# Patient Record
Sex: FEMALE | Race: WHITE | NOT HISPANIC OR LATINO | Employment: OTHER | URBAN - METROPOLITAN AREA
[De-identification: names, ages, dates, MRNs, and addresses within clinical notes are randomized per-mention and may not be internally consistent; named-entity substitution may affect disease eponyms.]

---

## 2021-04-21 NOTE — PATIENT INSTRUCTIONS
Preventive Health Recommendations    See your health care provider every year to    Review health changes.     Discuss preventive care.      Review your medicines if your doctor has prescribed any.      You no longer need a yearly Pap test unless you've had an abnormal Pap test in the past 10 years. If you have vaginal symptoms, such as bleeding or discharge, be sure to talk with your provider about a Pap test.      Every 1 to 2 years, have a mammogram.  If you are over 69, talk with your health care provider about whether or not you want to continue having screening mammograms.      Every 10 years, have a colonoscopy. Or, have a yearly FIT test (stool test). These exams will check for colon cancer.       Have a cholesterol test every 5 years, or more often if your doctor advises it.       Have a diabetes test (fasting glucose) every three years. If you are at risk for diabetes, you should have this test more often.       At age 65, have a bone density scan (DEXA) to check for osteoporosis (brittle bone disease).    Shots:    Get a flu shot each year.    Get a tetanus shot every 10 years.    Talk to your doctor about your pneumonia vaccines. There are now two you should receive - Pneumovax (PPSV 23) and Prevnar (PCV 13).    Talk to your pharmacist about the shingles vaccine.    Talk to your doctor about the hepatitis B vaccine.    Nutrition:     Eat at least 5 servings of fruits and vegetables each day.      Eat whole-grain bread, whole-wheat pasta and brown rice instead of white grains and rice.      Get adequate about Calcium and Vitamin D.     Lifestyle    Exercise at least 150 minutes a week (30 minutes a day, 5 days a week). This will help you control your weight and prevent disease.      Limit alcohol to one drink per day.      No smoking.       Wear sunscreen to prevent skin cancer.       See your dentist twice a year for an exam and cleaning.      See your eye doctor every 1 to 2 years to screen for  conditions such as glaucoma, macular degeneration, cataracts, etc.    Personalized Prevention Plan  You are due for the preventive services outlined below.  Your care team is available to assist you in scheduling these services.  If you have already completed any of these items, please share that information with your care team to update in your medical record.    There are no preventive care reminders to display for this patient.   Assessment & Plan   Problem List Items Addressed This Visit        Endocrine    Other hyperlipidemia    Relevant Medications    simvastatin (ZOCOR) 40 MG tablet    Other Relevant Orders    Lipid Panel (BFP)    VENOUS COLLECTION (Completed)       Circulatory    Essential hypertension - Primary    Relevant Medications    amLODIPine (NORVASC) 5 MG tablet    doxazosin (CARDURA) 2 MG tablet    hydrochlorothiazide (HYDRODIURIL) 25 MG tablet    Other Relevant Orders    Comprehensive Metobolic Panel (BFP)       Behavioral    Personal history of tobacco use, presenting hazards to health       Other    Memory changes    Relevant Medications    donepezil (ARICEPT) 10 MG tablet    Other Relevant Orders    NEUROLOGY ADULT REFERRAL         1. Essential hypertension  Controlled, check labs, continue medications.  - amLODIPine (NORVASC) 5 MG tablet; Take 1 tablet (5 mg) by mouth daily  Dispense: 90 tablet; Refill: 1  - doxazosin (CARDURA) 2 MG tablet; Take 1 tablet (2 mg) by mouth At Bedtime  Dispense: 90 tablet; Refill: 1  - hydrochlorothiazide (HYDRODIURIL) 25 MG tablet; Take 1 tablet (25 mg) by mouth daily  Dispense: 90 tablet; Refill: 1  - Comprehensive Metobolic Panel (BFP)    2. Other hyperlipidemia  Check labs, continue medications.  - Lipid Panel (BFP)  - simvastatin (ZOCOR) 40 MG tablet; Take 1 tablet (40 mg) by mouth At Bedtime  Dispense: 90 tablet; Refill: 1  - VENOUS COLLECTION    3. Memory changes  Refilled aricept, referral to follow with neurology.  - donepezil (ARICEPT) 10 MG tablet;  Take 1 tablet (10 mg) by mouth At Bedtime  Dispense: 90 tablet; Refill: 0  - NEUROLOGY ADULT REFERRAL    4. Personal history of tobacco use, presenting hazards to health  Quit 6-7 years ago             FUTURE APPOINTMENTS:       - Follow-up visit in 3-6 months    No follow-ups on file.    Tatianna Swain MD  [facility]

## 2021-04-21 NOTE — PROGRESS NOTES
Assessment & Plan   Problem List Items Addressed This Visit        Endocrine    Other hyperlipidemia    Relevant Medications    simvastatin (ZOCOR) 40 MG tablet    Other Relevant Orders    Lipid Panel (BFP)    VENOUS COLLECTION (Completed)       Circulatory    Essential hypertension - Primary    Relevant Medications    amLODIPine (NORVASC) 5 MG tablet    doxazosin (CARDURA) 2 MG tablet    hydrochlorothiazide (HYDRODIURIL) 25 MG tablet    Other Relevant Orders    Comprehensive Metobolic Panel (BFP)       Behavioral    Personal history of tobacco use, presenting hazards to health       Other    Memory changes    Relevant Medications    donepezil (ARICEPT) 10 MG tablet    Other Relevant Orders    NEUROLOGY ADULT REFERRAL         1. Essential hypertension  Controlled, check labs, continue medications.  - amLODIPine (NORVASC) 5 MG tablet; Take 1 tablet (5 mg) by mouth daily  Dispense: 90 tablet; Refill: 1  - doxazosin (CARDURA) 2 MG tablet; Take 1 tablet (2 mg) by mouth At Bedtime  Dispense: 90 tablet; Refill: 1  - hydrochlorothiazide (HYDRODIURIL) 25 MG tablet; Take 1 tablet (25 mg) by mouth daily  Dispense: 90 tablet; Refill: 1  - Comprehensive Metobolic Panel (BFP)    2. Other hyperlipidemia  Check labs, continue medications.  - Lipid Panel (BFP)  - simvastatin (ZOCOR) 40 MG tablet; Take 1 tablet (40 mg) by mouth At Bedtime  Dispense: 90 tablet; Refill: 1  - VENOUS COLLECTION    3. Memory changes  Refilled aricept, referral to follow with neurology.  - donepezil (ARICEPT) 10 MG tablet; Take 1 tablet (10 mg) by mouth At Bedtime  Dispense: 90 tablet; Refill: 0  - NEUROLOGY ADULT REFERRAL    4. Personal history of tobacco use, presenting hazards to health  Quit 6-7 years ago             FUTURE APPOINTMENTS:       - Follow-up visit in 3-6 months    No follow-ups on file.    Tatianna Swain MD  [facility]    Subjective     Nursing Notes:   Caren Adames CMA  4/23/2021  9:17 AM  Signed  Pat is here to  "establish care and go over meds    Pre-visit Screening:  Immunizations:  up to date  Colonoscopy:  NA d/t age  Mammogram: NA d/t age  Asthma Action Test/Plan:  NA  PHQ9:  Done today  GAD7:  Done today  Questioned patient about current smoking habits Pt. quit smoking some time ago.  Ok to leave detailed message on voice mail for today's visit only Yes, phone # [phone]           Ilda Nassar is a 88 year old female who presents to clinic today for the following health issues   HPI         Review of Systems   Constitutional, HEENT, cardiovascular, pulmonary, gi and gu systems are negative, except as otherwise noted.      Objective    /68 (BP Location: Left arm, Patient Position: Sitting, Cuff Size: Adult Regular)   Pulse 57   Temp 98.2  F (36.8  C) (Oral)   Ht 1.626 m (5' 4\")   Wt 47.7 kg (105 lb 3.2 oz)   LMP  (LMP Unknown)   SpO2 96%   BMI 18.06 kg/m    Body mass index is 18.06 kg/m .  Physical Exam   GENERAL: healthy, alert and no distress  NECK: no adenopathy, no asymmetry, masses, or scars and thyroid normal to palpation  RESP: lungs clear to auscultation - no rales, rhonchi or wheezes  CV: regular rate and rhythm, normal S1 S2, no S3 or S4, no murmur, click or rub, no peripheral edema and peripheral pulses strong  ABDOMEN: soft, nontender, no hepatosplenomegaly, no masses and bowel sounds normal  MS: no gross musculoskeletal defects noted, no edema  NEURO: Normal strength and tone, mentation intact and speech normal  PSYCH: mentation appears normal, affect normal/bright          "

## 2021-04-23 ENCOUNTER — OFFICE VISIT (OUTPATIENT)
Dept: FAMILY MEDICINE | Facility: CLINIC | Age: 86
End: 2021-04-23

## 2021-04-23 ENCOUNTER — TRANSFERRED RECORDS (OUTPATIENT)
Dept: FAMILY MEDICINE | Facility: CLINIC | Age: 86
End: 2021-04-23

## 2021-04-23 VITALS
HEART RATE: 57 BPM | HEIGHT: 64 IN | WEIGHT: 105.2 LBS | TEMPERATURE: 98.2 F | OXYGEN SATURATION: 96 % | DIASTOLIC BLOOD PRESSURE: 68 MMHG | BODY MASS INDEX: 17.96 KG/M2 | SYSTOLIC BLOOD PRESSURE: 110 MMHG

## 2021-04-23 DIAGNOSIS — I10 ESSENTIAL HYPERTENSION: Primary | ICD-10-CM

## 2021-04-23 DIAGNOSIS — Z87.891 PERSONAL HISTORY OF TOBACCO USE, PRESENTING HAZARDS TO HEALTH: ICD-10-CM

## 2021-04-23 DIAGNOSIS — R41.3 MEMORY CHANGES: ICD-10-CM

## 2021-04-23 DIAGNOSIS — E78.49 OTHER HYPERLIPIDEMIA: ICD-10-CM

## 2021-04-23 PROBLEM — Z71.89 ACP (ADVANCE CARE PLANNING): Status: ACTIVE | Noted: 2021-04-23

## 2021-04-23 PROBLEM — Z76.89 HEALTH CARE HOME: Status: ACTIVE | Noted: 2021-04-23

## 2021-04-23 LAB
ALBUMIN SERPL-MCNC: 4.2 G/DL (ref 3.6–5.1)
ALBUMIN/GLOB SERPL: 1.7 {RATIO} (ref 1–2.5)
ALP SERPL-CCNC: 74 U/L (ref 33–130)
ALT 1742-6: 16 U/L (ref 0–32)
AST 1920-8: 19 U/L (ref 0–35)
BILIRUB SERPL-MCNC: 1.7 MG/DL (ref 0.2–1.2)
BUN SERPL-MCNC: 16 MG/DL (ref 7–25)
BUN/CREATININE RATIO: 17 (ref 6–22)
CALCIUM SERPL-MCNC: 10 MG/DL (ref 8.6–10.3)
CHLORIDE SERPLBLD-SCNC: 96.5 MMOL/L (ref 98–110)
CHOLEST SERPL-MCNC: 190 MG/DL (ref 0–199)
CHOLEST/HDLC SERPL: 2 {RATIO} (ref 0–5)
CO2 SERPL-SCNC: 32.5 MMOL/L (ref 20–32)
CREAT SERPL-MCNC: 0.94 MG/DL (ref 0.6–1.3)
GLOBULIN, CALCULATED - QUEST: 2.5 (ref 1.9–3.7)
GLUCOSE SERPL-MCNC: 107 MG/DL (ref 60–99)
HDLC SERPL-MCNC: 79 MG/DL (ref 40–150)
LDLC SERPL CALC-MCNC: 92 MG/DL (ref 0–130)
POTASSIUM SERPL-SCNC: 3.85 MMOL/L (ref 3.5–5.3)
PROT SERPL-MCNC: 6.7 G/DL (ref 6.1–8.1)
SODIUM SERPL-SCNC: 138.9 MMOL/L (ref 135–146)
TRIGL SERPL-MCNC: 96 MG/DL (ref 0–149)

## 2021-04-23 PROCEDURE — 36415 COLL VENOUS BLD VENIPUNCTURE: CPT | Performed by: FAMILY MEDICINE

## 2021-04-23 PROCEDURE — 80061 LIPID PANEL: CPT | Performed by: FAMILY MEDICINE

## 2021-04-23 PROCEDURE — 80053 COMPREHEN METABOLIC PANEL: CPT | Performed by: FAMILY MEDICINE

## 2021-04-23 PROCEDURE — 99203 OFFICE O/P NEW LOW 30 MIN: CPT | Performed by: FAMILY MEDICINE

## 2021-04-23 RX ORDER — HYDROCHLOROTHIAZIDE 25 MG/1
25 TABLET ORAL DAILY
COMMUNITY
End: 2021-04-23

## 2021-04-23 RX ORDER — AMLODIPINE BESYLATE 5 MG/1
5 TABLET ORAL DAILY
Qty: 90 TABLET | Refills: 1 | Status: SHIPPED | OUTPATIENT
Start: 2021-04-23 | End: 2021-11-10

## 2021-04-23 RX ORDER — DONEPEZIL HYDROCHLORIDE 10 MG/1
10 TABLET, FILM COATED ORAL AT BEDTIME
Qty: 90 TABLET | Refills: 0 | Status: SHIPPED | OUTPATIENT
Start: 2021-04-23 | End: 2021-09-21

## 2021-04-23 RX ORDER — SIMVASTATIN 40 MG
40 TABLET ORAL AT BEDTIME
Qty: 90 TABLET | Refills: 1 | Status: SHIPPED | OUTPATIENT
Start: 2021-04-23 | End: 2021-11-10

## 2021-04-23 RX ORDER — AMLODIPINE BESYLATE 5 MG/1
5 TABLET ORAL DAILY
COMMUNITY
End: 2021-04-23

## 2021-04-23 RX ORDER — DONEPEZIL HYDROCHLORIDE 10 MG/1
10 TABLET, FILM COATED ORAL AT BEDTIME
COMMUNITY
End: 2021-04-23

## 2021-04-23 RX ORDER — DOXAZOSIN 2 MG/1
2 TABLET ORAL AT BEDTIME
COMMUNITY
End: 2021-04-23

## 2021-04-23 RX ORDER — HYDROCHLOROTHIAZIDE 25 MG/1
25 TABLET ORAL DAILY
Qty: 90 TABLET | Refills: 1 | Status: SHIPPED | OUTPATIENT
Start: 2021-04-23 | End: 2021-11-10

## 2021-04-23 RX ORDER — SIMVASTATIN 40 MG
40 TABLET ORAL AT BEDTIME
COMMUNITY
End: 2021-04-23

## 2021-04-23 RX ORDER — DOXAZOSIN 2 MG/1
2 TABLET ORAL AT BEDTIME
Qty: 90 TABLET | Refills: 1 | Status: SHIPPED | OUTPATIENT
Start: 2021-04-23 | End: 2021-11-10

## 2021-04-23 SDOH — HEALTH STABILITY: MENTAL HEALTH: HOW OFTEN DO YOU HAVE 6 OR MORE DRINKS ON ONE OCCASION?: NOT ASKED

## 2021-04-23 SDOH — HEALTH STABILITY: MENTAL HEALTH: HOW MANY STANDARD DRINKS CONTAINING ALCOHOL DO YOU HAVE ON A TYPICAL DAY?: 1 OR 2

## 2021-04-23 SDOH — HEALTH STABILITY: MENTAL HEALTH: HOW OFTEN DO YOU HAVE A DRINK CONTAINING ALCOHOL?: 2-3 TIMES A WEEK

## 2021-04-23 ASSESSMENT — MIFFLIN-ST. JEOR: SCORE: 892.18

## 2021-04-23 ASSESSMENT — ANXIETY QUESTIONNAIRES
5. BEING SO RESTLESS THAT IT IS HARD TO SIT STILL: NOT AT ALL
1. FEELING NERVOUS, ANXIOUS, OR ON EDGE: NOT AT ALL
GAD7 TOTAL SCORE: 0
2. NOT BEING ABLE TO STOP OR CONTROL WORRYING: NOT AT ALL
IF YOU CHECKED OFF ANY PROBLEMS ON THIS QUESTIONNAIRE, HOW DIFFICULT HAVE THESE PROBLEMS MADE IT FOR YOU TO DO YOUR WORK, TAKE CARE OF THINGS AT HOME, OR GET ALONG WITH OTHER PEOPLE: NOT DIFFICULT AT ALL
6. BECOMING EASILY ANNOYED OR IRRITABLE: NOT AT ALL
3. WORRYING TOO MUCH ABOUT DIFFERENT THINGS: NOT AT ALL
7. FEELING AFRAID AS IF SOMETHING AWFUL MIGHT HAPPEN: NOT AT ALL

## 2021-04-23 ASSESSMENT — PATIENT HEALTH QUESTIONNAIRE - PHQ9
SUM OF ALL RESPONSES TO PHQ QUESTIONS 1-9: 0
5. POOR APPETITE OR OVEREATING: NOT AT ALL

## 2021-04-23 NOTE — NURSING NOTE
Pat is here to establish care and go over meds    Pre-visit Screening:  Immunizations:  up to date  Colonoscopy:  NA d/t age  Mammogram: NA d/t age  Asthma Action Test/Plan:  NA  PHQ9:  Done today  GAD7:  Done today  Questioned patient about current smoking habits Pt. quit smoking some time ago.  Ok to leave detailed message on voice mail for today's visit only Yes, phone # [phone]

## 2021-04-24 ASSESSMENT — ANXIETY QUESTIONNAIRES: GAD7 TOTAL SCORE: 0

## 2021-05-01 ENCOUNTER — HEALTH MAINTENANCE LETTER (OUTPATIENT)
Age: 86
End: 2021-05-01

## 2021-05-11 ENCOUNTER — HOSPITAL ENCOUNTER (OUTPATIENT)
Dept: CT IMAGING | Facility: CLINIC | Age: 86
Discharge: HOME | End: 2021-05-11
Attending: PSYCHIATRY & NEUROLOGY | Admitting: PSYCHIATRY & NEUROLOGY
Payer: MEDICARE

## 2021-05-11 DIAGNOSIS — R41.3 MEMORY LOSS: ICD-10-CM

## 2021-05-11 DIAGNOSIS — E53.8 VITAMIN B12 DEFICIENCY (NON ANEMIC): ICD-10-CM

## 2021-05-11 DIAGNOSIS — R53.83 FATIGUE: Primary | ICD-10-CM

## 2021-05-11 LAB
% GRANULOCYTES: 60.3 %
HCT VFR BLD AUTO: 45.4 % (ref 35–47)
HEMOGLOBIN: 14.4 G/DL (ref 11.7–15.7)
LYMPHOCYTES NFR BLD AUTO: 26 %
MCH RBC QN AUTO: 29.5 PG (ref 26–33)
MCHC RBC AUTO-ENTMCNC: 31.7 G/DL (ref 31–36)
MCV RBC AUTO: 93.1 FL (ref 78–100)
MONOCYTES NFR BLD AUTO: 13.7 %
PLATELET COUNT - QUEST: 217 10^9/L (ref 150–375)
RBC # BLD AUTO: 4.88 10*12/L (ref 3.8–5.2)
WBC # BLD AUTO: 5 10*9/L (ref 4–11)

## 2021-05-11 PROCEDURE — 70450 CT HEAD/BRAIN W/O DYE: CPT

## 2021-05-11 PROCEDURE — 36415 COLL VENOUS BLD VENIPUNCTURE: CPT | Performed by: FAMILY MEDICINE

## 2021-05-11 PROCEDURE — 85025 COMPLETE CBC W/AUTO DIFF WBC: CPT | Performed by: FAMILY MEDICINE

## 2021-05-11 NOTE — NURSING NOTE
Lab only non fasting     Orders from     [facility]  Dr. Akosua Casas  Fax [phone]

## 2021-05-12 LAB
TSH SERPL-ACNC: 1.36 MIU/L (ref 0.4–4.5)
VIT B12 SERPL-MCNC: 399 PG/ML (ref 200–1100)

## 2021-05-26 ENCOUNTER — DOCUMENTATION ONLY (OUTPATIENT)
Dept: OTHER | Facility: CLINIC | Age: 86
End: 2021-05-26

## 2021-05-26 PROBLEM — Z71.89 ACP (ADVANCE CARE PLANNING): Status: RESOLVED | Noted: 2021-04-23 | Resolved: 2021-05-26

## 2021-07-09 ENCOUNTER — APPOINTMENT (OUTPATIENT)
Dept: GENERAL RADIOLOGY | Facility: CLINIC | Age: 86
End: 2021-07-09
Attending: EMERGENCY MEDICINE
Payer: MEDICARE

## 2021-07-09 ENCOUNTER — HOSPITAL ENCOUNTER (EMERGENCY)
Facility: CLINIC | Age: 86
Discharge: HOME | End: 2021-07-09
Attending: EMERGENCY MEDICINE | Admitting: EMERGENCY MEDICINE
Payer: MEDICARE

## 2021-07-09 VITALS
TEMPERATURE: 98 F | OXYGEN SATURATION: 85 % | RESPIRATION RATE: 18 BRPM | SYSTOLIC BLOOD PRESSURE: 119 MMHG | HEART RATE: 80 BPM | DIASTOLIC BLOOD PRESSURE: 91 MMHG

## 2021-07-09 DIAGNOSIS — M25.512 ACUTE PAIN OF LEFT SHOULDER: ICD-10-CM

## 2021-07-09 DIAGNOSIS — I48.91 ATRIAL FIBRILLATION, UNSPECIFIED TYPE (H): ICD-10-CM

## 2021-07-09 LAB
ANION GAP SERPL CALCULATED.3IONS-SCNC: 8 MMOL/L (ref 3–14)
BASOPHILS # BLD AUTO: 0 10E9/L (ref 0–0.2)
BASOPHILS NFR BLD AUTO: 0.6 %
BUN SERPL-MCNC: 17 MG/DL (ref 7–30)
CALCIUM SERPL-MCNC: 9.5 MG/DL (ref 8.5–10.1)
CHLORIDE SERPL-SCNC: 96 MMOL/L (ref 94–109)
CO2 SERPL-SCNC: 31 MMOL/L (ref 20–32)
CREAT SERPL-MCNC: 0.84 MG/DL (ref 0.52–1.04)
DIFFERENTIAL METHOD BLD: NORMAL
EOSINOPHIL # BLD AUTO: 0.1 10E9/L (ref 0–0.7)
EOSINOPHIL NFR BLD AUTO: 2.7 %
ERYTHROCYTE [DISTWIDTH] IN BLOOD BY AUTOMATED COUNT: 12.8 % (ref 10–15)
GFR SERPL CREATININE-BSD FRML MDRD: 61 ML/MIN/{1.73_M2}
GLUCOSE SERPL-MCNC: 112 MG/DL (ref 70–99)
HCT VFR BLD AUTO: 40.7 % (ref 35–47)
HGB BLD-MCNC: 13.5 G/DL (ref 11.7–15.7)
IMM GRANULOCYTES # BLD: 0 10E9/L (ref 0–0.4)
IMM GRANULOCYTES NFR BLD: 0.4 %
INTERPRETATION ECG - MUSE: NORMAL
INTERPRETATION ECG - MUSE: NORMAL
LYMPHOCYTES # BLD AUTO: 0.9 10E9/L (ref 0.8–5.3)
LYMPHOCYTES NFR BLD AUTO: 17.9 %
MCH RBC QN AUTO: 30.4 PG (ref 26.5–33)
MCHC RBC AUTO-ENTMCNC: 33.2 G/DL (ref 31.5–36.5)
MCV RBC AUTO: 92 FL (ref 78–100)
MONOCYTES # BLD AUTO: 0.8 10E9/L (ref 0–1.3)
MONOCYTES NFR BLD AUTO: 17.3 %
NEUTROPHILS # BLD AUTO: 2.9 10E9/L (ref 1.6–8.3)
NEUTROPHILS NFR BLD AUTO: 61.1 %
NRBC # BLD AUTO: 0 10*3/UL
NRBC BLD AUTO-RTO: 0 /100
PLATELET # BLD AUTO: 176 10E9/L (ref 150–450)
POTASSIUM SERPL-SCNC: 3.1 MMOL/L (ref 3.4–5.3)
RBC # BLD AUTO: 4.44 10E12/L (ref 3.8–5.2)
SODIUM SERPL-SCNC: 135 MMOL/L (ref 133–144)
TROPONIN I SERPL-MCNC: <0.015 UG/L (ref 0–0.04)
TROPONIN I SERPL-MCNC: <0.015 UG/L (ref 0–0.04)
WBC # BLD AUTO: 4.7 10E9/L (ref 4–11)

## 2021-07-09 PROCEDURE — 93005 ELECTROCARDIOGRAM TRACING: CPT | Mod: 76

## 2021-07-09 PROCEDURE — 99285 EMERGENCY DEPT VISIT HI MDM: CPT | Mod: 25

## 2021-07-09 PROCEDURE — 73030 X-RAY EXAM OF SHOULDER: CPT | Mod: LT

## 2021-07-09 PROCEDURE — 93005 ELECTROCARDIOGRAM TRACING: CPT

## 2021-07-09 PROCEDURE — 85025 COMPLETE CBC W/AUTO DIFF WBC: CPT | Performed by: EMERGENCY MEDICINE

## 2021-07-09 PROCEDURE — 73060 X-RAY EXAM OF HUMERUS: CPT | Mod: LT

## 2021-07-09 PROCEDURE — 36415 COLL VENOUS BLD VENIPUNCTURE: CPT | Performed by: EMERGENCY MEDICINE

## 2021-07-09 PROCEDURE — 84484 ASSAY OF TROPONIN QUANT: CPT | Performed by: EMERGENCY MEDICINE

## 2021-07-09 PROCEDURE — 80048 BASIC METABOLIC PNL TOTAL CA: CPT | Performed by: EMERGENCY MEDICINE

## 2021-07-09 PROCEDURE — 250N000013 HC RX MED GY IP 250 OP 250 PS 637: Performed by: EMERGENCY MEDICINE

## 2021-07-09 PROCEDURE — 71046 X-RAY EXAM CHEST 2 VIEWS: CPT

## 2021-07-09 RX ORDER — LIDOCAINE 40 MG/G
CREAM TOPICAL
Status: DISCONTINUED | OUTPATIENT
Start: 2021-07-09 | End: 2021-07-09 | Stop reason: HOSPADM

## 2021-07-09 RX ORDER — ACETAMINOPHEN 500 MG
500 TABLET ORAL EVERY 4 HOURS PRN
Status: DISCONTINUED | OUTPATIENT
Start: 2021-07-09 | End: 2021-07-09 | Stop reason: HOSPADM

## 2021-07-09 RX ADMIN — ACETAMINOPHEN 500 MG: 500 TABLET, FILM COATED ORAL at 09:12

## 2021-07-09 ASSESSMENT — ENCOUNTER SYMPTOMS
SHORTNESS OF BREATH: 0
ARTHRALGIAS: 1

## 2021-07-09 NOTE — ED TRIAGE NOTES
Pt brought in by EMS for left arm pain starting at elbow when she woke up this morning. Denies falling.

## 2021-07-09 NOTE — ED NOTES
Pt reports she lives at a hotel and doesn't have a ride home. Looked up address [facility].  Spoke with staff who are able to help her to her apartment.

## 2021-07-09 NOTE — DISCHARGE INSTRUCTIONS
Discharge Instructions  Palpitations    Palpitations are an unusual awareness of your heartbeat. People often describe this as the heart skipping, fluttering, racing, irregular, or pounding. At this time, your doctor has found no signs that your palpitations are due to a serious or life-threatening condition. However, sometimes there is a serious problem that does not show up right away. It is important that you follow up with your doctor within 1 week, or as directed by your doctor today, to check for other serious problems. You may need more blood tests, a stress test, heart monitoring, or other tests.    Palpitations can be caused by caffeine, cigarettes, diet pills, energy drinks or supplements, other stimulants, and medications and street drugs. They can also be caused by anxiety, hormone conditions such as high thyroid, and other medical conditions. Sometimes they are a sign of abnormal rhythm in the heart, so you may need your heart checked.    Return to the Emergency Department if:  You get chest pain or tightness.  You are short of breath.  You get very weak or tired.  You pass out or faint.  Your heart rate is over 120 beats per minute for more than 10 minutes while you are resting.  You have any new symptoms, like fever, cough, numb legs, or you cough up blood.  You have anything else that worries you.    What can I do to help myself?  Fill any prescriptions the doctor gave you and take them right away.   Follow your doctor s instructions about the prescription medicines you are on. Sometimes the doctor may tell you to stop taking a medicine or change the dose.  If you smoke, this may be a good time to quit! The less you can smoke, the better.  Do not use energy drinks, diet pills, or stimulants. Limit your use of caffeine.    Follow up with your doctor:  Within 1 week, or sooner if instructed.  If you keep having palpitations.  If you need help to quit smoking.  If you were given a prescription for  medicine here today, be sure to read all of the information (including the package insert) that comes with your prescription.  This will include important information about the medicine, its side effects, and any warnings that you need to know about.  The pharmacist who fills the prescription can provide more information and answer questions you may have about the medicine.  If you have questions or concerns that the pharmacist cannot address, please call or return to the Emergency Department.         Opioid Medication Information    Pain medications are among the most commonly prescribed medicines, so we are including this information for all our patients. If you did not receive pain medication or get a prescription for pain medicine, you can ignore it.     You may have been given a prescription for an opioid (narcotic) pain medicine and/or have received a pain medicine while here in the Emergency Department. These medicines can make you drowsy or impaired. You must not drive, operate dangerous equipment, or engage in any other dangerous activities while taking these medications. If you drive while taking these medications, you could be arrested for DUI, or driving under the influence. Do not drink any alcohol while you are taking these medications.     Opioid pain medications can cause addiction. If you have a history of chemical dependency of any type, you are at a higher risk of becoming addicted to pain medications.  Only take these prescribed medications to treat your pain when all other options have been tried. Take it for as short a time and as few doses as possible. Store your pain pills in a secure place, as they are frequently stolen and provide a dangerous opportunity for children or visitors in your house to start abusing these powerful medications. We will not replace any lost or stolen medicine.  As soon as your pain is better, you should flush all your remaining medication.     Many prescription pain  medications contain Tylenol  (acetaminophen), including Vicodin , Tylenol #3 , Norco , Lortab , and Percocet .  You should not take any extra pills of Tylenol  if you are using these prescription medications or you can get very sick.  Do not ever take more than 3000 mg of acetaminophen in any 24 hour period.    All opioids tend to cause constipation. Drink plenty of water and eat foods that have a lot of fiber, such as fruits, vegetables, prune juice, apple juice and high fiber cereal.  Take a laxative if you don t move your bowels at least every other day. Miralax , Milk of Magnesia, Colace , or Senna  can be used to keep you regular.      Remember that you can always come back to the Emergency Department if you are not able to see your regular doctor in the amount of time listed above, if you get any new symptoms, or if there is anything that worries you.    Discharge Instructions  Splint Care    You had a splint put on today to help protect your injury and help it heal.  Splints are used to treat things like strains, sprains, cuts and fractures (broken bones).    Be sure your splint is not too tight!  If you splint is too tight, it may cause loss of blood supply.  Signs of your splint being too tight include:  your arm or leg hurting a lot more; your fingers or toes getting numb, cold, pale or blue; or your child is crying, fussing or seeming restless.    Return to the Emergency Department right away if:  You have increased pain or pressure around the injury.  You have numbness, tingling, or cool, pale, or blue toes or fingers past the injury.  Your child is more fussy than normal, crying a lot, or restless.  Your splint becomes soft, breaks, or is wet.  Your splint begins to smell bad.  Your splint is cutting into your skin.    Home care:  Keep the injured area above the level of your heart while laying or sitting down.  This will help decrease the swelling and the pain.  Keep the splint dry.  Do not put objects  down or inside the splint.  If there is an elastic bandage (Ace  wrap) holding the splint on this may be loosened slightly to relieve pressure or pain.  If pain continues return to the Emergency Department right away.  Do not remove your splint by yourself unless told to by your doctor.    Follow-up:  Sometimes the splint put on in the Emergency Department needs to be changed once the swelling has gone down and a more permanent cast needs to be placed.  This is usually done by a bone specialist doctor (Orthopedist).  Follow the instructions given to you by your doctor today.    X-rays:  X-rays done today were read by your doctor but will also be read by a radiologist.  We will contact you if the radiologist sees anything different on the x-ray.  Your regular doctor may also want to review your x-rays on follow-up.    You could have a fracture (break), even if we told you your x-rays were normal. X-rays are not always certain, and some fractures are hard to see and may not show up right away.  Also, your x-ray may look like you have a fracture, even though you do not.  It is important to follow-up with your regular doctor.     If you were given a prescription for medicine here today, be sure to read all of the information (including the package insert) that comes with your prescription.  This will include important information about the medicine, its side effects, and any warnings that you need to know about.  The pharmacist who fills the prescription can provide more information and answer questions you may have about the medicine.  If you have questions or concerns that the pharmacist cannot address, please call or return to the Emergency Department.   Opioid Medication Information    Pain medications are among the most commonly prescribed medicines, so we are including this information for all our patients. If you did not receive pain medication or get a prescription for pain medicine, you can ignore it.     You  may have been given a prescription for an opioid (narcotic) pain medicine and/or have received a pain medicine while here in the Emergency Department. These medicines can make you drowsy or impaired. You must not drive, operate dangerous equipment, or engage in any other dangerous activities while taking these medications. If you drive while taking these medications, you could be arrested for DUI, or driving under the influence. Do not drink any alcohol while you are taking these medications.     Opioid pain medications can cause addiction. If you have a history of chemical dependency of any type, you are at a higher risk of becoming addicted to pain medications.  Only take these prescribed medications to treat your pain when all other options have been tried. Take it for as short a time and as few doses as possible. Store your pain pills in a secure place, as they are frequently stolen and provide a dangerous opportunity for children or visitors in your house to start abusing these powerful medications. We will not replace any lost or stolen medicine.  As soon as your pain is better, you should flush all your remaining medication.     Many prescription pain medications contain Tylenol  (acetaminophen), including Vicodin , Tylenol #3 , Norco , Lortab , and Percocet .  You should not take any extra pills of Tylenol  if you are using these prescription medications or you can get very sick.  Do not ever take more than 3000 mg of acetaminophen in any 24 hour period.    All opioids tend to cause constipation. Drink plenty of water and eat foods that have a lot of fiber, such as fruits, vegetables, prune juice, apple juice and high fiber cereal.  Take a laxative if you don t move your bowels at least every other day. Miralax , Milk of Magnesia, Colace , or Senna  can be used to keep you regular.      Remember that you can always come back to the Emergency Department if you are not able to see your regular doctor in the  amount of time listed above, if you get any new symptoms, or if there is anything that worries you.

## 2021-07-09 NOTE — ED PROVIDER NOTES
"  History   Chief Complaint:  Arm Pain    The history is provided by the patient.      Ilda Nassar is a 88 year old female with history of hypertension, hyperlipidemia, and tobacco use who presents with left arm pain. History limited due to the patient's memory status.The patient reports waking up this morning with constant left arm pain, starting at the elbow and moving up into the shoulder. She describes the pain as \"very sore,\" and almost like an electric shock. Her pain is exacerbated by movement of the arm. Pat reports feeling fine before falling asleep last night. This has never happened before. She denies any shortness of breath, chest pain, or any recent falls.    Review of Systems   Respiratory: Negative for shortness of breath.    Cardiovascular: Negative for chest pain.   Musculoskeletal: Positive for arthralgias.   All other systems reviewed and are negative.        Allergies:  The patient has no known allergies.     Medications:  Norvasc  Aricept  Cardura  Hydrodiuril  Zocor    Past Medical History:    Hypertension  Hyperlipidemia  Memory changes  Tobacco use     Social History:  The patient presents to the ED alone.     Physical Exam     Physical Exam  General: The patient is alert, in no respiratory distress.    HENT: Mucous membranes moist.    Cardiovascular: Regular rate and rhythm. Good pulses in all four extremities. Normal capillary refill and skin turgor.     Respiratory: Lungs are clear. No nasal flaring. No retractions. No wheezing, no crackles.    Gastrointestinal: Abdomen soft. No guarding, no rebound. No palpable hernias.     Musculoskeletal: No gross deformity. Tender over her left shoulder. Slow movement of left upper extremity secondary to pain.     Skin: No rashes or petechiae. There is swelling of a superficial ulcer over the mid-clavical    Neurologic: The patient is alert and oriented to person. GCS 15. No testable cranial nerve deficit. Follows commands with clear and " appropriate speech. Gives appropriate answers, but slow to answer. Good strength in all extremities. No gross neurologic deficit. Gross sensation intact. Pupils are round and reactive. No meningismus.     Lymphatic: No cervical adenopathy. No lower extremity swelling. No swelling of the left upper extremity    Psychiatric: The patient is non-tearful.    Emergency Department Course   ECG  ECG taken at 0900, ECG read at 0907  A fib & flutter waves. ST & T wave abnormality, consider anterior ischemia. Abnormal ECG.    Rate 80 bpm. OR interval 172 ms. QRS duration 92 ms. QT/QTc 380/438 ms. P-R-T axes ** 55 148.     ECG  ECG taken at 1111, ECG read at 1114  Atrial flutter with variable AV block with premature ventricular or aberrantly conducted complexes. T wave abnormality.  Rate 80 bpm. OR interval * ms. QRS duration 84 ms. QT/QTc 384/442 ms. P-R-T axes 96 59 105.     Imaging:  XR shoulder left G/E 3 views  Glenohumeral osteoarthritis, which appears prominent. No  acute fracture identified.  As per radiology.    XR humerus G/E/2 views, left  Glenohumeral osteoarthritis, which appears prominent. No  acute fracture identified.  As per radiology.    XR chest PA & LAT  There are no acute infiltrates. The cardiac silhouette is  not enlarged. Pulmonary vasculature is unremarkable.   As per radiology.     Laboratory:  CBC: WBC 4.7, HGB 13.5,     BMP: potassium: 3.1(L), Glucose: 112(H) o/w WNL (Creatinine 0.84)     Troponin (Collected 0908): <0.015    Troponin (Collected 1125): <0.015    Emergency Department Course:    Reviewed:  I reviewed nursing notes, vitals and past medical history    Assessments:  0854 I obtained history and examined the patient as noted above.   1055 I rechecked the patient and explained findings.   1227 I rechecked the patient and explained findings.     Interventions:  0912 Tylenol 500 mg PO  1230 Sling placement     Disposition:  The patient was discharged to home.       Impression & Plan      Medical Decision Making:  The patient presents complaining of left arm pain starting this morning.  In someone of this age with high blood pressure hyperlipidemia tobacco use I was concerned about ACS however the patient has worse pain with flexion extension of her left arm and is focally tender in the region of the shoulder.  This is not what I would expect from referred pain and she also lacks associated symptoms.  The symptoms are not exertional she has no shortness of breath cough or chest related symptoms.  I did however consider dissection ACS PE, tissue infection dislocation amongst many issues.  The patient's description sounds like it may be due to a neuropathic type process.  Her x-rays show signs of significant arthritis but no fracture or dislocation.  The patient was placed in a sling on her follow-up with Ortho as an outpatient for likely shoulder impingement syndrome.  I did check serial enzymes which were normal.  Her EKG here showed A. fib with some a flutter.  There were no old to compare to and the patient does not recall this but with her memory issues I cannot verify that has not been there deviously.  The patient here is otherwise stable there is no signs of ischemia in her serial enzymes.  Her EKGs are reassuring there is no signs of rapid ventricular response.  I counted her CHADS2 score and started her on aspirin.  She will follow-up with her primary care doctor as well as orthopedics.  I do not think she has a joint infection or life-threatening process.  Symptoms to return for discussed.    Covid-19  Ilda Nassar was evaluated during a global COVID-19 pandemic, which necessitated consideration that the patient might be at risk for infection with the SARS-CoV-2 virus that causes COVID-19.   Applicable protocols for evaluation were followed during the patient's care.     Diagnosis:    ICD-10-CM    1. Acute pain of left shoulder  M25.512    2. Atrial fibrillation, unspecified type (H)   I48.91        Discharge Medications:  None    Scribe Disclosure:  I, Rajendra Cordobanahid, am serving as a scribe at 8:53 AM on 7/9/2021 to document services personally performed by Reyes Blancas MD based on my observations and the provider's statements to me.            Reyes Blancas MD  07/09/21 7344

## 2021-09-20 ENCOUNTER — APPOINTMENT (OUTPATIENT)
Dept: GENERAL RADIOLOGY | Facility: CLINIC | Age: 86
End: 2021-09-20
Attending: EMERGENCY MEDICINE
Payer: MEDICARE

## 2021-09-20 ENCOUNTER — HOSPITAL ENCOUNTER (EMERGENCY)
Facility: CLINIC | Age: 86
Discharge: HOME | End: 2021-09-20
Attending: EMERGENCY MEDICINE | Admitting: EMERGENCY MEDICINE
Payer: MEDICARE

## 2021-09-20 ENCOUNTER — APPOINTMENT (OUTPATIENT)
Dept: CT IMAGING | Facility: CLINIC | Age: 86
End: 2021-09-20
Attending: EMERGENCY MEDICINE
Payer: MEDICARE

## 2021-09-20 VITALS
OXYGEN SATURATION: 96 % | RESPIRATION RATE: 18 BRPM | TEMPERATURE: 97.9 F | BODY MASS INDEX: 16.27 KG/M2 | DIASTOLIC BLOOD PRESSURE: 83 MMHG | WEIGHT: 94.8 LBS | HEART RATE: 93 BPM | SYSTOLIC BLOOD PRESSURE: 140 MMHG
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
## 2021-09-20 DIAGNOSIS — I48.91 ATRIAL FIBRILLATION, UNSPECIFIED TYPE (H): ICD-10-CM

## 2021-09-20 LAB
ALBUMIN UR-MCNC: 20 MG/DL
ANION GAP SERPL CALCULATED.3IONS-SCNC: 6 MMOL/L (ref 3–14)
APPEARANCE UR: CLEAR
ATRIAL RATE - MUSE: 227 BPM
BASOPHILS # BLD AUTO: 0 10E3/UL (ref 0–0.2)
BASOPHILS NFR BLD AUTO: 0 %
BILIRUB UR QL STRIP: NEGATIVE
BUN SERPL-MCNC: 29 MG/DL (ref 7–30)
CALCIUM SERPL-MCNC: 9.1 MG/DL (ref 8.5–10.1)
CHLORIDE BLD-SCNC: 97 MMOL/L (ref 94–109)
CK SERPL-CCNC: 148 U/L (ref 30–225)
CO2 SERPL-SCNC: 32 MMOL/L (ref 20–32)
COLOR UR AUTO: YELLOW
CREAT SERPL-MCNC: 0.7 MG/DL (ref 0.52–1.04)
DIASTOLIC BLOOD PRESSURE - MUSE: NORMAL MMHG
EOSINOPHIL # BLD AUTO: 0 10E3/UL (ref 0–0.7)
EOSINOPHIL NFR BLD AUTO: 0 %
ERYTHROCYTE [DISTWIDTH] IN BLOOD BY AUTOMATED COUNT: 12.4 % (ref 10–15)
GFR SERPL CREATININE-BSD FRML MDRD: 77 ML/MIN/1.73M2
GLUCOSE BLD-MCNC: 113 MG/DL (ref 70–99)
GLUCOSE UR STRIP-MCNC: NEGATIVE MG/DL
HCT VFR BLD AUTO: 44 % (ref 35–47)
HGB BLD-MCNC: 14.7 G/DL (ref 11.7–15.7)
HGB UR QL STRIP: NEGATIVE
HOLD SPECIMEN: NORMAL
HOLD SPECIMEN: NORMAL
IMM GRANULOCYTES # BLD: 0 10E3/UL
IMM GRANULOCYTES NFR BLD: 0 %
INTERPRETATION ECG - MUSE: NORMAL
KETONES UR STRIP-MCNC: 10 MG/DL
LEUKOCYTE ESTERASE UR QL STRIP: NEGATIVE
LYMPHOCYTES # BLD AUTO: 0.5 10E3/UL (ref 0.8–5.3)
LYMPHOCYTES NFR BLD AUTO: 6 %
MCH RBC QN AUTO: 30.7 PG (ref 26.5–33)
MCHC RBC AUTO-ENTMCNC: 33.4 G/DL (ref 31.5–36.5)
MCV RBC AUTO: 92 FL (ref 78–100)
MONOCYTES # BLD AUTO: 1.1 10E3/UL (ref 0–1.3)
MONOCYTES NFR BLD AUTO: 12 %
MUCOUS THREADS #/AREA URNS LPF: PRESENT /LPF
NEUTROPHILS # BLD AUTO: 7.3 10E3/UL (ref 1.6–8.3)
NEUTROPHILS NFR BLD AUTO: 82 %
NITRATE UR QL: NEGATIVE
NRBC # BLD AUTO: 0 10E3/UL
NRBC BLD AUTO-RTO: 0 /100
P AXIS - MUSE: NORMAL DEGREES
PH UR STRIP: 5.5 [PH] (ref 5–7)
PLATELET # BLD AUTO: 233 10E3/UL (ref 150–450)
POTASSIUM BLD-SCNC: 3.3 MMOL/L (ref 3.4–5.3)
PR INTERVAL - MUSE: NORMAL MS
QRS DURATION - MUSE: 96 MS
QT - MUSE: 378 MS
QTC - MUSE: 462 MS
R AXIS - MUSE: 66 DEGREES
RBC # BLD AUTO: 4.79 10E6/UL (ref 3.8–5.2)
RBC URINE: 1 /HPF
SODIUM SERPL-SCNC: 135 MMOL/L (ref 133–144)
SP GR UR STRIP: 1.03 (ref 1–1.03)
SYSTOLIC BLOOD PRESSURE - MUSE: NORMAL MMHG
T AXIS - MUSE: 27 DEGREES
TROPONIN I SERPL-MCNC: <0.015 UG/L (ref 0–0.04)
UROBILINOGEN UR STRIP-MCNC: 2 MG/DL
VENTRICULAR RATE- MUSE: 90 BPM
WBC # BLD AUTO: 9 10E3/UL (ref 4–11)
WBC URINE: 1 /HPF

## 2021-09-20 PROCEDURE — 85025 COMPLETE CBC W/AUTO DIFF WBC: CPT | Performed by: EMERGENCY MEDICINE

## 2021-09-20 PROCEDURE — 84484 ASSAY OF TROPONIN QUANT: CPT | Performed by: EMERGENCY MEDICINE

## 2021-09-20 PROCEDURE — 82550 ASSAY OF CK (CPK): CPT | Performed by: EMERGENCY MEDICINE

## 2021-09-20 PROCEDURE — 80048 BASIC METABOLIC PNL TOTAL CA: CPT | Performed by: EMERGENCY MEDICINE

## 2021-09-20 PROCEDURE — 36415 COLL VENOUS BLD VENIPUNCTURE: CPT | Performed by: EMERGENCY MEDICINE

## 2021-09-20 PROCEDURE — 73502 X-RAY EXAM HIP UNI 2-3 VIEWS: CPT

## 2021-09-20 PROCEDURE — 93005 ELECTROCARDIOGRAM TRACING: CPT

## 2021-09-20 PROCEDURE — 70450 CT HEAD/BRAIN W/O DYE: CPT | Mod: ME

## 2021-09-20 PROCEDURE — 71046 X-RAY EXAM CHEST 2 VIEWS: CPT

## 2021-09-20 PROCEDURE — 81001 URINALYSIS AUTO W/SCOPE: CPT | Performed by: EMERGENCY MEDICINE

## 2021-09-20 PROCEDURE — 99285 EMERGENCY DEPT VISIT HI MDM: CPT | Mod: 25

## 2021-09-20 PROCEDURE — 73030 X-RAY EXAM OF SHOULDER: CPT | Mod: LT

## 2021-09-20 ASSESSMENT — ENCOUNTER SYMPTOMS
CHEST TIGHTNESS: 0
CONSTIPATION: 0
FEVER: 0
NAUSEA: 0
NECK PAIN: 0
HEADACHES: 0
DIARRHEA: 0
ABDOMINAL PAIN: 0
SHORTNESS OF BREATH: 0

## 2021-09-20 NOTE — ED PROVIDER NOTES
"  History     Chief Complaint:  Fall      HPI   Ilda Nassar is a 89 year old female who presents for evaluation after fall yesterday.  Patient does not recall circumstances of fall but can recall that she did not trip over anything.  Reports she was on the ground until this morning when a friend who she normally drinks coffee with every morning came to her door and she was able to call out to the friend to help her up.  Patient thinks that she was making her way to her couch and may have missed the couch causing her to fall.  She does not think she hit her head.  She denies any headache, neck pain, dizziness, chest pain, shortness of breath, abdominal pain, fevers, cough, urinary symptoms, diarrhea/constipation, nausea/vomiting.  Patient does report having a poor appetite over the last 2 to 3 days.  Per sister at bedside, patient has not taken her hyperlipidemia and hypertension medications for the past 2 days.  Patient lives independently in an apartment and does not use any assistive devices for ambulation.  Patient reports feeling \"stiff\" to the left shoulder.    Review of Systems   Constitutional: Negative for fever.   Respiratory: Negative for chest tightness and shortness of breath.    Cardiovascular: Negative for chest pain.   Gastrointestinal: Negative for abdominal pain, constipation, diarrhea and nausea.   Genitourinary: Negative.    Musculoskeletal: Negative for neck pain.   Neurological: Negative for headaches.   All other systems reviewed and are negative.        Allergies:  The patient has no known allergies.    Medications:    Norvasc  Aricept  Cardura  Hydrodiuril  Zocor    Past Medical History:    Memory changes  Other hyperlipidemia  Essential hypertension  Tobacco use   Atrial fibrillation    Social History:  Presents to ED alone via EMS    Physical Exam     Patient Vitals for the past 24 hrs:   BP Temp Temp src Pulse Resp SpO2 Weight   09/20/21 1203 -- -- -- -- -- -- 43 kg (94 lb 12.8 oz) "   09/20/21 1200 (!) 140/83 -- -- 93 -- -- --   09/20/21 1100 126/81 -- -- 98 -- 97 % --   09/20/21 1030 129/80 -- -- 90 -- 97 % --   09/20/21 1008 136/76 97.9  F (36.6  C) Oral 93 18 100 % --       Physical Exam  General: Alert, no acute distress; well appearing  Neuro:  PERRL.  EOMI.  Gait stable, no focal deficits; GCS 15  HEENT:  Atraumatic.  Moist mucous membranes.  Conjunctiva normal.   CV:  Irregular, no m/r/g, skin warm and well perfused  Pulm:  CTAB, no wheezes/ronchi/rales.  No acute distress, breathing comfortably  GI:  Soft, nontender, nondistended.  No rebound or guarding.  Normal bowel sounds  MSK:  No focal areas of edema, erythema; no cervical midline tenderness.  Mild left hip and left shoulder tenderness, no obvious extremity deformity.  Moving all extremities without pain.  Skin:  WWP, no rashes, no lower extremity edema, skin color normal, no diaphoresis  Psych:  Well-appearing, normal affect, regular speech    Emergency Department Course   ECG:  ECG taken at 1037, ECG read at 1041  Atrial fibrillation  T wave abnormality, consider lateral ischemia  Abnormal ECG   No significant change as compared to prior, dated 7/9/21.  Rate 90 bpm. IL interval * ms. QRS duration 96 ms. QT/QTc 378/462 ms. P-R-T axes * 66 27.     Imaging:  XR Shoulder Left G/E 3 Views       1.  Left shoulder negative for fracture or dislocation.          2.  Generalized demineralization.          3.  End-stage degenerative arthritis in the glenohumeral joint, with   bone-on-bone articulation and moderate sized marginal osteophytes.   As per radiology.     XR Pelvis and Hip Left 1 View        1.  Left hip and pelvis negative for acute fracture or joint   malalignment.           2.  Moderate degenerative disc and facet changes in the lower lumbar   spine. Mild partial joint space narrowing in both hips. Arterial   calcifications in the bilateral femoral arteries.   As per radiology.     Chest XR, PA & LAT       No radiographic  evidence of acute chest abnormality.   As per radiology.     Head CT w/o contrast       Age-related changes, as above, with no acute intracranial abnormality.   As per radiology.     Laboratory:  UA with microscopic: Ketones 10(A), Protein Albumin 20(A), Mucus: present(A) o/w WNL     Troponin (Collected 1028): <0.015    BMP: Potassium 3.3(L), Glucose 113(H), o/w WNL (Creatinine 0.70)     CK total: 148    CBC: WBC 9.0, HGB 14.7,        Procedures:  None    Emergency Department Course:    Reviewed:  I reviewed nursing notes, vitals, past history and care everywhere    Assessments:   I obtained history and examined the patient as noted above.   1242 I rechecked the patient and explained findings.     Consults:   None         Interventions:    Medications - No data to display    Disposition:  The patient was discharged to home.    Impression & Plan      Medical Decision Making:  Ilda Nassar is a 89 year old female with history of atrial fibrillation presents to the ER for evaluation of fall.  Please see above for details of HPI and exam.  Patient does not know the exact circumstances of her fall but denies any prodromal symptoms prior.  Here, she complains of left shoulder stiffness.  She is afebrile vitally stable.  GCS is 15.  Neurologic exam is nonfocal and not suggestive of stroke.  EKG shows known atrial fibrillation without any acute ST change concerning for ischemia or infarct.  Troponin is within normal limits.  No signs of severe electrolyte disturbance, anemia, UTI.  CT head negative for acute pathology.  Cervical spine is cleared clinically.  X-rays of the shoulder and left hip were obtained fortunately are negative for fracture dislocation.  The rest of her trauma head to toe exam was unremarkable.  Unclear cause for patient's fall at home but she was able to ambulate in the ER without any difficulty.  Patient reports that she may have just missed the couch and was unable to get up thereafter.   Given her overall well appearance and reassuring work-up thus far, I had a discussion with the patient and sister at bedside and with shared decision-making felt that she was safe to discharge home.  Discussed [id number] score with her and with shared decision making recommend that she continue with aspirin daily and she will follow up with her PCP regarding further discussion of her persistent atrial fibrillation.  Discussed the signs symptoms that should prompt her urgent return to the ER. Patient and sister comfortable with the plan.  All questions were answered and patient was discharged in stable condition.          Diagnosis:    ICD-10-CM    1. Fall in home, initial encounter  W19.XXXA     Y92.009    2. Atrial fibrillation, unspecified type (H)  I48.91        Discharge Medications:  New Prescriptions    No medications on file         Scribe Disclosure:  Kentrell EARL am serving as a scribe at 10:04 AM on 9/20/2021 to document services personally performed by Domingo Horton MD based on my observations and the provider's statements to me.          Domingo Horton MD  09/20/21 0987

## 2021-09-20 NOTE — ED TRIAGE NOTES
Patient presents to the ED following a fall last night. Patient is unsure what caused her to fall. States has not eaten in the past 1.5 days due to loss of appetite. Feels weak.

## 2021-09-20 NOTE — ED NOTES
Ambulated pt down ED hallway approximately 60 feet. Pt stated they did not feel dizzy or SOB during ambulation. Pt had a steady gait that is normal for them.

## 2021-09-21 ENCOUNTER — OFFICE VISIT (OUTPATIENT)
Dept: FAMILY MEDICINE | Facility: CLINIC | Age: 86
End: 2021-09-21

## 2021-09-21 ENCOUNTER — TELEPHONE (OUTPATIENT)
Dept: FAMILY MEDICINE | Facility: CLINIC | Age: 86
End: 2021-09-21

## 2021-09-21 ENCOUNTER — CARE COORDINATION (OUTPATIENT)
Dept: FAMILY MEDICINE | Facility: CLINIC | Age: 86
End: 2021-09-21

## 2021-09-21 VITALS
HEART RATE: 102 BPM | WEIGHT: 100 LBS | DIASTOLIC BLOOD PRESSURE: 78 MMHG | SYSTOLIC BLOOD PRESSURE: 136 MMHG | HEIGHT: 64 IN | OXYGEN SATURATION: 97 % | TEMPERATURE: 98.1 F | BODY MASS INDEX: 17.07 KG/M2

## 2021-09-21 DIAGNOSIS — R41.3 MEMORY CHANGES: ICD-10-CM

## 2021-09-21 DIAGNOSIS — I48.20 CHRONIC ATRIAL FIBRILLATION (H): ICD-10-CM

## 2021-09-21 DIAGNOSIS — I10 ESSENTIAL HYPERTENSION: ICD-10-CM

## 2021-09-21 DIAGNOSIS — Z91.81 AT HIGH RISK FOR FALLS: Primary | ICD-10-CM

## 2021-09-21 DIAGNOSIS — R94.5 ABNORMAL RESULTS OF LIVER FUNCTION STUDIES: ICD-10-CM

## 2021-09-21 DIAGNOSIS — E78.49 OTHER HYPERLIPIDEMIA: ICD-10-CM

## 2021-09-21 PROCEDURE — 36415 COLL VENOUS BLD VENIPUNCTURE: CPT | Performed by: FAMILY MEDICINE

## 2021-09-21 PROCEDURE — 99214 OFFICE O/P EST MOD 30 MIN: CPT | Performed by: FAMILY MEDICINE

## 2021-09-21 PROCEDURE — 80076 HEPATIC FUNCTION PANEL: CPT | Performed by: FAMILY MEDICINE

## 2021-09-21 ASSESSMENT — MIFFLIN-ST. JEOR: SCORE: 863.6

## 2021-09-21 NOTE — PROGRESS NOTES
Care Coordination Initial Assessment    The patient was seen at [facility] on 9/20/2021 for a fall at home. She was discharged with instructions to follow up closely with PCP.    PCP: Tatianna Swain    Referral Source:  ED/IP List    Utilization:   ED visits in last year: 2  Hospital Admits in last year: 0  Last PCP Appt.: 4/23/21  Specialist: Seeing orthopedics   Upcoming Appt.: Has appt with Dr. Swain today     Health Maintenance Reviewed: Yes    Current Medical Health Concerns:   Please see patients current problem list.     Patient/Caregiver Understanding: Yes     Medication Management:   Patient has understanding of regimen and is adherent:  Yes    Functional Status:   Independent with all ADL/IADL's    Current Behavioral Health Concerns:   No behavioral concerns    Patient/Caregiver Understanding:  Yes    Psychosocial:  No concerns    Gaps:    N/A    Resources Given:    N/A    Plan:   Patient is coming in for a follow up appt with Dr. Swain today.

## 2021-09-21 NOTE — TELEPHONE ENCOUNTER
I talked with patients sister, Jodi Trinidad ( [phone] ) regarding Cardiology Referral. Patient would like to see Dr. Claude Sheriff with     [facility] [address]  [address]  [place] [address]  [phone]    Jodi will call [facility] to make patients appointment with Asai Sheriff. Will call me back with appointment information.

## 2021-09-21 NOTE — PROGRESS NOTES
Assessment & Plan   Problem List Items Addressed This Visit        Endocrine    Other hyperlipidemia    Relevant Orders    VENOUS COLLECTION (Completed)    Hepatic Panel ([facility])       Circulatory    Essential hypertension       Other    Memory changes      Other Visit Diagnoses     At high risk for falls    -  Primary    Relevant Orders    Adult Cardiology Eval Referral    Chronic atrial fibrillation (H)        Relevant Orders    Adult Cardiology Eval Referral         1. At high risk for falls  Discussed risks.  - Adult Cardiology Eval Referral; Future    2. Essential hypertension  Controlled, continue medications. Sister will call the pharmacy and get the medications straightened out.    3. Other hyperlipidemia  Continue medications, check liver function.  - VENOUS COLLECTION  - Hepatic Panel ([facility])    4. Memory changes  She said she went to neurology. We will call to get notes. Reportedly they told her to go off the aricept. followup with them. Family has  A plan to check in on her more often.    5. Chronic atrial fibrillation (H)  Referral to cardiology, sister will take her to her family cardiologist. Discussed risks/benefits of blood thinner. Start now full aspirin and discuss further with cardiology. She is a fall risk.  - Adult Cardiology Eval Referral; Future         FUTURE APPOINTMENTS:       - Follow-up visit in 3 months.    No follow-ups on file.    Tatianna Swain MD  [facility]    Subjective   Patricia is a 89 year old who presents for the following health issues here with sister    HPI     Here for ER followup, fell and ended up on the floor. This was early Monday morning. Lives alone. Didn't hit head and no loc. Nothing hurts. Didn't injure herself. Neighbor found her.   Blood work all ok, slightly low potassium.  xrays and head ct are ok.  Is feeling back to her usual. Overall muscle aching and pain. Thinks it happened because she stool up too quick.  Had a fib on ekg. Also had some a  "fib on a previous ER visit in July. They recommended a cardiologist.  He suggested an aspirin    Sister is not sure about the medications. She isn't taking all of them. Sister isn't sure why.  She saw neurology. Sister said that she could stop the aricept. So hasn't been taking this one.  Sister will check with pharmacy to find out about refills.      ED/UC Followup:    Facility:  [facility]  Date of visit: 09/20/21  Reason for visit: fall at home, had a dizzy spell  Current Status: feeling \"foggy\"            Review of Systems         Objective    /78 (BP Location: Left arm, Patient Position: Sitting, Cuff Size: Adult Regular)   Pulse 102   Temp 98.1  F (36.7  C) (Temporal)   Ht 1.626 m (5' 4\")   Wt 45.4 kg (100 lb)   LMP  (LMP Unknown)   SpO2 97%   BMI 17.16 kg/m    Body mass index is 17.16 kg/m .  Physical Exam                   "

## 2021-09-21 NOTE — PATIENT INSTRUCTIONS
Assessment & Plan   Problem List Items Addressed This Visit        Endocrine    Other hyperlipidemia    Relevant Orders    VENOUS COLLECTION (Completed)    Hepatic Panel ([facility])       Circulatory    Essential hypertension       Other    Memory changes      Other Visit Diagnoses     At high risk for falls    -  Primary    Relevant Orders    Adult Cardiology Eval Referral    Chronic atrial fibrillation (H)        Relevant Orders    Adult Cardiology Eval Referral         1. At high risk for falls  Discussed risks.  - Adult Cardiology Eval Referral; Future    2. Essential hypertension  Controlled, continue medications. Sister will call the pharmacy and get the medications straightened out.    3. Other hyperlipidemia  Continue medications, check liver function.  - VENOUS COLLECTION  - Hepatic Panel ([facility])    4. Memory changes  She said she went to neurology. We will call to get notes. Reportedly they told her to go off the aricept. followup with them. Family has  A plan to check in on her more often.    5. Chronic atrial fibrillation (H)  Referral to cardiology, sister will take her to her family cardiologist. Discussed risks/benefits of blood thinner. Start now full aspirin and discuss further with cardiology. She is a fall risk.  - Adult Cardiology Eval Referral; Future         FUTURE APPOINTMENTS:       - Follow-up visit in 3 months.    No follow-ups on file.    Tatianna Swain MD  [facility]

## 2021-09-22 ENCOUNTER — TRANSFERRED RECORDS (OUTPATIENT)
Dept: FAMILY MEDICINE | Facility: CLINIC | Age: 86
End: 2021-09-22

## 2021-09-22 ENCOUNTER — TELEPHONE (OUTPATIENT)
Dept: FAMILY MEDICINE | Facility: CLINIC | Age: 86
End: 2021-09-22

## 2021-09-22 LAB
ALBUMIN SERPL-MCNC: 3.6 G/DL (ref 3.6–5.1)
ALP SERPL-CCNC: 86 U/L (ref 33–130)
ALT 1742-6: 31 U/L (ref 0–32)
AST 1920-8: 42 U/L (ref 0–35)
BILIRUB SERPL-MCNC: 2.3 MG/DL (ref 0.2–1.2)
BILIRUBIN DIRECT: 0.9 MG/DL (ref 0.1–0.4)
PROT SERPL-MCNC: 6.4 G/DL (ref 6.1–8.1)

## 2021-09-22 NOTE — TELEPHONE ENCOUNTER
Call the sister. I read the notes from neurology. It doesn't say to stop the aricept. Also they said to contact their office if they don't get the results of the testing. It is a good idea if they schedule a followup apt with neurology to go over results and recommendations with the doctor.  She should restart the aricept according to what I can see.

## 2021-09-22 NOTE — TELEPHONE ENCOUNTER
Patients sister Jodi called regarding Cardiology Referral. After talk/ thinking about it patient would like to see Cardiology in [place].     Patient is scheduled 10/18/2021 @ 1:15pm with     Dr. Antonino Hernández  [facility]   [facility]  [id number] [place] [address] [address]  [place] [address]  [phone] -- appt line  [phone] -- fax

## 2021-09-24 NOTE — TELEPHONE ENCOUNTER
Daughter Margaret called to say she had a missed call from [facility], she will speak to patients sisters about the information about the medication and Neurology.    According to the will please contact sisters Marion Marr or Jodi Trinidad.

## 2021-10-11 ENCOUNTER — HEALTH MAINTENANCE LETTER (OUTPATIENT)
Age: 86
End: 2021-10-11

## 2021-10-18 ENCOUNTER — OFFICE VISIT (OUTPATIENT)
Dept: CARDIOLOGY | Facility: CLINIC | Age: 86
End: 2021-10-18
Payer: MEDICARE

## 2021-10-18 VITALS
DIASTOLIC BLOOD PRESSURE: 52 MMHG | SYSTOLIC BLOOD PRESSURE: 104 MMHG | OXYGEN SATURATION: 98 % | BODY MASS INDEX: 18.73 KG/M2 | WEIGHT: 112.4 LBS | HEIGHT: 65 IN | HEART RATE: 90 BPM

## 2021-10-18 DIAGNOSIS — I10 ESSENTIAL HYPERTENSION: Primary | ICD-10-CM

## 2021-10-18 DIAGNOSIS — R22.42 LOCALIZED SWELLING OF LEFT LOWER LEG: ICD-10-CM

## 2021-10-18 DIAGNOSIS — Z91.81 AT HIGH RISK FOR FALLS: ICD-10-CM

## 2021-10-18 DIAGNOSIS — E78.49 OTHER HYPERLIPIDEMIA: ICD-10-CM

## 2021-10-18 DIAGNOSIS — I48.20 CHRONIC ATRIAL FIBRILLATION (H): ICD-10-CM

## 2021-10-18 PROCEDURE — 99204 OFFICE O/P NEW MOD 45 MIN: CPT | Performed by: INTERNAL MEDICINE

## 2021-10-18 RX ORDER — ASPIRIN 81 MG/1
81 TABLET ORAL DAILY
COMMUNITY
End: 2021-10-18

## 2021-10-18 ASSESSMENT — MIFFLIN-ST. JEOR: SCORE: 935.72

## 2021-10-18 NOTE — LETTER
10/18/2021    Tatianna Swain MD  [address]  [facility] [address]    RE: Ilda CARLSON Cesario       Dear Colleague,    I had the pleasure of seeing Ilda Nassar in the [facility].    CARDIOLOGY CLINIC CONSULTATION      REASON FOR CONSULT:   Newly recognized atrial fibrillation    PRIMARY CARE PHYSICIAN:  Tatianna Swain        History of Present Illness   Ilda Nassar is an extremely pleasant 89 year old female here as a new patient to discuss her atrial fibrillation.  She has a past medical history significant for atrial fibrillation, hypertension, dyslipidemia, memory loss, and former tobacco abuse.  She presented to the emergency department on 7/9/2021 due to left shoulder pain which she attributed to a musculoskeletal injury.  However, EKG was checked at that time and was read as atrial flutter.  On my review, this is likely coarse atrial fibrillation, with a heart rate of 80 bpm and some nonspecific ST/T wave changes.  She followed up with her primary care physician on 9/20/2021, and ECG was checked at that time and this showed again coarse atrial fibrillation.    From a symptomatic standpoint, she reports that she is actually doing very well.  She does have some lower extremity swelling, more on the left than the right, which she thinks has been worsening over the past couple of months.  It does mostly get better in the morning after sleeping with her legs up, but does not entirely resolved.  Otherwise, she has no chest pain, shortness of breath, palpitations, lightheadedness, or syncope.  She is very active, and lives by herself and does all of her activities of daily living.  She did have a fall on 9/20/2021, and the details of this are not entirely clear from speaking to the patient or her sister.  Apparently she had fallen in her living room, unclear if she tripped or lost consciousness.  However, she reports and her sister confirms that  this is her only fall for quite some time, and neither of them are very concerned about the likelihood of falls in the future.  No other bleeding issues.    Most recent labs are from 9/20/2021, and show a sodium of 135, potassium of 3.3, creatinine of 0.70 with estimated GFR of 77, CBC is normal, and troponin is undetectable x2.  She does not have an echocardiogram in our system.      Assessment & Plan     1. Chronic atrial fibrillation, apparently rate controlled, [id number] = 3  2. Hypertension, well controlled  3. Dyslipidemia  4. Left greater than right lower extremity swelling      It was a pleasure meeting with Patricia and her sister in clinic today.  We discussed the clinical significance of her atrial fibrillation, and the appropriate management.  We also discussed her left greater than right lower extremity swelling.  I recommended the following:      -TTE  -3-day Zio patch for assessment of heart rate control  -Lower extremity duplex  -As fall risk seems to actually be fairly well, we discussed risks and benefits of anticoagulation and I ultimately recommended anticoagulation, which patient and her sister are in favor of.  We will start with Eliquis 2.5 mg twice daily.  We also discussed left atrial appendage occlusion, but they are not in favor of this.  -Continue simvastatin 40 mg daily  -Continue amlodipine 5 mg daily and HCTZ 25 mg daily; may need to decrease 1 of these for toleration of beta-blocker in the future if rate control is suboptimal.      Follow-up: 1 month with MYRIAM to discuss above results        Antonino Hernández MD  Interventional Cardiology  October 18, 2021        Medications   Current Outpatient Medications   Medication     amLODIPine (NORVASC) 5 MG tablet     doxazosin (CARDURA) 2 MG tablet     hydrochlorothiazide (HYDRODIURIL) 25 MG tablet     simvastatin (ZOCOR) 40 MG tablet     No current facility-administered medications for this visit.     Allergies   No Known  Allergies      Physical Exam                      Vital Signs with Ranges     0 lbs 0 oz    Constitutional: Well-appearing, no acute distress  Respiratory: Normal respiratory effort, CTAB  Cardiovascular: Irregularly irregular, 2/6 systolic murmur at the apex.  JVP difficult to assess.  There is 2+ left and 1+ right LE edema.  Normal carotid upstrokes, no carotid bruits.                      Thank you for allowing me to participate in the care of your patient.      Sincerely,     Antonino Hernández MD     [facility]  cc:   Tatianna Swain MD  [address] [place] [address]

## 2021-10-18 NOTE — PROGRESS NOTES
CARDIOLOGY CLINIC CONSULTATION      REASON FOR CONSULT:   Newly recognized atrial fibrillation    PRIMARY CARE PHYSICIAN:  Tatianna Swain        History of Present Illness   Ilda Nassar is an extremely pleasant 89 year old female here as a new patient to discuss her atrial fibrillation.  She has a past medical history significant for atrial fibrillation, hypertension, dyslipidemia, memory loss, and former tobacco abuse.  She presented to the emergency department on 7/9/2021 due to left shoulder pain which she attributed to a musculoskeletal injury.  However, EKG was checked at that time and was read as atrial flutter.  On my review, this is likely coarse atrial fibrillation, with a heart rate of 80 bpm and some nonspecific ST/T wave changes.  She followed up with her primary care physician on 9/20/2021, and ECG was checked at that time and this showed again coarse atrial fibrillation.    From a symptomatic standpoint, she reports that she is actually doing very well.  She does have some lower extremity swelling, more on the left than the right, which she thinks has been worsening over the past couple of months.  It does mostly get better in the morning after sleeping with her legs up, but does not entirely resolved.  Otherwise, she has no chest pain, shortness of breath, palpitations, lightheadedness, or syncope.  She is very active, and lives by herself and does all of her activities of daily living.  She did have a fall on 9/20/2021, and the details of this are not entirely clear from speaking to the patient or her sister.  Apparently she had fallen in her living room, unclear if she tripped or lost consciousness.  However, she reports and her sister confirms that this is her only fall for quite some time, and neither of them are very concerned about the likelihood of falls in the future.  No other bleeding issues.    Most recent labs are from 9/20/2021, and show a sodium of 135, potassium of 3.3, creatinine  of 0.70 with estimated GFR of 77, CBC is normal, and troponin is undetectable x2.  She does not have an echocardiogram in our system.      Assessment & Plan     1. Chronic atrial fibrillation, apparently rate controlled, [id number] = 3  2. Hypertension, well controlled  3. Dyslipidemia  4. Left greater than right lower extremity swelling      It was a pleasure meeting with Patricia and her sister in clinic today.  We discussed the clinical significance of her atrial fibrillation, and the appropriate management.  We also discussed her left greater than right lower extremity swelling.  I recommended the following:      -TTE  -3-day Zio patch for assessment of heart rate control  -Lower extremity duplex  -As fall risk seems to actually be fairly well, we discussed risks and benefits of anticoagulation and I ultimately recommended anticoagulation, which patient and her sister are in favor of.  We will start with Eliquis 2.5 mg twice daily.  We also discussed left atrial appendage occlusion, but they are not in favor of this.  -Continue simvastatin 40 mg daily  -Continue amlodipine 5 mg daily and HCTZ 25 mg daily; may need to decrease 1 of these for toleration of beta-blocker in the future if rate control is suboptimal.      Follow-up: 1 month with MYRIAM to discuss above results        Antonino Hernández MD  Interventional Cardiology  October 18, 2021        Medications   Current Outpatient Medications   Medication     amLODIPine (NORVASC) 5 MG tablet     doxazosin (CARDURA) 2 MG tablet     hydrochlorothiazide (HYDRODIURIL) 25 MG tablet     simvastatin (ZOCOR) 40 MG tablet     No current facility-administered medications for this visit.     Allergies   No Known Allergies      Physical Exam                      Vital Signs with Ranges     0 lbs 0 oz    Constitutional: Well-appearing, no acute distress  Respiratory: Normal respiratory effort, CTAB  Cardiovascular: Irregularly irregular, 2/6 systolic murmur at the apex.  JVP  difficult to assess.  There is 2+ left and 1+ right LE edema.  Normal carotid upstrokes, no carotid bruits.

## 2021-10-19 ENCOUNTER — HOSPITAL ENCOUNTER (OUTPATIENT)
Dept: CARDIOLOGY | Facility: CLINIC | Age: 86
Discharge: HOME | End: 2021-10-19
Attending: INTERNAL MEDICINE | Admitting: INTERNAL MEDICINE
Payer: MEDICARE

## 2021-10-19 DIAGNOSIS — I48.20 CHRONIC ATRIAL FIBRILLATION (H): ICD-10-CM

## 2021-10-19 PROCEDURE — 93244 EXT ECG>48HR<7D REV&INTERPJ: CPT | Performed by: INTERNAL MEDICINE

## 2021-10-19 PROCEDURE — 93242 EXT ECG>48HR<7D RECORDING: CPT

## 2021-11-05 ENCOUNTER — HOSPITAL ENCOUNTER (OUTPATIENT)
Dept: CARDIOLOGY | Facility: CLINIC | Age: 86
Discharge: HOME | End: 2021-11-05
Attending: INTERNAL MEDICINE | Admitting: INTERNAL MEDICINE
Payer: MEDICARE

## 2021-11-05 DIAGNOSIS — R22.42 LOCALIZED SWELLING OF LEFT LOWER LEG: ICD-10-CM

## 2021-11-05 DIAGNOSIS — I48.20 CHRONIC ATRIAL FIBRILLATION (H): ICD-10-CM

## 2021-11-05 LAB — LVEF ECHO: NORMAL

## 2021-11-05 PROCEDURE — 93306 TTE W/DOPPLER COMPLETE: CPT

## 2021-11-05 PROCEDURE — 93970 EXTREMITY STUDY: CPT

## 2021-11-05 PROCEDURE — 93970 EXTREMITY STUDY: CPT | Mod: 26 | Performed by: INTERNAL MEDICINE

## 2021-11-05 PROCEDURE — 93306 TTE W/DOPPLER COMPLETE: CPT | Mod: 26 | Performed by: INTERNAL MEDICINE

## 2021-11-10 DIAGNOSIS — E78.49 OTHER HYPERLIPIDEMIA: ICD-10-CM

## 2021-11-10 DIAGNOSIS — I10 ESSENTIAL HYPERTENSION: ICD-10-CM

## 2021-11-10 RX ORDER — AMLODIPINE BESYLATE 5 MG/1
5 TABLET ORAL DAILY
Qty: 30 TABLET | Refills: 0 | Status: SHIPPED | OUTPATIENT
Start: 2021-11-10 | End: 2021-12-06

## 2021-11-10 RX ORDER — HYDROCHLOROTHIAZIDE 25 MG/1
25 TABLET ORAL DAILY
Qty: 30 TABLET | Refills: 0 | Status: SHIPPED | OUTPATIENT
Start: 2021-11-10 | End: 2021-12-06

## 2021-11-10 RX ORDER — DOXAZOSIN 2 MG/1
2 TABLET ORAL AT BEDTIME
Qty: 30 TABLET | Refills: 0 | Status: SHIPPED | OUTPATIENT
Start: 2021-11-10 | End: 2021-12-06

## 2021-11-10 RX ORDER — SIMVASTATIN 40 MG
40 TABLET ORAL AT BEDTIME
Qty: 30 TABLET | Refills: 0 | Status: SHIPPED | OUTPATIENT
Start: 2021-11-10 | End: 2021-12-06

## 2021-11-10 NOTE — TELEPHONE ENCOUNTER
Pt is moving into memory care facility. She is due for 6 month FASTING medication recheck. Her sister will schedule an appt within the month. They are asking for 30 day supply in the meantime to her new pharmacy. Please advise.    Ilda Nassar is requesting a refill of:    Pending Prescriptions:                       Disp   Refills    amLODIPine (NORVASC) 5 MG tablet          30 tab*0            Sig: Take 1 tablet (5 mg) by mouth daily    doxazosin (CARDURA) 2 MG tablet           30 tab*0            Sig: Take 1 tablet (2 mg) by mouth At Bedtime    hydrochlorothiazide (HYDRODIURIL) 25 MG t*30 tab*0            Sig: Take 1 tablet (25 mg) by mouth daily    simvastatin (ZOCOR) 40 MG tablet          30 tab*0            Sig: Take 1 tablet (40 mg) by mouth At Bedtime

## 2021-12-06 ENCOUNTER — OFFICE VISIT (OUTPATIENT)
Dept: FAMILY MEDICINE | Facility: CLINIC | Age: 86
End: 2021-12-06

## 2021-12-06 VITALS
HEART RATE: 89 BPM | SYSTOLIC BLOOD PRESSURE: 102 MMHG | BODY MASS INDEX: 19.53 KG/M2 | TEMPERATURE: 98.6 F | HEIGHT: 65 IN | DIASTOLIC BLOOD PRESSURE: 62 MMHG | OXYGEN SATURATION: 98 % | WEIGHT: 117.2 LBS

## 2021-12-06 DIAGNOSIS — R73.09 HIGH GLUCOSE: Primary | ICD-10-CM

## 2021-12-06 DIAGNOSIS — E87.6 HYPOKALEMIA: ICD-10-CM

## 2021-12-06 DIAGNOSIS — I10 ESSENTIAL HYPERTENSION: ICD-10-CM

## 2021-12-06 DIAGNOSIS — E78.49 OTHER HYPERLIPIDEMIA: ICD-10-CM

## 2021-12-06 LAB
ALBUMIN SERPL-MCNC: 3.9 G/DL (ref 3.6–5.1)
ALBUMIN/GLOB SERPL: 1.6 {RATIO} (ref 1–2.5)
ALP SERPL-CCNC: 76 U/L (ref 33–130)
ALT 1742-6: 13 U/L (ref 0–32)
AST 1920-8: 16 U/L (ref 0–35)
BILIRUB SERPL-MCNC: 2.6 MG/DL (ref 0.2–1.2)
BUN SERPL-MCNC: 19 MG/DL (ref 7–25)
BUN/CREATININE RATIO: 20.9 (ref 6–22)
CALCIUM SERPL-MCNC: 9.3 MG/DL (ref 8.6–10.3)
CHLORIDE SERPLBLD-SCNC: 97.1 MMOL/L (ref 98–110)
CHOLEST SERPL-MCNC: 163 MG/DL (ref 0–199)
CHOLEST/HDLC SERPL: 2 {RATIO} (ref 0–5)
CO2 SERPL-SCNC: 30.5 MMOL/L (ref 20–32)
CREAT SERPL-MCNC: 0.91 MG/DL (ref 0.6–1.3)
GLOBULIN, CALCULATED - QUEST: 2.5 (ref 1.9–3.7)
GLUCOSE SERPL-MCNC: 121 MG/DL (ref 60–99)
HBA1C MFR BLD: 5.8 % (ref 4–7)
HDLC SERPL-MCNC: 81 MG/DL (ref 40–150)
LDLC SERPL CALC-MCNC: 68 MG/DL (ref 0–130)
POTASSIUM SERPL-SCNC: 3.28 MMOL/L (ref 3.5–5.3)
PROT SERPL-MCNC: 6.4 G/DL (ref 6.1–8.1)
SODIUM SERPL-SCNC: 137.3 MMOL/L (ref 135–146)
TRIGL SERPL-MCNC: 68 MG/DL (ref 0–149)

## 2021-12-06 PROCEDURE — 36415 COLL VENOUS BLD VENIPUNCTURE: CPT | Performed by: FAMILY MEDICINE

## 2021-12-06 PROCEDURE — 83036 HEMOGLOBIN GLYCOSYLATED A1C: CPT | Performed by: FAMILY MEDICINE

## 2021-12-06 PROCEDURE — 80061 LIPID PANEL: CPT | Performed by: FAMILY MEDICINE

## 2021-12-06 PROCEDURE — 80053 COMPREHEN METABOLIC PANEL: CPT | Performed by: FAMILY MEDICINE

## 2021-12-06 PROCEDURE — 99213 OFFICE O/P EST LOW 20 MIN: CPT | Performed by: FAMILY MEDICINE

## 2021-12-06 RX ORDER — AMLODIPINE BESYLATE 5 MG/1
5 TABLET ORAL DAILY
Qty: 90 TABLET | Refills: 1 | Status: SHIPPED | OUTPATIENT
Start: 2021-12-06 | End: 2022-06-16 | Stop reason: DRUGHIGH

## 2021-12-06 RX ORDER — SIMVASTATIN 40 MG
40 TABLET ORAL AT BEDTIME
Qty: 90 TABLET | Refills: 1 | Status: SHIPPED | OUTPATIENT
Start: 2021-12-06 | End: 2022-06-16

## 2021-12-06 RX ORDER — DOXAZOSIN 2 MG/1
2 TABLET ORAL AT BEDTIME
Qty: 90 TABLET | Refills: 1 | Status: SHIPPED | OUTPATIENT
Start: 2021-12-06 | End: 2022-06-16

## 2021-12-06 RX ORDER — HYDROCHLOROTHIAZIDE 25 MG/1
25 TABLET ORAL DAILY
Qty: 90 TABLET | Refills: 1 | Status: SHIPPED | OUTPATIENT
Start: 2021-12-06 | End: 2022-06-16

## 2021-12-06 ASSESSMENT — MIFFLIN-ST. JEOR: SCORE: 957.5

## 2021-12-06 NOTE — PROGRESS NOTES
Assessment & Plan   Problem List Items Addressed This Visit        Endocrine    Other hyperlipidemia    Relevant Medications    simvastatin (ZOCOR) 40 MG tablet    Other Relevant Orders    Comprehensive Metobolic Panel (BFP)    Lipid Panel (BFP)    VENOUS COLLECTION (Completed)       Circulatory    Essential hypertension    Relevant Medications    hydrochlorothiazide (HYDRODIURIL) 25 MG tablet    amLODIPine (NORVASC) 5 MG tablet    doxazosin (CARDURA) 2 MG tablet           1. Other hyperlipidemia  Check labs, refilled medications.  - simvastatin (ZOCOR) 40 MG tablet; Take 1 tablet (40 mg) by mouth At Bedtime  Dispense: 90 tablet; Refill: 1  - Comprehensive Metobolic Panel (BFP)  - Lipid Panel (BFP)  - VENOUS COLLECTION    2. Essential hypertension  Check labs, refilled. Stable.  - hydrochlorothiazide (HYDRODIURIL) 25 MG tablet; Take 1 tablet (25 mg) by mouth daily  Dispense: 90 tablet; Refill: 1  - amLODIPine (NORVASC) 5 MG tablet; Take 1 tablet (5 mg) by mouth daily  Dispense: 90 tablet; Refill: 1  - doxazosin (CARDURA) 2 MG tablet; Take 1 tablet (2 mg) by mouth At Bedtime  Dispense: 90 tablet; Refill: 1           FUTURE APPOINTMENTS:       - Follow-up visit in 6 months    No follow-ups on file.    Tatianna Swain MD  [facility]    Subjective     Nursing Notes:   Jessica Barney CMA  12/6/2021 10:10 AM  Signed  Chief Complaint   Patient presents with     Recheck Medication     fasting today, refill mediations      Pre-visit Screening:  Immunizations:  up to date  Colonoscopy:  is up to date  Mammogram: is up to date  Asthma Action Test/Plan:  NA  PHQ9:  NA  GAD7:  NA  Questioned patient about current smoking habits Pt. quit smoking some time ago.  Ok to leave detailed message on voice mail for today's visit only Yes, phone # [phone]           Ilda Nassar is a 89 year old female who presents to clinic today for the following health issues   HPI     Here with sister, due for refills on  "medications and labs.   Living in an apt. On her own. Things are going well.  Due for lipids and blood pressure review. No problems with the medications.          Review of Systems   Constitutional, HEENT, cardiovascular, pulmonary, gi and gu systems are negative, except as otherwise noted.      Objective    /62 (BP Location: Left arm, Patient Position: Sitting, Cuff Size: Adult Regular)   Pulse 89   Temp 98.6  F (37  C) (Temporal)   Ht 1.651 m (5' 5\")   Wt 53.2 kg (117 lb 3.2 oz)   LMP  (LMP Unknown)   SpO2 98%   BMI 19.50 kg/m    Body mass index is 19.5 kg/m .  Physical Exam   GENERAL: healthy, alert and no distress  RESP: lungs clear to auscultation - no rales, rhonchi or wheezes  CV: regular rate and rhythm, normal S1 S2, no S3 or S4, no murmur, click or rub, no peripheral edema and peripheral pulses strong  ABDOMEN: soft, nontender, no hepatosplenomegaly, no masses and bowel sounds normal  MS: no gross musculoskeletal defects noted, no edema  NEURO: Normal strength and tone, mentation intact and speech normal  PSYCH: mentation appears normal, affect normal/bright    No results found for any visits on 12/06/21.      "

## 2021-12-06 NOTE — NURSING NOTE
Chief Complaint   Patient presents with     Recheck Medication     fasting today, refill mediations      Pre-visit Screening:  Immunizations:  up to date  Colonoscopy:  is up to date  Mammogram: is up to date  Asthma Action Test/Plan:  NA  PHQ9:  NA  GAD7:  NA  Questioned patient about current smoking habits Pt. quit smoking some time ago.  Ok to leave detailed message on voice mail for today's visit only Yes, phone # [phone]

## 2021-12-06 NOTE — PATIENT INSTRUCTIONS
Assessment & Plan   Problem List Items Addressed This Visit        Endocrine    Other hyperlipidemia    Relevant Medications    simvastatin (ZOCOR) 40 MG tablet    Other Relevant Orders    Comprehensive Metobolic Panel (BFP)    Lipid Panel (BFP)    VENOUS COLLECTION (Completed)       Circulatory    Essential hypertension    Relevant Medications    hydrochlorothiazide (HYDRODIURIL) 25 MG tablet    amLODIPine (NORVASC) 5 MG tablet    doxazosin (CARDURA) 2 MG tablet           1. Other hyperlipidemia  Check labs, refilled medications.  - simvastatin (ZOCOR) 40 MG tablet; Take 1 tablet (40 mg) by mouth At Bedtime  Dispense: 90 tablet; Refill: 1  - Comprehensive Metobolic Panel (BFP)  - Lipid Panel (BFP)  - VENOUS COLLECTION    2. Essential hypertension  Check labs, refilled. Stable.  - hydrochlorothiazide (HYDRODIURIL) 25 MG tablet; Take 1 tablet (25 mg) by mouth daily  Dispense: 90 tablet; Refill: 1  - amLODIPine (NORVASC) 5 MG tablet; Take 1 tablet (5 mg) by mouth daily  Dispense: 90 tablet; Refill: 1  - doxazosin (CARDURA) 2 MG tablet; Take 1 tablet (2 mg) by mouth At Bedtime  Dispense: 90 tablet; Refill: 1           FUTURE APPOINTMENTS:       - Follow-up visit in 6 months    No follow-ups on file.    Tatianna Swain MD  [facility]

## 2021-12-06 NOTE — LETTER
[facility]  [address]  [place]  [address]    December 28, 2021        Ilda Nassar  [address] [facility] [address]              Dear Ilda Nassar    Your potassium was in the normal range this time. But your blood sugar was very high. It was only slightly high in the past.  We recently checked a hemoglobin a1c for diabetes and this was in the normal range. I should see you back in 3 months for a clinic visit and we should recheck the kidney test and the diabetes test. Please also come in fasting at that appointment for 12 hours prior.         If you have any further questions or problems, please contact our office at [phone].          Tatianna Swain MD

## 2021-12-07 DIAGNOSIS — I10 ESSENTIAL HYPERTENSION: ICD-10-CM

## 2021-12-07 DIAGNOSIS — E78.49 OTHER HYPERLIPIDEMIA: ICD-10-CM

## 2021-12-07 RX ORDER — HYDROCHLOROTHIAZIDE 25 MG/1
TABLET ORAL
Qty: 90 TABLET | Refills: 1 | COMMUNITY
Start: 2021-12-07

## 2021-12-07 RX ORDER — AMLODIPINE BESYLATE 5 MG/1
TABLET ORAL
Qty: 90 TABLET | Refills: 1 | COMMUNITY
Start: 2021-12-07

## 2021-12-07 RX ORDER — DOXAZOSIN 2 MG/1
TABLET ORAL
Qty: 90 TABLET | Refills: 1 | COMMUNITY
Start: 2021-12-07

## 2021-12-07 RX ORDER — POTASSIUM CHLORIDE 750 MG/1
10 TABLET, EXTENDED RELEASE ORAL DAILY
Qty: 90 TABLET | Refills: 0 | Status: SHIPPED | OUTPATIENT
Start: 2021-12-07 | End: 2022-01-25

## 2021-12-07 RX ORDER — SIMVASTATIN 40 MG
TABLET ORAL
Qty: 90 TABLET | Refills: 1 | COMMUNITY
Start: 2021-12-07

## 2021-12-07 NOTE — TELEPHONE ENCOUNTER
Received incoming refill request for  Pending Prescriptions:                       Disp   Refills    amLODIPine (NORVASC) 5 MG tablet [Pharmac*90 tab*1            Sig: TAKE 1 TABLET(5 MG) BY MOUTH DAILY    doxazosin (CARDURA) 2 MG tablet [Pharmacy*90 tab*1            Sig: TAKE 1 TABLET(2 MG) BY MOUTH AT BEDTIME    hydrochlorothiazide (HYDRODIURIL) 25 MG t*90 tab*1            Sig: TAKE 1 TABLET(25 MG) BY MOUTH DAILY    simvastatin (ZOCOR) 40 MG tablet [Pharmac*90 tab*1            Sig: TAKE 1 TABLET(40 MG) BY MOUTH AT BEDTIME    Patient last had a refill of these medications yesterday.

## 2021-12-09 ENCOUNTER — OFFICE VISIT (OUTPATIENT)
Dept: FAMILY MEDICINE | Facility: CLINIC | Age: 86
End: 2021-12-09

## 2021-12-09 VITALS
TEMPERATURE: 97.1 F | HEIGHT: 65 IN | WEIGHT: 117 LBS | BODY MASS INDEX: 19.49 KG/M2 | DIASTOLIC BLOOD PRESSURE: 62 MMHG | RESPIRATION RATE: 20 BRPM | SYSTOLIC BLOOD PRESSURE: 124 MMHG

## 2021-12-09 DIAGNOSIS — R22.32 LOCALIZED SWELLING OF FINGER OF LEFT HAND: ICD-10-CM

## 2021-12-09 DIAGNOSIS — L03.114 CELLULITIS OF LEFT HAND: Primary | ICD-10-CM

## 2021-12-09 PROCEDURE — 99213 OFFICE O/P EST LOW 20 MIN: CPT | Performed by: FAMILY MEDICINE

## 2021-12-09 RX ORDER — CEPHALEXIN 500 MG/1
500 CAPSULE ORAL 3 TIMES DAILY
Qty: 21 CAPSULE | Refills: 0 | Status: SHIPPED | OUTPATIENT
Start: 2021-12-09 | End: 2022-06-16

## 2021-12-09 ASSESSMENT — MIFFLIN-ST. JEOR: SCORE: 956.59

## 2021-12-09 NOTE — PROGRESS NOTES
"Ilda Nassar is a 89 year old female who is here for evaluation of a redness and swelling left hand.  Began 1-2 days ago.  no fevers/chills.  No generalized malaise.  No other signs/symptoms.    Pt concerned as her wedding ring is extremely tight -causing some pain    Past Medical History:   Diagnosis Date     Atrial fibrillation (H)      Atrial flutter (H)      Hyperlipidemia      Hypertension      Current Outpatient Medications   Medication     cephALEXin (KEFLEX) 500 MG capsule     amLODIPine (NORVASC) 5 MG tablet     apixaban ANTICOAGULANT (ELIQUIS ANTICOAGULANT) 2.5 MG tablet     doxazosin (CARDURA) 2 MG tablet     hydrochlorothiazide (HYDRODIURIL) 25 MG tablet     potassium chloride ER (K-TAB/KLOR-CON) 10 MEQ CR tablet     simvastatin (ZOCOR) 40 MG tablet     No current facility-administered medications for this visit.     No Known Allergies                                  All other ros unremarkable    OBJECTIVE:  /62 (BP Location: Left arm, Patient Position: Chair, Cuff Size: Adult Regular)   Temp 97.1  F (36.2  C)   Resp 20   Ht 1.651 m (5' 5\")   Wt 53.1 kg (117 lb)   LMP  (LMP Unknown)   BMI 19.47 kg/m    General appearance: in no apparent distress.  Skin exam shows rash on left hand with patient's observations stated as swelling, redness, mild discomfot, exam of this area shows likely cellulitis, features including warmth and redness, entire hand, moreso dorsally, circulation intact, pulses intact, cap refill intact, ring finger with evidence constriction from ring but no signs ischemia    ASSESSMENT:  Cellulitis-pt with apparent cellulitis, swelling has caused ring to be stuck- I am concerned about critical restriction developing so ring is removed, pt agreeable to plan    PLAN:ring removal attempted with ring device but was only able to cut through 60% of ring-pt referred to [facility] for final cut with sharper instrument  1) see epic orders  2) recommend rest and elevation of " Size Of Lesion After Curettage: 1.5 Additional Information: (Optional): The wound was cleaned, and a pressure dressing was applied.  The patient received detailed post-op instructions. affected area  3) if rash not decreasing in size within 24-36 hours of beginning antibiotics or increasing in size, needs to seek re-evaluation  4) followup in 2-3 days, and prn     Pt understands to go immediately to ED if worsens as weekend approaching    32 minutes spent attempting to remove ring alone as well as other portions of visit   Consent was obtained from the patient. The risks, benefits and alternatives to therapy were discussed in detail. Specifically, the risks of infection, scarring, bleeding, prolonged wound healing, nerve injury, incomplete removal, allergy to anesthesia and recurrence were addressed. Alternatives to ED&C, such as: surgical removal and XRT were also discussed.  Prior to the procedure, the treatment site was clearly identified and confirmed by the patient. All components of Universal Protocol/PAUSE Rule completed. Cautery Type: electrodesiccation Anesthesia Volume In Cc: 2 Render Post-Care Instructions In Note?: no Detail Level: Detailed Post-Care Instructions: I reviewed with the patient in detail post-care instructions. Patient is to keep the area dry for 48 hours, and not to engage in any swimming until the area is healed. Should the patient develop any fevers, chills, bleeding, severe pain patient will contact the office immediately. Anesthesia Type: 1% lidocaine with 1:200,000 epinephrine and a 1:10 solution of 8.4% sodium bicarbonate Bill As A Line Item Or As Units: Line Item Biopsy Type: H and E What Was Performed First?: Curettage Histology Text: Following the procedure a portion of the curetted material was sent for histologic evaluation. Billing Type: Third-Party Bill

## 2021-12-09 NOTE — NURSING NOTE
Ilda Nassar is here for a sore swollen left hand that started yesterday. Has a ring that is stuck on her finger now.    Questioned patient about current smoking habits.  Pt. quit smoking some time ago.  PULSE regular  My Chart: active  CLASSIFICATION OF OVERWEIGHT AND OBESITY BY BMI                        Obesity Class           BMI(kg/m2)  Underweight                                    < 18.5  Normal                                         18.5-24.9  Overweight                                     25.0-29.9  OBESITY                     I                  30.0-34.9                             II                 35.0-39.9  EXTREME OBESITY             III                >40                            Patient's  BMI Body mass index is 19.47 kg/m .  http://hin.nhlbi.nih.gov/menuplanner/menu.cgi  Pre-visit planning  Immunizations - up to date  Colonoscopy -   Mammogram -   Asthma -   PHQ9 -    JOHAN-7 -    Hearing Test -

## 2021-12-13 ENCOUNTER — OFFICE VISIT (OUTPATIENT)
Dept: CARDIOLOGY | Facility: CLINIC | Age: 86
End: 2021-12-13
Attending: INTERNAL MEDICINE
Payer: MEDICARE

## 2021-12-13 VITALS
BODY MASS INDEX: 18.79 KG/M2 | SYSTOLIC BLOOD PRESSURE: 110 MMHG | DIASTOLIC BLOOD PRESSURE: 62 MMHG | HEIGHT: 66 IN | OXYGEN SATURATION: 98 % | HEART RATE: 96 BPM | WEIGHT: 116.9 LBS

## 2021-12-13 DIAGNOSIS — R06.09 DYSPNEA ON EXERTION: ICD-10-CM

## 2021-12-13 DIAGNOSIS — I48.20 CHRONIC ATRIAL FIBRILLATION (H): ICD-10-CM

## 2021-12-13 PROCEDURE — 99214 OFFICE O/P EST MOD 30 MIN: CPT | Performed by: PHYSICIAN ASSISTANT

## 2021-12-13 ASSESSMENT — MIFFLIN-ST. JEOR: SCORE: 964.06

## 2021-12-13 NOTE — LETTER
12/13/2021    Tatianna Swain MD  [address] [facility] [address]    RE: Ilda Nassar       Dear Colleague,     I had the pleasure of seeing Ilda Nassar in the [facility].      Cardiology Progress Note    Date of Service: 12/13/21    Reason for visit: Follow up testing, atrial fibrillation.    Primary cardiologist: Dr. Antonino Hernández    HPI:  Ms. Nassar is a very pleasant 89 year old female with a PMHx including hyperlipidemia and hypertension, who was seen in the ER in July for shoulder pain which she attributed to a musculoskeletal injury.  However, EKG was checked at that time showed atrial flutter vs coarse afib. Heart rate was controlled at 80 at that time. She was sent for evaluation and saw Dr. Hernández in mid October. At that time from a symptom standpoint she was doing well. She noted some mild LE edema L>R. At that time, for evaluation, an echo, Ziopatch, and LE ultrasound were recommended.     Today, I am meeting Pat in clinic, along with her two sisters, to review her testing. Her echo showed preserved EF 55-60%. RV was of normal size and function. She has moderate to sever bi-atrial enlargement and 1-2+ MR. LE duplex showed no DVT. 3 day Ziopatch showed 100% afib with good control of heart rates and average of 72. Today, she tells me she continues to feel well. She denies any new chest pain, or palpitations. She only has occasional dizziness if she stands too fast when she first gets up in the morning. She is not having significant shortness of breath. She continues with LE edema, which she states is overall unchanged. In the morning it seems better but not fully resolved.     Pat did not have any new labs today, but most recent labs done with her PCP were reviewed as below.     ASSESSMENT/PLAN:    1. Atrial fibrillation.   --First noted this summer when she was evaluated for shoulder pain that she attributed to a musculoskeletal injury. Incidentally noted to be in afib  vs flutter (ultimately felt to be coarse afib). She remains asymptomatic in this regard, and Ziopatch showed very good rate control. She is not currently on beta blockers. Recent echo showed preserved EF with no significant valvular issues.   --Continue Eliquis 2.5mg BID (age/weight) for [id number] = 3.    2. Hypertension.   --Appears to be under good control, currently on amlodipine and hydrochlorothiazide. She was recently found to be hypokalemic and PCP recently added potassium supplements. Repeat labs planned in the next few weeks.    --She continues with some moderate ankle edema. I advised her to elevate her feet in the evenings and recommended compression socks during the day. We also discussed the need to reduce sodium in her diet.  If this is unhelpful, may consider a trial off the amlodipine to see if this is a contributor. Will defer to PCP on follow up.       Follow up plan: Return to cardiology clinic in 6 months, but we are happy to see her back sooner with any new concerns.      Orders this Visit:  Orders Placed This Encounter   Procedures     Basic metabolic panel     N terminal pro BNP outpatient     CBC with platelets     Follow-Up with Cardiac Advanced Practice Provider     No orders of the defined types were placed in this encounter.    There are no discontinued medications.        CURRENT MEDICATIONS:  Current Outpatient Medications   Medication Sig Dispense Refill     amLODIPine (NORVASC) 5 MG tablet Take 1 tablet (5 mg) by mouth daily 90 tablet 1     apixaban ANTICOAGULANT (ELIQUIS ANTICOAGULANT) 2.5 MG tablet Take 1 tablet (2.5 mg) by mouth 2 times daily 180 tablet 3     cephALEXin (KEFLEX) 500 MG capsule Take 1 capsule (500 mg) by mouth 3 times daily 21 capsule 0     doxazosin (CARDURA) 2 MG tablet Take 1 tablet (2 mg) by mouth At Bedtime 90 tablet 1     hydrochlorothiazide (HYDRODIURIL) 25 MG tablet Take 1 tablet (25 mg) by mouth daily 90 tablet 1     potassium chloride ER  "(K-TAB/KLOR-CON) 10 MEQ CR tablet Take 1 tablet (10 mEq) by mouth daily 90 tablet 0     simvastatin (ZOCOR) 40 MG tablet Take 1 tablet (40 mg) by mouth At Bedtime 90 tablet 1       ALLERGIES   No Known Allergies    PAST MEDICAL HISTORY:  Past Medical History:   Diagnosis Date     Atrial fibrillation (H)      Atrial flutter (H)      Hyperlipidemia      Hypertension          Review of Systems:  Cardiovascular: negative for chest pain, palpitations, orthopnea, pos LE edema  Constitutional: negative for chills, sweats, fevers   Resp: Negative for dyspnea at rest, dyspnea on exertion, cough, known chronic lung disease  HEENT: Negative for new visual changes, frequent headaches  Gastrointestinal: negative for abdominal pain, diarrhea, blood in stool, nausea, vomiting  Hematologic/lymphatic: pos for current systemic anticoagulation, neg hx of blood clots  Neurological: negative for focal weakness, LOC, seizures, syncope/presyncope. Pos dizziness if stands too fast.      Physical Exam:  Vitals: /62 (BP Location: Right arm, Patient Position: Sitting, Cuff Size: Adult Regular)   Pulse 96   Ht 1.664 m (5' 5.5\")   Wt 53 kg (116 lb 14.4 oz)   LMP  (LMP Unknown)   SpO2 98%   BMI 19.16 kg/m     Wt Readings from Last 4 Encounters:   12/13/21 53 kg (116 lb 14.4 oz)   12/09/21 53.1 kg (117 lb)   12/06/21 53.2 kg (117 lb 3.2 oz)   10/18/21 51 kg (112 lb 6.4 oz)       GEN:  In general, this is a thin  female, in no acute distress on room air.  Patient ambulatory, accompanied by her two sisters today.  HEENT:  Pupils grossly equal, sclerae nonicteric.   NECK: Supple, trachea midline. No JVD while upright.   C/V:  Irregular rhythm, no significant murmur, rub or gallop. No S3 or RV heave.   RESP: Respirations are unlabored. No use of accessory muscles. Clear to auscultation bilaterally without wheezing, rales, or rhonchi.  GI: Abdomen soft, nontender, nondistended.   EXTREM: 1+ bilateral ankle edema today.  No " cyanosis or clubbing.  NEURO: Alert and oriented, cooperative. Gait not formally assessed. No obvious focal deficits.   SKIN: Warm and dry.       Recent Lab Results:  LIPID RESULTS:  Lab Results   Component Value Date    CHOL 163 12/06/2021    HDL 81 12/06/2021    LDL 68 12/06/2021    TRIG 68 12/06/2021    CHOLHDLRATIO 2 12/06/2021       LIVER ENZYME RESULTS:  No results found for: AST, ALT    CBC RESULTS:  Lab Results   Component Value Date    WBC 9.0 09/20/2021    WBC 4.7 07/09/2021    RBC 4.79 09/20/2021    RBC 4.44 07/09/2021    HGB 14.7 09/20/2021    HGB 13.5 07/09/2021    HCT 44.0 09/20/2021    HCT 40.7 07/09/2021    MCV 92 09/20/2021    MCV 92 07/09/2021    MCH 30.7 09/20/2021    MCH 30.4 07/09/2021    MCHC 33.4 09/20/2021    MCHC 33.2 07/09/2021    RDW 12.4 09/20/2021    RDW 12.8 07/09/2021     09/20/2021     07/09/2021       BMP RESULTS:  Lab Results   Component Value Date    .3 12/06/2021    POTASSIUM 3.28 (A) 12/06/2021    CHLORIDE 97.1 (A) 12/06/2021    CO2 30.5 12/06/2021    ANIONGAP 6 09/20/2021    ANIONGAP 8 07/09/2021     (A) 12/06/2021    BUN 19 12/06/2021    BUN 20.9 12/06/2021    CR 0.91 12/06/2021    GFRESTIMATED 77 09/20/2021    GFRESTIMATED 61 07/09/2021    GFRESTBLACK 71 07/09/2021    ESTELA 9.3 12/06/2021          Additional pertinent testing:    Echo 11/5/2021  Interpretation Summary     1. The left ventricle is normal in size. Proximal septal thickening is noted.  Left ventricular systolic function is normal. The visual ejection fraction is  55-60%. Diastolic function not assessed due to atrial fibrillation. No  regional wall motion abnormalities noted.  2. The right ventricle is normal size. The right ventricular systolic function  is normal.  3. There is mod-severe biatrial enlargement. There is no color Doppler  evidence of an atrial shunt.  4. There is mild to moderate (1-2+) mitral regurgitation.  5. No pericardial effusion.  6. No previous study for  comparison.      35 total minutes was spent today including chart review, precharting, history and exam, post visit documentation, and reviewing studies as outlined above.       Myrna Reddy PA-C  [facility]  Pager [phone]    cc:   Antonino Hernández MD  [id number] [id number]

## 2021-12-13 NOTE — PROGRESS NOTES
Cardiology Progress Note    Date of Service: 12/13/21      Reason for visit: Follow up testing, atrial fibrillation.    Primary cardiologist: Dr. Antonino Hernández      HPI:  Ms. Nassar is a very pleasant 89 year old female with a PMHx including hyperlipidemia and hypertension, who was seen in the ER in July for shoulder pain which she attributed to a musculoskeletal injury.  However, EKG was checked at that time showed atrial flutter vs coarse afib. Heart rate was controlled at 80 at that time. She was sent for evaluation and saw Dr. Hernández in mid October. At that time from a symptom standpoint she was doing well. She noted some mild LE edema L>R. At that time, for evaluation, an echo, Ziopatch, and LE ultrasound were recommended.     Today, I am meeting Pat in clinic, along with her two sisters, to review her testing. Her echo showed preserved EF 55-60%. RV was of normal size and function. She has moderate to sever bi-atrial enlargement and 1-2+ MR. LE duplex showed no DVT. 3 day Ziopatch showed 100% afib with good control of heart rates and average of 72. Today, she tells me she continues to feel well. She denies any new chest pain, or palpitations. She only has occasional dizziness if she stands too fast when she first gets up in the morning. She is not having significant shortness of breath. She continues with LE edema, which she states is overall unchanged. In the morning it seems better but not fully resolved.     Pat did not have any new labs today, but most recent labs done with her PCP were reviewed as below.     ASSESSMENT/PLAN:    1. Atrial fibrillation.   --First noted this summer when she was evaluated for shoulder pain that she attributed to a musculoskeletal injury. Incidentally noted to be in afib vs flutter (ultimately felt to be coarse afib). She remains asymptomatic in this regard, and Ziopatch showed very good rate control. She is not currently on beta blockers. Recent echo showed preserved EF  with no significant valvular issues.   --Continue Eliquis 2.5mg BID (age/weight) for [id number] = 3.    2. Hypertension.   --Appears to be under good control, currently on amlodipine and hydrochlorothiazide. She was recently found to be hypokalemic and PCP recently added potassium supplements. Repeat labs planned in the next few weeks.    --She continues with some moderate ankle edema. I advised her to elevate her feet in the evenings and recommended compression socks during the day. We also discussed the need to reduce sodium in her diet.  If this is unhelpful, may consider a trial off the amlodipine to see if this is a contributor. Will defer to PCP on follow up.       Follow up plan: Return to cardiology clinic in 6 months, but we are happy to see her back sooner with any new concerns.      Orders this Visit:  Orders Placed This Encounter   Procedures     Basic metabolic panel     N terminal pro BNP outpatient     CBC with platelets     Follow-Up with Cardiac Advanced Practice Provider     No orders of the defined types were placed in this encounter.    There are no discontinued medications.        CURRENT MEDICATIONS:  Current Outpatient Medications   Medication Sig Dispense Refill     amLODIPine (NORVASC) 5 MG tablet Take 1 tablet (5 mg) by mouth daily 90 tablet 1     apixaban ANTICOAGULANT (ELIQUIS ANTICOAGULANT) 2.5 MG tablet Take 1 tablet (2.5 mg) by mouth 2 times daily 180 tablet 3     cephALEXin (KEFLEX) 500 MG capsule Take 1 capsule (500 mg) by mouth 3 times daily 21 capsule 0     doxazosin (CARDURA) 2 MG tablet Take 1 tablet (2 mg) by mouth At Bedtime 90 tablet 1     hydrochlorothiazide (HYDRODIURIL) 25 MG tablet Take 1 tablet (25 mg) by mouth daily 90 tablet 1     potassium chloride ER (K-TAB/KLOR-CON) 10 MEQ CR tablet Take 1 tablet (10 mEq) by mouth daily 90 tablet 0     simvastatin (ZOCOR) 40 MG tablet Take 1 tablet (40 mg) by mouth At Bedtime 90 tablet 1       ALLERGIES   No Known Allergies    PAST  "MEDICAL HISTORY:  Past Medical History:   Diagnosis Date     Atrial fibrillation (H)      Atrial flutter (H)      Hyperlipidemia      Hypertension          Review of Systems:  Cardiovascular: negative for chest pain, palpitations, orthopnea, pos LE edema  Constitutional: negative for chills, sweats, fevers   Resp: Negative for dyspnea at rest, dyspnea on exertion, cough, known chronic lung disease  HEENT: Negative for new visual changes, frequent headaches  Gastrointestinal: negative for abdominal pain, diarrhea, blood in stool, nausea, vomiting  Hematologic/lymphatic: pos for current systemic anticoagulation, neg hx of blood clots  Neurological: negative for focal weakness, LOC, seizures, syncope/presyncope. Pos dizziness if stands too fast.      Physical Exam:  Vitals: /62 (BP Location: Right arm, Patient Position: Sitting, Cuff Size: Adult Regular)   Pulse 96   Ht 1.664 m (5' 5.5\")   Wt 53 kg (116 lb 14.4 oz)   LMP  (LMP Unknown)   SpO2 98%   BMI 19.16 kg/m     Wt Readings from Last 4 Encounters:   12/13/21 53 kg (116 lb 14.4 oz)   12/09/21 53.1 kg (117 lb)   12/06/21 53.2 kg (117 lb 3.2 oz)   10/18/21 51 kg (112 lb 6.4 oz)       GEN:  In general, this is a thin  female, in no acute distress on room air.  Patient ambulatory, accompanied by her two sisters today.  HEENT:  Pupils grossly equal, sclerae nonicteric.   NECK: Supple, trachea midline. No JVD while upright.   C/V:  Irregular rhythm, no significant murmur, rub or gallop. No S3 or RV heave.   RESP: Respirations are unlabored. No use of accessory muscles. Clear to auscultation bilaterally without wheezing, rales, or rhonchi.  GI: Abdomen soft, nontender, nondistended.   EXTREM: 1+ bilateral ankle edema today.  No cyanosis or clubbing.  NEURO: Alert and oriented, cooperative. Gait not formally assessed. No obvious focal deficits.   SKIN: Warm and dry.       Recent Lab Results:  LIPID RESULTS:  Lab Results   Component Value Date    CHOL " 163 12/06/2021    HDL 81 12/06/2021    LDL 68 12/06/2021    TRIG 68 12/06/2021    CHOLHDLRATIO 2 12/06/2021       LIVER ENZYME RESULTS:  No results found for: AST, ALT    CBC RESULTS:  Lab Results   Component Value Date    WBC 9.0 09/20/2021    WBC 4.7 07/09/2021    RBC 4.79 09/20/2021    RBC 4.44 07/09/2021    HGB 14.7 09/20/2021    HGB 13.5 07/09/2021    HCT 44.0 09/20/2021    HCT 40.7 07/09/2021    MCV 92 09/20/2021    MCV 92 07/09/2021    MCH 30.7 09/20/2021    MCH 30.4 07/09/2021    MCHC 33.4 09/20/2021    MCHC 33.2 07/09/2021    RDW 12.4 09/20/2021    RDW 12.8 07/09/2021     09/20/2021     07/09/2021       BMP RESULTS:  Lab Results   Component Value Date    .3 12/06/2021    POTASSIUM 3.28 (A) 12/06/2021    CHLORIDE 97.1 (A) 12/06/2021    CO2 30.5 12/06/2021    ANIONGAP 6 09/20/2021    ANIONGAP 8 07/09/2021     (A) 12/06/2021    BUN 19 12/06/2021    BUN 20.9 12/06/2021    CR 0.91 12/06/2021    GFRESTIMATED 77 09/20/2021    GFRESTIMATED 61 07/09/2021    GFRESTBLACK 71 07/09/2021    ESTELA 9.3 12/06/2021          Additional pertinent testing:    Echo 11/5/2021  Interpretation Summary     1. The left ventricle is normal in size. Proximal septal thickening is noted.  Left ventricular systolic function is normal. The visual ejection fraction is  55-60%. Diastolic function not assessed due to atrial fibrillation. No  regional wall motion abnormalities noted.  2. The right ventricle is normal size. The right ventricular systolic function  is normal.  3. There is mod-severe biatrial enlargement. There is no color Doppler  evidence of an atrial shunt.  4. There is mild to moderate (1-2+) mitral regurgitation.  5. No pericardial effusion.  6. No previous study for comparison.      35 total minutes was spent today including chart review, precharting, history and exam, post visit documentation, and reviewing studies as outlined above.       Myrna Reddy PA-C  [facility]  Pager [phone]

## 2021-12-13 NOTE — PATIENT INSTRUCTIONS
Visit Summary:    Today we discussed:   All of your test were reassuring. No medication changes today.  Please follow up with your primary doctor about your potassium levels.     Try to use compression socks during the day if you can.  Watch your sodium intake as discussed.     Follow up:   In clinic in ~6 months, or sooner if needed. Please call with any concerns.      Please call my nurse Leena at [phone] with any questions or concerns.

## 2021-12-22 DIAGNOSIS — E87.6 HYPOKALEMIA: ICD-10-CM

## 2021-12-22 LAB
BUN SERPL-MCNC: 18 MG/DL (ref 7–25)
BUN/CREATININE RATIO: 17.8 (ref 6–22)
CALCIUM SERPL-MCNC: 9.7 MG/DL (ref 8.6–10.3)
CHLORIDE SERPLBLD-SCNC: 97.7 MMOL/L (ref 98–110)
CO2 SERPL-SCNC: 31.3 MMOL/L (ref 20–32)
CREAT SERPL-MCNC: 1.01 MG/DL (ref 0.6–1.3)
GLUCOSE SERPL-MCNC: 269 MG/DL (ref 60–99)
POTASSIUM SERPL-SCNC: 3.93 MMOL/L (ref 3.5–5.3)
SODIUM SERPL-SCNC: 138.7 MMOL/L (ref 135–146)

## 2021-12-22 PROCEDURE — 80048 BASIC METABOLIC PNL TOTAL CA: CPT | Performed by: FAMILY MEDICINE

## 2021-12-22 PROCEDURE — 36415 COLL VENOUS BLD VENIPUNCTURE: CPT | Performed by: FAMILY MEDICINE

## 2022-01-25 DIAGNOSIS — E87.6 HYPOKALEMIA: ICD-10-CM

## 2022-01-25 DIAGNOSIS — I10 ESSENTIAL HYPERTENSION: ICD-10-CM

## 2022-01-25 DIAGNOSIS — I48.20 CHRONIC ATRIAL FIBRILLATION (H): ICD-10-CM

## 2022-01-25 DIAGNOSIS — Z91.81 AT HIGH RISK FOR FALLS: ICD-10-CM

## 2022-01-25 NOTE — TELEPHONE ENCOUNTER
Sister called to say they switched pharmacy to [facility], she wants a refill of potassium. I Lm on  informing that recent labs KEP stated to return in 3 months. Would you like to refill?     Pending Prescriptions:                       Disp   Refills    potassium chloride ER (K-TAB/KLOR-CON) 10*90 tab*0            Sig: Take 1 tablet (10 mEq) by mouth daily

## 2022-01-26 RX ORDER — POTASSIUM CHLORIDE 750 MG/1
10 TABLET, EXTENDED RELEASE ORAL DAILY
Qty: 90 TABLET | Refills: 0 | Status: SHIPPED | OUTPATIENT
Start: 2022-01-26 | End: 2022-06-16

## 2022-02-22 DIAGNOSIS — E87.6 HYPOKALEMIA: ICD-10-CM

## 2022-02-22 RX ORDER — POTASSIUM CHLORIDE 750 MG/1
TABLET, EXTENDED RELEASE ORAL
Qty: 90 TABLET | Refills: 0 | COMMUNITY
Start: 2022-02-22

## 2022-02-22 NOTE — TELEPHONE ENCOUNTER
Ilda Nassar is requesting a refill of:    Refused Prescriptions:                       Disp   Refills    potassium chloride ER (K-TAB/KLOR-CON) 10 *90 tab*0        Sig: TAKE 1 TABLET(10 MEQ) BY MOUTH DAILY  Refused By: TALIA KRAFT  Reason for Refusal: OTHER  Reason for Refusal Comment: Refilled at alternative pharmacy    Refill denied from [facility]a. [facility]

## 2022-05-17 DIAGNOSIS — E87.6 HYPOKALEMIA: ICD-10-CM

## 2022-05-17 DIAGNOSIS — I10 ESSENTIAL HYPERTENSION: ICD-10-CM

## 2022-05-17 DIAGNOSIS — E78.49 OTHER HYPERLIPIDEMIA: ICD-10-CM

## 2022-05-17 RX ORDER — SIMVASTATIN 40 MG
TABLET ORAL
Qty: 90 TABLET | Refills: 1 | COMMUNITY
Start: 2022-05-17

## 2022-05-17 RX ORDER — POTASSIUM CHLORIDE 750 MG/1
TABLET, EXTENDED RELEASE ORAL
Qty: 90 TABLET | Refills: 0 | COMMUNITY
Start: 2022-05-17

## 2022-05-17 RX ORDER — HYDROCHLOROTHIAZIDE 25 MG/1
TABLET ORAL
Qty: 90 TABLET | Refills: 1 | COMMUNITY
Start: 2022-05-17

## 2022-05-17 RX ORDER — AMLODIPINE BESYLATE 5 MG/1
TABLET ORAL
Qty: 90 TABLET | Refills: 1 | COMMUNITY
Start: 2022-05-17

## 2022-05-17 NOTE — TELEPHONE ENCOUNTER
Ilda Nassar is requesting a refill of:    Refused Prescriptions:                       Disp   Refills    potassium chloride ER (K-TAB/KLOR-CON) 10 *90 tab*0        Sig: TAKE 1 TABLET BY MOUTH EVERY DAY  Refused By: TALIA KRAFT  Reason for Refusal: Patient needs appointment    amLODIPine (NORVASC) 5 MG tablet [Pharmacy*90 tab*1        Sig: TAKE 1 TABLET BY MOUTH EVERY DAY  Refused By: TALIA KRAFT  Reason for Refusal: Patient needs appointment    hydrochlorothiazide (HYDRODIURIL) 25 MG ta*90 tab*1        Sig: TAKE 1 TABLET BY MOUTH EVERY DAY  Refused By: TALIA KRAFT  Reason for Refusal: Patient needs appointment    simvastatin (ZOCOR) 40 MG tablet [Pharmacy*90 tab*1        Sig: TAKE 1 TABLET BY MOUTH EVERYDAY AT BEDTIME  Refused By: TALIA KRAFT  Reason for Refusal: Patient needs appointment    Pt last seen 12/09/21 advised to return in 6 months. Pt due for FASTING OV. [employer] message sent

## 2022-05-22 ENCOUNTER — HEALTH MAINTENANCE LETTER (OUTPATIENT)
Age: 87
End: 2022-05-22

## 2022-06-16 ENCOUNTER — OFFICE VISIT (OUTPATIENT)
Dept: FAMILY MEDICINE | Facility: CLINIC | Age: 87
End: 2022-06-16

## 2022-06-16 VITALS
HEIGHT: 66 IN | HEART RATE: 71 BPM | OXYGEN SATURATION: 97 % | TEMPERATURE: 98.3 F | SYSTOLIC BLOOD PRESSURE: 104 MMHG | WEIGHT: 111.2 LBS | DIASTOLIC BLOOD PRESSURE: 70 MMHG | BODY MASS INDEX: 17.87 KG/M2

## 2022-06-16 DIAGNOSIS — I10 ESSENTIAL HYPERTENSION: ICD-10-CM

## 2022-06-16 DIAGNOSIS — E78.49 OTHER HYPERLIPIDEMIA: ICD-10-CM

## 2022-06-16 DIAGNOSIS — E87.6 HYPOKALEMIA: ICD-10-CM

## 2022-06-16 DIAGNOSIS — R41.3 MEMORY CHANGES: ICD-10-CM

## 2022-06-16 DIAGNOSIS — R73.01 ELEVATED FASTING GLUCOSE: Primary | ICD-10-CM

## 2022-06-16 LAB
ALBUMIN SERPL-MCNC: 4.1 G/DL (ref 3.6–5.1)
ALBUMIN/GLOB SERPL: 1.7 {RATIO} (ref 1–2.5)
ALP SERPL-CCNC: 77 U/L (ref 33–130)
ALT 1742-6: 17 U/L (ref 0–32)
AST 1920-8: 19 U/L (ref 0–35)
BILIRUB SERPL-MCNC: 1.8 MG/DL (ref 0.2–1.2)
BUN SERPL-MCNC: 14 MG/DL (ref 7–25)
BUN/CREATININE RATIO: 17.1 (ref 6–22)
CALCIUM SERPL-MCNC: 9.6 MG/DL (ref 8.6–10.3)
CHLORIDE SERPLBLD-SCNC: 98.6 MMOL/L (ref 98–110)
CHOLEST SERPL-MCNC: 161 MG/DL (ref 0–199)
CHOLEST/HDLC SERPL: 2 {RATIO} (ref 0–5)
CO2 SERPL-SCNC: 32 MMOL/L (ref 20–32)
CREAT SERPL-MCNC: 0.82 MG/DL (ref 0.6–1.3)
GLOBULIN, CALCULATED - QUEST: 2.4 (ref 1.9–3.7)
GLUCOSE SERPL-MCNC: 115 MG/DL (ref 60–99)
HBA1C MFR BLD: 6 % (ref 4–7)
HDLC SERPL-MCNC: 66 MG/DL (ref 40–150)
LDLC SERPL CALC-MCNC: 79 MG/DL (ref 0–130)
POTASSIUM SERPL-SCNC: 3.88 MMOL/L (ref 3.5–5.3)
PROT SERPL-MCNC: 6.5 G/DL (ref 6.1–8.1)
SODIUM SERPL-SCNC: 139.6 MMOL/L (ref 135–146)
TRIGL SERPL-MCNC: 78 MG/DL (ref 0–149)

## 2022-06-16 PROCEDURE — 80053 COMPREHEN METABOLIC PANEL: CPT | Performed by: PHYSICIAN ASSISTANT

## 2022-06-16 PROCEDURE — 80061 LIPID PANEL: CPT | Performed by: PHYSICIAN ASSISTANT

## 2022-06-16 PROCEDURE — 83036 HEMOGLOBIN GLYCOSYLATED A1C: CPT | Performed by: PHYSICIAN ASSISTANT

## 2022-06-16 PROCEDURE — 36415 COLL VENOUS BLD VENIPUNCTURE: CPT | Performed by: PHYSICIAN ASSISTANT

## 2022-06-16 PROCEDURE — 99214 OFFICE O/P EST MOD 30 MIN: CPT | Performed by: PHYSICIAN ASSISTANT

## 2022-06-16 RX ORDER — HYDROCHLOROTHIAZIDE 25 MG/1
25 TABLET ORAL DAILY
Qty: 90 TABLET | Refills: 1 | Status: SHIPPED | OUTPATIENT
Start: 2022-06-16 | End: 2022-10-20

## 2022-06-16 RX ORDER — DOXAZOSIN 2 MG/1
2 TABLET ORAL AT BEDTIME
Qty: 90 TABLET | Refills: 1 | Status: SHIPPED | OUTPATIENT
Start: 2022-06-16 | End: 2022-10-20

## 2022-06-16 RX ORDER — AMLODIPINE BESYLATE 2.5 MG/1
2.5 TABLET ORAL DAILY
Qty: 90 TABLET | Refills: 1 | Status: SHIPPED | OUTPATIENT
Start: 2022-06-16 | End: 2022-10-20

## 2022-06-16 RX ORDER — DONEPEZIL HYDROCHLORIDE 10 MG/1
10 TABLET, FILM COATED ORAL AT BEDTIME
COMMUNITY
Start: 2022-06-16

## 2022-06-16 RX ORDER — POTASSIUM CHLORIDE 750 MG/1
10 TABLET, EXTENDED RELEASE ORAL DAILY
Qty: 90 TABLET | Refills: 0 | Status: SHIPPED | OUTPATIENT
Start: 2022-06-16 | End: 2022-10-21

## 2022-06-16 RX ORDER — SIMVASTATIN 40 MG
40 TABLET ORAL AT BEDTIME
Qty: 90 TABLET | Refills: 1 | Status: SHIPPED | OUTPATIENT
Start: 2022-06-16 | End: 2022-10-20

## 2022-06-16 NOTE — PROGRESS NOTES
Assessment & Plan     Elevated fasting glucose    - Comprehensive Metobolic Panel (BFP)  - VENOUS COLLECTION  - HEMOGLOBIN A1C (BFP)    Other hyperlipidemia    - Comprehensive Metobolic Panel (BFP)  - VENOUS COLLECTION  - Lipid Panel (BFP)  - simvastatin (ZOCOR) 40 MG tablet  Dispense: 90 tablet; Refill: 1    Essential hypertension  I reached out to cardiology who agreed that dec. Antihypertensive is prudent.  Reduce amlodipine to 2.5 mg daily - check BP 3 x weekly  Will stop amlodipine if BP < 115/75 consistently  Sister notified of this  - Comprehensive Metobolic Panel (BFP)  - VENOUS COLLECTION  - amLODIPine (NORVASC) 2.5 MG tablet  Dispense: 90 tablet; Refill: 1  - hydrochlorothiazide (HYDRODIURIL) 25 MG tablet  Dispense: 90 tablet; Refill: 1  - doxazosin (CARDURA) 2 MG tablet  Dispense: 90 tablet; Refill: 1    Memory changes    - donepezil (ARICEPT) 10 MG tablet    Hypokalemia    - potassium chloride ER (K-TAB/KLOR-CON) 10 MEQ CR tablet  Dispense: 90 tablet; Refill: 0      Patient additionally has 2 packets of paperwork to be completed  Advised RTC to meet with her PCP for this discussion/evaluation    33 minutes spent on the date of the encounter doing chart review, history and exam, documentation and further activities per the note      ELDA Randall  [facility]    Subjective     Nursing Notes:   Jessica Barney CMA  6/16/2022 10:07 AM  Signed  Chief Complaint   Patient presents with     Recheck Medication     Fasting today, refill medications      Pre-visit Screening:  Immunizations:  up to date  Colonoscopy:  is up to date  Mammogram: is up to date  Asthma Action Test/Plan:  NA  PHQ9:  NA  GAD7:  NA  Questioned patient about current smoking habits Pt. quit smoking some time ago.  Ok to leave detailed message on voice mail for today's visit only Yes, phone # [phone] sister Jodi Nassar is a 89 year old female who presents to clinic today for the  "following health issues     HPI     Not checking BP at home.  Specifically denies chest pain, palpitations, dyspnea, or peripheral edema.     Moved here from [place] to be close to her sisters.     Memory loss  - Dr. Akosua Casas    Has been out of her potassium for 2 days      BP Readings from Last 6 Encounters:   06/16/22 104/70   12/13/21 110/62   12/09/21 124/62   12/06/21 102/62   10/18/21 104/52   09/21/21 136/78     BP lower than normal today     Specifically denies chest pain, palpitations, dyspnea, or peripheral edema.       Living in independent facility  Here with her sister providing history         Current Medications  Current Outpatient Medications   Medication Sig Dispense Refill     amLODIPine (NORVASC) 2.5 MG tablet Take 1 tablet (2.5 mg) by mouth daily 90 tablet 1     apixaban ANTICOAGULANT (ELIQUIS ANTICOAGULANT) 2.5 MG tablet Take 1 tablet (2.5 mg) by mouth 2 times daily 180 tablet 2     donepezil (ARICEPT) 10 MG tablet Take 1 tablet (10 mg) by mouth At Bedtime       doxazosin (CARDURA) 2 MG tablet Take 1 tablet (2 mg) by mouth At Bedtime 90 tablet 1     hydrochlorothiazide (HYDRODIURIL) 25 MG tablet Take 1 tablet (25 mg) by mouth daily 90 tablet 1     potassium chloride ER (K-TAB/KLOR-CON) 10 MEQ CR tablet Take 1 tablet (10 mEq) by mouth daily 90 tablet 0     simvastatin (ZOCOR) 40 MG tablet Take 1 tablet (40 mg) by mouth At Bedtime 90 tablet 1         Constitutional, HEENT, Cardiovascular, Pulmonary, GI and  systems are negative, except as otherwise noted.          Objective    /70 (BP Location: Right arm, Patient Position: Sitting, Cuff Size: Adult Regular)   Pulse 71   Temp 98.3  F (36.8  C) (Temporal)   Ht 1.664 m (5' 5.5\")   Wt 50.4 kg (111 lb 3.2 oz)   LMP  (LMP Unknown)   SpO2 97%   BMI 18.22 kg/m    Body mass index is 18.22 kg/m .  Physical Exam   GENERAL: healthy, alert and no distress  Head: Normocephalic, atraumatic.  Eyes: Conjunctiva clear, no discharge  Ears: " External ears and TMs normal BL.  Nose: Nasal mucosa pink and moist. No discharge.  Mouth / Throat: Mucous membranes moist. Normal dentition.  Pharynx non-erythematous, no exudates.   Neck: Supple, No thyromegaly or nodules. No lymphadenopathy.  RESP: lungs clear to auscultation - no rales, rhonchi or wheezes  CV: regular rate and rhythm, normal S1 S2, no S3 or S4, no murmur, click or rub, no peripheral edema and peripheral pulses strong  MS: no gross musculoskeletal defects noted, no edema

## 2022-06-16 NOTE — Clinical Note
Let's chat tomorrow Had a ton of paperwork - advised she needs to see you for this  - It is probably going to be a 30 min visit based on all the paperwork she had. She was only scheduled with me for a med check

## 2022-06-16 NOTE — NURSING NOTE
Chief Complaint   Patient presents with     Recheck Medication     Fasting today, refill medications      Pre-visit Screening:  Immunizations:  up to date  Colonoscopy:  is up to date  Mammogram: is up to date  Asthma Action Test/Plan:  NA  PHQ9:  NA  GAD7:  NA  Questioned patient about current smoking habits Pt. quit smoking some time ago.  Ok to leave detailed message on voice mail for today's visit only Yes, phone # [phone] sister Jodi

## 2022-06-17 ENCOUNTER — OFFICE VISIT (OUTPATIENT)
Dept: FAMILY MEDICINE | Facility: CLINIC | Age: 87
End: 2022-06-17

## 2022-06-17 VITALS
WEIGHT: 111 LBS | TEMPERATURE: 98.3 F | SYSTOLIC BLOOD PRESSURE: 108 MMHG | HEART RATE: 77 BPM | BODY MASS INDEX: 17.84 KG/M2 | OXYGEN SATURATION: 97 % | DIASTOLIC BLOOD PRESSURE: 62 MMHG | HEIGHT: 66 IN

## 2022-06-17 DIAGNOSIS — I10 ESSENTIAL HYPERTENSION: ICD-10-CM

## 2022-06-17 DIAGNOSIS — R41.3 MEMORY CHANGES: ICD-10-CM

## 2022-06-17 DIAGNOSIS — Z02.2 ENCOUNTER FOR EXAMINATION FOR ADMISSION TO ASSISTED LIVING FACILITY: Primary | ICD-10-CM

## 2022-06-17 DIAGNOSIS — I48.11 LONGSTANDING PERSISTENT ATRIAL FIBRILLATION (H): ICD-10-CM

## 2022-06-17 DIAGNOSIS — R73.01 ELEVATED FASTING GLUCOSE: ICD-10-CM

## 2022-06-17 DIAGNOSIS — Z87.891 PERSONAL HISTORY OF TOBACCO USE, PRESENTING HAZARDS TO HEALTH: ICD-10-CM

## 2022-06-17 DIAGNOSIS — E78.49 OTHER HYPERLIPIDEMIA: ICD-10-CM

## 2022-06-17 PROCEDURE — 99214 OFFICE O/P EST MOD 30 MIN: CPT | Performed by: FAMILY MEDICINE

## 2022-06-17 NOTE — NURSING NOTE
Chief Complaint   Patient presents with     Forms     Moved into living facility, needs health forms completed     Pre-visit Screening:  Immunizations:  not up to date - tetanus unknown  Colonoscopy:  NA  Mammogram: NA  Asthma Action Test/Plan:  NA  PHQ9:  NA  GAD7:  NA  Questioned patient about current smoking habits Pt. quit smoking some time ago.  Ok to leave detailed message on voice mail for today's visit only Yes, phone # [phone]

## 2022-06-17 NOTE — PROGRESS NOTES
Assessment & Plan   Problem List Items Addressed This Visit        Endocrine    Other hyperlipidemia    Elevated fasting glucose       Circulatory    Essential hypertension    Atrial fibrillation (H)       Behavioral    Personal history of tobacco use, presenting hazards to health       Other    Memory changes      Other Visit Diagnoses     Encounter for examination for admission to assisted living facility    -  Primary          25 minutes in face to face and counseling time spent with the patient.    1. Encounter for examination for admission to assisted living facility  Forms filled out and discussed.    2. Personal history of tobacco use, presenting hazards to health      3. Atrial fibrillation, unspecified type (H)  Followed by cardiology.    4. Essential hypertension  She has decreased her dose of norvasc to 2.5 mg/day, she is doing well on the medications and blood pressures have been still in the normal range. Continue to monitor this.    5. Elevated fasting glucose  Watching closely.    6. Other hyperlipidemia      7. Memory changes  Followed by neurology.       FUTURE APPOINTMENTS:       - Follow-up visit when due for med refills.    No follow-ups on file.    Tatianna Swain MD  [facility]    Subjective     Nursing Notes:   Jessica Barney CMA  6/17/2022  3:09 PM  Signed  Chief Complaint   Patient presents with     Forms     Moved into living facility, needs health forms completed     Pre-visit Screening:  Immunizations:  not up to date - tetanus unknown  Colonoscopy:  NA  Mammogram: NA  Asthma Action Test/Plan:  NA  PHQ9:  NA  GAD7:  NA  Questioned patient about current smoking habits Pt. quit smoking some time ago.  Ok to leave detailed message on voice mail for today's visit only Yes, phone # [phone]           Ilda Nassar is a 89 year old female who presents to clinic today for the following health issues   HPI     Here with friend, will be moving into assisted living  "facility and needs forms filled out and a physical. She is doing well.  For additional details please see scanned form.  She also has decreased her dose of amlodipine to 2.5 mg/day, they are watching her blood pressures and she is in the normal range now. No problems or symptoms, no side effects.        Review of Systems   Constitutional, HEENT, cardiovascular, pulmonary, gi and gu systems are negative, except as otherwise noted.      Objective    /62 (BP Location: Left arm, Patient Position: Sitting, Cuff Size: Adult Regular)   Pulse 77   Temp 98.3  F (36.8  C) (Temporal)   Ht 1.664 m (5' 5.5\")   Wt 50.3 kg (111 lb)   LMP  (LMP Unknown)   SpO2 97%   BMI 18.19 kg/m    Body mass index is 18.19 kg/m .  Physical Exam   GENERAL: healthy, alert and no distress  EYES: Eyes grossly normal to inspection, PERRL and conjunctivae and sclerae normal  HENT: ear canals and TM's normal, nose and mouth without ulcers or lesions  NECK: no adenopathy, no asymmetry, masses, or scars and thyroid normal to palpation  RESP: lungs clear to auscultation - no rales, rhonchi or wheezes  CV:irregular rate and rhythm, normal S1 S2, no S3 or S4, no murmur, click or rub, no peripheral edema and peripheral pulses strong  ABDOMEN: soft, nontender, no hepatosplenomegaly, no masses and bowel sounds normal  MS: no gross musculoskeletal defects noted, no edema  SKIN: no suspicious lesions or rashes  NEURO: Normal strength and tone, mentation intact and speech normal  PSYCH: mentation appears normal, affect normal/bright  Cognitive: needs some assistance with history. But she gives most of her history accurately.  Gait: normal, can heel walk, can toe walk  Dtrs: normal          "

## 2022-07-04 DIAGNOSIS — I10 ESSENTIAL HYPERTENSION: ICD-10-CM

## 2022-07-05 RX ORDER — DOXAZOSIN 2 MG/1
TABLET ORAL
Qty: 90 TABLET | Refills: 1 | COMMUNITY
Start: 2022-07-05

## 2022-07-05 NOTE — TELEPHONE ENCOUNTER
Ilda Nassar is requesting a refill of:    Refused Prescriptions:                       Disp   Refills    doxazosin (CARDURA) 2 MG tablet [Pharmacy *90 tab*1        Sig: TAKE 1 TABLET BY MOUTH EVERYDAY AT BEDTIME  Refused By: TALIA KRAFT  Reason for Refusal: OTHER    Refills written on 06/16/22 says were local printed

## 2022-07-15 DIAGNOSIS — E78.49 OTHER HYPERLIPIDEMIA: ICD-10-CM

## 2022-07-17 RX ORDER — SIMVASTATIN 40 MG
TABLET ORAL
Qty: 90 TABLET | Refills: 1 | COMMUNITY
Start: 2022-07-17

## 2022-07-18 NOTE — TELEPHONE ENCOUNTER
Received incoming refill request for  Pending Prescriptions:                       Disp   Refills    simvastatin (ZOCOR) 40 MG tablet [Pharmac*90 tab*1            Sig: TAKE 1 TABLET BY MOUTH EVERYDAY AT BEDTIME    Refill was given on 6/22-print out given to the patient.

## 2022-07-31 NOTE — PROGRESS NOTES
Cardiology Progress Note    Date of Service: 08/01/22      Reason for visit: Follow up atrial fibrillation.     Primary cardiologist: Dr. Antonino Hernández      HPI:  Ms. Nassar is a very pleasant 89 year old female with a PMHx including hyperlipidemia and hypertension, who was seen in the ER in July of 2021 for shoulder pain which she attributed to a musculoskeletal injury.  However, EKG was checked at that time showed atrial flutter vs coarse afib. Heart rate was controlled at 80 at that time. Echocardiogram showed preserved EF 55-60%. RV was of normal size and function. She has moderate to sever bi-atrial enlargement and 1-2+ MR. 3 day Ziopatch showed 100% afib with good control of heart rates and average of 72. Thus, when I met with her last in December, she was feeling well so we continued with rate control strategies and anticoagulation.     Today, I am seeing her back for routine cardiology follow up. She tells me that overall she continues to feel well. She denies any palpitations, chest pain, or dizziness. She does have some intermittent ongoing ankle edema but says this is not overly bothersome to her, and denies MONAHAN or orthopnea. She is taking her medications as prescribed.     Labs performed prior to our visit today were reviewed: Na 137, K 3.7, BUN 14.2, SCr 0.79, NT-proBNP 1,736. WBC 4.0, hemoglobin 13.6, hematocrit 43.5, plt 224.       ASSESSMENT/PLAN:    1. Atrial fibrillation.   --Noted in July 2021 when she was evaluated for shoulder pain that she attributed to a musculoskeletal injury. Incidentally noted to be in afib vs flutter (ultimately felt to be coarse afib). She has been asymptomatic and follow up Ziopatch showed very good rate control, not currently on beta blockers. Per exam today, she remains in rate controlled afib.     --Continue Eliquis 2.5mg BID (age/weight) for [id number] = 3.    2. Hypertension.   --BP under good control, currently on amlodipine and hydrochlorothiazide. Labs  today showed preserved renal function.   --She continues with intermittent ankle edema but says this is not bothersome to her. I noted that her NT-proBNP is elevated, but as she is feeling well, she would like to remain conservative so I made no changes today. Echo Nov 2021 showed preserved EF with normal RV size and function. She has mod-severe biatrial enlargement and 1-2+ MR. Diastolic function was unable to be assessed due to afib.  I asked her to call with any worsening edema or new dyspnea. I also advised her to follow a low sodium diet and use compression socks as soon as the weather cools down (not wearing in the summer, usually does in the winter). To ensure no decline in cardiac function, will repeat echo this winter when she returns for follow up.       Follow up plan: Return in 6 months to see Dr. Hernández with labs and echocardiogram. We are happy to see her back sooner with any new concerns.      Orders this Visit:  Orders Placed This Encounter   Procedures     Basic metabolic panel     N terminal pro BNP outpatient     CBC with platelets     Follow-Up with Cardiology     Echocardiogram Complete     Orders Placed This Encounter   Medications     aspirin (ASA) 325 MG EC tablet     Sig: Take 325 mg by mouth every 6 hours as needed for moderate pain     There are no discontinued medications.        CURRENT MEDICATIONS:  Current Outpatient Medications   Medication Sig Dispense Refill     amLODIPine (NORVASC) 2.5 MG tablet Take 1 tablet (2.5 mg) by mouth daily 90 tablet 1     apixaban ANTICOAGULANT (ELIQUIS ANTICOAGULANT) 2.5 MG tablet Take 1 tablet (2.5 mg) by mouth 2 times daily 180 tablet 2     aspirin (ASA) 325 MG EC tablet Take 325 mg by mouth every 6 hours as needed for moderate pain       donepezil (ARICEPT) 10 MG tablet Take 1 tablet (10 mg) by mouth At Bedtime       hydrochlorothiazide (HYDRODIURIL) 25 MG tablet Take 1 tablet (25 mg) by mouth daily 90 tablet 1     potassium chloride ER  "(K-TAB/KLOR-CON) 10 MEQ CR tablet Take 1 tablet (10 mEq) by mouth daily 90 tablet 0     doxazosin (CARDURA) 2 MG tablet Take 1 tablet (2 mg) by mouth At Bedtime 90 tablet 1     simvastatin (ZOCOR) 40 MG tablet Take 1 tablet (40 mg) by mouth At Bedtime 90 tablet 1       ALLERGIES   No Known Allergies    PAST MEDICAL HISTORY:  Past Medical History:   Diagnosis Date     Atrial fibrillation (H)      Atrial flutter (H)      Hyperlipidemia      Hypertension          Review of Systems:  Cardiovascular: negative for chest pain, palpitations, orthopnea, pos occasional ankle edema  Constitutional: negative for chills, sweats, fevers   Resp: Negative for dyspnea at rest, dyspnea on exertion, cough, known chronic lung disease  HEENT: Negative for new visual changes, frequent headaches  Gastrointestinal: negative for abdominal pain, diarrhea, blood in stool, nausea, vomiting  Hematologic/lymphatic: pos for current systemic anticoagulation, neg hx of blood clots  Neurological: negative for focal weakness, LOC, seizures, syncope/presyncope.       Physical Exam:  Vitals: /70 (BP Location: Right arm, Patient Position: Sitting, Cuff Size: Adult Regular)   Pulse 63   Ht 1.664 m (5' 5.5\")   Wt 51.3 kg (113 lb)   LMP  (LMP Unknown)   SpO2 97%   BMI 18.52 kg/m     Wt Readings from Last 4 Encounters:   08/01/22 51.3 kg (113 lb)   06/17/22 50.3 kg (111 lb)   06/16/22 50.4 kg (111 lb 3.2 oz)   12/13/21 53 kg (116 lb 14.4 oz)       GEN:  In general, this is a thin  female, in no acute distress on room air.  Patient ambulatory, accompanied by her sister today.  HEENT:  Pupils grossly equal, sclerae nonicteric.   NECK: Supple, trachea midline. No JVD while upright.   C/V:  Irregular rhythm, no significant murmur, rub or gallop. No S3 or RV heave.   RESP: Respirations are unlabored. No use of accessory muscles. Clear to auscultation bilaterally without wheezing, rales, or rhonchi.  GI: Abdomen soft, nontender, nondistended. "   EXTREM: Trace to 1+ bilateral ankle edema today.  No cyanosis or clubbing.  NEURO: Alert and oriented, cooperative. Gait not formally assessed. No obvious focal deficits.   SKIN: Warm and dry.       Recent Lab Results:  LIPID RESULTS:  Lab Results   Component Value Date    CHOL 161 06/16/2022    HDL 66 06/16/2022    LDL 79 06/16/2022    TRIG 78 06/16/2022    CHOLHDLRATIO 2 06/16/2022       LIVER ENZYME RESULTS:  No results found for: AST, ALT    CBC RESULTS:  Lab Results   Component Value Date    WBC 4.0 08/01/2022    WBC 4.7 07/09/2021    RBC 4.70 08/01/2022    RBC 4.44 07/09/2021    HGB 13.6 08/01/2022    HGB 13.5 07/09/2021    HCT 43.5 08/01/2022    HCT 40.7 07/09/2021    MCV 93 08/01/2022    MCV 92 07/09/2021    MCH 28.9 08/01/2022    MCH 30.4 07/09/2021    MCHC 31.3 (L) 08/01/2022    MCHC 33.2 07/09/2021    RDW 13.2 08/01/2022    RDW 12.8 07/09/2021     08/01/2022     07/09/2021       BMP RESULTS:  Lab Results   Component Value Date     08/01/2022    .6 06/16/2022    POTASSIUM 3.7 08/01/2022    POTASSIUM 3.88 06/16/2022    CHLORIDE 99 08/01/2022    CHLORIDE 98.6 06/16/2022    CO2 30 (H) 08/01/2022    CO2 32.0 06/16/2022    ANIONGAP 8 08/01/2022    ANIONGAP 6 09/20/2021    ANIONGAP 8 07/09/2021     (H) 08/01/2022     (A) 06/16/2022    BUN 14.2 08/01/2022    BUN 14 06/16/2022    BUN 17.1 06/16/2022    CR 0.79 08/01/2022    CR 0.82 06/16/2022    GFRESTIMATED 71 08/01/2022    GFRESTIMATED 61 07/09/2021    GFRESTBLACK 71 07/09/2021    ESTELA 9.6 08/01/2022    ESTELA 9.6 06/16/2022          Additional pertinent testing:    Echo 11/5/2021  Interpretation Summary     1. The left ventricle is normal in size. Proximal septal thickening is noted.  Left ventricular systolic function is normal. The visual ejection fraction is  55-60%. Diastolic function not assessed due to atrial fibrillation. No  regional wall motion abnormalities noted.  2. The right ventricle is normal size. The right  ventricular systolic function  is normal.  3. There is mod-severe biatrial enlargement. There is no color Doppler  evidence of an atrial shunt.  4. There is mild to moderate (1-2+) mitral regurgitation.  5. No pericardial effusion.  6. No previous study for comparison.      30  total minutes was spent today including chart review, precharting, history and exam, post visit documentation, and reviewing studies as outlined above.       Myrna Reddy PA-C  [facility]  Pager [phone]

## 2022-08-01 ENCOUNTER — OFFICE VISIT (OUTPATIENT)
Dept: CARDIOLOGY | Facility: CLINIC | Age: 87
End: 2022-08-01

## 2022-08-01 ENCOUNTER — LAB (OUTPATIENT)
Dept: LAB | Facility: CLINIC | Age: 87
End: 2022-08-01
Payer: MEDICARE

## 2022-08-01 VITALS
HEART RATE: 63 BPM | DIASTOLIC BLOOD PRESSURE: 70 MMHG | BODY MASS INDEX: 18.16 KG/M2 | SYSTOLIC BLOOD PRESSURE: 122 MMHG | OXYGEN SATURATION: 97 % | WEIGHT: 113 LBS | HEIGHT: 66 IN

## 2022-08-01 DIAGNOSIS — I48.20 CHRONIC ATRIAL FIBRILLATION (H): ICD-10-CM

## 2022-08-01 DIAGNOSIS — R06.09 DYSPNEA ON EXERTION: ICD-10-CM

## 2022-08-01 LAB
ANION GAP SERPL CALCULATED.3IONS-SCNC: 8 MMOL/L (ref 7–15)
BUN SERPL-MCNC: 14.2 MG/DL (ref 8–23)
CALCIUM SERPL-MCNC: 9.6 MG/DL (ref 8.8–10.2)
CHLORIDE SERPL-SCNC: 99 MMOL/L (ref 98–107)
CREAT SERPL-MCNC: 0.79 MG/DL (ref 0.51–0.95)
DEPRECATED HCO3 PLAS-SCNC: 30 MMOL/L (ref 22–29)
ERYTHROCYTE [DISTWIDTH] IN BLOOD BY AUTOMATED COUNT: 13.2 % (ref 10–15)
GFR SERPL CREATININE-BSD FRML MDRD: 71 ML/MIN/1.73M2
GLUCOSE SERPL-MCNC: 134 MG/DL (ref 70–99)
HCT VFR BLD AUTO: 43.5 % (ref 35–47)
HGB BLD-MCNC: 13.6 G/DL (ref 11.7–15.7)
MCH RBC QN AUTO: 28.9 PG (ref 26.5–33)
MCHC RBC AUTO-ENTMCNC: 31.3 G/DL (ref 31.5–36.5)
MCV RBC AUTO: 93 FL (ref 78–100)
NT-PROBNP SERPL-MCNC: 1736 PG/ML (ref 0–450)
PLATELET # BLD AUTO: 224 10E3/UL (ref 150–450)
POTASSIUM SERPL-SCNC: 3.7 MMOL/L (ref 3.4–5.3)
RBC # BLD AUTO: 4.7 10E6/UL (ref 3.8–5.2)
SODIUM SERPL-SCNC: 137 MMOL/L (ref 136–145)
WBC # BLD AUTO: 4 10E3/UL (ref 4–11)

## 2022-08-01 PROCEDURE — 99214 OFFICE O/P EST MOD 30 MIN: CPT | Performed by: PHYSICIAN ASSISTANT

## 2022-08-01 PROCEDURE — 83880 ASSAY OF NATRIURETIC PEPTIDE: CPT | Performed by: PHYSICIAN ASSISTANT

## 2022-08-01 PROCEDURE — 80048 BASIC METABOLIC PNL TOTAL CA: CPT | Performed by: PHYSICIAN ASSISTANT

## 2022-08-01 PROCEDURE — 85027 COMPLETE CBC AUTOMATED: CPT | Performed by: PHYSICIAN ASSISTANT

## 2022-08-01 PROCEDURE — 36415 COLL VENOUS BLD VENIPUNCTURE: CPT | Performed by: PHYSICIAN ASSISTANT

## 2022-08-01 NOTE — LETTER
8/1/2022    Tatianna Swain MD  [address] [facility] [address]    RE: Ilda Nassar       Dear Colleague,     I had the pleasure of seeing Ilda Nassar in the [facility].        Cardiology Progress Note    Date of Service: 08/01/22      Reason for visit: Follow up atrial fibrillation.     Primary cardiologist: Dr. Antonino Hernández      HPI:  Ms. Nassar is a very pleasant 89 year old female with a PMHx including hyperlipidemia and hypertension, who was seen in the ER in July of 2021 for shoulder pain which she attributed to a musculoskeletal injury.  However, EKG was checked at that time showed atrial flutter vs coarse afib. Heart rate was controlled at 80 at that time. Echocardiogram showed preserved EF 55-60%. RV was of normal size and function. She has moderate to sever bi-atrial enlargement and 1-2+ MR. 3 day Ziopatch showed 100% afib with good control of heart rates and average of 72. Thus, when I met with her last in December, she was feeling well so we continued with rate control strategies and anticoagulation.     Today, I am seeing her back for routine cardiology follow up. She tells me that overall she continues to feel well. She denies any palpitations, chest pain, or dizziness. She does have some intermittent ongoing ankle edema but says this is not overly bothersome to her, and denies MONAHAN or orthopnea. She is taking her medications as prescribed.     Labs performed prior to our visit today were reviewed: Na 137, K 3.7, BUN 14.2, SCr 0.79, NT-proBNP 1,736. WBC 4.0, hemoglobin 13.6, hematocrit 43.5, plt 224.       ASSESSMENT/PLAN:    1. Atrial fibrillation.   --Noted in July 2021 when she was evaluated for shoulder pain that she attributed to a musculoskeletal injury. Incidentally noted to be in afib vs flutter (ultimately felt to be coarse afib). She has been asymptomatic and follow up Ziopatch showed very good rate control, not currently on beta blockers. Per exam today, she  remains in rate controlled afib.     --Continue Eliquis 2.5mg BID (age/weight) for [id number] = 3.    2. Hypertension.   --BP under good control, currently on amlodipine and hydrochlorothiazide. Labs today showed preserved renal function.   --She continues with intermittent ankle edema but says this is not bothersome to her. I noted that her NT-proBNP is elevated, but as she is feeling well, she would like to remain conservative so I made no changes today. Echo Nov 2021 showed preserved EF with normal RV size and function. She has mod-severe biatrial enlargement and 1-2+ MR. Diastolic function was unable to be assessed due to afib.  I asked her to call with any worsening edema or new dyspnea. I also advised her to follow a low sodium diet and use compression socks as soon as the weather cools down (not wearing in the summer, usually does in the winter). To ensure no decline in cardiac function, will repeat echo this winter when she returns for follow up.       Follow up plan: Return in 6 months to see Dr. Hernández with labs and echocardiogram. We are happy to see her back sooner with any new concerns.      Orders this Visit:  Orders Placed This Encounter   Procedures     Basic metabolic panel     N terminal pro BNP outpatient     CBC with platelets     Follow-Up with Cardiology     Echocardiogram Complete     Orders Placed This Encounter   Medications     aspirin (ASA) 325 MG EC tablet     Sig: Take 325 mg by mouth every 6 hours as needed for moderate pain     There are no discontinued medications.        CURRENT MEDICATIONS:  Current Outpatient Medications   Medication Sig Dispense Refill     amLODIPine (NORVASC) 2.5 MG tablet Take 1 tablet (2.5 mg) by mouth daily 90 tablet 1     apixaban ANTICOAGULANT (ELIQUIS ANTICOAGULANT) 2.5 MG tablet Take 1 tablet (2.5 mg) by mouth 2 times daily 180 tablet 2     aspirin (ASA) 325 MG EC tablet Take 325 mg by mouth every 6 hours as needed for moderate pain       donepezil  "(ARICEPT) 10 MG tablet Take 1 tablet (10 mg) by mouth At Bedtime       hydrochlorothiazide (HYDRODIURIL) 25 MG tablet Take 1 tablet (25 mg) by mouth daily 90 tablet 1     potassium chloride ER (K-TAB/KLOR-CON) 10 MEQ CR tablet Take 1 tablet (10 mEq) by mouth daily 90 tablet 0     doxazosin (CARDURA) 2 MG tablet Take 1 tablet (2 mg) by mouth At Bedtime 90 tablet 1     simvastatin (ZOCOR) 40 MG tablet Take 1 tablet (40 mg) by mouth At Bedtime 90 tablet 1       ALLERGIES   No Known Allergies    PAST MEDICAL HISTORY:  Past Medical History:   Diagnosis Date     Atrial fibrillation (H)      Atrial flutter (H)      Hyperlipidemia      Hypertension          Review of Systems:  Cardiovascular: negative for chest pain, palpitations, orthopnea, pos occasional ankle edema  Constitutional: negative for chills, sweats, fevers   Resp: Negative for dyspnea at rest, dyspnea on exertion, cough, known chronic lung disease  HEENT: Negative for new visual changes, frequent headaches  Gastrointestinal: negative for abdominal pain, diarrhea, blood in stool, nausea, vomiting  Hematologic/lymphatic: pos for current systemic anticoagulation, neg hx of blood clots  Neurological: negative for focal weakness, LOC, seizures, syncope/presyncope.       Physical Exam:  Vitals: /70 (BP Location: Right arm, Patient Position: Sitting, Cuff Size: Adult Regular)   Pulse 63   Ht 1.664 m (5' 5.5\")   Wt 51.3 kg (113 lb)   LMP  (LMP Unknown)   SpO2 97%   BMI 18.52 kg/m     Wt Readings from Last 4 Encounters:   08/01/22 51.3 kg (113 lb)   06/17/22 50.3 kg (111 lb)   06/16/22 50.4 kg (111 lb 3.2 oz)   12/13/21 53 kg (116 lb 14.4 oz)       GEN:  In general, this is a thin  female, in no acute distress on room air.  Patient ambulatory, accompanied by her sister today.  HEENT:  Pupils grossly equal, sclerae nonicteric.   NECK: Supple, trachea midline. No JVD while upright.   C/V:  Irregular rhythm, no significant murmur, rub or gallop. No S3 " or RV heave.   RESP: Respirations are unlabored. No use of accessory muscles. Clear to auscultation bilaterally without wheezing, rales, or rhonchi.  GI: Abdomen soft, nontender, nondistended.   EXTREM: Trace to 1+ bilateral ankle edema today.  No cyanosis or clubbing.  NEURO: Alert and oriented, cooperative. Gait not formally assessed. No obvious focal deficits.   SKIN: Warm and dry.       Recent Lab Results:  LIPID RESULTS:  Lab Results   Component Value Date    CHOL 161 06/16/2022    HDL 66 06/16/2022    LDL 79 06/16/2022    TRIG 78 06/16/2022    CHOLHDLRATIO 2 06/16/2022       LIVER ENZYME RESULTS:  No results found for: AST, ALT    CBC RESULTS:  Lab Results   Component Value Date    WBC 4.0 08/01/2022    WBC 4.7 07/09/2021    RBC 4.70 08/01/2022    RBC 4.44 07/09/2021    HGB 13.6 08/01/2022    HGB 13.5 07/09/2021    HCT 43.5 08/01/2022    HCT 40.7 07/09/2021    MCV 93 08/01/2022    MCV 92 07/09/2021    MCH 28.9 08/01/2022    MCH 30.4 07/09/2021    MCHC 31.3 (L) 08/01/2022    MCHC 33.2 07/09/2021    RDW 13.2 08/01/2022    RDW 12.8 07/09/2021     08/01/2022     07/09/2021       BMP RESULTS:  Lab Results   Component Value Date     08/01/2022    .6 06/16/2022    POTASSIUM 3.7 08/01/2022    POTASSIUM 3.88 06/16/2022    CHLORIDE 99 08/01/2022    CHLORIDE 98.6 06/16/2022    CO2 30 (H) 08/01/2022    CO2 32.0 06/16/2022    ANIONGAP 8 08/01/2022    ANIONGAP 6 09/20/2021    ANIONGAP 8 07/09/2021     (H) 08/01/2022     (A) 06/16/2022    BUN 14.2 08/01/2022    BUN 14 06/16/2022    BUN 17.1 06/16/2022    CR 0.79 08/01/2022    CR 0.82 06/16/2022    GFRESTIMATED 71 08/01/2022    GFRESTIMATED 61 07/09/2021    GFRESTBLACK 71 07/09/2021    ESTELA 9.6 08/01/2022    ESTELA 9.6 06/16/2022          Additional pertinent testing:    Echo 11/5/2021  Interpretation Summary     1. The left ventricle is normal in size. Proximal septal thickening is noted.  Left ventricular systolic function is normal. The  visual ejection fraction is  55-60%. Diastolic function not assessed due to atrial fibrillation. No  regional wall motion abnormalities noted.  2. The right ventricle is normal size. The right ventricular systolic function  is normal.  3. There is mod-severe biatrial enlargement. There is no color Doppler  evidence of an atrial shunt.  4. There is mild to moderate (1-2+) mitral regurgitation.  5. No pericardial effusion.  6. No previous study for comparison.      30  total minutes was spent today including chart review, precharting, history and exam, post visit documentation, and reviewing studies as outlined above.       Myrna Reddy PA-C  [facility]  Pager [phone]    Thank you for allowing me to participate in the care of your patient.      Sincerely,     ELDA Roberts     [facility]  cc:   ELDA Roberts  [facility]  [address] [place] [phone]

## 2022-08-01 NOTE — PATIENT INSTRUCTIONS
Visit Summary:    Today we discussed:   No medication changes today.  Please call if you note any new shortness of breath, more swelling than usual, or any new concerns.     Follow up:   With Dr Hernández in 6 months with an echo and labs.       Please call my nurse Leena at [phone] with any questions or concerns.

## 2022-08-17 ENCOUNTER — MEDICAL CORRESPONDENCE (OUTPATIENT)
Dept: HEALTH INFORMATION MANAGEMENT | Facility: CLINIC | Age: 87
End: 2022-08-17

## 2022-09-25 ENCOUNTER — HEALTH MAINTENANCE LETTER (OUTPATIENT)
Age: 87
End: 2022-09-25

## 2022-10-03 ENCOUNTER — MEDICAL CORRESPONDENCE (OUTPATIENT)
Dept: HEALTH INFORMATION MANAGEMENT | Facility: CLINIC | Age: 87
End: 2022-10-03

## 2022-10-17 ENCOUNTER — MEDICAL CORRESPONDENCE (OUTPATIENT)
Dept: HEALTH INFORMATION MANAGEMENT | Facility: CLINIC | Age: 87
End: 2022-10-17

## 2022-10-17 ENCOUNTER — OFFICE VISIT (OUTPATIENT)
Dept: FAMILY MEDICINE | Facility: CLINIC | Age: 87
End: 2022-10-17

## 2022-10-17 VITALS
HEART RATE: 66 BPM | OXYGEN SATURATION: 99 % | SYSTOLIC BLOOD PRESSURE: 102 MMHG | TEMPERATURE: 98 F | HEIGHT: 66 IN | BODY MASS INDEX: 17.36 KG/M2 | DIASTOLIC BLOOD PRESSURE: 66 MMHG | WEIGHT: 108 LBS

## 2022-10-17 DIAGNOSIS — R68.89 ILL FEELING: ICD-10-CM

## 2022-10-17 DIAGNOSIS — R73.09 HIGH GLUCOSE: ICD-10-CM

## 2022-10-17 DIAGNOSIS — R07.0 THROAT PAIN: Primary | ICD-10-CM

## 2022-10-17 LAB
% GRANULOCYTES: 55 %
ALBUMIN SERPL-MCNC: 4.1 G/DL (ref 3.6–5.1)
ALBUMIN/GLOB SERPL: 1.3 {RATIO} (ref 1–2.5)
ALP SERPL-CCNC: 76 U/L (ref 33–130)
ALT 1742-6: 15 U/L (ref 0–32)
APPEARANCE UR: ABNORMAL
AST 1920-8: 18 U/L (ref 0–35)
BACTERIA, UR: ABNORMAL
BILIRUB SERPL-MCNC: 2.8 MG/DL (ref 0.2–1.2)
BILIRUB UR QL: ABNORMAL
BUN SERPL-MCNC: 14 MG/DL (ref 7–25)
BUN/CREATININE RATIO: 11.5 (ref 6–22)
CALCIUM SERPL-MCNC: 10.1 MG/DL (ref 8.6–10.3)
CASTS/LPF: ABNORMAL
CHLORIDE SERPLBLD-SCNC: 90.8 MMOL/L (ref 98–110)
CO2 SERPL-SCNC: 31.2 MMOL/L (ref 20–32)
COLOR UR: YELLOW
COVID-19: NEGATIVE
CREAT SERPL-MCNC: 1.22 MG/DL (ref 0.6–1.3)
EP/HPF: ABNORMAL
GLOBULIN, CALCULATED - QUEST: 3.1 (ref 1.9–3.7)
GLUCOSE SERPL-MCNC: 168 MG/DL (ref 60–99)
GLUCOSE URINE: ABNORMAL MG/DL
HCT VFR BLD AUTO: 47.5 % (ref 35–47)
HEMOGLOBIN: 15.7 G/DL (ref 11.7–15.7)
HGB UR QL: ABNORMAL
KETONES UR QL: 15 MG/DL
LYMPHOCYTES NFR BLD AUTO: 28.9 %
MCH RBC QN AUTO: 29.6 PG (ref 26–33)
MCHC RBC AUTO-ENTMCNC: 33.1 G/DL (ref 31–36)
MCV RBC AUTO: 89.7 FL (ref 78–100)
MISC.: ABNORMAL
MONOCYTES NFR BLD AUTO: 16.1 %
NITRITE UR QL STRIP: ABNORMAL
PH UR STRIP: 6 PH (ref 5–7)
PLATELET COUNT - QUEST: 180 10^9/L (ref 150–375)
POTASSIUM SERPL-SCNC: 3.92 MMOL/L (ref 3.5–5.3)
PROT SERPL-MCNC: 7.2 G/DL (ref 6.1–8.1)
PROT UR QL: 100 MG/DL
RBC # BLD AUTO: 5.3 10*12/L (ref 3.8–5.2)
RBC, UR MICRO: ABNORMAL (ref ?–2)
SODIUM SERPL-SCNC: 131 MMOL/L (ref 135–146)
SP GR UR STRIP: ABNORMAL (ref 1–1.03)
UROBILINOGEN UR QL STRIP: 4 EU/DL (ref 0.2–1)
WBC # BLD AUTO: 5.2 10*9/L (ref 4–11)
WBC #/AREA URNS HPF: ABNORMAL /[HPF]
WBC, UR MICRO: ABNORMAL (ref ?–2)

## 2022-10-17 PROCEDURE — 87635 SARS-COV-2 COVID-19 AMP PRB: CPT | Performed by: FAMILY MEDICINE

## 2022-10-17 PROCEDURE — 99214 OFFICE O/P EST MOD 30 MIN: CPT | Performed by: FAMILY MEDICINE

## 2022-10-17 PROCEDURE — 85025 COMPLETE CBC W/AUTO DIFF WBC: CPT | Performed by: FAMILY MEDICINE

## 2022-10-17 PROCEDURE — 81001 URINALYSIS AUTO W/SCOPE: CPT | Performed by: FAMILY MEDICINE

## 2022-10-17 PROCEDURE — 36415 COLL VENOUS BLD VENIPUNCTURE: CPT | Performed by: FAMILY MEDICINE

## 2022-10-17 PROCEDURE — G2023 SPECIMEN COLLECT COVID-19: HCPCS | Performed by: FAMILY MEDICINE

## 2022-10-17 PROCEDURE — 83036 HEMOGLOBIN GLYCOSYLATED A1C: CPT | Performed by: FAMILY MEDICINE

## 2022-10-17 PROCEDURE — 80053 COMPREHEN METABOLIC PANEL: CPT | Performed by: FAMILY MEDICINE

## 2022-10-17 RX ORDER — CEPHALEXIN 500 MG/1
500 CAPSULE ORAL 2 TIMES DAILY
Qty: 14 CAPSULE | Refills: 0 | Status: SHIPPED | OUTPATIENT
Start: 2022-10-17 | End: 2022-10-24

## 2022-10-17 NOTE — PROGRESS NOTES
Assessment & Plan   Problem List Items Addressed This Visit    None  Visit Diagnoses     Throat pain    -  Primary    Relevant Orders    COVID-19 ([facility]) (Completed)    Ill feeling        Relevant Medications    cephALEXin (KEFLEX) 500 MG capsule    Other Relevant Orders    VENOUS COLLECTION (Completed)    HEMOGRAM PLATELET DIFF ([facility]) (Completed)    URINALYSIS, ROUTINE ([facility]) (Completed)    Comprehensive Metobolic Panel ([facility]) (Completed)    URINE CULTURE AEROBIC BACTERIAL ([facility])           1. Throat pain  Negative covid result.  - COVID-19 ([facility])    2. Ill feeling  Labs done. Possible uti. Treat with antibiotics, then recheck in clinic later this week or next week, depending on how she is feeling. Since she lives alone I recommended someone check on her or stay with her to make sure she is doing ok.   - VENOUS COLLECTION  - HEMOGRAM PLATELET DIFF ([facility])  - URINALYSIS, ROUTINE ([facility])  - Comprehensive Metobolic Panel ([facility])  - URINE CULTURE AEROBIC BACTERIAL ([facility]); Standing  - cephALEXin (KEFLEX) 500 MG capsule; Take 1 capsule (500 mg) by mouth 2 times daily for 7 days  Dispense: 14 capsule; Refill: 0           FUTURE APPOINTMENTS:       - Follow-up visit later this week    No follow-ups on file.    Tatianna Swain MD  [facility]    Subjective     Nursing Notes:   Norma Thomas  10/17/2022  2:48 PM  Signed  Chief Complaint   Patient presents with     Throat Pain     Throat pain started 6 days ago     Fatigue     Fatigue/not feeling well since last Tuesday  Has tried tylenol and cough drops which weren't helpful enough         Pre-visit Screening:  Immunizations:  tdap at pharmacy  Colonoscopy:  NA  Mammogram: NA  Asthma Action Test/Plan:  NA  PHQ9:  NA  GAD7:  NA  Questioned patient about current smoking habits Pt. Quit some time ago  Ok to leave detailed message on voice mail for today's visit only Yes, phone # [phone]             Ilda Nassar is a 89 year old female who presents to clinic  "today for the following health issues   HPI     Here with sister with cold sx that started last week Tuesday, didn't check for covid. No known sick contacts.  Eating and drinking--not too well. Decreased appetite. Not sure if she is dehydrated. Lives alone. Drinks coffee in the morning. No fevers and no shortness of breath.   No known uti.  Just doesn't feel good.        Review of Systems   Constitutional, HEENT, cardiovascular, pulmonary, gi and gu systems are negative, except as otherwise noted.      Objective    /66 (BP Location: Left arm, Patient Position: Sitting, Cuff Size: Adult Regular)   Pulse 66   Temp 98  F (36.7  C) (Temporal)   Ht 1.664 m (5' 5.5\")   Wt 49 kg (108 lb)   LMP  (LMP Unknown)   SpO2 99%   BMI 17.70 kg/m    Body mass index is 17.7 kg/m .  Physical Exam   GENERAL: healthy, alert and no distress  RESP: lungs clear to auscultation - no rales, rhonchi or wheezes  CV: regular rate and rhythm, normal S1 S2, no S3 or S4, no murmur, click or rub, no peripheral edema and peripheral pulses strong  ABDOMEN: soft, nontender, no hepatosplenomegaly, no masses and bowel sounds normal  MS: no gross musculoskeletal defects noted, no edema  NEURO: Normal strength and tone, mentation intact and speech normal  PSYCH: mentation appears normal, affect normal/bright    Results for orders placed or performed in visit on 10/17/22   COVID-19 ([facility])     Status: None   Result Value Ref Range    COVID-19 Negative    HEMOGRAM PLATELET DIFF ([facility])     Status: Abnormal   Result Value Ref Range    WBC 5.2 4.0 - 11 10*9/L    RBC Count 5.30 (A) 3.8 - 5.2 10*12/L    Hemoglobin 15.7 11.7 - 15.7 g/dL    Hematocrit 47.5 (A) 35.0 - 47.0 %    MCV 89.7 78 - 100 fL    MCH 29.6 26 - 33 pg    MCHC 33.1 31 - 36 g/dL    Platelet Count 180 150 - 375 10^9/L    % Granulocytes 55.0 %    % Lymphocytes 28.9 %    % Monocytes 16.1 %   URINALYSIS, ROUTINE ([facility])     Status: Abnormal   Result Value Ref Range    Color Urine Yellow     " Appearance Urine Cloudy (A)     Glucose Urine Neg neg mg/dL    Bilirubin Urine Mod (A) neg    Ketones Urine 15 (A) neg mg/dL    Specific Gravity Urine Other (A) 1.003 - 1.035    Blood Urine Small (A) neg    pH Urine 6.0 5.0 - 7.0 pH    Protein Urine 100 (A) neg - neg mg/dL    Urobilinogen Urine 4 (A) 0.2 - 1.0 EU/dL    Nitrite Urine Neg NEG    Leukocytes small (A)     Wbc, Urine Micro 40-50 (A) neg - 2    RBC Micro Urine 5-10 (A) neg - 2    EP/HPF few     Bacteria Urine moderate (A) neg - neg    Casts/LPF neg     Miscellaneous neg    Comprehensive Metobolic Panel ([facility])     Status: Abnormal   Result Value Ref Range    Carbon Dioxide 31.2 20 - 32 mmol/L    Creatinine 1.22 0.60 - 1.30 mg/dL    Glucose 168 (A) 60 - 99 mg/dL    Sodium 131.0 (A) 135 - 146 mmol/L    Potassium 3.92 3.5 - 5.3 mmol/L    Chloride 90.8 (A) 98 - 110 mmol/L    Protein Total 7.2 6.1 - 8.1 g/dL    Albumin 4.1 3.6 - 5.1 g/dL    Alkaline Phosphatase 76 33 - 130 U/L    ALT 15 0 - 32 U/L    AST 18 0 - 35 U/L    Bilirubin Total 2.8 (A) 0.2 - 1.2 mg/dL    Urea Nitrogen 14 7 - 25 mg/dL    Calcium 10.1 8.6 - 10.3 mg/dL    BUN/Creatinine Ratio 11.5 6 - 22    Globulin Calculated 3.1 1.9 - 3.7    A/G Ratio 1.3 1 - 2.5

## 2022-10-17 NOTE — NURSING NOTE
Chief Complaint   Patient presents with     Throat Pain     Throat pain started 6 days ago     Fatigue     Fatigue/not feeling well since last Tuesday  Has tried tylenol and cough drops which weren't helpful enough         Pre-visit Screening:  Immunizations:  tdap at pharmacy  Colonoscopy:  NA  Mammogram: NA  Asthma Action Test/Plan:  NA  PHQ9:  NA  GAD7:  NA  Questioned patient about current smoking habits Pt. Quit some time ago  Ok to leave detailed message on voice mail for today's visit only Yes, phone # [phone]

## 2022-10-18 DIAGNOSIS — R68.89 ILL FEELING: ICD-10-CM

## 2022-10-18 LAB — HBA1C MFR BLD: 5.8 % (ref 4–7)

## 2022-10-18 PROCEDURE — 83036 HEMOGLOBIN GLYCOSYLATED A1C: CPT | Performed by: FAMILY MEDICINE

## 2022-10-18 PROCEDURE — 36415 COLL VENOUS BLD VENIPUNCTURE: CPT | Performed by: FAMILY MEDICINE

## 2022-10-20 ENCOUNTER — TELEPHONE (OUTPATIENT)
Dept: FAMILY MEDICINE | Facility: CLINIC | Age: 87
End: 2022-10-20

## 2022-10-20 DIAGNOSIS — I10 ESSENTIAL HYPERTENSION: ICD-10-CM

## 2022-10-20 DIAGNOSIS — E87.6 HYPOKALEMIA: ICD-10-CM

## 2022-10-20 DIAGNOSIS — E78.49 OTHER HYPERLIPIDEMIA: ICD-10-CM

## 2022-10-20 DIAGNOSIS — R68.89 ILL FEELING: ICD-10-CM

## 2022-10-20 LAB — URINE VOIDED CULTURE: NORMAL

## 2022-10-20 RX ORDER — CEPHALEXIN 500 MG/1
500 CAPSULE ORAL 2 TIMES DAILY
Qty: 14 CAPSULE | Refills: 0 | Status: CANCELLED | OUTPATIENT
Start: 2022-10-20

## 2022-10-20 NOTE — TELEPHONE ENCOUNTER
"I informed of the results and therefore no need to take the abx.  Pt still feels \"ill\". Just not \"herself\".  Her sister thinks it could do with the season changing.  I advised to help boost Pat's immune system and encourage some social activity to help with seasonal changes  She will make an appt next week if not better or gets worse.  "

## 2022-10-20 NOTE — TELEPHONE ENCOUNTER
Patricia's sister called wanting to confirm if Patricia has a UTI?  Pt brought a sample the day after seeing Dr. Swain because she was unsuccessful in-clinic.  Dr. Swain sent in abx in case of a positive result.  Should pt take these?  Routing to Siri to review for Dr. Swain, thanks.      Sister Jodi's phone # [phone]

## 2022-10-20 NOTE — TELEPHONE ENCOUNTER
Pt's sister called stating they need the remainder of pt's scripts sent to her new pharmacy. She has moved into a long term care facility. Can you send these to her new pharmacy?    Ilda Nassar is requesting a refill of:    Pending Prescriptions:                       Disp   Refills    amLODIPine (NORVASC) 2.5 MG tablet        90 tab*0            Sig: Take 1 tablet (2.5 mg) by mouth daily    doxazosin (CARDURA) 2 MG tablet           90 tab*0            Sig: Take 1 tablet (2 mg) by mouth At Bedtime    hydrochlorothiazide (HYDRODIURIL) 25 MG t*90 tab*0            Sig: Take 1 tablet (25 mg) by mouth daily    simvastatin (ZOCOR) 40 MG tablet          90 tab*0            Sig: Take 1 tablet (40 mg) by mouth At Bedtime    potassium chloride ER (K-TAB/KLOR-CON) 10*90 tab*0            Sig: Take 1 tablet (10 mEq) by mouth daily

## 2022-10-20 NOTE — TELEPHONE ENCOUNTER
Urine culture did not find the type of bacteria that tend to cause UTIs. Any update on how patient is doing?

## 2022-10-21 RX ORDER — SIMVASTATIN 40 MG
40 TABLET ORAL AT BEDTIME
Qty: 90 TABLET | Refills: 0 | Status: SHIPPED | OUTPATIENT
Start: 2022-10-21 | End: 2023-03-01

## 2022-10-21 RX ORDER — DOXAZOSIN 2 MG/1
2 TABLET ORAL AT BEDTIME
Qty: 90 TABLET | Refills: 0 | Status: SHIPPED | OUTPATIENT
Start: 2022-10-21 | End: 2023-03-01

## 2022-10-21 RX ORDER — AMLODIPINE BESYLATE 2.5 MG/1
2.5 TABLET ORAL DAILY
Qty: 90 TABLET | Refills: 0 | Status: SHIPPED | OUTPATIENT
Start: 2022-10-21 | End: 2022-11-03

## 2022-10-21 RX ORDER — HYDROCHLOROTHIAZIDE 25 MG/1
25 TABLET ORAL DAILY
Qty: 90 TABLET | Refills: 0 | Status: SHIPPED | OUTPATIENT
Start: 2022-10-21 | End: 2022-11-09

## 2022-10-21 RX ORDER — POTASSIUM CHLORIDE 750 MG/1
10 TABLET, EXTENDED RELEASE ORAL DAILY
Qty: 90 TABLET | Refills: 0 | Status: SHIPPED | OUTPATIENT
Start: 2022-10-21 | End: 2022-11-09

## 2022-10-26 ENCOUNTER — MEDICAL CORRESPONDENCE (OUTPATIENT)
Dept: HEALTH INFORMATION MANAGEMENT | Facility: CLINIC | Age: 87
End: 2022-10-26

## 2022-10-27 ENCOUNTER — OFFICE VISIT (OUTPATIENT)
Dept: FAMILY MEDICINE | Facility: CLINIC | Age: 87
End: 2022-10-27

## 2022-10-27 VITALS
OXYGEN SATURATION: 96 % | SYSTOLIC BLOOD PRESSURE: 118 MMHG | HEIGHT: 66 IN | HEART RATE: 68 BPM | TEMPERATURE: 97.8 F | DIASTOLIC BLOOD PRESSURE: 70 MMHG | BODY MASS INDEX: 17.36 KG/M2 | WEIGHT: 108 LBS

## 2022-10-27 DIAGNOSIS — F32.89 OTHER DEPRESSION: Primary | ICD-10-CM

## 2022-10-27 LAB
% GRANULOCYTES: 55.3 %
HCT VFR BLD AUTO: 46.5 % (ref 35–47)
HEMOGLOBIN: 15.3 G/DL (ref 11.7–15.7)
LYMPHOCYTES NFR BLD AUTO: 26.5 %
MCH RBC QN AUTO: 29.9 PG (ref 26–33)
MCHC RBC AUTO-ENTMCNC: 32.9 G/DL (ref 31–36)
MCV RBC AUTO: 91 FL (ref 78–100)
MONOCYTES NFR BLD AUTO: 18.2 %
PLATELET COUNT - QUEST: 178 10^9/L (ref 150–375)
RBC # BLD AUTO: 5.11 10*12/L (ref 3.8–5.2)
WBC # BLD AUTO: 4.1 10*9/L (ref 4–11)

## 2022-10-27 PROCEDURE — 85025 COMPLETE CBC W/AUTO DIFF WBC: CPT | Performed by: FAMILY MEDICINE

## 2022-10-27 PROCEDURE — 36415 COLL VENOUS BLD VENIPUNCTURE: CPT | Performed by: FAMILY MEDICINE

## 2022-10-27 PROCEDURE — 99214 OFFICE O/P EST MOD 30 MIN: CPT | Performed by: FAMILY MEDICINE

## 2022-10-27 RX ORDER — CITALOPRAM HYDROBROMIDE 10 MG/1
10 TABLET ORAL DAILY
Qty: 90 TABLET | Refills: 0 | Status: SHIPPED | OUTPATIENT
Start: 2022-10-27 | End: 2022-11-28

## 2022-10-27 ASSESSMENT — ANXIETY QUESTIONNAIRES
3. WORRYING TOO MUCH ABOUT DIFFERENT THINGS: NOT AT ALL
7. FEELING AFRAID AS IF SOMETHING AWFUL MIGHT HAPPEN: NOT AT ALL
IF YOU CHECKED OFF ANY PROBLEMS ON THIS QUESTIONNAIRE, HOW DIFFICULT HAVE THESE PROBLEMS MADE IT FOR YOU TO DO YOUR WORK, TAKE CARE OF THINGS AT HOME, OR GET ALONG WITH OTHER PEOPLE: SOMEWHAT DIFFICULT
6. BECOMING EASILY ANNOYED OR IRRITABLE: NOT AT ALL
GAD7 TOTAL SCORE: 0
5. BEING SO RESTLESS THAT IT IS HARD TO SIT STILL: NOT AT ALL
1. FEELING NERVOUS, ANXIOUS, OR ON EDGE: NOT AT ALL
2. NOT BEING ABLE TO STOP OR CONTROL WORRYING: NOT AT ALL
GAD7 TOTAL SCORE: 0

## 2022-10-27 ASSESSMENT — PATIENT HEALTH QUESTIONNAIRE - PHQ9
5. POOR APPETITE OR OVEREATING: NOT AT ALL
SUM OF ALL RESPONSES TO PHQ QUESTIONS 1-9: 14

## 2022-10-27 NOTE — PROGRESS NOTES
Assessment & Plan   Problem List Items Addressed This Visit    None  Visit Diagnoses     Other depression    -  Primary    Relevant Medications    citalopram (CELEXA) 10 MG tablet    Other Relevant Orders    VENOUS COLLECTION (Completed)    HEMOGRAM PLATELET DIFF ([facility]) (Completed)    TSH WITH FREE T4 REFLEX ([facility])    URINE CULTURE AEROBIC BACTERIAL ([facility])    URINALYSIS, ROUTINE ([facility])           1. Other depression  Start celexa, side effects including FDA warning discussed. Recheck in 2-3 weeks.  - citalopram (CELEXA) 10 MG tablet; Take 1 tablet (10 mg) by mouth daily  Dispense: 90 tablet; Refill: 0  - VENOUS COLLECTION  - HEMOGRAM PLATELET DIFF ([facility])  - TSH WITH FREE T4 REFLEX ([facility])  - URINE CULTURE AEROBIC BACTERIAL ([facility]); Future  - URINALYSIS, ROUTINE ([facility]); Future           FUTURE APPOINTMENTS:       - Follow-up visit in 2-3 weeks.    No follow-ups on file.    Tatianna Swain MD  [facility]    Subjective     Nursing Notes:   Jessica Barney CMA  10/27/2022  4:36 PM  Signed  Chief Complaint   Patient presents with     Depression     Feeling depressed over the last 2 weeks, has been trying to ignore the feeling of being down     Pre-visit Screening:  Immunizations:  not up to date - td at pharmacy  Colonoscopy:  is up to date  Mammogram: is up to date  Asthma Action Test/Plan:  NA  PHQ9:  NA  GAD7:  NA  Questioned patient about current smoking habits Pt. has never smoked.  Ok to leave detailed message on voice mail for today's visit only Yes, phone # [phone]           Ilda Nassar is a 89 year old female who presents to clinic today for the following health issues HPI     Here with her sister Aarti. She hasn't been out of the apt much in a couple of weeks.   Sister says she seems very down. Did some labs last visit so tired, has a lot of friends in her building and normally is out and about and talking. But she says the weather hasn't been cooperative. Not sad or tearful. No get  "up and go. This has been going on for a couple of weeks. Not sure of the reason: no interest. Normally she is involved in the building. Not feeling sick of short of breath, just tired. Is easier to not move than to move.  Hasn't been on a medication in the past for depression.        Review of Systems   Constitutional, HEENT, cardiovascular, pulmonary, gi and gu systems are negative, except as otherwise noted.      Objective    /70 (BP Location: Right arm, Patient Position: Sitting, Cuff Size: Adult Regular)   Pulse 68   Temp 97.8  F (36.6  C) (Temporal)   Ht 1.664 m (5' 5.5\")   Wt 49 kg (108 lb)   LMP  (LMP Unknown)   SpO2 96%   BMI 17.70 kg/m    Body mass index is 17.7 kg/m .  Physical Exam   GENERAL: healthy, alert and no distress  MS: no gross musculoskeletal defects noted, no edema  NEURO: Normal strength and tone, mentation intact and speech normal  PSYCH: mentation appears normal, affect normal/bright    Results for orders placed or performed in visit on 10/27/22   HEMOGRAM PLATELET DIFF ([facility]FP)     Status: None   Result Value Ref Range    WBC 4.1 4.0 - 11 10*9/L    RBC Count 5.11 3.8 - 5.2 10*12/L    Hemoglobin 15.3 11.7 - 15.7 g/dL    Hematocrit 46.5 35.0 - 47.0 %    MCV 91.0 78 - 100 fL    MCH 29.9 26 - 33 pg    MCHC 32.9 31 - 36 g/dL    Platelet Count 178 150 - 375 10^9/L    % Granulocytes 55.3 %    % Lymphocytes 26.5 %    % Monocytes 18.2 %         "

## 2022-10-27 NOTE — NURSING NOTE
Chief Complaint   Patient presents with     Depression     Feeling depressed over the last 2 weeks, has been trying to ignore the feeling of being down     Pre-visit Screening:  Immunizations:  not up to date - td at pharmacy  Colonoscopy:  is up to date  Mammogram: is up to date  Asthma Action Test/Plan:  NA  PHQ9:  NA  GAD7:  NA  Questioned patient about current smoking habits Pt. has never smoked.  Ok to leave detailed message on voice mail for today's visit only Yes, phone # [phone]

## 2022-10-28 ENCOUNTER — TELEPHONE (OUTPATIENT)
Dept: FAMILY MEDICINE | Facility: CLINIC | Age: 87
End: 2022-10-28

## 2022-10-28 DIAGNOSIS — F32.89 OTHER DEPRESSION: ICD-10-CM

## 2022-10-28 LAB
APPEARANCE UR: CLEAR
BACTERIA, UR: ABNORMAL
BILIRUB UR QL: ABNORMAL
CASTS/LPF: ABNORMAL
COLOR UR: YELLOW
EP/HPF: ABNORMAL
GLUCOSE URINE: ABNORMAL MG/DL
HGB UR QL: ABNORMAL
KETONES UR QL: ABNORMAL MG/DL
MISC.: ABNORMAL
NITRITE UR QL STRIP: ABNORMAL
PH UR STRIP: 7 PH (ref 5–7)
PROT UR QL: ABNORMAL MG/DL
RBC, UR MICRO: ABNORMAL (ref ?–2)
SP GR UR STRIP: 1.01 (ref 1–1.03)
UROBILINOGEN UR QL STRIP: 0.2 EU/DL (ref 0.2–1)
WBC #/AREA URNS HPF: ABNORMAL /[HPF]
WBC, UR MICRO: ABNORMAL (ref ?–2)

## 2022-10-28 PROCEDURE — 81001 URINALYSIS AUTO W/SCOPE: CPT | Performed by: FAMILY MEDICINE

## 2022-10-28 NOTE — TELEPHONE ENCOUNTER
Pt's sister (Aarti) dropped of recent blood pressure readings from [facility] where pt is living. They are concerned her BP has been getting low wondering if she should stop some medications. Placed these readings in your in-basket to review.    Thanks, Jessica TRUJILLO

## 2022-10-28 NOTE — TELEPHONE ENCOUNTER
Have her stop the amlodipine and then see me back next week to recheck   If she is having lightheadedness or other sx, she should call the cardiologist. Or if her blood pressures continue to be low, check with her cardiologist.

## 2022-10-29 LAB — TSH SERPL-ACNC: 1.61 MIU/L (ref 0.4–4.5)

## 2022-10-30 LAB — URINE VOIDED CULTURE: NORMAL

## 2022-10-31 NOTE — TELEPHONE ENCOUNTER
Aarti called back and I informed her of the message below.  Patricia has an appt on 11/14/2022.    Camryn's call back # [phone]

## 2022-11-07 PROBLEM — F32.89 OTHER DEPRESSION: Status: ACTIVE | Noted: 2022-11-07

## 2022-11-07 NOTE — PROGRESS NOTES
Assessment & Plan   Problem List Items Addressed This Visit        Circulatory    Essential hypertension       Behavioral    Other depression - Primary       Other    Memory changes--followed by neurology   Other Visit Diagnoses     Encounter for initial annual wellness visit (AWV) in Medicare patient             1. Other depression  It appears that the medication is helping. However, she has some memory loss, so unclear effect.    2. 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    3. Encounter for initial annual wellness visit (AWV) in Medicare patient  Completed.      4. Essential hypertension  Her blood pressure is low. I have asked her to stop hydrochlorothiazide, stop potassium, see me back in 2 weeks to recheck this and check labs. I would also like her to schedule followup apt with cardiology for her blood pressure.          FUTURE APPOINTMENTS:       - Follow-up visit in 2 weeks.    No follow-ups on file.    Tatianna Swain MD  [facility]    Subjective     Nursing Notes:   Jessica Barney, [facility]  11/9/2022 11:33 AM  Signed  Chief Complaint   Patient presents with     RECHECK     Recheck on citalopram and recheck BP, feeling well on this medication     Pre-visit Screening:  Immunizations:  not up to date - pt declines  Colonoscopy:  is up to date  Mammogram: is up to date  Asthma Action Test/Plan:  NA  PHQ9:  NA  GAD7:  NA  Questioned patient about current smoking habits Pt. quit smoking some time ago.  Ok to leave detailed message on voice mail for today's visit only Yes, phone # [phone]           Ilda Nassar is a 89 year old female who presents to clinic today for the following health issues   HPI     Here with son and daughter.[place] and usually she comes to her apts with her sister.   Blood pressures --at the care facility and it's running low. Also checking in the evening.not lightheaded or dizzy.  Not sure when next apt with cardiology  "is. We have stopped the amlodipine more recently.    For mood, is now on citalopram, no problems or side effects. She said she still doesn't have get up and go, but seems to be feeling ok. Her children haven't noticed any changes but they will check with her sister          Review of Systems   Constitutional, HEENT, cardiovascular, pulmonary, gi and gu systems are negative, except as otherwise noted.      Objective    BP 98/62 (BP Location: Right arm, Patient Position: Sitting, Cuff Size: Adult Regular)   Pulse 63   Temp 97.8  F (36.6  C) (Temporal)   Ht 1.664 m (5' 5.5\")   Wt 48.5 kg (107 lb)   LMP  (LMP Unknown)   SpO2 98%   BMI 17.53 kg/m    Body mass index is 17.53 kg/m .  Physical Exam   GENERAL: healthy, alert and no distress  MS: no gross musculoskeletal defects noted, no edema  NEURO: Normal strength and tone, mentation intact and speech normal  PSYCH: mentation appears normal, affect normal/bright  History given by patient and some by her daughter and son    No results found for any visits on 11/09/22.      "

## 2022-11-09 ENCOUNTER — OFFICE VISIT (OUTPATIENT)
Dept: FAMILY MEDICINE | Facility: CLINIC | Age: 87
End: 2022-11-09

## 2022-11-09 VITALS
HEART RATE: 63 BPM | BODY MASS INDEX: 17.19 KG/M2 | OXYGEN SATURATION: 98 % | TEMPERATURE: 97.8 F | HEIGHT: 66 IN | WEIGHT: 107 LBS | SYSTOLIC BLOOD PRESSURE: 98 MMHG | DIASTOLIC BLOOD PRESSURE: 62 MMHG

## 2022-11-09 DIAGNOSIS — F32.89 OTHER DEPRESSION: Primary | ICD-10-CM

## 2022-11-09 DIAGNOSIS — R41.3 MEMORY CHANGES: ICD-10-CM

## 2022-11-09 DIAGNOSIS — I10 ESSENTIAL HYPERTENSION: ICD-10-CM

## 2022-11-09 DIAGNOSIS — Z00.00 ENCOUNTER FOR INITIAL ANNUAL WELLNESS VISIT (AWV) IN MEDICARE PATIENT: ICD-10-CM

## 2022-11-09 PROCEDURE — 99214 OFFICE O/P EST MOD 30 MIN: CPT | Mod: 25 | Performed by: FAMILY MEDICINE

## 2022-11-09 PROCEDURE — G0438 PPPS, INITIAL VISIT: HCPCS | Performed by: FAMILY MEDICINE

## 2022-11-09 NOTE — NURSING NOTE
Chief Complaint   Patient presents with     RECHECK     Recheck on citalopram and recheck BP, feeling well on this medication     Pre-visit Screening:  Immunizations:  not up to date - pt declines  Colonoscopy:  is up to date  Mammogram: is up to date  Asthma Action Test/Plan:  NA  PHQ9:  NA  GAD7:  NA  Questioned patient about current smoking habits Pt. quit smoking some time ago.  Ok to leave detailed message on voice mail for today's visit only Yes, phone # [phone]

## 2022-11-09 NOTE — PROGRESS NOTES
Ilda Nassar is a 89 year old female who presents for Medicare Annual Wellness Visit.    Current providers caring for this patient include:  Patient Care Team:  Tatianna Swain MD as PCP - General (Family Medicine)  Tatianna Swain MD as Assigned PCP  Myrna Reddy PA as Assigned Heart and Vascular Provider    Complete Medical and Social history reviewed with patient, outlined below.    Patient Active Problem List   Diagnosis     ACP (advance care planning)     Health Care Home     Memory changes--followed by neurology     Other hyperlipidemia     Essential hypertension     Personal history of tobacco use, presenting hazards to health     Longstanding persistent atrial fibrillation (H)--followed by cardiology     Atrial flutter (H)     Elevated fasting glucose     Other depression       Past Medical History:   Diagnosis Date     Atrial fibrillation (H)      Atrial flutter (H)      Hyperlipidemia      Hypertension        No past surgical history on file.    No family history on file.    Social History     Tobacco Use     Smoking status: Former     Smokeless tobacco: Never   Substance Use Topics     Alcohol use: Yes       Diet: eats what she wants. Weight has been stable. Family is concerned about if she is eating enough. There is a dining room at her facililty. noone monitors this.   Physical Activity: exercises and activities at the living situation. She isn't really interested.  Depression Screen:    Over the past 2 weeks, patient has felt down, depressed, or hopeless:  No    Over the past 2 weeks, patient has felt little interest or pleasure in doing things: No    Functional ability/Safety screen:  Up and go test (able to get up and walk longer than 30 seconds): Passed  Patient needs assistance with: lives at [facility]   Patient's home has the following possible safety concerns: none identified  Patient has concerns about her hearing:  No  Cognitive Screen  Patient repeats three objects (ball, flag,  "tree)      Clock drawing test:   NORMAL  Recalls three objects after 3 minutes (ball,flag,tree):                                recalls 2 objects (2 points)    Physical Exam:  BP 98/62 (BP Location: Right arm, Patient Position: Sitting, Cuff Size: Adult Regular)   Pulse 63   Temp 97.8  F (36.6  C) (Temporal)   Ht 1.664 m (5' 5.5\")   Wt 48.5 kg (107 lb)   LMP  (LMP Unknown)   SpO2 98%   BMI 17.53 kg/m     Body mass index is 17.53 kg/m .        "

## 2022-11-28 ENCOUNTER — OFFICE VISIT (OUTPATIENT)
Dept: FAMILY MEDICINE | Facility: CLINIC | Age: 87
End: 2022-11-28

## 2022-11-28 VITALS
WEIGHT: 107 LBS | HEIGHT: 66 IN | HEART RATE: 73 BPM | TEMPERATURE: 97.8 F | OXYGEN SATURATION: 97 % | BODY MASS INDEX: 17.19 KG/M2 | SYSTOLIC BLOOD PRESSURE: 114 MMHG | DIASTOLIC BLOOD PRESSURE: 72 MMHG

## 2022-11-28 DIAGNOSIS — R41.3 MEMORY CHANGES: ICD-10-CM

## 2022-11-28 DIAGNOSIS — I10 ESSENTIAL HYPERTENSION: ICD-10-CM

## 2022-11-28 DIAGNOSIS — F32.89 OTHER DEPRESSION: Primary | ICD-10-CM

## 2022-11-28 PROCEDURE — 99214 OFFICE O/P EST MOD 30 MIN: CPT | Performed by: FAMILY MEDICINE

## 2022-11-28 RX ORDER — CITALOPRAM HYDROBROMIDE 10 MG/1
10 TABLET ORAL DAILY
Qty: 90 TABLET | Refills: 1 | Status: SHIPPED | OUTPATIENT
Start: 2022-11-28 | End: 2024-04-16

## 2022-11-28 NOTE — NURSING NOTE
Chief Complaint   Patient presents with     Recheck Medication     Non-fasting today, recheck on medications, doing well on citalopram feeling good, BP is good since stopping her BP medications at her last visit     Pre-visit Screening:  Immunizations:  Not up to date- tdap at pharmacy  Colonoscopy:  is up to date  Mammogram: is up to date  Asthma Action Test/Plan:  NA  PHQ9:  NA  GAD7:  NA  Questioned patient about current smoking habits Pt. has never smoked.  Ok to leave detailed message on voice mail for today's visit only Yes, phone # [phone]

## 2022-11-28 NOTE — PROGRESS NOTES
Assessment & Plan   Problem List Items Addressed This Visit        Circulatory    Essential hypertension       Behavioral    Other depression - Primary    Relevant Medications    citalopram (CELEXA) 10 MG tablet       Other    Memory changes--followed by neurology      1. Other depression  Citalopram is controlling her symptoms, she is feeling much better. Continue medications and recheck in  6months.  - citalopram (CELEXA) 10 MG tablet; Take 1 tablet (10 mg) by mouth daily  Dispense: 90 tablet; Refill: 1    2. Essential hypertension  In range, continue current medications and recheck in 6 months. She should also discuss with cardiology at her apt in January.    3. Memory changes--followed by neurology               FUTURE APPOINTMENTS:       - Follow-up visit in 5-6 months.    No follow-ups on file.    Tatianna Swain MD  [facility]    Subjective     Nursing Notes:   Jessica Barney, [facility]  11/28/2022 11:35 AM  Signed  Chief Complaint   Patient presents with     Recheck Medication     Non-fasting today, recheck on medications, doing well on citalopram feeling good, BP is good since stopping her BP medications at her last visit     Pre-visit Screening:  Immunizations:  Not up to date- tdap at pharmacy  Colonoscopy:  is up to date  Mammogram: is up to date  Asthma Action Test/Plan:  NA  PHQ9:  NA  GAD7:  NA  Questioned patient about current smoking habits Pt. has never smoked.  Ok to leave detailed message on voice mail for today's visit only Yes, phone # [phone]           Ilda Nassar is a 89 year old female who presents to clinic today for the following health issues     HPI     Here with her sister to followup on her depression. She is feeling very well on citalopram, this has improved her depression per the patient and also her sister. She has been on the medication for a few weeks. She would like to continue with the same dose.  She has also stopped a couple of her blood pressure  "medications, it was dropping too low. She is doing well, has cardiology apt in January.        Review of Systems   Constitutional, HEENT, cardiovascular, pulmonary, gi and gu systems are negative, except as otherwise noted.      Objective    /72 (BP Location: Right arm, Patient Position: Sitting, Cuff Size: Adult Regular)   Pulse 73   Temp 97.8  F (36.6  C) (Temporal)   Ht 1.664 m (5' 5.5\")   Wt 48.5 kg (107 lb)   LMP  (LMP Unknown)   SpO2 97%   BMI 17.53 kg/m    Body mass index is 17.53 kg/m .  Physical Exam   GENERAL: healthy, alert and no distress  MS: no gross musculoskeletal defects noted, no edema  NEURO: Normal strength and tone, mentation intact and speech normal  PSYCH: mentation appears normal, affect normal/bright    No results found for any visits on 11/28/22.      "

## 2022-12-20 ENCOUNTER — APPOINTMENT (OUTPATIENT)
Dept: GENERAL RADIOLOGY | Facility: CLINIC | Age: 87
End: 2022-12-20
Attending: EMERGENCY MEDICINE
Payer: MEDICARE

## 2022-12-20 ENCOUNTER — HOSPITAL ENCOUNTER (OUTPATIENT)
Facility: CLINIC | Age: 87
Setting detail: OBSERVATION
Discharge: HOME | End: 2022-12-21
Attending: EMERGENCY MEDICINE | Admitting: INTERNAL MEDICINE
Payer: MEDICARE

## 2022-12-20 ENCOUNTER — APPOINTMENT (OUTPATIENT)
Dept: CT IMAGING | Facility: CLINIC | Age: 87
End: 2022-12-20
Attending: EMERGENCY MEDICINE
Payer: MEDICARE

## 2022-12-20 DIAGNOSIS — R79.89 TROPONIN LEVEL ELEVATED: ICD-10-CM
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
## 2022-12-20 DIAGNOSIS — R41.0 CONFUSION: ICD-10-CM

## 2022-12-20 DIAGNOSIS — U07.1 INFECTION DUE TO 2019 NOVEL CORONAVIRUS: ICD-10-CM

## 2022-12-20 DIAGNOSIS — Z79.01 CHRONIC ANTICOAGULATION: ICD-10-CM

## 2022-12-20 PROBLEM — M62.81 GENERALIZED MUSCLE WEAKNESS: Status: ACTIVE | Noted: 2022-12-20

## 2022-12-20 LAB
ALBUMIN SERPL BCG-MCNC: 3.9 G/DL (ref 3.5–5.2)
ALBUMIN UR-MCNC: 10 MG/DL
ALP SERPL-CCNC: 73 U/L (ref 35–104)
ALT SERPL W P-5'-P-CCNC: 36 U/L (ref 10–35)
ANION GAP SERPL CALCULATED.3IONS-SCNC: 14 MMOL/L (ref 7–15)
APPEARANCE UR: CLEAR
AST SERPL W P-5'-P-CCNC: 101 U/L (ref 10–35)
BASOPHILS # BLD AUTO: 0 10E3/UL (ref 0–0.2)
BASOPHILS NFR BLD AUTO: 0 %
BILIRUB SERPL-MCNC: 1.9 MG/DL
BILIRUB UR QL STRIP: NEGATIVE
BUN SERPL-MCNC: 22.1 MG/DL (ref 8–23)
CALCIUM SERPL-MCNC: 9.1 MG/DL (ref 8.2–9.6)
CHLORIDE SERPL-SCNC: 93 MMOL/L (ref 98–107)
COLOR UR AUTO: YELLOW
CREAT SERPL-MCNC: 0.78 MG/DL (ref 0.51–0.95)
DEPRECATED HCO3 PLAS-SCNC: 29 MMOL/L (ref 22–29)
EOSINOPHIL # BLD AUTO: 0 10E3/UL (ref 0–0.7)
EOSINOPHIL NFR BLD AUTO: 0 %
ERYTHROCYTE [DISTWIDTH] IN BLOOD BY AUTOMATED COUNT: 14.2 % (ref 10–15)
FLUAV RNA SPEC QL NAA+PROBE: NEGATIVE
FLUBV RNA RESP QL NAA+PROBE: NEGATIVE
GFR SERPL CREATININE-BSD FRML MDRD: 72 ML/MIN/1.73M2
GLUCOSE SERPL-MCNC: 126 MG/DL (ref 70–99)
GLUCOSE UR STRIP-MCNC: NEGATIVE MG/DL
HCT VFR BLD AUTO: 46 % (ref 35–47)
HGB BLD-MCNC: 14.6 G/DL (ref 11.7–15.7)
HGB UR QL STRIP: NEGATIVE
HOLD SPECIMEN: NORMAL
HYALINE CASTS: 81 /LPF
IMM GRANULOCYTES # BLD: 0 10E3/UL
IMM GRANULOCYTES NFR BLD: 1 %
KETONES UR STRIP-MCNC: 40 MG/DL
LEUKOCYTE ESTERASE UR QL STRIP: NEGATIVE
LYMPHOCYTES # BLD AUTO: 0.3 10E3/UL (ref 0.8–5.3)
LYMPHOCYTES NFR BLD AUTO: 9 %
MAGNESIUM SERPL-MCNC: 1.7 MG/DL (ref 1.7–2.3)
MCH RBC QN AUTO: 29.4 PG (ref 26.5–33)
MCHC RBC AUTO-ENTMCNC: 31.7 G/DL (ref 31.5–36.5)
MCV RBC AUTO: 93 FL (ref 78–100)
MONOCYTES # BLD AUTO: 1 10E3/UL (ref 0–1.3)
MONOCYTES NFR BLD AUTO: 28 %
MUCOUS THREADS #/AREA URNS LPF: PRESENT /LPF
NEUTROPHILS # BLD AUTO: 2.3 10E3/UL (ref 1.6–8.3)
NEUTROPHILS NFR BLD AUTO: 62 %
NITRATE UR QL: NEGATIVE
NRBC # BLD AUTO: 0 10E3/UL
NRBC BLD AUTO-RTO: 0 /100
PH UR STRIP: 5 [PH] (ref 5–7)
PLAT MORPH BLD: NORMAL
PLATELET # BLD AUTO: 125 10E3/UL (ref 150–450)
POTASSIUM SERPL-SCNC: 3.7 MMOL/L (ref 3.4–5.3)
PROT SERPL-MCNC: 6.5 G/DL (ref 6.4–8.3)
RBC # BLD AUTO: 4.97 10E6/UL (ref 3.8–5.2)
RBC MORPH BLD: NORMAL
RBC URINE: 1 /HPF
RSV RNA SPEC NAA+PROBE: NEGATIVE
SARS-COV-2 RNA RESP QL NAA+PROBE: POSITIVE
SODIUM SERPL-SCNC: 136 MMOL/L (ref 136–145)
SP GR UR STRIP: 1.02 (ref 1–1.03)
SQUAMOUS EPITHELIAL: 1 /HPF
TROPONIN T SERPL HS-MCNC: 16 NG/L
TROPONIN T SERPL HS-MCNC: 18 NG/L
TROPONIN T SERPL HS-MCNC: 18 NG/L
UROBILINOGEN UR STRIP-MCNC: NORMAL MG/DL
WBC # BLD AUTO: 3.6 10E3/UL (ref 4–11)
WBC URINE: 2 /HPF

## 2022-12-20 PROCEDURE — 83735 ASSAY OF MAGNESIUM: CPT | Performed by: EMERGENCY MEDICINE

## 2022-12-20 PROCEDURE — G1010 CDSM STANSON: HCPCS

## 2022-12-20 PROCEDURE — 258N000003 HC RX IP 258 OP 636: Performed by: INTERNAL MEDICINE

## 2022-12-20 PROCEDURE — G0378 HOSPITAL OBSERVATION PER HR: HCPCS

## 2022-12-20 PROCEDURE — 36415 COLL VENOUS BLD VENIPUNCTURE: CPT | Performed by: EMERGENCY MEDICINE

## 2022-12-20 PROCEDURE — 80053 COMPREHEN METABOLIC PANEL: CPT | Performed by: EMERGENCY MEDICINE

## 2022-12-20 PROCEDURE — 93005 ELECTROCARDIOGRAM TRACING: CPT

## 2022-12-20 PROCEDURE — 71045 X-RAY EXAM CHEST 1 VIEW: CPT

## 2022-12-20 PROCEDURE — 87637 SARSCOV2&INF A&B&RSV AMP PRB: CPT | Performed by: EMERGENCY MEDICINE

## 2022-12-20 PROCEDURE — 84484 ASSAY OF TROPONIN QUANT: CPT | Performed by: EMERGENCY MEDICINE

## 2022-12-20 PROCEDURE — 99219 PR INITIAL OBSERVATION CARE,LEVEL II: CPT | Performed by: INTERNAL MEDICINE

## 2022-12-20 PROCEDURE — 84484 ASSAY OF TROPONIN QUANT: CPT | Performed by: INTERNAL MEDICINE

## 2022-12-20 PROCEDURE — 72170 X-RAY EXAM OF PELVIS: CPT

## 2022-12-20 PROCEDURE — 85025 COMPLETE CBC W/AUTO DIFF WBC: CPT | Performed by: EMERGENCY MEDICINE

## 2022-12-20 PROCEDURE — 250N000013 HC RX MED GY IP 250 OP 250 PS 637: Performed by: INTERNAL MEDICINE

## 2022-12-20 PROCEDURE — 99285 EMERGENCY DEPT VISIT HI MDM: CPT | Mod: 25

## 2022-12-20 PROCEDURE — 81003 URINALYSIS AUTO W/O SCOPE: CPT | Performed by: EMERGENCY MEDICINE

## 2022-12-20 PROCEDURE — 36415 COLL VENOUS BLD VENIPUNCTURE: CPT | Performed by: INTERNAL MEDICINE

## 2022-12-20 PROCEDURE — C9803 HOPD COVID-19 SPEC COLLECT: HCPCS

## 2022-12-20 RX ORDER — POLYETHYLENE GLYCOL 3350 17 G/17G
17 POWDER, FOR SOLUTION ORAL DAILY PRN
Status: DISCONTINUED | OUTPATIENT
Start: 2022-12-20 | End: 2022-12-21 | Stop reason: HOSPADM

## 2022-12-20 RX ORDER — AMOXICILLIN 250 MG
1 CAPSULE ORAL 2 TIMES DAILY PRN
Status: DISCONTINUED | OUTPATIENT
Start: 2022-12-20 | End: 2022-12-21 | Stop reason: HOSPADM

## 2022-12-20 RX ORDER — ASPIRIN 81 MG/1
162 TABLET, CHEWABLE ORAL ONCE
Status: COMPLETED | OUTPATIENT
Start: 2022-12-20 | End: 2022-12-20

## 2022-12-20 RX ORDER — CITALOPRAM HYDROBROMIDE 10 MG/1
10 TABLET ORAL DAILY
Status: DISCONTINUED | OUTPATIENT
Start: 2022-12-21 | End: 2022-12-21 | Stop reason: HOSPADM

## 2022-12-20 RX ORDER — ASPIRIN 81 MG/1
81 TABLET ORAL DAILY
Status: DISCONTINUED | OUTPATIENT
Start: 2022-12-21 | End: 2022-12-21 | Stop reason: HOSPADM

## 2022-12-20 RX ORDER — SIMVASTATIN 20 MG
40 TABLET ORAL AT BEDTIME
Status: DISCONTINUED | OUTPATIENT
Start: 2022-12-20 | End: 2022-12-21 | Stop reason: HOSPADM

## 2022-12-20 RX ORDER — PROCHLORPERAZINE MALEATE 5 MG
5 TABLET ORAL EVERY 6 HOURS PRN
Status: DISCONTINUED | OUTPATIENT
Start: 2022-12-20 | End: 2022-12-21 | Stop reason: HOSPADM

## 2022-12-20 RX ORDER — DOXAZOSIN 1 MG/1
2 TABLET ORAL AT BEDTIME
Status: DISCONTINUED | OUTPATIENT
Start: 2022-12-20 | End: 2022-12-21 | Stop reason: HOSPADM

## 2022-12-20 RX ORDER — AMOXICILLIN 250 MG
2 CAPSULE ORAL 2 TIMES DAILY PRN
Status: DISCONTINUED | OUTPATIENT
Start: 2022-12-20 | End: 2022-12-21 | Stop reason: HOSPADM

## 2022-12-20 RX ORDER — ONDANSETRON 4 MG/1
4 TABLET, ORALLY DISINTEGRATING ORAL EVERY 6 HOURS PRN
Status: DISCONTINUED | OUTPATIENT
Start: 2022-12-20 | End: 2022-12-21 | Stop reason: HOSPADM

## 2022-12-20 RX ORDER — ONDANSETRON 2 MG/ML
4 INJECTION INTRAMUSCULAR; INTRAVENOUS EVERY 6 HOURS PRN
Status: DISCONTINUED | OUTPATIENT
Start: 2022-12-20 | End: 2022-12-21 | Stop reason: HOSPADM

## 2022-12-20 RX ORDER — DONEPEZIL HYDROCHLORIDE 10 MG/1
10 TABLET, FILM COATED ORAL AT BEDTIME
Status: DISCONTINUED | OUTPATIENT
Start: 2022-12-20 | End: 2022-12-21 | Stop reason: HOSPADM

## 2022-12-20 RX ORDER — PROCHLORPERAZINE 25 MG
12.5 SUPPOSITORY, RECTAL RECTAL EVERY 12 HOURS PRN
Status: DISCONTINUED | OUTPATIENT
Start: 2022-12-20 | End: 2022-12-21 | Stop reason: HOSPADM

## 2022-12-20 RX ADMIN — APIXABAN 2.5 MG: 2.5 TABLET, FILM COATED ORAL at 22:37

## 2022-12-20 RX ADMIN — SIMVASTATIN 40 MG: 20 TABLET, FILM COATED ORAL at 22:37

## 2022-12-20 RX ADMIN — SODIUM CHLORIDE, POTASSIUM CHLORIDE, SODIUM LACTATE AND CALCIUM CHLORIDE 500 ML: 600; 310; 30; 20 INJECTION, SOLUTION INTRAVENOUS at 18:19

## 2022-12-20 RX ADMIN — DONEPEZIL HYDROCHLORIDE 10 MG: 10 TABLET ORAL at 22:38

## 2022-12-20 RX ADMIN — DOXAZOSIN 2 MG: 1 TABLET ORAL at 22:37

## 2022-12-20 RX ADMIN — ASPIRIN 81 MG CHEWABLE TABLET 162 MG: 81 TABLET CHEWABLE at 18:40

## 2022-12-20 ASSESSMENT — ENCOUNTER SYMPTOMS
CONFUSION: 1
SHORTNESS OF BREATH: 0
DIARRHEA: 1
FEVER: 0
WEAKNESS: 1
DYSURIA: 0

## 2022-12-20 ASSESSMENT — ACTIVITIES OF DAILY LIVING (ADL)
ADLS_ACUITY_SCORE: 37
ADLS_ACUITY_SCORE: 35
ADLS_ACUITY_SCORE: 32
ADLS_ACUITY_SCORE: 37
ADLS_ACUITY_SCORE: 37
ADLS_ACUITY_SCORE: 35

## 2022-12-20 NOTE — PHARMACY-ADMISSION MEDICATION HISTORY
Admission medication history interview status for this patient is complete. See [facility] admission navigator for allergy information, prior to admission medications and immunization status.     Medication history interview done, indicate source(s): Jodi (sister) [phone]  Medication history resources (including written lists, pill bottles, clinic record):None    Changes made to PTA medication list:  Added: none  Changed: none  Reported as Not Taking: none  Removed: asa    Actions taken by pharmacist (provider contacted, etc):None     Additional medication history information:None    Medication reconciliation/reorder completed by provider prior to medication history?  n   (Y/N)     For patients on insulin therapy:   Do you use sliding scale insulin based on blood sugars?   What is your pre-meal insulin coverage?    Do you typically eat three meals a day?   How many times do you check your blood glucose per day?   How many episodes of hypoglycemia do you typically have per month?   Do you have a Continuous Glucose Monitor (CGM)?      Prior to Admission medications    Medication Sig Last Dose Taking? Auth Provider Long Term End Date   apixaban ANTICOAGULANT (ELIQUIS ANTICOAGULANT) 2.5 MG tablet Take 1 tablet (2.5 mg) by mouth 2 times daily 12/20/2022 at am Yes Antonino Hernández MD     citalopram (CELEXA) 10 MG tablet Take 1 tablet (10 mg) by mouth daily 12/20/2022 at am Yes Tatianna Swain MD Yes    donepezil (ARICEPT) 10 MG tablet Take 1 tablet (10 mg) by mouth At Bedtime 12/19/2022 Yes Lara Bonilla PA     doxazosin (CARDURA) 2 MG tablet Take 1 tablet (2 mg) by mouth At Bedtime 12/19/2022 Yes Lara Bonilla PA Yes    simvastatin (ZOCOR) 40 MG tablet Take 1 tablet (40 mg) by mouth At Bedtime 12/19/2022 Yes Lara Bonilla PA Yes

## 2022-12-20 NOTE — ED PROVIDER NOTES
History   Chief Complaint:  Fall       The history is provided by the EMS personnel and the patient.      Ilda Nassar is a 89 year old female on Eliquis with history of atrial fibrillation, hypertension, and hyperlipidemia who presents for evaluation after a fall. EMS states that the patient was diagnosed with Covid-19 yesterday, and has become generally weak since. They state that the patient fell this morning, and has been mildly confused which is abnormal for her. They also report her having diarrhea, and state that her biggest complaint was confusion. They state that her vitals were good and blood sugar was normal, and she was able to bear weight and take a few steps to get into their stretcher. The patient states that she was sitting on the edge of her bed trying to stand up when she slid off and fell. She denies having any pain from the fall.     Review of Systems   Constitutional: Negative for fever.   Respiratory: Negative for shortness of breath.    Cardiovascular: Negative for chest pain.   Gastrointestinal: Positive for diarrhea.   Genitourinary: Negative for dysuria.   Neurological: Positive for weakness (generalized).   Psychiatric/Behavioral: Positive for confusion.   All other systems reviewed and are negative.        Allergies:  The patient has no known allergies.     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  Persistent atrial fibrillation  Tobacco use  Depression  Atrial flutter    Social History:  The patient presents to the ED unaccompanied  Presents via EMS  Living Situation: assisted living  PCP: Tatianna Swain     Physical Exam     Patient Vitals for the past 24 hrs:   BP Temp Temp src Pulse Resp SpO2   12/20/22 1530 (!) 160/83 -- -- 85 -- 99 %   12/20/22 1242 (!) 165/98 97.7  F (36.5  C) Oral 92 16 100 %       Physical Exam  General: Laying  on the ED bed, no distress  HEENT: Normocephalic, atraumatic  Cardiac: Radial pulses 2+, irregularly irregular  Pulm: Breathing comfortably, no accessory muscle usage, no conversational dyspnea, and lungs clear bilaterally  GI: Abdomen soft, nontender, no rigidity or guarding  MSK: C/T/L-spine nontender to palpation, no step-offs, no external evidence of trauma.  Anterior chest wall and posterior thorax nontender to palpation and no external trauma.  Extremities x4 without bony deformity, no instability, tenderness to palpation, or painful range of motion.  Skin: Warm and dry  Neuro: Sensorimotor intact extremities x4  Psych: Normal mood and affect    Emergency Department Course   ECG  Atrial flutter with variable conduction, normal axis, T wave inversion in V3 and V4 is slightly more pronounced when compared to 9/20/2021, otherwise no significant change.    Imaging:  XR Pelvis 1/2 Views   Preliminary Result   IMPRESSION:   1.  Normal bilateral hip joint spacing and alignment.   2.  No fracture.   3.  Degenerative changes in the lower lumbar spine and bilateral   sacroiliac joints.   4.  Atherosclerotic calcification.          XR Chest 1 View   Preliminary Result   IMPRESSION: No acute cardiopulmonary disease. Stable mild   cardiomegaly. No visible pneumothorax or displaced fracture can be   seen. Bilateral shoulder DJD.      CT Head w/o Contrast   Final Result   IMPRESSION: Diffuse cerebral volume loss and cerebral white matter   changes consistent with chronic small vessel ischemic disease. No   evidence for acute intracranial pathology.      Radiation dose for this scan was reduced using automated exposure   control, adjustment of the mA and/or kV according to patient size, or   iterative reconstruction technique.       JEFF COYNE MD            SYSTEM ID:  [id number]      CT Cervical Spine w/o Contrast   Final Result   IMPRESSION: Moderate degenerative anterolisthesis of C4 upon C5 and C5   upon C6. Alignment  of the cervical vertebrae is otherwise normal.   Vertebral body heights of the cervical spine are normal.   Craniocervical alignment is normal. There are no fractures of the   cervical spine.  Loss of disc space height and degenerative endplate   spurring at C6-C7. Mild to moderate facet arthropathy throughout the   cervical spine. No spinal canal stenosis. No prevertebral soft tissue   swelling.         Radiation dose for this scan was reduced using automated exposure   control, adjustment of the mA and/or kV according to patient size, or   iterative reconstruction technique.      JEFF COYNE MD            SYSTEM ID:  [id number]        Report per radiology    Laboratory:  Labs Ordered and Resulted from Time of ED Arrival to Time of ED Departure   COMPREHENSIVE METABOLIC PANEL - Abnormal       Result Value    Sodium 136      Potassium 3.7      Chloride 93 (*)     Carbon Dioxide (CO2) 29      Anion Gap 14      Urea Nitrogen 22.1      Creatinine 0.78      Calcium 9.1      Glucose 126 (*)     Alkaline Phosphatase 73       (*)     ALT 36 (*)     Protein Total 6.5      Albumin 3.9      Bilirubin Total 1.9 (*)     GFR Estimate 72     TROPONIN T, HIGH SENSITIVITY - Abnormal    Troponin T, High Sensitivity 18 (*)    CBC WITH PLATELETS AND DIFFERENTIAL - Abnormal    WBC Count 3.6 (*)     RBC Count 4.97      Hemoglobin 14.6      Hematocrit 46.0      MCV 93      MCH 29.4      MCHC 31.7      RDW 14.2      Platelet Count 125 (*)     % Neutrophils 62      % Lymphocytes 9      % Monocytes 28      % Eosinophils 0      % Basophils 0      % Immature Granulocytes 1      NRBCs per 100 WBC 0      Absolute Neutrophils 2.3      Absolute Lymphocytes 0.3 (*)     Absolute Monocytes 1.0      Absolute Eosinophils 0.0      Absolute Basophils 0.0      Absolute Immature Granulocytes 0.0      Absolute NRBCs 0.0     MAGNESIUM - Normal    Magnesium 1.7     RBC AND PLATELET MORPHOLOGY    Platelet Assessment        Value: Automated Count  Confirmed. Platelet morphology is normal.    RBC Morphology Confirmed RBC Indices     ROUTINE UA WITH MICROSCOPIC REFLEX TO CULTURE   INFLUENZA A/B & SARS-COV2 PCR MULTIPLEX   TROPONIN T, HIGH SENSITIVITY      Emergency Department Course:           Reviewed:  I reviewed nursing notes, vitals, past medical history and Care Everywhere    Assessments:  1236 I obtained history and examined the patient as noted above.   1540 I rechecked the patient and explained findings.       Disposition:  The patient was admitted to the hospital under the care of Dr. Gonzales.     Impression & Plan     CMS Diagnoses: None      Medical Decision Makin-year-old female presents with seemingly mechanical fall while attempting to stand from sitting on the end of her bed.  Her primary survey is intact and she has no complaints except for mild confusion.  She was reportedly able to ambulate a few steps while transferring onto the EMS stretcher prior to arrival.  She is anticoagulated on apixaban.  CT head and C-spine were obtained and showed no acute finding.  Chest and pelvis x-rays showed no fractures and no other acute findings.  Routine lab work showed a slight troponin elevation which is likely a type II demand ischemia in the setting of COVID.  The T wave inversions in V3 and V4 are only slightly more pronounced than prior, no STEMI.  Urinalysis is pending.  Plan is for observation admission to the hospitalist service for serial troponins.    Critical Care Time: was 0 minutes for this patient excluding procedures    Diagnosis:    ICD-10-CM    1. Fall, initial encounter  W19.XXXA       2. Confusion  R41.0       3. Chronic anticoagulation  Z79.01             Scribe Disclosure:  I, Devora Ash, am serving as a scribe at 12:35 PM on 2022 to document services personally performed by Amandeep Reddy MD based on my observations and the provider's statements to me.              Amandeep Reddy MD  22 [id number]

## 2022-12-20 NOTE — ED NOTES
[facility]  ED Nurse Handoff Report    Ilda Nassar is a 89 year old female   ED Chief complaint: Fall  . ED Diagnosis:   Final diagnoses:   Fall, initial encounter   Confusion   Chronic anticoagulation   Infection due to 2019 novel coronavirus   Troponin level elevated     Allergies: No Known Allergies    Code Status: Full Code  Activity level - Baseline/Home:  Independent. Activity Level - Current:   Unable to Assess. Lift room needed: No. Bariatric: No   Needed: No   Isolation: Yes. Infection: Not Applicable  COVID r/o and special precautions.     Vital Signs:   Vitals:    12/20/22 1242 12/20/22 1530 12/20/22 1615   BP: (!) 165/98 (!) 160/83 (!) 158/99   Pulse: 92 85 72   Resp: 16     Temp: 97.7  F (36.5  C)     TempSrc: Oral     SpO2: 100% 99% 98%       Cardiac Rhythm:  ,      Pain level:    Patient confused: No. Patient Falls Risk: Yes.   Elimination Status: Has voided   Patient Report - Initial Complaint: Fall. Focused Assessment: Weakness   Tests Performed:   Labs Ordered and Resulted from Time of ED Arrival to Time of ED Departure   ROUTINE UA WITH MICROSCOPIC REFLEX TO CULTURE - Abnormal       Result Value    Color Urine Yellow      Appearance Urine Clear      Glucose Urine Negative      Bilirubin Urine Negative      Ketones Urine 40 (*)     Specific Gravity Urine 1.020      Blood Urine Negative      pH Urine 5.0      Protein Albumin Urine 10 (*)     Urobilinogen Urine Normal      Nitrite Urine Negative      Leukocyte Esterase Urine Negative      Mucus Urine Present (*)     RBC Urine 1      WBC Urine 2      Squamous Epithelials Urine 1      Hyaline Casts Urine 81 (*)    COMPREHENSIVE METABOLIC PANEL - Abnormal    Sodium 136      Potassium 3.7      Chloride 93 (*)     Carbon Dioxide (CO2) 29      Anion Gap 14      Urea Nitrogen 22.1      Creatinine 0.78      Calcium 9.1      Glucose 126 (*)     Alkaline Phosphatase 73       (*)     ALT 36 (*)     Protein Total 6.5       Albumin 3.9      Bilirubin Total 1.9 (*)     GFR Estimate 72     TROPONIN T, HIGH SENSITIVITY - Abnormal    Troponin T, High Sensitivity 18 (*)    CBC WITH PLATELETS AND DIFFERENTIAL - Abnormal    WBC Count 3.6 (*)     RBC Count 4.97      Hemoglobin 14.6      Hematocrit 46.0      MCV 93      MCH 29.4      MCHC 31.7      RDW 14.2      Platelet Count 125 (*)     % Neutrophils 62      % Lymphocytes 9      % Monocytes 28      % Eosinophils 0      % Basophils 0      % Immature Granulocytes 1      NRBCs per 100 WBC 0      Absolute Neutrophils 2.3      Absolute Lymphocytes 0.3 (*)     Absolute Monocytes 1.0      Absolute Eosinophils 0.0      Absolute Basophils 0.0      Absolute Immature Granulocytes 0.0      Absolute NRBCs 0.0     MAGNESIUM - Normal    Magnesium 1.7     RBC AND PLATELET MORPHOLOGY    Platelet Assessment        Value: Automated Count Confirmed. Platelet morphology is normal.    RBC Morphology Confirmed RBC Indices     INFLUENZA A/B & SARS-COV2 PCR MULTIPLEX   TROPONIN T, HIGH SENSITIVITY     XR Pelvis 1/2 Views   Final Result   IMPRESSION:   1.  Normal bilateral hip joint spacing and alignment.   2.  No fracture.   3.  Degenerative changes in the lower lumbar spine and bilateral   sacroiliac joints.   4.  Atherosclerotic calcification.          EULALIA HANKS MD            SYSTEM ID:  [id number]      XR Chest 1 View   Preliminary Result   IMPRESSION: No acute cardiopulmonary disease. Stable mild   cardiomegaly. No visible pneumothorax or displaced fracture can be   seen. Bilateral shoulder DJD.      CT Head w/o Contrast   Final Result   IMPRESSION: Diffuse cerebral volume loss and cerebral white matter   changes consistent with chronic small vessel ischemic disease. No   evidence for acute intracranial pathology.      Radiation dose for this scan was reduced using automated exposure   control, adjustment of the mA and/or kV according to patient size, or   iterative reconstruction technique.       JEFF  MD DORETHA            SYSTEM ID:  [id number]      CT Cervical Spine w/o Contrast   Final Result   IMPRESSION: Moderate degenerative anterolisthesis of C4 upon C5 and C5   upon C6. Alignment of the cervical vertebrae is otherwise normal.   Vertebral body heights of the cervical spine are normal.   Craniocervical alignment is normal. There are no fractures of the   cervical spine.  Loss of disc space height and degenerative endplate   spurring at C6-C7. Mild to moderate facet arthropathy throughout the   cervical spine. No spinal canal stenosis. No prevertebral soft tissue   swelling.         Radiation dose for this scan was reduced using automated exposure   control, adjustment of the mA and/or kV according to patient size, or   iterative reconstruction technique.      JEFF COYNE MD            SYSTEM ID:  [id number]           Abnormal Results: See above.   Treatments provided: See MAR  Family Comments: NA  OBS brochure/video discussed/provided to patient:  Yes  ED Medications: Medications - No data to display  Drips infusing:  No  For the majority of the shift, the patient's behavior Green. Interventions performed were NA.    Sepsis treatment initiated: No     Patient tested for COVID 19 prior to admission: YES    ED Nurse Name/Phone Number: Elisa Farnsworth RN,   4:22 PM  RECEIVING UNIT ED HANDOFF REVIEW    Above ED Nurse Handoff Report was reviewed: Yes  Reviewed by: Patricia Dwyer RN on December 20, 2022 at 9:36 PM

## 2022-12-20 NOTE — ED NOTES
Bed: ED11  Expected date: 12/20/22  Expected time: 12:32 PM  Means of arrival: Ambulance  Comments:  BM3

## 2022-12-20 NOTE — ED TRIAGE NOTES
Pt arrives by EMS with complaint of diagnosis of covid yesterday. Weak and diarrhea. Fell today. Family concerned and wanted her to be seen. No pain.      Triage Assessment     Row Name 12/20/22 1239       Triage Assessment (Adult)    Airway WDL WDL       Respiratory WDL    Respiratory WDL WDL       Skin Circulation/Temperature WDL    Skin Circulation/Temperature WDL WDL       Cardiac WDL    Cardiac WDL WDL

## 2022-12-21 ENCOUNTER — APPOINTMENT (OUTPATIENT)
Dept: PHYSICAL THERAPY | Facility: CLINIC | Age: 87
End: 2022-12-21
Attending: INTERNAL MEDICINE
Payer: MEDICARE

## 2022-12-21 VITALS
SYSTOLIC BLOOD PRESSURE: 154 MMHG | TEMPERATURE: 97.6 F | RESPIRATION RATE: 18 BRPM | HEART RATE: 77 BPM | WEIGHT: 109.1 LBS | DIASTOLIC BLOOD PRESSURE: 85 MMHG | OXYGEN SATURATION: 93 % | BODY MASS INDEX: 17.88 KG/M2

## 2022-12-21 LAB
ALBUMIN SERPL BCG-MCNC: 3.4 G/DL (ref 3.5–5.2)
ALP SERPL-CCNC: 64 U/L (ref 35–104)
ALT SERPL W P-5'-P-CCNC: 27 U/L (ref 10–35)
ANION GAP SERPL CALCULATED.3IONS-SCNC: 12 MMOL/L (ref 7–15)
AST SERPL W P-5'-P-CCNC: 74 U/L (ref 10–35)
ATRIAL RATE - MUSE: 375 BPM
BILIRUB SERPL-MCNC: 1.5 MG/DL
BUN SERPL-MCNC: 14.8 MG/DL (ref 8–23)
CALCIUM SERPL-MCNC: 8.7 MG/DL (ref 8.2–9.6)
CHLORIDE SERPL-SCNC: 97 MMOL/L (ref 98–107)
CREAT SERPL-MCNC: 0.63 MG/DL (ref 0.51–0.95)
DEPRECATED HCO3 PLAS-SCNC: 29 MMOL/L (ref 22–29)
DIASTOLIC BLOOD PRESSURE - MUSE: NORMAL MMHG
ERYTHROCYTE [DISTWIDTH] IN BLOOD BY AUTOMATED COUNT: 14.1 % (ref 10–15)
GFR SERPL CREATININE-BSD FRML MDRD: 84 ML/MIN/1.73M2
GLUCOSE SERPL-MCNC: 80 MG/DL (ref 70–99)
HCT VFR BLD AUTO: 44.3 % (ref 35–47)
HGB BLD-MCNC: 14.8 G/DL (ref 11.7–15.7)
INTERPRETATION ECG - MUSE: NORMAL
MCH RBC QN AUTO: 29.6 PG (ref 26.5–33)
MCHC RBC AUTO-ENTMCNC: 33.4 G/DL (ref 31.5–36.5)
MCV RBC AUTO: 89 FL (ref 78–100)
P AXIS - MUSE: NORMAL DEGREES
PLATELET # BLD AUTO: 116 10E3/UL (ref 150–450)
POTASSIUM SERPL-SCNC: 3.2 MMOL/L (ref 3.4–5.3)
PR INTERVAL - MUSE: NORMAL MS
PROT SERPL-MCNC: 5.8 G/DL (ref 6.4–8.3)
QRS DURATION - MUSE: 88 MS
QT - MUSE: 428 MS
QTC - MUSE: 493 MS
R AXIS - MUSE: 51 DEGREES
RBC # BLD AUTO: 5 10E6/UL (ref 3.8–5.2)
SODIUM SERPL-SCNC: 138 MMOL/L (ref 136–145)
SYSTOLIC BLOOD PRESSURE - MUSE: NORMAL MMHG
T AXIS - MUSE: 253 DEGREES
VENTRICULAR RATE- MUSE: 80 BPM
WBC # BLD AUTO: 3.1 10E3/UL (ref 4–11)

## 2022-12-21 PROCEDURE — 97161 PT EVAL LOW COMPLEX 20 MIN: CPT | Mod: GP | Performed by: PHYSICAL THERAPIST

## 2022-12-21 PROCEDURE — 97116 GAIT TRAINING THERAPY: CPT | Mod: GP | Performed by: PHYSICAL THERAPIST

## 2022-12-21 PROCEDURE — 80053 COMPREHEN METABOLIC PANEL: CPT | Performed by: INTERNAL MEDICINE

## 2022-12-21 PROCEDURE — G0378 HOSPITAL OBSERVATION PER HR: HCPCS

## 2022-12-21 PROCEDURE — 99217 PR OBSERVATION CARE DISCHARGE: CPT | Performed by: PHYSICIAN ASSISTANT

## 2022-12-21 PROCEDURE — 85027 COMPLETE CBC AUTOMATED: CPT | Performed by: INTERNAL MEDICINE

## 2022-12-21 PROCEDURE — 97530 THERAPEUTIC ACTIVITIES: CPT | Mod: GP | Performed by: PHYSICAL THERAPIST

## 2022-12-21 PROCEDURE — 250N000013 HC RX MED GY IP 250 OP 250 PS 637: Performed by: INTERNAL MEDICINE

## 2022-12-21 PROCEDURE — 36415 COLL VENOUS BLD VENIPUNCTURE: CPT | Performed by: INTERNAL MEDICINE

## 2022-12-21 RX ORDER — POTASSIUM CHLORIDE 1500 MG/1
20 TABLET, EXTENDED RELEASE ORAL ONCE
Status: COMPLETED | OUTPATIENT
Start: 2022-12-21 | End: 2022-12-21

## 2022-12-21 RX ADMIN — ASPIRIN 81 MG: 81 TABLET, COATED ORAL at 08:17

## 2022-12-21 RX ADMIN — APIXABAN 2.5 MG: 2.5 TABLET, FILM COATED ORAL at 08:17

## 2022-12-21 RX ADMIN — CITALOPRAM HYDROBROMIDE 10 MG: 10 TABLET ORAL at 08:17

## 2022-12-21 ASSESSMENT — ACTIVITIES OF DAILY LIVING (ADL)
ADLS_ACUITY_SCORE: 24

## 2022-12-21 NOTE — DISCHARGE INSTRUCTIONS
Your home care referral was sent to [facility]  If you haven't heard from them within the next 24-48 hours,  Please call them at [phone].

## 2022-12-21 NOTE — H&P
[facility]    History and Physical - Hospitalist Service       Date of Admission:  12/20/2022    Assessment & Plan      Ilda Nassar is a 89 year old female admitted on 12/20/2022 with COVID-19 infection, weakness, and elevated troponin. She has history of hypertension, dyslipidemia, atrial fibrillation, atrial flutter, chronic anticoagulation with Eliquis, depression, and dementia.  It sounds like she lives in some type of assisted living.  She presented from her facility today after a fall.  She had evidently tested positive for COVID-19 1 day earlier.  She was sitting at the side of her bed and slid off the bed while trying to get up.  She had no obvious injury.  She seemed mildly confused.  She denied pain.  She was sent to the emergency department for evaluation.  She denied chest pain, cough, shortness of breath, abdominal pain, nausea, vomiting, diarrhea, and dysuria.  She did report a bit of congestion.  She reported feeling weak.  Emergency department evaluation showed elevated blood pressure and heart rate in the 90s but overall stable vital signs.  She was afebrile and she was not hypoxic.  Laboratory evaluation showed AST of 101 and total bilirubin of 1.9 with otherwise unremarkable comprehensive metabolic panel.  CBC showed white blood cell count of 3.6 and platelets of 125.  Troponin was elevated in the 16-18 range but did not change from this range on 3 checks.  CT of head was unremarkable.  CT of C-spine showed no acute process.  Chest x-ray showed no acute process.  X-ray of pelvis showed no acute process.  ECG showed PVCs.  Showed atrial fibrillation.  There was ST segment depression in V3 maybe a little bit deeper than prior and T wave inversion in lead V4.  I was asked to admit Pat to the hospital with COVID-19, weakness, troponin elevation.    Problem list:    Acute COVID-19 infection  Weakness  Fall without injury  -Patient does not have infiltrate on chest x-ray or  "hypoxia so there is no indication for treatment with dexamethasone or remdesivir  -Tylenol as needed for myalgias  -Physical therapy evaluation for assessment of mobility and falls    Troponin elevation, mild  -No chest pain  -No ECG suggestion of acute ischemia  -Aspirin 162 mg ordered and 81 mg daily ordered- likely no need to continue on discharge  -Troponins were 18, 16, and 18  -Monitor on telemetry  -No further evaluation planned    Hypertension  Dyslipidemia  Atrial fibrillation  Chronic anticoagulation  -Resume prior to admission Cardura, Zocor, and Eliquis    Depression  Dementia  -Resume prior to admission Celexa and Aricept         Diet: Regular Diet Adult    DVT Prophylaxis: DOAC  Treviño Catheter: Not present  Central Lines: None  Cardiac Monitoring: ACTIVE order. Indication: AMI (NSTEMI/ STEMI) (48 hours)  Code Status: Full Code      Clinically Significant Risk Factors Present on Admission               # Drug Induced Coagulation Defect: home medication list includes an anticoagulant medication  # Thrombocytopenia: Lowest platelets = 125 in last 2 days, will monitor for bleeding   # Hypertension: home medication list includes antihypertensive(s)      # Cachexia: Estimated body mass index is 17.53 kg/m  as calculated from the following:    Height as of 11/28/22: 1.664 m (5' 5.5\").    Weight as of 11/28/22: 48.5 kg (107 lb).           Disposition Plan      Expected Discharge Date: 12/21/2022                The patient's care was discussed with the Patient.    Luca Gonzales MD  Hospitalist Service  [employer]  Securely message with the Vocera Web Console (learn more here)  Text page via [employer] Paging/Directory         ______________________________________________________________________    Chief Complaint   Weakness and fall in setting of recently (yesteday) diagnosed COVID-19    History is obtained from the patient, Dr. Reddy, and the medical record    History of Present Illness "   Ilda Nassar is a 89 year old female admitted on 12/20/2022 with COVID-19 infection, weakness, and elevated troponin. She has history of hypertension, dyslipidemia, atrial fibrillation, atrial flutter, chronic anticoagulation with Eliquis, depression, and dementia.  It sounds like she lives in some type of assisted living.  She presented from her facility today after a fall.  She had evidently tested positive for COVID-19 1 day earlier.  She was sitting at the side of her bed and slid off the bed while trying to get up.  She had no obvious injury.  She seemed mildly confused.  She denied pain.  She was sent to the emergency department for evaluation.  She denied chest pain, cough, shortness of breath, abdominal pain, nausea, vomiting, diarrhea, and dysuria.  She did report a bit of congestion.  She reported feeling weak.  Emergency department evaluation showed elevated blood pressure and heart rate in the 90s but overall stable vital signs.  She was afebrile and she was not hypoxic.  Laboratory evaluation showed AST of 101 and total bilirubin of 1.9 with otherwise unremarkable comprehensive metabolic panel.  CBC showed white blood cell count of 3.6 and platelets of 125.  Troponin was elevated in the 16-18 range but did not change from this range on 3 checks.  CT of head was unremarkable.  CT of C-spine showed no acute process.  Chest x-ray showed no acute process.  X-ray of pelvis showed no acute process.  ECG showed PVCs.  Showed atrial fibrillation.  There was ST segment depression in V3 maybe a little bit deeper than prior and T wave inversion in lead V4.  I was asked to admit Pat to the hospital with COVID-19, weakness, troponin elevation.    Review of Systems    The 10 point Review of Systems is negative other than noted in the HPI or here.     Past Medical History    I have reviewed this patient's medical history and updated it with pertinent information if needed.   Past Medical History:   Diagnosis  Date     Atrial fibrillation (H)      Atrial flutter (H)      Hyperlipidemia      Hypertension        Past Surgical History   I have reviewed this patient's surgical history and updated it with pertinent information if needed.  No past surgical history on file.    Social History   I have reviewed this patient's social history and updated it with pertinent information if needed.  Social History     Tobacco Use     Smoking status: Former     Smokeless tobacco: Never   Substance Use Topics     Alcohol use: Yes       Family History   Patient denies any family medical history but history is limited by dementia.    Prior to Admission Medications   Prior to Admission Medications   Prescriptions Last Dose Informant Patient Reported? Taking?   apixaban ANTICOAGULANT (ELIQUIS ANTICOAGULANT) 2.5 MG tablet 12/20/2022 at am  No Yes   Sig: Take 1 tablet (2.5 mg) by mouth 2 times daily   citalopram (CELEXA) 10 MG tablet 12/20/2022 at am  No Yes   Sig: Take 1 tablet (10 mg) by mouth daily   donepezil (ARICEPT) 10 MG tablet 12/19/2022  Yes Yes   Sig: Take 1 tablet (10 mg) by mouth At Bedtime   doxazosin (CARDURA) 2 MG tablet 12/19/2022  No Yes   Sig: Take 1 tablet (2 mg) by mouth At Bedtime   simvastatin (ZOCOR) 40 MG tablet 12/19/2022  No Yes   Sig: Take 1 tablet (40 mg) by mouth At Bedtime      Facility-Administered Medications: None     Allergies   No Known Allergies    Physical Exam   Vital Signs: Temp: 97.7  F (36.5  C) Temp src: Oral BP: (!) 129/94 Pulse: 74   Resp: 18 SpO2: 95 % O2 Device: None (Room air)    Weight: 0 lbs 0 oz    GENERAL: Pleasant and cooperative. No acute distress.  EYES: Pupils equal and round. No scleral erythema or icterus.  ENT: External ears are normal without deformity. Posterior oropharynx is without erythem, swelling, or exudate.  NECK: Supple. No masses or swelling. No tenderness. Thyroid is normal without mass or tenderness.  CHEST: Clear to auscultation. Normal breath sounds. No retractions.   CV:  Regular rate and rhythm. No JVD. Pulses normal.  ABDOMEN: Bowel sounds present. No tenderness. No masses or hernia.  EXTREMETIES: No clubbing, cyanosis, or ischemia.  SKIN: Warm and dry to touch. No wounds or rashes.  NEUROLOGIC: Strength and sensation are normal. Deep tendon reflexes are normal. Cranial nerves are normal.        Data   Data reviewed today: I reviewed all medications, new labs and imaging results over the last 24 hours.     Recent Labs   Lab 12/20/22  1246   WBC 3.6*   HGB 14.6   MCV 93   *      POTASSIUM 3.7   CHLORIDE 93*   CO2 29   BUN 22.1   CR 0.78   ANIONGAP 14   ESTELA 9.1   *   ALBUMIN 3.9   PROTTOTAL 6.5   BILITOTAL 1.9*   ALKPHOS 73   ALT 36*   *     Recent Results (from the past 24 hour(s))   CT Cervical Spine w/o Contrast    Narrative    CT OF THE CERVICAL SPINE WITHOUT CONTRAST   12/20/2022 2:27 PM     COMPARISON: None.    HISTORY: Fall. Trauma. Pain.     TECHNIQUE: Axial images of the cervical spine were acquired without  intravenous contrast. Multiplanar reformations were created.        Impression    IMPRESSION: Moderate degenerative anterolisthesis of C4 upon C5 and C5  upon C6. Alignment of the cervical vertebrae is otherwise normal.  Vertebral body heights of the cervical spine are normal.  Craniocervical alignment is normal. There are no fractures of the  cervical spine.  Loss of disc space height and degenerative endplate  spurring at C6-C7. Mild to moderate facet arthropathy throughout the  cervical spine. No spinal canal stenosis. No prevertebral soft tissue  swelling.      Radiation dose for this scan was reduced using automated exposure  control, adjustment of the mA and/or kV according to patient size, or  iterative reconstruction technique.    JEFF COYNE MD         SYSTEM ID:  [id number]   CT Head w/o Contrast    Narrative    CT OF THE HEAD WITHOUT CONTRAST 12/20/2022 2:28 PM     COMPARISON: Head CT 9/20/2021.    HISTORY: Fall on  apixaban.    TECHNIQUE: 5 mm thick axial CT images of the head were acquired  without IV contrast material.    FINDINGS: There is mild diffuse cerebral volume loss. There are subtle  patchy areas of decreased density in the cerebral white matter  bilaterally that are consistent with sequela of chronic small vessel  ischemic disease.    The ventricles and basal cisterns are within normal limits in  configuration given the degree of cerebral volume loss.  There is no  midline shift. There are no extra-axial fluid collections.    No intracranial hemorrhage, mass or recent infarct.    The visualized paranasal sinuses are well-aerated. There is no  mastoiditis. There are no fractures of the visualized bones.      Impression    IMPRESSION: Diffuse cerebral volume loss and cerebral white matter  changes consistent with chronic small vessel ischemic disease. No  evidence for acute intracranial pathology.    Radiation dose for this scan was reduced using automated exposure  control, adjustment of the mA and/or kV according to patient size, or  iterative reconstruction technique.     JEFF COYNE MD         SYSTEM ID:  [id number]   XR Chest 1 View    Narrative    CHEST ONE VIEW   12/20/2022 2:40 PM     HISTORY: Fall.    COMPARISON: Chest x-ray 9/20/2021.      Impression    IMPRESSION: No acute cardiopulmonary disease. Stable mild  cardiomegaly. No visible pneumothorax or displaced fracture can be  seen. Bilateral shoulder DJD.    LUX ESTRADA MD         SYSTEM ID:  [id number]   XR Pelvis 1/2 Views    Narrative    EXAM: PELVIS ONE TO TWO VIEWS  DATE/TIME: 12/20/2022 2:41 PM     INDICATION: Pelvic pain after a fall.   COMPARISON: 9/20/2021.      Impression    IMPRESSION:  1.  Normal bilateral hip joint spacing and alignment.  2.  No fracture.  3.  Degenerative changes in the lower lumbar spine and bilateral  sacroiliac joints.  4.  Atherosclerotic calcification.       EULALIA HANKS MD         SYSTEM ID:  [id number]

## 2022-12-21 NOTE — PLAN OF CARE
Patient's After Visit Summary was reviewed with patient.   Patient verbalized understanding of After Visit Summary, recommended follow up and was given an opportunity to ask questions.   Discharge medications sent home with patient/family: No   Discharged with sister      OBSERVATION patient END time: 1220

## 2022-12-21 NOTE — PLAN OF CARE
PRIMARY DIAGNOSIS: GENERALIZED WEAKNESS/COVID +    OUTPATIENT/OBSERVATION GOALS TO BE MET BEFORE DISCHARGE  1. Orthostatic performed: No    2. Tolerating PO medications: Yes    3. Return to near baseline physical activity: No    4. Cleared for discharge by consultants (if involved): No    Discharge Planner Nurse   Safe discharge environment identified: Yes  Barriers to discharge: Yes       Entered by: Patricia Dwyer RN 12/21/2022    BP (!) 146/86 (BP Location: Left arm)   Pulse 83   Temp 97.4  F (36.3  C) (Oral)   Resp 18   LMP  (LMP Unknown)   SpO2 95%   Pt. Alert & oriented x 3 with some forgetfulness.VSS. Denies pain, sob or CP. Per tele tech,PVCs & Afib.One time Bolus of 500ml of LR infusing..Assist x 1.On regular diet..Continues to be monitored.   Please review provider order for any additional goals.   Nurse to notify provider when observation goals have been met and patient is ready for discharge.Goal Outcome Evaluation:

## 2022-12-21 NOTE — PLAN OF CARE
ROOM # ED 26    Living Situation (if not independent, order SW consult): lives alone  [facility]  : Jodi (sister)    Activity level at baseline: independent  Activity level on admit: has not gotten out of bed yet      Patient registered to observation; given Patient Bill of Rights; given the opportunity to ask questions about observation status and their plan of care.  Patient has been oriented to the observation room, bathroom and call light is in place.    Discussed discharge goals and expectations with patient/family.

## 2022-12-21 NOTE — PLAN OF CARE
PRIMARY DIAGNOSIS: GENERALIZED WEAKNESS/fall/COVID +    OUTPATIENT/OBSERVATION GOALS TO BE MET BEFORE DISCHARGE  1. Orthostatic performed: N/A    2. Tolerating PO medications: Yes    3. Return to near baseline physical activity: Yes    4. Cleared for discharge by consultants (if involved): No- PT to assess this am    Discharge Planner Nurse   Safe discharge environment identified: Yes  Barriers to discharge: yes- PT consule       Entered by: Virginia Lynn RN 12/21/2022      Please review provider order for any additional goals.   Nurse to notify provider when observation goals have been met and patient is ready for discharge.    AOx4- forgetful. SBA to bathroom and up in room this am. Denies pain. Congested cough- Non productive, LS diminished, O2 sats stable on RA. A-fib CVR on tele. Precautions maintained. Plan for PT consult today with possible discharge back to prior living arrangement.

## 2022-12-21 NOTE — PROGRESS NOTES
[facility]  OUTPATIENT PHYSICAL THERAPY EVALUATION  PLAN OF TREATMENT FOR OUTPATIENT REHABILITATION  (COMPLETE FOR INITIAL CLAIMS ONLY)  Patient's Last Name, First Name, M.I.  YOB: 1932  Ilda Nassar                        Provider's Name  [facility] Medical Record No.  [id number]                             Onset Date:  12/20/22   Start of Care Date:  12/21/22   Type:     _X_PT   ___OT   ___SLP Medical Diagnosis:  weakness              PT Diagnosis:  Impaired functional mobility Visits from SOC:  1     See note for plan of treatment, functional goals and certification details    I CERTIFY THE NEED FOR THESE SERVICES FURNISHED UNDER        THIS PLAN OF TREATMENT AND WHILE UNDER MY CARE     (Physician co-signature of this document indicates review and certification of the therapy plan).               12/21/22 1011   Appointment Info   Signing Clinician's Name / Credentials (PT) Marilia Gallegos DPT   Quick Adds   Quick Adds Certification   Living Environment   People in Home facility resident   Current Living Arrangements assisted living   Home Accessibility no concerns   Transportation Anticipated family or friend will provide   Self-Care   Usual Activity Tolerance good   Current Activity Tolerance moderate   Regular Exercise No   Equipment Currently Used at Home none   Fall history within last six months yes   Number of times patient has fallen within last six months 1   General Information   Onset of Illness/Injury or Date of Surgery 12/20/22   Referring Physician Luca Gonzales MD   Patient/Family Therapy Goals Statement (PT) Return home   Pertinent History of Current Problem (include personal factors and/or comorbidities that impact the POC) Ilda Nassar is a 89 year old female admitted on 12/20/2022 with COVID-19 infection, weakness, and elevated troponin. She has history of hypertension, dyslipidemia, atrial fibrillation,  atrial flutter, chronic anticoagulation with Eliquis, depression, and dementia.  It sounds like she lives in some type of assisted living.  She presented from her facility today after a fall.  She had evidently tested positive for COVID-19 1 day earlier.  She was sitting at the side of her bed and slid off the bed while trying to get up.  She had no obvious injury.  She seemed mildly confused.  She denied pain.  She was sent to the emergency department for evaluation.  She denied chest pain, cough, shortness of breath, abdominal pain, nausea, vomiting, diarrhea, and dysuria.  She did report a bit of congestion.  She reported feeling weak.  Emergency department evaluation showed elevated blood pressure and heart rate in the 90s but overall stable vital signs.  She was afebrile and she was not hypoxic.  Laboratory evaluation showed AST of 101 and total bilirubin of 1.9 with otherwise unremarkable comprehensive metabolic panel.  CBC showed white blood cell count of 3.6 and platelets of 125.  Troponin was elevated in the 16-18 range but did not change from this range on 3 checks.  CT of head was unremarkable.  CT of C-spine showed no acute process.  Chest x-ray showed no acute process.  X-ray of pelvis showed no acute process.  ECG showed PVCs.  Showed atrial fibrillation.  There was ST segment depression in V3 maybe a little bit deeper than prior and T wave inversion in lead V4.  I was asked to admit Pat to the hospital with COVID-19, weakness, troponin elevation.   Existing Precautions/Restrictions fall   Cognition   Affect/Mental Status (Cognition) WFL   Orientation Status (Cognition) oriented x 3   Follows Commands (Cognition) WNL   Cognitive Status Comments forgetful   Pain Assessment   Patient Currently in Pain No   Integumentary/Edema   Integumentary/Edema no deficits were identifed   Posture    Posture Forward head position;Protracted shoulders   Range of Motion (ROM)   Range of Motion ROM is WFL   Strength  (Manual Muscle Testing)   Strength Comments Mild deficits, requiring UE support for transfers but functional   Bed Mobility   Comment, (Bed Mobility) sit<>supine with SBA   Transfers   Comment, (Transfers) sit<>stand with CGA   Gait/Stairs (Locomotion)   Comment, (Gait/Stairs) CGA with no AD   Balance   Balance Comments minimal deficits, knows how to pause to find balance if feeling unsteady   Sensory Examination   Sensory Perception patient reports no sensory changes   Coordination   Coordination no deficits were identified   Muscle Tone   Muscle Tone no deficits were identified   Clinical Impression   Criteria for Skilled Therapeutic Intervention Yes, treatment indicated   PT Diagnosis (PT) Impaired functional mobility   Influenced by the following impairments Weakness, deconditioning   Functional limitations due to impairments Difficulty with bed mobility, transfers, ambulation   Clinical Presentation (PT Evaluation Complexity) Stable/Uncomplicated   Clinical Presentation Rationale Medically progressing   Clinical Decision Making (Complexity) low complexity   Planned Therapy Interventions (PT) bed mobility training;gait training;home exercise program;patient/family education;strengthening;stretching;transfer training;progressive activity/exercise;home program guidelines   Risk & Benefits of therapy have been explained evaluation/treatment results reviewed;care plan/treatment goals reviewed;risks/benefits reviewed;current/potential barriers reviewed;participants voiced agreement with care plan;participants included;patient   PT Total Evaluation Time   PT Eval, Low Complexity Minutes (59637) 10   Therapy Certification   Start of care date 12/21/22   Certification date from 12/21/22   Certification date to 12/24/22   Medical Diagnosis weakness   Physical Therapy Goals   PT Frequency Daily   PT Predicted Duration/Target Date for Goal Attainment 12/24/22   PT Goals Bed Mobility;Transfers;Gait   PT: Bed Mobility  Modified independent;Supine to/from sit   PT: Transfers Modified independent;Sit to/from stand   PT: Gait Modified independent;Assistive device;100 feet   PT Discharge Planning   PT Discharge Recommendation (DC Rec) home with home care physical therapy   PT Rationale for DC Rec Pt is presenting slightly below baseline with mild deconditioning and weakness associated with acute illness. Pt able to complete all basic mobility skills required for safe discharge to home. Rec HHPT and OT to address mobility, safety, and ADLs in the home setting   PT Brief overview of current status SBA with no AD   Total Session Time   Total Session Time (sum of timed and untimed services) 10

## 2022-12-21 NOTE — CONSULTS
Care Management Discharge Note    Discharge Date: 12/21/2022     Discharge Disposition:  [facility]    Discharge Services:  Home PT/OT    Discharge Transportation: Patient's sister Jodi will transport at 12pm    Private pay costs discussed: Not applicable     Patient/family educated on Medicare website which has current facility and service quality ratings:  Yes    Education Provided on the Discharge Plan:  Yes  Persons Notified of Discharge Plans: Patient, Sister Lilian Zapata RN [place] [phone]  Patient/Family in Agreement with the Plan:  Yes    Handoff Referral Completed: No    Additional Information:  HELEN consulted for discharge planning. Patient admitted 12/20 under observation status, COVID+.     PT evaluated patient and recommend return to home with Home PT/OT. HELEN spoke with patient via phone d/t COVID precautions. She is agreeable to HC and does not have an agency preference.     [facility] [phone]. They confirm patient resides in [facility] at [facility]. Pt receives assist with safety checks and meals. They work most closely with [facility]. Pt needs to return by 1500. Orders to be faxed to (f) [phone]. New meds to be sent to [facility] in [place] to be filled.     Patient asked that SW call her sister Jodi to see if she can transport pt home. HELEN spoke with Jodi via phone ([phone]) who reports that she can transport patient home at 12pm today. She will bring clothes for patient up to the unit. She is aware that patient is COVID+.    HELEN placed call to [facility], [phone]. Made referral for Home PT/OT. They have accepted. AVS updated.     HELEN will continue to follow.     1105: Orders faxed to [facility]. [facility] can get orders through Epic-no need to fax. Updated RN and [facility] on transport time. Attempted to update pt on transport time via phone, line busy.     MARY Sadler, [facility]   Inpatient Care Coordination  [facility]    [phone]

## 2022-12-21 NOTE — PLAN OF CARE
PRIMARY DIAGNOSIS: GENERALIZED WEAKNESS/COVID +    OUTPATIENT/OBSERVATION GOALS TO BE MET BEFORE DISCHARGE  1. Orthostatic performed: No    2. Tolerating PO medications: Yes    3. Return to near baseline physical activity: No    4. Cleared for discharge by consultants (if involved): No    Discharge Planner Nurse   Safe discharge environment identified: Yes  Barriers to discharge: Yes       Entered by: Patricia Dwyer RN 12/21/2022    BP (!) 146/86 (BP Location: Left arm)   Pulse 83   Temp 97.4  F (36.3  C) (Oral)   Resp 18   LMP  (LMP Unknown)   SpO2 95%     Pt. Alert & oriented x 3 with some forgetfulness.VSS. Denies pain, sob or CP. Per tele tech,controlled Afib in the 70s & PVCs.,.Assist x 1.On regular diet..Continues to be monitored.     Please review provider order for any additional goals.   Nurse to notify provider when observation goals have been met and patient is ready for discharge.Goal Outcome Evaluation:

## 2022-12-21 NOTE — PLAN OF CARE
ROOM # 229    Living Situation (if not independent, order SW consult):  Facility name:[facility]  :Jodi ( Sister)    Activity level at baseline: Independent  Activity level on admit:Assist x 1    Who will be transporting you at discharge: TBD    Patient registered to observation; given Patient Bill of Rights; given the opportunity to ask questions about observation status and their plan of care.  Patient has been oriented to the observation room, bathroom and call light is in place.    Discussed discharge goals and expectations with patient/family.         Goal Outcome Evaluation:

## 2022-12-21 NOTE — PLAN OF CARE
PRIMARY DIAGNOSIS: GENERALIZED WEAKNESS/COVID+    OUTPATIENT/OBSERVATION GOALS TO BE MET BEFORE DISCHARGE  1. Orthostatic performed: No    2. Tolerating PO medications: Yes    3. Return to near baseline physical activity: No    4. Cleared for discharge by consultants (if involved): No    Discharge Planner Nurse   Safe discharge environment identified: Yes  Barriers to discharge: Yes       Entered by: Julieta Estrada RN 12/20/2022 6:29 PM     Please review provider order for any additional goals.   Nurse to notify provider when observation goals have been met and patient is ready for discharge.       Disoriented to time, denies pain, tolerating regular diet, has not gotten out of bed yet, IVF, plan for PT to see, plan of care reviewed with pt, will continue to provide supportive cares.

## 2022-12-21 NOTE — DISCHARGE SUMMARY
[facility]  Discharge Summary        Ilda Nassar MRN# [id number]   YOB: 1932 Age: 89 year old     Date of Admission:  12/20/2022  Date of Discharge:  12/21/2022 12:04 PM  Admitting Physician:  Luca Gonzales MD  Discharge Physician: Fariha Bell PA-C  Discharging Service: Hospitalist     Primary Provider: Tatianna Swain  Primary Care Physician Phone Number: [phone]         Discharge Diagnoses/Problem Oriented Hospital Course (Providers):    Ilda Nassar was admitted on 12/20/2022 by Luca Gonzales MD and I would refer you to their history and physical.  The following problems were addressed during her hospitalization:    1.  Generalized weakness due to COVID-19  2.  Fall without injury due to weakness  3.  Mild troponin elevation of unclear significance  Current medical issues include:  Hypertension, dyslipidemia, atrial fibrillation on chronic anticoagulation, depression, dementia         Code Status:      Full Code        Brief Hospital Stay Summary Sent Home With Patient in AVS:        Reason for your hospital stay      You are admitted for concerns of generalized weakness due to COVID-19.    Fortunately chest x-ray lab work etc. did not reveal significant sequela   of COVID-19 except for general weakness.  He was seen by physical therapy   and recommended home with increased services with home PT and OT.    Continue supportive cares at home.         Ilda Nassar is a 89 year old female admitted on 12/20/2022 with COVID-19 infection, weakness, and elevated troponin. She has history of hypertension, dyslipidemia, atrial fibrillation, atrial flutter, chronic anticoagulation with Eliquis, depression, and dementia.  It sounds like she lives in some type of assisted living.  She presented from her facility today after a fall.  She had evidently tested positive for COVID-19 1 day earlier.  She was sitting at the side of her bed and slid off the bed while  trying to get up.  She had no obvious injury.  She seemed mildly confused.  She denied pain.  She was sent to the emergency department for evaluation.  She denied chest pain, cough, shortness of breath, abdominal pain, nausea, vomiting, diarrhea, and dysuria.  She did report a bit of congestion.  She reported feeling weak.  Emergency department evaluation showed elevated blood pressure and heart rate in the 90s but overall stable vital signs.  She was afebrile and she was not hypoxic.  Laboratory evaluation showed AST of 101 and total bilirubin of 1.9 with otherwise unremarkable comprehensive metabolic panel.  CBC showed white blood cell count of 3.6 and platelets of 125.  Troponin was elevated in the 16-18 range but did not change from this range on 3 checks.  CT of head was unremarkable.  CT of C-spine showed no acute process.  Chest x-ray showed no acute process.  X-ray of pelvis showed no acute process.  ECG showed PVCs.  Showed atrial fibrillation.  There was ST segment depression in V3 maybe a little bit deeper than prior and T wave inversion in lead V4.  I was asked to admit Pat to the hospital with COVID-19, weakness, troponin elevation.        Acute COVID-19 infection  Weakness  Fall without injury  -Patient does not have infiltrate on chest x-ray or hypoxia so there is no indication for treatment with dexamethasone or remdesivir  -Tylenol as needed for myalgias  -Physical therapy evaluation felt that he is able to go home with increased services.     Troponin elevation, mild  -No chest pain  -No ECG suggestion of acute ischemia  -Aspirin 162 mg ordered and 81 mg daily ordered- likely no need to continue on discharge  -No further evaluation needed     Hypertension  Dyslipidemia  Atrial fibrillation  Chronic anticoagulation  -Resume prior to admission Cardura, Zocor, and Eliquis     Depression  Dementia  -Resume prior to admission Celexa and Aricept            Important Results:      Recent Labs   Lab  12/30/22  0000   WBC 4.7   HGB 13.5   HCT 40.6   MCV 92.2        Recent Labs   Lab 12/30/22  0000   .4   POTASSIUM 4.69   CHLORIDE 101.4   CO2 32.7*   *   BUN 12  13.3   CR 0.90   ESTELA 9.0     7-Day Micro Results     No results found for the last 168 hours.        Recent Labs   Lab 12/30/22  0000   *              Pending Results:        Unresulted Labs Ordered in the Past 30 Days of this Admission     No orders found for last 31 day(s).            Discharge Instructions and Follow-Up:      Follow-up Appointments     Follow-up and recommended labs and tests       Follow up with primary care provider, Tatianna Swain, within 7 days for   hospital follow- up.  Repeat BMP in 1 week.               Discharge Disposition:      Discharged to home         Discharge Medications:        Current Discharge Medication List      CONTINUE these medications which have NOT CHANGED    Details   apixaban ANTICOAGULANT (ELIQUIS ANTICOAGULANT) 2.5 MG tablet Take 1 tablet (2.5 mg) by mouth 2 times daily  Qty: 180 tablet, Refills: 2    Associated Diagnoses: At high risk for falls; Chronic atrial fibrillation (H); Essential hypertension      citalopram (CELEXA) 10 MG tablet Take 1 tablet (10 mg) by mouth daily  Qty: 90 tablet, Refills: 1    Associated Diagnoses: Other depression      donepezil (ARICEPT) 10 MG tablet Take 1 tablet (10 mg) by mouth At Bedtime    Associated Diagnoses: Memory changes      doxazosin (CARDURA) 2 MG tablet Take 1 tablet (2 mg) by mouth At Bedtime  Qty: 90 tablet, Refills: 0    Associated Diagnoses: Essential hypertension      simvastatin (ZOCOR) 40 MG tablet Take 1 tablet (40 mg) by mouth At Bedtime  Qty: 90 tablet, Refills: 0    Associated Diagnoses: Other hyperlipidemia               Allergies:       No Known Allergies        Consultations This Hospital Stay:      Consultation during this admission received from PT         Condition and Physical on Discharge:      Discharge condition:  Stable   Vitals: Blood pressure (!) 154/85, pulse 77, temperature 97.6  F (36.4  C), temperature source Oral, resp. rate 18, weight 49.5 kg (109 lb 1.6 oz), SpO2 93 %.  109 lbs 1.6 oz      GENERAL: Pleasant and cooperative. No acute distress.  EYES: Pupils equal and round. No scleral erythema or icterus.  ENT: External ears are normal without deformity. Posterior oropharynx is without erythem, swelling, or exudate.  NECK: Supple. No masses or swelling. No tenderness. Thyroid is normal without mass or tenderness.  CHEST: Clear to auscultation. Normal breath sounds. No retractions.   CV: Regular rate and rhythm. No JVD. Pulses normal.  ABDOMEN: Bowel sounds present. No tenderness. No masses or hernia.  EXTREMETIES: No clubbing, cyanosis, or ischemia.  SKIN: Warm and dry to touch. No wounds or rashes.  NEUROLOGIC: Strength and sensation are normal. Deep tendon reflexes are normal. Cranial nerves are normal        Discharge Time:      <30 mins         Image Results From This Hospital Stay (For Non-EPIC Providers):        Results for orders placed or performed during the hospital encounter of 12/20/22   CT Head w/o Contrast    Narrative    CT OF THE HEAD WITHOUT CONTRAST 12/20/2022 2:28 PM     COMPARISON: Head CT 9/20/2021.    HISTORY: Fall on apixaban.    TECHNIQUE: 5 mm thick axial CT images of the head were acquired  without IV contrast material.    FINDINGS: There is mild diffuse cerebral volume loss. There are subtle  patchy areas of decreased density in the cerebral white matter  bilaterally that are consistent with sequela of chronic small vessel  ischemic disease.    The ventricles and basal cisterns are within normal limits in  configuration given the degree of cerebral volume loss.  There is no  midline shift. There are no extra-axial fluid collections.    No intracranial hemorrhage, mass or recent infarct.    The visualized paranasal sinuses are well-aerated. There is no  mastoiditis. There are no fractures of  the visualized bones.      Impression    IMPRESSION: Diffuse cerebral volume loss and cerebral white matter  changes consistent with chronic small vessel ischemic disease. No  evidence for acute intracranial pathology.    Radiation dose for this scan was reduced using automated exposure  control, adjustment of the mA and/or kV according to patient size, or  iterative reconstruction technique.     JEFF COYNE MD         SYSTEM ID:  [id number]   CT Cervical Spine w/o Contrast    Narrative    CT OF THE CERVICAL SPINE WITHOUT CONTRAST   12/20/2022 2:27 PM     COMPARISON: None.    HISTORY: Fall. Trauma. Pain.     TECHNIQUE: Axial images of the cervical spine were acquired without  intravenous contrast. Multiplanar reformations were created.        Impression    IMPRESSION: Moderate degenerative anterolisthesis of C4 upon C5 and C5  upon C6. Alignment of the cervical vertebrae is otherwise normal.  Vertebral body heights of the cervical spine are normal.  Craniocervical alignment is normal. There are no fractures of the  cervical spine.  Loss of disc space height and degenerative endplate  spurring at C6-C7. Mild to moderate facet arthropathy throughout the  cervical spine. No spinal canal stenosis. No prevertebral soft tissue  swelling.      Radiation dose for this scan was reduced using automated exposure  control, adjustment of the mA and/or kV according to patient size, or  iterative reconstruction technique.    JEFF COYNE MD         SYSTEM ID:  [id number]   XR Pelvis 1/2 Views    Narrative    EXAM: PELVIS ONE TO TWO VIEWS  DATE/TIME: 12/20/2022 2:41 PM     INDICATION: Pelvic pain after a fall.   COMPARISON: 9/20/2021.      Impression    IMPRESSION:  1.  Normal bilateral hip joint spacing and alignment.  2.  No fracture.  3.  Degenerative changes in the lower lumbar spine and bilateral  sacroiliac joints.  4.  Atherosclerotic calcification.       EULALIA HANKS MD         SYSTEM ID:  [id number]   XR Chest 1 View     Narrative    CHEST ONE VIEW   12/20/2022 2:40 PM     HISTORY: Fall.    COMPARISON: Chest x-ray 9/20/2021.      Impression    IMPRESSION: No acute cardiopulmonary disease. Stable mild  cardiomegaly. No visible pneumothorax or displaced fracture can be  seen. Bilateral shoulder DJD.    LUX ESTRADA MD         SYSTEM ID:  [id number]

## 2022-12-22 ENCOUNTER — CARE COORDINATION (OUTPATIENT)
Dept: FAMILY MEDICINE | Facility: CLINIC | Age: 87
End: 2022-12-22

## 2022-12-22 NOTE — PLAN OF CARE
Physical Therapy Discharge Summary    Reason for therapy discharge:    Discharged to home with home therapy.    Progress towards therapy goal(s). See goals on Care Plan in [facility] electronic health record for goal details.  Goals not met due to pt being DISCHARGE on same day of eval    Therapy recommendation(s):    Continued therapy is recommended.  Rationale/Recommendations: Pt is below baseline for functional mobility, ROM and strength. Pt would benefit from continued skilled therapy to address these deficits.

## 2022-12-22 NOTE — PROGRESS NOTES
Care Coordination Initial Assessment    The patient was admitted into [facility] on 12/20/22 for a fall. She was discharged on 12/21/22 with instructions to follow up with her PCP.    PCP: Tatianna Swain    Referral Source:  ED/IP List    Utilization:   Last PCP Appt.: 11/28/22    Health Maintenance Reviewed: Yes    Current Medical Health Concerns:   Please review patients current medical problem list.    Patient/Caregiver Understanding: Yes    Medication Management:   Method of Taking:  Family set up in med box  Patient has understanding of regimen and is adherent:  Yes  Medications Reviewed: Yes    Functional Status:   Independent with all ADL/IADL's  Transportation: Family provides transportation-sister     Current Behavioral Health Concerns:   No concerns    Patient/Caregiver Understanding:  yes    Psychosocial:  No concerns    Gaps:    NA    Resources Given:    Na    Plan:   I called the patients sister Aleisha who helps take care of the patient and was able to get her scheduled with Dr. Swain for her hospital follow up on 12/30/22 at 10:30 am where everything will be fully reviewed from her hospital stay.

## 2022-12-23 ENCOUNTER — TELEPHONE (OUTPATIENT)
Dept: FAMILY MEDICINE | Facility: CLINIC | Age: 87
End: 2022-12-23

## 2022-12-23 LAB
ATRIAL RATE - MUSE: 468 BPM
DIASTOLIC BLOOD PRESSURE - MUSE: NORMAL MMHG
INTERPRETATION ECG - MUSE: NORMAL
P AXIS - MUSE: NORMAL DEGREES
PR INTERVAL - MUSE: NORMAL MS
QRS DURATION - MUSE: 98 MS
QT - MUSE: 428 MS
QTC - MUSE: 490 MS
R AXIS - MUSE: 51 DEGREES
SYSTOLIC BLOOD PRESSURE - MUSE: NORMAL MMHG
T AXIS - MUSE: -73 DEGREES
VENTRICULAR RATE- MUSE: 79 BPM

## 2022-12-23 NOTE — TELEPHONE ENCOUNTER
Sandie nurse from [facility] called asking for a delay to start of care until 12/30/22. Pt's living facility has covid therefore it is shut down. Pt has appt scheduled with KEP for 12//30/22 so I gave the verbal ok.    Sandie # [phone]

## 2022-12-26 ENCOUNTER — MEDICAL CORRESPONDENCE (OUTPATIENT)
Dept: HEALTH INFORMATION MANAGEMENT | Facility: CLINIC | Age: 87
End: 2022-12-26

## 2022-12-30 ENCOUNTER — OFFICE VISIT (OUTPATIENT)
Dept: FAMILY MEDICINE | Facility: CLINIC | Age: 87
End: 2022-12-30

## 2022-12-30 VITALS
WEIGHT: 110 LBS | HEART RATE: 52 BPM | DIASTOLIC BLOOD PRESSURE: 68 MMHG | HEIGHT: 65 IN | BODY MASS INDEX: 18.33 KG/M2 | OXYGEN SATURATION: 98 % | SYSTOLIC BLOOD PRESSURE: 114 MMHG | TEMPERATURE: 98.5 F

## 2022-12-30 DIAGNOSIS — E87.6 HYPOKALEMIA: ICD-10-CM

## 2022-12-30 DIAGNOSIS — U07.1 INFECTION DUE TO 2019 NOVEL CORONAVIRUS: Primary | ICD-10-CM

## 2022-12-30 LAB
% GRANULOCYTES: 67.2 %
ALBUMIN SERPL-MCNC: 3.4 G/DL (ref 3.6–5.1)
ALBUMIN/GLOB SERPL: 1.4 {RATIO} (ref 1–2.5)
ALP SERPL-CCNC: 72 U/L (ref 33–130)
ALT 1742-6: 18 U/L (ref 0–32)
AST 1920-8: 15 U/L (ref 0–35)
BILIRUB SERPL-MCNC: 1.3 MG/DL (ref 0.2–1.2)
BUN SERPL-MCNC: 12 MG/DL (ref 7–25)
BUN/CREATININE RATIO: 13.3 (ref 6–22)
CALCIUM SERPL-MCNC: 9 MG/DL (ref 8.6–10.3)
CHLORIDE SERPLBLD-SCNC: 101.4 MMOL/L (ref 98–110)
CO2 SERPL-SCNC: 32.7 MMOL/L (ref 20–32)
CREAT SERPL-MCNC: 0.9 MG/DL (ref 0.6–1.3)
GLOBULIN, CALCULATED - QUEST: 2.4 (ref 1.9–3.7)
GLUCOSE SERPL-MCNC: 133 MG/DL (ref 60–99)
HCT VFR BLD AUTO: 40.6 % (ref 35–47)
HEMOGLOBIN: 13.5 G/DL (ref 11.7–15.7)
LYMPHOCYTES NFR BLD AUTO: 22.2 %
MCH RBC QN AUTO: 30.7 PG (ref 26–33)
MCHC RBC AUTO-ENTMCNC: 33.3 G/DL (ref 31–36)
MCV RBC AUTO: 92.2 FL (ref 78–100)
MONOCYTES NFR BLD AUTO: 10.6 %
PLATELET COUNT - QUEST: 223 10^9/L (ref 150–375)
POTASSIUM SERPL-SCNC: 4.69 MMOL/L (ref 3.5–5.3)
PROT SERPL-MCNC: 5.8 G/DL (ref 6.1–8.1)
RBC # BLD AUTO: 4.4 10*12/L (ref 3.8–5.2)
SODIUM SERPL-SCNC: 137.4 MMOL/L (ref 135–146)
WBC # BLD AUTO: 4.7 10*9/L (ref 4–11)

## 2022-12-30 PROCEDURE — 85025 COMPLETE CBC W/AUTO DIFF WBC: CPT | Performed by: FAMILY MEDICINE

## 2022-12-30 PROCEDURE — 36415 COLL VENOUS BLD VENIPUNCTURE: CPT | Performed by: FAMILY MEDICINE

## 2022-12-30 PROCEDURE — 99495 TRANSJ CARE MGMT MOD F2F 14D: CPT | Performed by: FAMILY MEDICINE

## 2022-12-30 PROCEDURE — 80053 COMPREHEN METABOLIC PANEL: CPT | Performed by: FAMILY MEDICINE

## 2022-12-30 NOTE — PROGRESS NOTES
Assessment & Plan   Problem List Items Addressed This Visit        Infectious/Inflammatory    Infection due to 2019 novel coronavirus - Primary   Other Visit Diagnoses     Hypokalemia        Relevant Orders    VENOUS COLLECTION (Completed)    HEMOGRAM PLATELET DIFF (BFP) (Completed)    Comprehensive Metobolic Panel (BFP) (Completed)         1. Infection due to 2019 novel coronavirus  She is feeling well, living at home alone and has family checking in on her. She has recovered appropriately.    2. Hypokalemia  Check today.  - VENOUS COLLECTION  - HEMOGRAM PLATELET DIFF (BFP)  - Comprehensive Metobolic Panel (BFP)    FUTURE APPOINTMENTS:       - Follow-up visit as needed.    No follow-ups on file.    Tatianna Swain MD  [facility]    Subjective   Patricia is a 89 year old, presenting for the following health issues:  Hospital F/U (Went to [facility] from 12/19-12/20 for falls, confusion, was covid pos, many people in her living facility had covid, was given IV fluids, no changes to her medications, feeling well today)      [facility]       Hospital Follow-up Visit:    Hospital/Nursing Home/IP Rehab Facility: [facility]  Date of Admission: 12/19/22  Date of Discharge: 12/20/22  Reason(s) for Admission: fall, confusion, covid pos     Was your hospitalization related to COVID-19? YES   How are you feeling today? Better  In the past 24 hours have you had shortness of breath when speaking, walking, or climbing stairs? I don't have breathing problems  Do you have a cough? I don't have a cough  When is the last time you had a fever greater than 100? No  Are you having any other symptoms? None   Do you have any other stressors you would like to discuss with your provider? No    Was the patient in the ICU or did the patient experience delirium during hospitalization?  No    Here with sister for hospital flujasmine, had covid and had fallen. She was in the hosp overnight.  Feeling fine. Lives  "alone--no problems. Eating and drinking ok. No homecare. Is at [facility].  Memory is about the same. Had recent dx a fib, no changes.    Problems taking medications regularly:  None  Medication changes since discharge: None  Problems adhering to non-medication therapy:  None    Summary of hospitalization:  [facility] discharge summary reviewed  Diagnostic Tests/Treatments reviewed.  Follow up needed: none  Other Healthcare Providers Involved in Patient s Care:         None  Update since discharge: improved.   Plan of care communicated with patient and family         Review of Systems   Constitutional, HEENT, cardiovascular, pulmonary, gi and gu systems are negative, except as otherwise noted.      Objective    /68 (BP Location: Right arm, Patient Position: Sitting, Cuff Size: Adult Large)   Pulse 52   Temp 98.5  F (36.9  C) (Temporal)   Ht 1.651 m (5' 5\")   Wt 49.9 kg (110 lb)   LMP  (LMP Unknown)   SpO2 98%   BMI 18.30 kg/m    Body mass index is 18.3 kg/m .  Physical Exam   GENERAL: healthy, alert and no distress  RESP: lungs clear to auscultation - no rales, rhonchi or wheezes  CV: regular rate and rhythm, normal S1 S2, no S3 or S4, no murmur, click or rub, no peripheral edema and peripheral pulses strong  MS: no gross musculoskeletal defects noted, no edema  NEURO: Normal strength and tone, mentation intact and speech normal  PSYCH: mentation appears normal, affect normal/bright    Results for orders placed or performed in visit on 12/30/22   HEMOGRAM PLATELET DIFF (BFP)     Status: None   Result Value Ref Range    WBC 4.7 4.0 - 11 10*9/L    RBC Count 4.40 3.8 - 5.2 10*12/L    Hemoglobin 13.5 11.7 - 15.7 g/dL    Hematocrit 40.6 35.0 - 47.0 %    MCV 92.2 78 - 100 fL    MCH 30.7 26 - 33 pg    MCHC 33.3 31 - 36 g/dL    Platelet Count 223 150 - 375 10^9/L    % Granulocytes 67.2 %    % Lymphocytes 22.2 %    % Monocytes 10.6 %   Comprehensive Metobolic Panel (BFP)     Status: Abnormal "   Result Value Ref Range    Carbon Dioxide 32.7 (A) 20 - 32 mmol/L    Creatinine 0.90 0.60 - 1.30 mg/dL    Glucose 133 (A) 60 - 99 mg/dL    Sodium 137.4 135 - 146 mmol/L    Potassium 4.69 3.5 - 5.3 mmol/L    Chloride 101.4 98 - 110 mmol/L    Protein Total 5.8 (A) 6.1 - 8.1 g/dL    Albumin 3.4 (A) 3.6 - 5.1 g/dL    Alkaline Phosphatase 72 33 - 130 U/L    ALT 18 0 - 32 U/L    AST 15 0 - 35 U/L    Bilirubin Total 1.3 (A) 0.2 - 1.2 mg/dL    Urea Nitrogen 12 7 - 25 mg/dL    Calcium 9.0 8.6 - 10.3 mg/dL    BUN/Creatinine Ratio 13.3 6 - 22    Globulin Calculated 2.4 1.9 - 3.7    A/G Ratio 1.4 1 - 2.5

## 2022-12-30 NOTE — TELEPHONE ENCOUNTER
Jose F PT from [facility] called asking for orders for PT. Seeking 2x a week for 1 week, then 1x for 3 weeks to address balance and strength.    She also has to mention that there is an interaction with citalopram and donepazil per there computer.     Please advise (jose f) # [phone]    Thanks, Jessica TRUJILLO

## 2022-12-30 NOTE — NURSING NOTE
Chief Complaint   Patient presents with     Hospital F/U     Went to [facility] from 12/19-12/20 for falls, confusion, was covid pos, many people in her living facility had covid, was given IV fluids, no changes to her medications, feeling well today

## 2023-01-03 NOTE — TELEPHONE ENCOUNTER
Audrey with [facility] called asking for verbal orders for Physical Therapy for balance and strength.     Audrey is asking for a call back today before 2:00pm - as she would like to start the Physical Therapy today.    Audrye can be reached at [phone]    Thank you

## 2023-01-13 ENCOUNTER — TELEPHONE (OUTPATIENT)
Dept: FAMILY MEDICINE | Facility: CLINIC | Age: 88
End: 2023-01-13

## 2023-01-13 NOTE — TELEPHONE ENCOUNTER
[facility] from 12/30/22 visit with Dr. Swain for Home Care services.  Faxed this to [phone].  [facility]ie [facility] phone # [phone].

## 2023-01-23 ENCOUNTER — TELEPHONE (OUTPATIENT)
Dept: FAMILY MEDICINE | Facility: CLINIC | Age: 88
End: 2023-01-23

## 2023-01-23 NOTE — TELEPHONE ENCOUNTER
Christian nurse from [facility] home care called asking for the verbal ok to go and see Pat today for SN to evaluate a skin tear on pt's shin. While doing PT she bumped her shin. Skin tear on left shin is now weeping slightly. KEP gave verbal ok. I notified christian, advised her if this looks infected or does not improve needs to come in here to have it looked at.    Christian # [phone]

## 2023-01-31 DIAGNOSIS — E78.49 OTHER HYPERLIPIDEMIA: ICD-10-CM

## 2023-01-31 DIAGNOSIS — I10 ESSENTIAL HYPERTENSION: ICD-10-CM

## 2023-02-01 NOTE — TELEPHONE ENCOUNTER
Ilda Nassar is requesting a refill of:    Pending Prescriptions:                       Disp   Refills    doxazosin (CARDURA) 2 MG tablet [Pharmacy*90 tab*0            Sig: TAKE 1 TABLET (2 MG) BY MOUTH AT BEDTIME    simvastatin (ZOCOR) 40 MG tablet [Pharmac*90 tab*0            Sig: TAKE 1 TABLET (40 MG) BY MOUTH AT BEDTIME    Please clsoe encounter if RX was sent. Thanks, Ayse    11/28/22  1. Other depression  Citalopram is controlling her symptoms, she is feeling much better. Continue medications and recheck in  6months.  - citalopram (CELEXA) 10 MG tablet; Take 1 tablet (10 mg) by mouth daily  Dispense: 90 tablet; Refill: 1     2. Essential hypertension  In range, continue current medications and recheck in 6 months. She should also discuss with cardiology at her apt in January.     3. Memory changes--followed by neurology   FUTURE APPOINTMENTS:       - Follow-up visit in 5-6 months.

## 2023-02-02 RX ORDER — SIMVASTATIN 40 MG
40 TABLET ORAL AT BEDTIME
Qty: 90 TABLET | Refills: 0 | COMMUNITY
Start: 2023-02-02

## 2023-02-02 RX ORDER — DOXAZOSIN 2 MG/1
2 TABLET ORAL AT BEDTIME
Qty: 90 TABLET | Refills: 0 | COMMUNITY
Start: 2023-02-02

## 2023-02-02 NOTE — TELEPHONE ENCOUNTER
Ilda Nassar is requesting a refill of:    Refused Prescriptions:                       Disp   Refills    doxazosin (CARDURA) 2 MG tablet [Pharmacy *90 tab*0        Sig: TAKE 1 TABLET (2 MG) BY MOUTH AT BEDTIME  Refused By: TALIA KRAFT  Reason for Refusal: Originating/Specialty Provider to approve    simvastatin (ZOCOR) 40 MG tablet [Pharmacy*90 tab*0        Sig: TAKE 1 TABLET (40 MG) BY MOUTH AT BEDTIME  Refused By: TALIA KRAFT  Reason for Refusal: Originating/Specialty Provider to approve    Refills need to go to Cardiology Dr. Reddy per note on 11/28/22 she was following up with cardiology in January

## 2023-03-20 ENCOUNTER — MEDICAL CORRESPONDENCE (OUTPATIENT)
Dept: HEALTH INFORMATION MANAGEMENT | Facility: CLINIC | Age: 88
End: 2023-03-20
Payer: MEDICARE

## 2023-04-16 ENCOUNTER — APPOINTMENT (OUTPATIENT)
Dept: CT IMAGING | Facility: CLINIC | Age: 88
DRG: 280 | End: 2023-04-16
Attending: INTERNAL MEDICINE
Payer: MEDICARE

## 2023-04-16 ENCOUNTER — HOSPITAL ENCOUNTER (INPATIENT)
Facility: CLINIC | Age: 88
LOS: 1 days | Discharge: HOME HEALTH | DRG: 280 | End: 2023-04-18
Attending: EMERGENCY MEDICINE | Admitting: INTERNAL MEDICINE
Payer: MEDICARE

## 2023-04-16 ENCOUNTER — APPOINTMENT (OUTPATIENT)
Dept: GENERAL RADIOLOGY | Facility: CLINIC | Age: 88
DRG: 280 | End: 2023-04-16
Attending: EMERGENCY MEDICINE
Payer: MEDICARE

## 2023-04-16 DIAGNOSIS — J18.9 COMMUNITY ACQUIRED PNEUMONIA, UNSPECIFIED LATERALITY: Primary | ICD-10-CM

## 2023-04-16 DIAGNOSIS — I50.9 ACUTE ON CHRONIC CONGESTIVE HEART FAILURE, UNSPECIFIED HEART FAILURE TYPE (H): ICD-10-CM

## 2023-04-16 DIAGNOSIS — I48.20 CHRONIC ATRIAL FIBRILLATION (H): ICD-10-CM

## 2023-04-16 LAB
ALBUMIN SERPL BCG-MCNC: 2.7 G/DL (ref 3.5–5.2)
ALBUMIN UR-MCNC: 20 MG/DL
ALP SERPL-CCNC: 73 U/L (ref 35–104)
ALT SERPL W P-5'-P-CCNC: 16 U/L (ref 10–35)
ANION GAP SERPL CALCULATED.3IONS-SCNC: 9 MMOL/L (ref 7–15)
APPEARANCE UR: CLEAR
AST SERPL W P-5'-P-CCNC: 23 U/L (ref 10–35)
BASOPHILS # BLD AUTO: 0 10E3/UL (ref 0–0.2)
BASOPHILS NFR BLD AUTO: 1 %
BILIRUB SERPL-MCNC: 1.8 MG/DL
BILIRUB UR QL STRIP: NEGATIVE
BUN SERPL-MCNC: 17.1 MG/DL (ref 8–23)
CALCIUM SERPL-MCNC: 8.2 MG/DL (ref 8.2–9.6)
CHLORIDE SERPL-SCNC: 99 MMOL/L (ref 98–107)
COLOR UR AUTO: YELLOW
CREAT SERPL-MCNC: 0.65 MG/DL (ref 0.51–0.95)
DEPRECATED HCO3 PLAS-SCNC: 27 MMOL/L (ref 22–29)
EOSINOPHIL # BLD AUTO: 0 10E3/UL (ref 0–0.7)
EOSINOPHIL NFR BLD AUTO: 0 %
ERYTHROCYTE [DISTWIDTH] IN BLOOD BY AUTOMATED COUNT: 13.3 % (ref 10–15)
FLUAV RNA SPEC QL NAA+PROBE: NEGATIVE
FLUBV RNA RESP QL NAA+PROBE: NEGATIVE
GFR SERPL CREATININE-BSD FRML MDRD: 83 ML/MIN/1.73M2
GLUCOSE SERPL-MCNC: 94 MG/DL (ref 70–99)
GLUCOSE UR STRIP-MCNC: NEGATIVE MG/DL
HCT VFR BLD AUTO: 40.8 % (ref 35–47)
HGB BLD-MCNC: 13.2 G/DL (ref 11.7–15.7)
HGB UR QL STRIP: NEGATIVE
IMM GRANULOCYTES # BLD: 0 10E3/UL
IMM GRANULOCYTES NFR BLD: 0 %
INR PPP: 2.44 (ref 0.85–1.15)
KETONES UR STRIP-MCNC: NEGATIVE MG/DL
LEUKOCYTE ESTERASE UR QL STRIP: ABNORMAL
LYMPHOCYTES # BLD AUTO: 0.7 10E3/UL (ref 0.8–5.3)
LYMPHOCYTES NFR BLD AUTO: 14 %
MAGNESIUM SERPL-MCNC: 1.6 MG/DL (ref 1.7–2.3)
MCH RBC QN AUTO: 28.8 PG (ref 26.5–33)
MCHC RBC AUTO-ENTMCNC: 32.4 G/DL (ref 31.5–36.5)
MCV RBC AUTO: 89 FL (ref 78–100)
MONOCYTES # BLD AUTO: 1 10E3/UL (ref 0–1.3)
MONOCYTES NFR BLD AUTO: 19 %
MUCOUS THREADS #/AREA URNS LPF: PRESENT /LPF
NEUTROPHILS # BLD AUTO: 3.3 10E3/UL (ref 1.6–8.3)
NEUTROPHILS NFR BLD AUTO: 66 %
NITRATE UR QL: NEGATIVE
NRBC # BLD AUTO: 0 10E3/UL
NRBC BLD AUTO-RTO: 0 /100
NT-PROBNP SERPL-MCNC: 2589 PG/ML (ref 0–1800)
PH UR STRIP: 7 [PH] (ref 5–7)
PHOSPHATE SERPL-MCNC: 2.4 MG/DL (ref 2.5–4.5)
PLATELET # BLD AUTO: 241 10E3/UL (ref 150–450)
POTASSIUM SERPL-SCNC: 3.4 MMOL/L (ref 3.4–5.3)
PROCALCITONIN SERPL IA-MCNC: 0.65 NG/ML
PROT SERPL-MCNC: 5.4 G/DL (ref 6.4–8.3)
RBC # BLD AUTO: 4.58 10E6/UL (ref 3.8–5.2)
RBC URINE: 1 /HPF
RENAL TUB EPI: <1 /HPF
RSV RNA SPEC NAA+PROBE: NEGATIVE
SARS-COV-2 RNA RESP QL NAA+PROBE: NEGATIVE
SODIUM SERPL-SCNC: 135 MMOL/L (ref 136–145)
SP GR UR STRIP: 1.02 (ref 1–1.03)
SQUAMOUS EPITHELIAL: 1 /HPF
TROPONIN T SERPL HS-MCNC: 17 NG/L
TROPONIN T SERPL HS-MCNC: 19 NG/L
UROBILINOGEN UR STRIP-MCNC: 6 MG/DL
WBC # BLD AUTO: 5 10E3/UL (ref 4–11)
WBC URINE: 10 /HPF

## 2023-04-16 PROCEDURE — C9803 HOPD COVID-19 SPEC COLLECT: HCPCS

## 2023-04-16 PROCEDURE — 250N000011 HC RX IP 250 OP 636: Performed by: INTERNAL MEDICINE

## 2023-04-16 PROCEDURE — 36415 COLL VENOUS BLD VENIPUNCTURE: CPT | Performed by: EMERGENCY MEDICINE

## 2023-04-16 PROCEDURE — G0378 HOSPITAL OBSERVATION PER HR: HCPCS

## 2023-04-16 PROCEDURE — 250N000013 HC RX MED GY IP 250 OP 250 PS 637: Performed by: INTERNAL MEDICINE

## 2023-04-16 PROCEDURE — 93005 ELECTROCARDIOGRAM TRACING: CPT | Mod: 76

## 2023-04-16 PROCEDURE — 80053 COMPREHEN METABOLIC PANEL: CPT | Performed by: EMERGENCY MEDICINE

## 2023-04-16 PROCEDURE — 85610 PROTHROMBIN TIME: CPT | Performed by: EMERGENCY MEDICINE

## 2023-04-16 PROCEDURE — 81001 URINALYSIS AUTO W/SCOPE: CPT | Performed by: EMERGENCY MEDICINE

## 2023-04-16 PROCEDURE — 84100 ASSAY OF PHOSPHORUS: CPT | Performed by: INTERNAL MEDICINE

## 2023-04-16 PROCEDURE — 84484 ASSAY OF TROPONIN QUANT: CPT | Performed by: EMERGENCY MEDICINE

## 2023-04-16 PROCEDURE — 85014 HEMATOCRIT: CPT | Performed by: EMERGENCY MEDICINE

## 2023-04-16 PROCEDURE — 83735 ASSAY OF MAGNESIUM: CPT | Performed by: EMERGENCY MEDICINE

## 2023-04-16 PROCEDURE — 93005 ELECTROCARDIOGRAM TRACING: CPT

## 2023-04-16 PROCEDURE — 96365 THER/PROPH/DIAG IV INF INIT: CPT

## 2023-04-16 PROCEDURE — G1010 CDSM STANSON: HCPCS

## 2023-04-16 PROCEDURE — 250N000011 HC RX IP 250 OP 636: Performed by: EMERGENCY MEDICINE

## 2023-04-16 PROCEDURE — 71046 X-RAY EXAM CHEST 2 VIEWS: CPT

## 2023-04-16 PROCEDURE — 83880 ASSAY OF NATRIURETIC PEPTIDE: CPT | Performed by: EMERGENCY MEDICINE

## 2023-04-16 PROCEDURE — 99223 1ST HOSP IP/OBS HIGH 75: CPT | Mod: AI | Performed by: INTERNAL MEDICINE

## 2023-04-16 PROCEDURE — 36415 COLL VENOUS BLD VENIPUNCTURE: CPT | Performed by: INTERNAL MEDICINE

## 2023-04-16 PROCEDURE — 87637 SARSCOV2&INF A&B&RSV AMP PRB: CPT | Performed by: EMERGENCY MEDICINE

## 2023-04-16 PROCEDURE — 84132 ASSAY OF SERUM POTASSIUM: CPT | Performed by: INTERNAL MEDICINE

## 2023-04-16 PROCEDURE — 84145 PROCALCITONIN (PCT): CPT | Performed by: INTERNAL MEDICINE

## 2023-04-16 PROCEDURE — 99285 EMERGENCY DEPT VISIT HI MDM: CPT | Mod: 25,CS

## 2023-04-16 PROCEDURE — 96375 TX/PRO/DX INJ NEW DRUG ADDON: CPT

## 2023-04-16 PROCEDURE — 87086 URINE CULTURE/COLONY COUNT: CPT | Performed by: EMERGENCY MEDICINE

## 2023-04-16 RX ORDER — CEFTRIAXONE 2 G/1
2 INJECTION, POWDER, FOR SOLUTION INTRAMUSCULAR; INTRAVENOUS EVERY 24 HOURS
Status: DISCONTINUED | OUTPATIENT
Start: 2023-04-16 | End: 2023-04-18 | Stop reason: HOSPADM

## 2023-04-16 RX ORDER — ACETAMINOPHEN 650 MG/1
650 SUPPOSITORY RECTAL EVERY 6 HOURS PRN
Status: DISCONTINUED | OUTPATIENT
Start: 2023-04-16 | End: 2023-04-18 | Stop reason: HOSPADM

## 2023-04-16 RX ORDER — CITALOPRAM HYDROBROMIDE 10 MG/1
10 TABLET ORAL DAILY
Status: DISCONTINUED | OUTPATIENT
Start: 2023-04-17 | End: 2023-04-18 | Stop reason: HOSPADM

## 2023-04-16 RX ORDER — FUROSEMIDE 10 MG/ML
20 INJECTION INTRAMUSCULAR; INTRAVENOUS
Status: DISCONTINUED | OUTPATIENT
Start: 2023-04-17 | End: 2023-04-18 | Stop reason: HOSPADM

## 2023-04-16 RX ORDER — ACETAMINOPHEN 325 MG/1
650 TABLET ORAL EVERY 6 HOURS PRN
Status: DISCONTINUED | OUTPATIENT
Start: 2023-04-16 | End: 2023-04-18 | Stop reason: HOSPADM

## 2023-04-16 RX ORDER — AZITHROMYCIN 250 MG/1
500 TABLET, FILM COATED ORAL ONCE
Status: COMPLETED | OUTPATIENT
Start: 2023-04-16 | End: 2023-04-16

## 2023-04-16 RX ORDER — AZITHROMYCIN 250 MG/1
250 TABLET, FILM COATED ORAL DAILY
Status: DISCONTINUED | OUTPATIENT
Start: 2023-04-17 | End: 2023-04-18 | Stop reason: HOSPADM

## 2023-04-16 RX ORDER — ONDANSETRON 2 MG/ML
4 INJECTION INTRAMUSCULAR; INTRAVENOUS EVERY 6 HOURS PRN
Status: DISCONTINUED | OUTPATIENT
Start: 2023-04-16 | End: 2023-04-18 | Stop reason: HOSPADM

## 2023-04-16 RX ORDER — DONEPEZIL HYDROCHLORIDE 10 MG/1
10 TABLET, FILM COATED ORAL AT BEDTIME
Status: DISCONTINUED | OUTPATIENT
Start: 2023-04-16 | End: 2023-04-18 | Stop reason: HOSPADM

## 2023-04-16 RX ORDER — FUROSEMIDE 10 MG/ML
40 INJECTION INTRAMUSCULAR; INTRAVENOUS ONCE
Status: COMPLETED | OUTPATIENT
Start: 2023-04-16 | End: 2023-04-16

## 2023-04-16 RX ORDER — MAGNESIUM SULFATE HEPTAHYDRATE 40 MG/ML
2 INJECTION, SOLUTION INTRAVENOUS ONCE
Status: COMPLETED | OUTPATIENT
Start: 2023-04-16 | End: 2023-04-16

## 2023-04-16 RX ORDER — SIMVASTATIN 40 MG
40 TABLET ORAL AT BEDTIME
Status: DISCONTINUED | OUTPATIENT
Start: 2023-04-16 | End: 2023-04-18 | Stop reason: HOSPADM

## 2023-04-16 RX ORDER — AZITHROMYCIN 250 MG/1
250 TABLET, FILM COATED ORAL DAILY
Status: DISCONTINUED | OUTPATIENT
Start: 2023-04-16 | End: 2023-04-16

## 2023-04-16 RX ORDER — BISACODYL 10 MG
10 SUPPOSITORY, RECTAL RECTAL DAILY PRN
Status: DISCONTINUED | OUTPATIENT
Start: 2023-04-16 | End: 2023-04-18 | Stop reason: HOSPADM

## 2023-04-16 RX ORDER — AMOXICILLIN 250 MG
2 CAPSULE ORAL 2 TIMES DAILY PRN
Status: DISCONTINUED | OUTPATIENT
Start: 2023-04-16 | End: 2023-04-18 | Stop reason: HOSPADM

## 2023-04-16 RX ORDER — ONDANSETRON 4 MG/1
4 TABLET, ORALLY DISINTEGRATING ORAL EVERY 6 HOURS PRN
Status: DISCONTINUED | OUTPATIENT
Start: 2023-04-16 | End: 2023-04-18 | Stop reason: HOSPADM

## 2023-04-16 RX ORDER — POTASSIUM CHLORIDE 1500 MG/1
40 TABLET, EXTENDED RELEASE ORAL ONCE
Status: COMPLETED | OUTPATIENT
Start: 2023-04-16 | End: 2023-04-16

## 2023-04-16 RX ORDER — AMOXICILLIN 250 MG
1 CAPSULE ORAL 2 TIMES DAILY PRN
Status: DISCONTINUED | OUTPATIENT
Start: 2023-04-16 | End: 2023-04-18 | Stop reason: HOSPADM

## 2023-04-16 RX ADMIN — DONEPEZIL HYDROCHLORIDE 10 MG: 10 TABLET ORAL at 21:17

## 2023-04-16 RX ADMIN — SIMVASTATIN 40 MG: 40 TABLET, FILM COATED ORAL at 21:17

## 2023-04-16 RX ADMIN — MAGNESIUM SULFATE HEPTAHYDRATE 2 G: 2 INJECTION, SOLUTION INTRAVENOUS at 10:04

## 2023-04-16 RX ADMIN — APIXABAN 2.5 MG: 2.5 TABLET, FILM COATED ORAL at 19:48

## 2023-04-16 RX ADMIN — FUROSEMIDE 40 MG: 10 INJECTION, SOLUTION INTRAMUSCULAR; INTRAVENOUS at 13:42

## 2023-04-16 RX ADMIN — POTASSIUM & SODIUM PHOSPHATES POWDER PACK 280-160-250 MG 1 PACKET: 280-160-250 PACK at 22:29

## 2023-04-16 RX ADMIN — POTASSIUM CHLORIDE 40 MEQ: 1500 TABLET, EXTENDED RELEASE ORAL at 19:48

## 2023-04-16 RX ADMIN — POTASSIUM & SODIUM PHOSPHATES POWDER PACK 280-160-250 MG 1 PACKET: 280-160-250 PACK at 19:48

## 2023-04-16 RX ADMIN — AZITHROMYCIN MONOHYDRATE 500 MG: 250 TABLET ORAL at 16:51

## 2023-04-16 RX ADMIN — CEFTRIAXONE 2 G: 2 INJECTION, POWDER, FOR SOLUTION INTRAMUSCULAR; INTRAVENOUS at 16:48

## 2023-04-16 ASSESSMENT — ACTIVITIES OF DAILY LIVING (ADL)
ADLS_ACUITY_SCORE: 22
ADLS_ACUITY_SCORE: 37
ADLS_ACUITY_SCORE: 35
ADLS_ACUITY_SCORE: 22
ADLS_ACUITY_SCORE: 22

## 2023-04-16 NOTE — PROVIDER NOTIFICATION
RN - Notified admitting hospitalist regarding pt elevated procalcitonin level. Also informing that it was confirmed with [facility] staff that the pt did not fall at her living facility. Pt daughter wishing to not proceed with ordered head CT unless it was necessary. Awaiting follow up.

## 2023-04-16 NOTE — ED NOTES
[facility]  Expected date:   Expected time:   Means of arrival:   Comments:  KAYLEIGH 1. 89. FGenna Cummings.

## 2023-04-16 NOTE — H&P
[facility]    History and Physical - Hospitalist Service       Date of Admission:  4/16/2023    Assessment & Plan      Ilda Nassar is a 89 year old female admitted on 4/16/2023. She has a past medical history significant for atrial fibrillation, hypertension, hyperlipidemia, depression, and dementia.  She presented to emergency room with weakness and shortness of breath.  Found to have acute CHF exacerbation.    Acute exacerbation of chronic heart failure with preserved ejection fraction.  -Had a dose of IV furosemide in the emergency room.  -Plan to continue furosemide 20 mg IV twice a day.  -Weigh daily.  -Strict intake and output monitoring.  -Monitor on telemetry.  -Check echocardiogram tomorrow.  -Also check a procalcitonin level.  -If procalcitonin level elevated, consider starting antibiotics.    Hypertension.  Hyperlipidemia.  Atrial fibrillation.  -Plan to restart apixaban once verified by pharmacy.  -Restart simvastatin.  -Furosemide as above.    Dementia.  Depression.  -Likely restart home medications once verified by pharmacy.    Hypomagnesemia.  Hypokalemia.  Hyponatremia.  -Furosemide as above.  -Magnesium replacement protocol.  -Potassium replacement protocol.  -Check phosphorus level.    Mild troponin elevation.  -Suspect type II myocardial infarction due to demand ischemia.  -Repeat troponin level slightly lower.  -Restart apixaban once verified by pharmacy.  -Monitor on telemetry.  -Check echocardiogram tomorrow.    Weakness.  Deconditioning.  Recent falls.  -Not complaining of any pain.  -Check CT of the head.  -Physical therapy consult.       Diet: Combination Diet No Caffeine Diet    DVT Prophylaxis: DOAC  Treviño Catheter: Not present  Lines: None     Cardiac Monitoring: None  Code Status: Full Code      Clinically Significant Risk Factors Present on Admission            # Hypomagnesemia: Lowest Mg = 1.6 mg/dL in last 2 days, will replace as needed   # Hypoalbuminemia:  Lowest albumin = 2.7 g/dL at 4/16/2023  8:21 AM, will monitor as appropriate  # Drug Induced Coagulation Defect: home medication list includes an anticoagulant medication    # Hypertension: home medication list includes antihypertensive(s)              Disposition Plan      Expected Discharge Date: 04/17/2023                  Cem Santos DO  Hospitalist Service  [facility]  Securely message with [employer] (more info)  Text page via [employer] Paging/Directory     ______________________________________________________________________    Chief Complaint   Weakness.  Shortness of breath.    History is obtained from the patient    History of Present Illness   Ilda Nassar is a 89 year old female who has a past medical history significant for atrial fibrillation, hypertension, hyperlipidemia, depression, and dementia.  She has been feeling weak for the past few days.  Has also been short of breath.  Has noticed swelling in her lower extremities.  Has had a couple of falls over the past few days.  Cannot give me details of the falls.  Does not think that she has hit her head.  Not currently having any pain.  She does note that swelling in her legs seems to be getting worse.  Shortness of breath also seems worse.  She does not remember having a cough.  Does not remember fevers.  Denies any chest pain.  Does not think that her legs have been swollen like this previously.  However, she is a poor medical historian.  No other acute complaints.      Past Medical History    Past Medical History:   Diagnosis Date     Atrial fibrillation (H)      Atrial flutter (H)      Hyperlipidemia      Hypertension        Past Surgical History   No past surgical history on file.    Prior to Admission Medications   Prior to Admission Medications   Prescriptions Last Dose Informant Patient Reported? Taking?   apixaban ANTICOAGULANT (ELIQUIS ANTICOAGULANT) 2.5 MG tablet   No No   Sig: Take 1 tablet (2.5 mg) by mouth 2  times daily   citalopram (CELEXA) 10 MG tablet   No No   Sig: Take 1 tablet (10 mg) by mouth daily   donepezil (ARICEPT) 10 MG tablet   Yes No   Sig: Take 1 tablet (10 mg) by mouth At Bedtime   doxazosin (CARDURA) 2 MG tablet   No No   Sig: TAKE 1 TABLET (2 MG) BY MOUTH AT BEDTIME   simvastatin (ZOCOR) 40 MG tablet   No No   Sig: TAKE 1 TABLET (40 MG) BY MOUTH AT BEDTIME      Facility-Administered Medications: None        Review of Systems    The 10 point Review of Systems is negative other than noted in the HPI     Social History   I have reviewed this patient's social history and updated it with pertinent information if needed.  Social History     Tobacco Use     Smoking status: Former     Smokeless tobacco: Never   Substance Use Topics     Alcohol use: Yes       Allergies   No Known Allergies     Physical Exam   Vital Signs: Temp: 98.3  F (36.8  C) Temp src: Oral BP: 124/70 Pulse: 88   Resp: 16 SpO2: 95 % O2 Device: None (Room air)    Weight: 0 lbs 0 oz    Gen:  NAD, A&Ox2 to person and place.  Not oriented to time.  Eyes:  PERRL, sclera anicteric.  OP:  MMM, no lesions.  Neck:  Supple.  CV:  Irregular, no loud murmurs.  Lung: Crackles in lower fields b/l, normal effort.  Ab:  +BS, soft.  Skin:  Warm, dry to touch.  No rash.  Ext: 2+ pitting edema LE b/l.      Medical Decision Making       75 MINUTES SPENT BY ME on the date of service doing chart review, history, exam, documentation & further activities per the note.      Data     I have personally reviewed the following data over the past 24 hrs:    5.0  \   13.2   / 241     135 (L) 99 17.1 /  94   3.4 27 0.65 \       ALT: 16 AST: 23 AP: 73 TBILI: 1.8 (H)   ALB: 2.7 (L) TOT PROTEIN: 5.4 (L) LIPASE: N/A       Trop: 17 (H) BNP: 2,589 (H)       INR:  2.44 (H) PTT:  N/A   D-dimer:  N/A Fibrinogen:  N/A       Imaging results reviewed over the past 24 hrs:   Recent Results (from the past 24 hour(s))   XR Chest 2 Views    Narrative    EXAM: XR CHEST 2 VIEWS  LOCATION:  [facility]  DATE/TIME: 04/16/2023, 10:48 AM CDT    INDICATION: Generalized weakness. Pulmonary rales.  COMPARISON: 12/20/2022.      Impression    IMPRESSION: Small bilateral pleural effusions, with mild associated infiltrate, suspicious for pneumonia. These findings are new since the previous exam. Stable cardiac enlargement. Pulmonary vascularity is within normal limits. Aortic calcification. No   pneumothorax.

## 2023-04-16 NOTE — ED PROVIDER NOTES
History     Chief Complaint:  Generalized Weakness       The history is provided by the patient and a relative. The history is limited by the condition of the patient (Poor historian).      Ilda Nassar is a 89 year old female, anticoagulated on Eliquis, with a history of atrial fibrillation, hypertension, and hyperlipidemia who presents to the ED for evaluation of generalized weakness. Her sister has noticed that she has been becoming progressively weaker throughout the night and felt that she was no longer able to care for herself. She currently lives independently, but is also seeking to increase services for care at her facility. She denies recent falls. She further denies cough, fever, or vomiting, although her history is limited by underlying memory problems/dementia.        Independent Historian: the niece   After speaking with the niece, I was informed that the patient was found on the floor twice since last night. She continued to have increasing weakness, so EMS was called to bring her to the ED. The niece also notes that the swelling in her legs are new.     Review of External Notes: None    ROS:  See HPI    Allergies:  No Known Allergies     Physical Exam     Patient Vitals for the past 24 hrs:   BP Temp Temp src Pulse Resp SpO2 Weight   04/16/23 1802 -- -- -- -- -- -- 53 kg (116 lb 14.4 oz)   04/16/23 1739 (!) 150/67 97.8  F (36.6  C) Oral 86 18 94 % --   04/16/23 1516 (!) 125/93 97.9  F (36.6  C) Oral 88 16 -- --   04/16/23 0806 124/70 98.3  F (36.8  C) Oral 88 16 95 % --        Physical Exam  General: Nontoxic. Resting comfortably  Head:  Scalp, face, and head appear normal  Eyes:  Pupils are equal, round, reactive to light    Conjunctivae non-injected and sclerae white  ENT:    The external nose is normal    Pinnae are normal  Neck:  Normal range of motion    There is no rigidity noted    Trachea is in the midline  CV:  Regular rate and irregular rhythm     Normal S1/S2, no S3/S4    3/6 systolic  murmur. No rub. Radial pulses 2+ bilaterally.  Resp:  Lungs are equal bilaterally  There is no tachypnea    No increased work of breathing    Rales at the bilateral bases. No wheezing, or rhonchi  GI:  Abdomen is soft, no rigidity or guarding    No distension, or mass    No tenderness or rebound tenderness   MS:  Normal muscular tone    Symmetric motor strength    3+ bilateral lower extremity pitting edema.  Skin:  No rash or acute skin lesions noted  Neuro: Awake and alert, oriented to person and place only. No facial droop. CN II-XII intact. Strength 5/5 and intact throughout.  No pronator drift or leg drift.  Finger-nose-finger is normal.  Sensation intact to light touch throughout.  Speech is normal and fluent  Moves all extremities spontaneously  Psych:  No agitation.      Emergency Department Course   EC  ECG taken at 0858, ECG read at 0909  Atrial fibrillation with premature ventricular or aberrantly conducted complexes   RSR' or QR pattern in V1 suggests right ventricular conduction relays   T wave abnormality, consider anterior ischemia   Abnormal ECG   Rate 87 bpm. UT interval * ms. QRS duration 94 ms. QT/QTc 374/450 ms. P-R-T axes * 59 -12.     EC  ECG taken at 0905, ECG read at 0908  Atrial fibrillation with PVC's   T wave abnormality, consider anterior ischemia   Abnormal ECG   Rate 94 bpm. UT interval * ms. QRS duration 90 ms. QT/QTc 372/465 ms. P-R-T axes * 35 -10.     Imaging:  XR Chest 2 Views  Preliminary Result  IMPRESSION: Small bilateral pleural effusions, with mild associated infiltrate, suspicious for pneumonia. These findings are new since the previous exam. Stable cardiac enlargement. Pulmonary vascularity is within normal limits. Aortic calcification. No   pneumothorax.     Report per radiology    Laboratory:  Labs Ordered and Resulted from Time of ED Arrival to Time of ED Departure   INR - Abnormal       Result Value    INR 2.44 (*)    COMPREHENSIVE METABOLIC PANEL - Abnormal     Sodium 135 (*)     Potassium 3.4      Chloride 99      Carbon Dioxide (CO2) 27      Anion Gap 9      Urea Nitrogen 17.1      Creatinine 0.65      Calcium 8.2      Glucose 94      Alkaline Phosphatase 73      AST 23      ALT 16      Protein Total 5.4 (*)     Albumin 2.7 (*)     Bilirubin Total 1.8 (*)     GFR Estimate 83     MAGNESIUM - Abnormal    Magnesium 1.6 (*)    ROUTINE UA WITH MICROSCOPIC REFLEX TO CULTURE - Abnormal    Color Urine Yellow      Appearance Urine Clear      Glucose Urine Negative      Bilirubin Urine Negative      Ketones Urine Negative      Specific Gravity Urine 1.023      Blood Urine Negative      pH Urine 7.0      Protein Albumin Urine 20 (*)     Urobilinogen Urine 6.0 (*)     Nitrite Urine Negative      Leukocyte Esterase Urine Moderate (*)     Mucus Urine Present (*)     RBC Urine 1      WBC Urine 10 (*)     Squamous Epithelials Urine 1      Renal Tubular Epithelials Urine <1     CBC WITH PLATELETS AND DIFFERENTIAL - Abnormal    WBC Count 5.0      RBC Count 4.58      Hemoglobin 13.2      Hematocrit 40.8      MCV 89      MCH 28.8      MCHC 32.4      RDW 13.3      Platelet Count 241      % Neutrophils 66      % Lymphocytes 14      % Monocytes 19      % Eosinophils 0      % Basophils 1      % Immature Granulocytes 0      NRBCs per 100 WBC 0      Absolute Neutrophils 3.3      Absolute Lymphocytes 0.7 (*)     Absolute Monocytes 1.0      Absolute Eosinophils 0.0      Absolute Basophils 0.0      Absolute Immature Granulocytes 0.0      Absolute NRBCs 0.0     TROPONIN T, HIGH SENSITIVITY - Abnormal    Troponin T, High Sensitivity 19 (*)    NT PROBNP INPATIENT - Abnormal    N terminal Pro BNP Inpatient 2,589 (*)    TROPONIN T, HIGH SENSITIVITY - Abnormal    Troponin T, High Sensitivity 17 (*)    INFLUENZA A/B, RSV, & SARS-COV2 PCR - Normal    Influenza A PCR Negative      Influenza B PCR Negative      RSV PCR Negative      SARS CoV2 PCR Negative     URINE CULTURE      Emergency Department Course &  Assessments:    Interventions:  Medications   magnesium sulfate 2 g in 50 mL sterile water intermittent infusion (0 g Intravenous Stopped 4/16/23 1107)   furosemide (LASIX) injection 40 mg (40 mg Intravenous $Given 4/16/23 1342)     Assessments, Independent Interpretation, Consult/Discussion of ManagementTests:  ED Course as of 04/16/23 1408   Sun Apr 16, 2023   0831 I obtained history and examined the patient as noted above.   1045 I performed an independent interpretation of the patient's chest radiographs.  There is evidence of pulmonary interstitial edema and bilateral small pleural effusions.  No pneumothorax, dense consolidation or pulmonary infiltrates.   1247 I rechecked the patient and explained findings.   1347 I spoke with Dr. Santos from Hospitalist Services, who accepts the patient for admission.     Social Determinants of Health affecting care:  None    Disposition:  The patient was admitted to the hospital under the care of Dr. Santos.     Impression & Plan      Medical Decision Making:  Ilda Nassar is a 89 year old female who presents to the ED for evaluation of generalized weakness, multiple falls at home, and new bilateral lower extremity swelling. On my evaluation the patient is well appearing, hemodynamically stable and afebrile. No focal neurologic deficits. No evidence of trauma on exam. ED evaluation with chronic atrial fibrillation, rate well controlled. Patient with bilateral lower extremity edema and pleural effusions and rales on ascultation consistent with mild CHF exacerbation. No reports of any chest pain. No acute ischemic changes on ECG and troponin is flat without significant elevation. COVID/Influenza/RSV testing negative. UA with 10 WBCs but not completely convincing for UTI. Culture pending. No fever or other acute infectious symptoms or findings. CXR with small bilateral pleural effusions and pulmonary infiltrates vs atelectasis. I favor atelectasis given normal WBC, no  fever, no cough or other respiratory symptoms to indicate pneumonia. Patient treated with lasix. Given her generalized weakness with multiple falls and evidence of CHF patient will be admitted for ongoing monitoring, evaluation and treatment. Case discussed with hospitalist and patient admitted in stable condition.      Diagnosis:    ICD-10-CM    1. Acute on chronic congestive heart failure, unspecified heart failure type (H)  I50.9       2. Chronic atrial fibrillation (H)  I48.20            4/16/2023   Morgan Jimenez MD       Historical Data:  ______________________________________________________________________  Medications:    Apixaban   Citalopram   Donepezil   Doxazosin   Simvastatin     Past Medical History:   Past Surgical History:     Atrial fibrillation (H)   Atrial flutter (H)   Hyperlipidemia   Hypertension   Depression    The patient denies pertinent past surgical history.             Family History:    The patient denies pertinent family history.     Social History:  Hx of smoking (former smoker) and current alcohol use; denies smokeless tobacco use  The patient presents to the ED alone via EMS     PCP: Tatianna Swain Ryan Clay, MD  04/16/23 2003

## 2023-04-16 NOTE — PLAN OF CARE
ROOM # 213-2    Living Situation (if not independent, order SW consult): Independent Living  Facility name:[facility]  : [facility]    Activity level at baseline: Indep  Activity level on admit: Asssist x1 /WG    Who will be transporting you at discharge: [facility]    Patient registered to observation; given Patient Bill of Rights; given the opportunity to ask questions about observation status and their plan of care.  Patient has been oriented to the observation room, bathroom and call light is in place.    Discussed discharge goals and expectations with patient/family.

## 2023-04-16 NOTE — ED NOTES
[facility]  ED Nurse Handoff Report    Ilda Nassar is a 89 year old female   ED Chief complaint: Generalized Weakness  . ED Diagnosis:   Final diagnoses:   Acute on chronic congestive heart failure, unspecified heart failure type (H)   Chronic atrial fibrillation (H)     Allergies: No Known Allergies    Code Status: Full Code  Activity level - Baseline/Home:  Independent. Activity Level - Current:   Stand by Assist. Lift room needed: No. Bariatric: No   Needed: No   Isolation: No. Infection: Not Applicable.     Vital Signs:   Vitals:    04/16/23 0806   BP: 124/70   Pulse: 88   Resp: 16   Temp: 98.3  F (36.8  C)   TempSrc: Oral   SpO2: 95%       Cardiac Rhythm:  ,      Pain level:    Patient confused: No. Patient Falls Risk: Yes.   Elimination Status: Has voided   Patient Report - Initial Complaint: Generalized weakness. Focused Assessment: Ilda Nassar is a 89 year old female, anticoagulated on Eliquis, with a history of atrial fibrillation, hypertension, and hyperlipidemia who presents to the ED for evaluation of generalized weakness. Her sister has noticed that she has been becoming progressively weaker throughout the night and felt that she was no longer able to care for herself. She currently lives independently, but is also seeking services for care. She denies recent falls. She further denies cough, fever, or vomiting.      After speaking with the niece, I was informed that the patient was found on the floor twice since last night. She continued to have increasing weakness, so EMS was called to bring her to the ED. The niece also notes that the swelling in her legs are new.       Tests Performed:   Labs Ordered and Resulted from Time of ED Arrival to Time of ED Departure   INR - Abnormal       Result Value    INR 2.44 (*)    COMPREHENSIVE METABOLIC PANEL - Abnormal    Sodium 135 (*)     Potassium 3.4      Chloride 99      Carbon Dioxide (CO2) 27      Anion Gap 9      Urea  Nitrogen 17.1      Creatinine 0.65      Calcium 8.2      Glucose 94      Alkaline Phosphatase 73      AST 23      ALT 16      Protein Total 5.4 (*)     Albumin 2.7 (*)     Bilirubin Total 1.8 (*)     GFR Estimate 83     MAGNESIUM - Abnormal    Magnesium 1.6 (*)    ROUTINE UA WITH MICROSCOPIC REFLEX TO CULTURE - Abnormal    Color Urine Yellow      Appearance Urine Clear      Glucose Urine Negative      Bilirubin Urine Negative      Ketones Urine Negative      Specific Gravity Urine 1.023      Blood Urine Negative      pH Urine 7.0      Protein Albumin Urine 20 (*)     Urobilinogen Urine 6.0 (*)     Nitrite Urine Negative      Leukocyte Esterase Urine Moderate (*)     Mucus Urine Present (*)     RBC Urine 1      WBC Urine 10 (*)     Squamous Epithelials Urine 1      Renal Tubular Epithelials Urine <1     CBC WITH PLATELETS AND DIFFERENTIAL - Abnormal    WBC Count 5.0      RBC Count 4.58      Hemoglobin 13.2      Hematocrit 40.8      MCV 89      MCH 28.8      MCHC 32.4      RDW 13.3      Platelet Count 241      % Neutrophils 66      % Lymphocytes 14      % Monocytes 19      % Eosinophils 0      % Basophils 1      % Immature Granulocytes 0      NRBCs per 100 WBC 0      Absolute Neutrophils 3.3      Absolute Lymphocytes 0.7 (*)     Absolute Monocytes 1.0      Absolute Eosinophils 0.0      Absolute Basophils 0.0      Absolute Immature Granulocytes 0.0      Absolute NRBCs 0.0     TROPONIN T, HIGH SENSITIVITY - Abnormal    Troponin T, High Sensitivity 19 (*)    NT PROBNP INPATIENT - Abnormal    N terminal Pro BNP Inpatient 2,589 (*)    TROPONIN T, HIGH SENSITIVITY - Abnormal    Troponin T, High Sensitivity 17 (*)    INFLUENZA A/B, RSV, & SARS-COV2 PCR - Normal    Influenza A PCR Negative      Influenza B PCR Negative      RSV PCR Negative      SARS CoV2 PCR Negative     URINE CULTURE     XR Chest 2 Views   Final Result   IMPRESSION: Small bilateral pleural effusions, with mild associated infiltrate, suspicious for  pneumonia. These findings are new since the previous exam. Stable cardiac enlargement. Pulmonary vascularity is within normal limits. Aortic calcification. No    pneumothorax.           . Abnormal Results: See above.   Treatments provided: See MAR  Family Comments: N/A  OBS brochure/video discussed/provided to patient:  No  ED Medications:   Medications   furosemide (LASIX) injection 40 mg (has no administration in time range)   magnesium sulfate 2 g in 50 mL sterile water intermittent infusion (0 g Intravenous Stopped 4/16/23 1107)     Drips infusing:  No  For the majority of the shift, the patient's behavior Green. Interventions performed were N/A.    Sepsis treatment initiated: No     Patient tested for COVID 19 prior to admission: YES    ED Nurse Name/Phone Number: Trupti Fuller RN,   1:25 PM  RECEIVING UNIT ED HANDOFF REVIEW    Above ED Nurse Handoff Report was reviewed: Yes  Reviewed by: Martine Mitchell RN on April 16, 2023 at 4:45 PM

## 2023-04-16 NOTE — PHARMACY-ADMISSION MEDICATION HISTORY
Pharmacist Admission Medication History    Admission medication history is complete. The information provided in this note is only as accurate as the sources available at the time of the update.    Medication reconciliation/reorder completed by provider prior to medication history? No    Information Source(s): Family member, Facility (TCU/NH/) medication list/MAR and CareEverywhere/SureScripts via phone, Sister & [facility] [place].    Pertinent Information: None    Changes made to PTA medication list:    Added: None    Deleted: None    Changed: None    Medication Affordability:  Not including over the counter (OTC) medications, was there a time in the past 12 months when you did not take your medications as prescribed because of cost?: Unable to Assess    Allergies reviewed with patient and updates made in EHR: yes    Medication History Completed By: Carmen Hand RPH 4/16/2023 3:44 PM    PTA Med List   Medication Sig Last Dose     apixaban ANTICOAGULANT (ELIQUIS ANTICOAGULANT) 2.5 MG tablet Take 1 tablet (2.5 mg) by mouth 2 times daily 4/16/2023 at 0900     citalopram (CELEXA) 10 MG tablet Take 1 tablet (10 mg) by mouth daily 4/16/2023 at 0900     donepezil (ARICEPT) 10 MG tablet Take 1 tablet (10 mg) by mouth At Bedtime 4/15/2023 at pm     doxazosin (CARDURA) 2 MG tablet TAKE 1 TABLET (2 MG) BY MOUTH AT BEDTIME 4/15/2023 at pm     simvastatin (ZOCOR) 40 MG tablet TAKE 1 TABLET (40 MG) BY MOUTH AT BEDTIME 4/15/2023 at pm

## 2023-04-16 NOTE — ED TRIAGE NOTES
Pt arrives by EMS with complaint of generalized weakness. She lives at [facility] independently and she is currently seeking services for care. Her sister told EMS that pt felt weaker and weaker through the night, and she felt pt was unsafe to be alone. When pt asked if she can stand, she states I don't know I might fall. Hx of falls. Pt is confused to time.      Triage Assessment     Row Name 04/16/23 0808       Triage Assessment (Adult)    Airway WDL WDL       Respiratory WDL    Respiratory WDL WDL       Skin Circulation/Temperature WDL    Skin Circulation/Temperature WDL WDL       Cardiac WDL    Cardiac WDL WDL       Peripheral/Neurovascular WDL    Peripheral Neurovascular WDL WDL       Cognitive/Neuro/Behavioral WDL    Cognitive/Neuro/Behavioral WDL X    Orientation disoriented to;time

## 2023-04-17 ENCOUNTER — APPOINTMENT (OUTPATIENT)
Dept: PHYSICAL THERAPY | Facility: CLINIC | Age: 88
DRG: 280 | End: 2023-04-17
Attending: INTERNAL MEDICINE
Payer: MEDICARE

## 2023-04-17 ENCOUNTER — APPOINTMENT (OUTPATIENT)
Dept: CARDIOLOGY | Facility: CLINIC | Age: 88
DRG: 280 | End: 2023-04-17
Attending: INTERNAL MEDICINE
Payer: MEDICARE

## 2023-04-17 LAB
ANION GAP SERPL CALCULATED.3IONS-SCNC: 7 MMOL/L (ref 7–15)
ATRIAL RATE - MUSE: 416 BPM
ATRIAL RATE - MUSE: 84 BPM
BACTERIA UR CULT: NORMAL
BUN SERPL-MCNC: 17.9 MG/DL (ref 8–23)
CALCIUM SERPL-MCNC: 8.2 MG/DL (ref 8.2–9.6)
CHLORIDE SERPL-SCNC: 97 MMOL/L (ref 98–107)
CREAT SERPL-MCNC: 0.74 MG/DL (ref 0.51–0.95)
DEPRECATED HCO3 PLAS-SCNC: 31 MMOL/L (ref 22–29)
DIASTOLIC BLOOD PRESSURE - MUSE: NORMAL MMHG
DIASTOLIC BLOOD PRESSURE - MUSE: NORMAL MMHG
ERYTHROCYTE [DISTWIDTH] IN BLOOD BY AUTOMATED COUNT: 13.2 % (ref 10–15)
GFR SERPL CREATININE-BSD FRML MDRD: 76 ML/MIN/1.73M2
GLUCOSE SERPL-MCNC: 101 MG/DL (ref 70–99)
HCT VFR BLD AUTO: 40.6 % (ref 35–47)
HGB BLD-MCNC: 13 G/DL (ref 11.7–15.7)
INTERPRETATION ECG - MUSE: NORMAL
INTERPRETATION ECG - MUSE: NORMAL
LVEF ECHO: NORMAL
MAGNESIUM SERPL-MCNC: 1.8 MG/DL (ref 1.7–2.3)
MCH RBC QN AUTO: 28.5 PG (ref 26.5–33)
MCHC RBC AUTO-ENTMCNC: 32 G/DL (ref 31.5–36.5)
MCV RBC AUTO: 89 FL (ref 78–100)
P AXIS - MUSE: NORMAL DEGREES
P AXIS - MUSE: NORMAL DEGREES
PHOSPHATE SERPL-MCNC: 2.7 MG/DL (ref 2.5–4.5)
PLATELET # BLD AUTO: 249 10E3/UL (ref 150–450)
POTASSIUM SERPL-SCNC: 3.9 MMOL/L (ref 3.4–5.3)
POTASSIUM SERPL-SCNC: 4.3 MMOL/L (ref 3.4–5.3)
PR INTERVAL - MUSE: NORMAL MS
PR INTERVAL - MUSE: NORMAL MS
QRS DURATION - MUSE: 90 MS
QRS DURATION - MUSE: 94 MS
QT - MUSE: 372 MS
QT - MUSE: 374 MS
QTC - MUSE: 450 MS
QTC - MUSE: 465 MS
R AXIS - MUSE: 35 DEGREES
R AXIS - MUSE: 59 DEGREES
RBC # BLD AUTO: 4.56 10E6/UL (ref 3.8–5.2)
SODIUM SERPL-SCNC: 135 MMOL/L (ref 136–145)
SYSTOLIC BLOOD PRESSURE - MUSE: NORMAL MMHG
SYSTOLIC BLOOD PRESSURE - MUSE: NORMAL MMHG
T AXIS - MUSE: -10 DEGREES
T AXIS - MUSE: -12 DEGREES
VENTRICULAR RATE- MUSE: 87 BPM
VENTRICULAR RATE- MUSE: 94 BPM
WBC # BLD AUTO: 5 10E3/UL (ref 4–11)

## 2023-04-17 PROCEDURE — 250N000013 HC RX MED GY IP 250 OP 250 PS 637: Performed by: INTERNAL MEDICINE

## 2023-04-17 PROCEDURE — 93306 TTE W/DOPPLER COMPLETE: CPT

## 2023-04-17 PROCEDURE — 93306 TTE W/DOPPLER COMPLETE: CPT | Mod: 26 | Performed by: INTERNAL MEDICINE

## 2023-04-17 PROCEDURE — G0378 HOSPITAL OBSERVATION PER HR: HCPCS

## 2023-04-17 PROCEDURE — 120N000001 HC R&B MED SURG/OB

## 2023-04-17 PROCEDURE — 83735 ASSAY OF MAGNESIUM: CPT | Performed by: INTERNAL MEDICINE

## 2023-04-17 PROCEDURE — 85027 COMPLETE CBC AUTOMATED: CPT | Performed by: INTERNAL MEDICINE

## 2023-04-17 PROCEDURE — 97161 PT EVAL LOW COMPLEX 20 MIN: CPT | Mod: GP | Performed by: PHYSICAL THERAPIST

## 2023-04-17 PROCEDURE — 84100 ASSAY OF PHOSPHORUS: CPT | Performed by: INTERNAL MEDICINE

## 2023-04-17 PROCEDURE — 80048 BASIC METABOLIC PNL TOTAL CA: CPT | Performed by: INTERNAL MEDICINE

## 2023-04-17 PROCEDURE — 36415 COLL VENOUS BLD VENIPUNCTURE: CPT | Performed by: INTERNAL MEDICINE

## 2023-04-17 PROCEDURE — 96376 TX/PRO/DX INJ SAME DRUG ADON: CPT

## 2023-04-17 PROCEDURE — 250N000013 HC RX MED GY IP 250 OP 250 PS 637: Performed by: NURSE PRACTITIONER

## 2023-04-17 PROCEDURE — 250N000011 HC RX IP 250 OP 636: Performed by: INTERNAL MEDICINE

## 2023-04-17 PROCEDURE — 99232 SBSQ HOSP IP/OBS MODERATE 35: CPT | Performed by: NURSE PRACTITIONER

## 2023-04-17 PROCEDURE — 97530 THERAPEUTIC ACTIVITIES: CPT | Mod: GP | Performed by: PHYSICAL THERAPIST

## 2023-04-17 RX ORDER — CARVEDILOL 3.12 MG/1
1.56 TABLET, FILM COATED ORAL 2 TIMES DAILY WITH MEALS
Status: DISCONTINUED | OUTPATIENT
Start: 2023-04-17 | End: 2023-04-18 | Stop reason: HOSPADM

## 2023-04-17 RX ORDER — LISINOPRIL 2.5 MG/1
2.5 TABLET ORAL DAILY
Status: DISCONTINUED | OUTPATIENT
Start: 2023-04-17 | End: 2023-04-17

## 2023-04-17 RX ORDER — CARVEDILOL 3.12 MG/1
3.12 TABLET ORAL 2 TIMES DAILY WITH MEALS
Status: DISCONTINUED | OUTPATIENT
Start: 2023-04-17 | End: 2023-04-17

## 2023-04-17 RX ADMIN — FUROSEMIDE 20 MG: 10 INJECTION, SOLUTION INTRAMUSCULAR; INTRAVENOUS at 09:06

## 2023-04-17 RX ADMIN — CITALOPRAM HYDROBROMIDE 10 MG: 10 TABLET ORAL at 09:06

## 2023-04-17 RX ADMIN — APIXABAN 2.5 MG: 2.5 TABLET, FILM COATED ORAL at 09:06

## 2023-04-17 RX ADMIN — CARVEDILOL 1.56 MG: 3.12 TABLET, FILM COATED ORAL at 17:59

## 2023-04-17 RX ADMIN — APIXABAN 2.5 MG: 2.5 TABLET, FILM COATED ORAL at 20:02

## 2023-04-17 RX ADMIN — AZITHROMYCIN MONOHYDRATE 250 MG: 250 TABLET ORAL at 17:36

## 2023-04-17 RX ADMIN — FUROSEMIDE 20 MG: 10 INJECTION, SOLUTION INTRAMUSCULAR; INTRAVENOUS at 17:37

## 2023-04-17 RX ADMIN — POTASSIUM & SODIUM PHOSPHATES POWDER PACK 280-160-250 MG 1 PACKET: 280-160-250 PACK at 03:13

## 2023-04-17 RX ADMIN — DONEPEZIL HYDROCHLORIDE 10 MG: 10 TABLET ORAL at 22:00

## 2023-04-17 RX ADMIN — SIMVASTATIN 40 MG: 40 TABLET, FILM COATED ORAL at 22:00

## 2023-04-17 RX ADMIN — CEFTRIAXONE 2 G: 2 INJECTION, POWDER, FOR SOLUTION INTRAMUSCULAR; INTRAVENOUS at 17:37

## 2023-04-17 ASSESSMENT — ACTIVITIES OF DAILY LIVING (ADL)
ADLS_ACUITY_SCORE: 22
CONCENTRATING,_REMEMBERING_OR_MAKING_DECISIONS_DIFFICULTY: YES
DEPENDENT_IADLS:: MEDICATION MANAGEMENT
ADLS_ACUITY_SCORE: 22
DOING_ERRANDS_INDEPENDENTLY_DIFFICULTY: NO
WALKING_OR_CLIMBING_STAIRS_DIFFICULTY: NO
ADLS_ACUITY_SCORE: 22
ADLS_ACUITY_SCORE: 23
ADLS_ACUITY_SCORE: 22
NUMBER_OF_TIMES_PATIENT_HAS_FALLEN_WITHIN_LAST_SIX_MONTHS: 2
FALL_HISTORY_WITHIN_LAST_SIX_MONTHS: YES
WEAR_GLASSES_OR_BLIND: OTHER (SEE COMMENTS)
TOILETING_ISSUES: NO
DRESSING/BATHING_DIFFICULTY: NO
ADLS_ACUITY_SCORE: 22
DIFFICULTY_EATING/SWALLOWING: NO
ADLS_ACUITY_SCORE: 22
DIFFICULTY_COMMUNICATING: NO
ADLS_ACUITY_SCORE: 22

## 2023-04-17 NOTE — PLAN OF CARE
CARE FROM: 1730 - 2300    PRIMARY DIAGNOSIS: GENERALIZED WEAKNESS    OUTPATIENT/OBSERVATION GOALS TO BE MET BEFORE DISCHARGE  1. Orthostatic performed: No    2. Tolerating PO medications: Yes    3. Return to near baseline physical activity: No    4. Cleared for discharge by consultants (if involved): No    Discharge Planner Nurse   Safe discharge environment identified: Yes  Barriers to discharge: Yes       Entered by: Martine Mitchell RN 04/16/2023     Patient A & O x 4, Forgetful at times. Assist x 1 with walker. Lung Sounds CTA, Bowel Sounds normoactive. Last BM this evening, medium soft & formed. Pt denies pain, SOB, 96% on RA, weaned oxygen. Pt continent of B&B, has been calling appropriately for bathroom privileges. Pt on Mag, Phos & K RN managed. K & Phos replaced. K recheck tonight, Mag & Phos tomorrrow AM.Tolerating combination diet no caffeine and po meds well. Tele; Afib controlled 80's. PT following. Will continue to monitor.  /52 (BP Location: Right arm)   Pulse 87   Temp 98  F (36.7  C) (Oral)   Resp 18   Wt 53 kg (116 lb 14.4 oz)   LMP  (LMP Unknown)   SpO2 96%   BMI 19.45 kg/m      Please review provider order for any additional goals.   Nurse to notify provider when observation goals have been met and patient is ready for discharge.

## 2023-04-17 NOTE — PLAN OF CARE
PRIMARY DIAGNOSIS: GENERALIZED WEAKNESS/CHF Exacerbation      OUTPATIENT/OBSERVATION GOALS TO BE MET BEFORE DISCHARGE  1. Orthostatic performed: N/A    2. Tolerating PO medications: Yes    3. Return to near baseline physical activity: Yes    4. Cleared for discharge by consultants (if involved): Yes    Discharge Planner Nurse   Safe discharge environment identified: Yes  Barriers to discharge: No       Entered by: Moira Mendez RN 04/17/2023 10:21 AM     Please review provider order for any additional goals.   Nurse to notify provider when observation goals have been met and patient is ready for discharge.Goal Outcome Evaluation:

## 2023-04-17 NOTE — CONSULTS
Care Management Initial Consult    General Information  Assessment completed with: Patient, Children,    Type of CM/SW Visit: Initial Assessment    Primary Care Provider verified and updated as needed:     Readmission within the last 30 days: no previous admission in last 30 days      Reason for Consult: discharge planning  Advance Care Planning:            Communication Assessment  Patient's communication style: spoken language (English or Bilingual)    Hearing Difficulty or Deaf: no   Wear Glasses or Blind: no    Cognitive  Cognitive/Neuro/Behavioral: WDL  Level of Consciousness: alert  Arousal Level: opens eyes spontaneously  Orientation: disoriented to, time  Mood/Behavior: calm, cooperative  Best Language: 0 - No aphasia  Speech: spontaneous, clear    Living Environment:   People in home: alone     Current living Arrangements: independent living facility      Able to return to prior arrangements: yes       Family/Social Support:  Care provided by: self  Provides care for: no one  Marital Status:   Children, Sibling(s)          Description of Support System: Supportive, Involved    Support Assessment: Adequate family and caregiver support    Current Resources:   Patient receiving home care services: No     Community Resources: None  Equipment currently used at home: none  Supplies currently used at home: None    Employment/Financial:  Employment Status:          Financial Concerns:             Lifestyle & Psychosocial Needs:  Social Determinants of Health     Tobacco Use: Medium Risk (12/30/2022)    Patient History      Smoking Tobacco Use: Former      Smokeless Tobacco Use: Never      Passive Exposure: Not on file   Alcohol Use: Unknown (4/23/2021)    AUDIT-C      Frequency of Alcohol Consumption: 2-3 times a week      Average Number of Drinks: 1 or 2      Frequency of Binge Drinking: Not asked   Financial Resource Strain: Not on file   Food Insecurity: Not on file   Transportation Needs: Not on file    Physical Activity: Not on file   Stress: Not on file   Social Connections: Not on file   Intimate Partner Violence: Not on file   Depression: At risk (10/27/2022)    PHQ-2      PHQ-2 Score: 6   Housing Stability: Not on file       Functional Status:  Prior to admission patient needed assistance:   Dependent ADLs:: Independent  Dependent IADLs:: Medication Management  Assesssment of Functional Status: Not at baseline with ADL Functioning    Mental Health Status:          Chemical Dependency Status:                Values/Beliefs:  Spiritual, Cultural Beliefs, Muslim Practices, Values that affect care:                 Additional Information:    Met with pt/family at bedside to discuss discharge planning. Pt explains that she comes from [facility] and is independent with mobility at baseline. Pt explains that she would be agreeable to homecare and would like whichever agency [facility] uses. Home PT referral sent in the hub with [facility] preference as [facility] is the agency typically used by [facility].     Left VM for [facility] to confirm that [facility] continues to be their preferred agency.     MARY Meneses, SW  Inpatient Care Coordination  Emergency Room /Float  [phone]   Mar Rosario, SW

## 2023-04-17 NOTE — PROGRESS NOTES
04/17/23 1012   Appointment Info   Signing Clinician's Name / Credentials (PT) Marilia Gallegos DPT   Living Environment   People in Home alone   Current Living Arrangements assisted living   Home Accessibility no concerns   Transportation Anticipated family or friend will provide   Self-Care   Usual Activity Tolerance good   Current Activity Tolerance moderate   Regular Exercise No   Equipment Currently Used at Home none   Fall history within last six months yes   Number of times patient has fallen within last six months 2   Activity/Exercise/Self-Care Comment Pt receives assist with meals, medication management, frequent check ins   General Information   Onset of Illness/Injury or Date of Surgery 04/16/23   Referring Physician Cem Santos, DO   Patient/Family Therapy Goals Statement (PT) Open to suggestions   Pertinent History of Current Problem (include personal factors and/or comorbidities that impact the POC) Ilda Nassar is a 89 year old female admitted on 4/16/2023. She has a past medical history significant for atrial fibrillation, hypertension, hyperlipidemia, depression, and dementia.  She presented to emergency room with weakness and shortness of breath.  Found to have acute CHF exacerbation   Existing Precautions/Restrictions fall   Weight-Bearing Status - LLE full weight-bearing   Weight-Bearing Status - RLE full weight-bearing   Cognition   Affect/Mental Status (Cognition) WFL   Orientation Status (Cognition) oriented x 3;disoriented to;time   Follows Commands (Cognition) WFL   Pain Assessment   Patient Currently in Pain No   Integumentary/Edema   Integumentary/Edema Comments BLE increased from baseline per pt and sister   Posture    Posture Forward head position;Protracted shoulders   Range of Motion (ROM)   Range of Motion ROM is WFL   Strength (Manual Muscle Testing)   Strength (Manual Muscle Testing) Deficits observed during functional mobility   Strength Comments requires UE support  for transfers   Bed Mobility   Comment, (Bed Mobility) sit<>supine with CGA   Transfers   Comment, (Transfers) CGA with sit<>stand   Gait/Stairs (Locomotion)   Comment, (Gait/Stairs) CGA x 5' with FWW   Balance   Balance Comments Requires B UE support for safe dynamic mobility   Sensory Examination   Sensory Perception patient reports no sensory changes   Coordination   Coordination no deficits were identified   Muscle Tone   Muscle Tone no deficits were identified   Clinical Impression   Criteria for Skilled Therapeutic Intervention Yes, treatment indicated   PT Diagnosis (PT) Impaired functional mobility   Influenced by the following impairments Weakness, deconditioning, impaired balance   Functional limitations due to impairments Difficulty with bed mobility, transfers, ambulation   Clinical Presentation (PT Evaluation Complexity) Stable/Uncomplicated   Clinical Presentation Rationale progressing medically, clear POC   Clinical Decision Making (Complexity) low complexity   Planned Therapy Interventions (PT) balance training;bed mobility training;gait training;patient/family education;strengthening;stretching;transfer training;progressive activity/exercise;home program guidelines   Anticipated Equipment Needs at Discharge (PT) walker, rolling   Risk & Benefits of therapy have been explained evaluation/treatment results reviewed;care plan/treatment goals reviewed;risks/benefits reviewed;current/potential barriers reviewed;participants voiced agreement with care plan;participants included;patient;sibling   PT Total Evaluation Time   PT Eval, Low Complexity Minutes (96138) 15   Physical Therapy Goals   PT Frequency Daily   PT Predicted Duration/Target Date for Goal Attainment 04/20/23   PT Goals Bed Mobility;Transfers;Gait   PT: Bed Mobility Modified independent;Supine to/from sit   PT: Transfers Modified independent;Sit to/from stand   PT: Gait Modified independent;Rolling walker;Greater than 200 feet   PT Discharge  Planning   PT Discharge Recommendation (DC Rec) home with home care physical therapy   PT Rationale for DC Rec Pt is able to complete all mobility skills with SBA, demonstrating adequate mobility for return to [facility]. Rec HHPT to maximize safety and indep with mobility in the home setting and to progress pt back to baseline mobility   PT Brief overview of current status SBA WW   Total Session Time   Total Session Time (sum of timed and untimed services) 15

## 2023-04-17 NOTE — UTILIZATION REVIEW
Admission Status; Secondary Review Determination     Under the authority of the Utilization Management Commitee, the utilization review process indicated a secondary review on the above patient. The review outcome is based on review of the medical records, discussions with staff, and applying clinical experience noted on the date of the review.     (x) Inpatient Status Appropriate - This patient's medical care is consistent with medical management for inpatient care and reasonable inpatient medical practice.     RATIONALE FOR DETERMINATION:     89 year old female admitted on 4/16/2023. She has a past medical history significant for atrial fibrillation, hypertension, hyperlipidemia, depression, and dementia.  She presented to emergency room with weakness and shortness of breath.  Found to have acute CHF exacerbation.    Vital signs reviewed.  Although hypoxia was not documented, that she was requiring supplemental oxygen at 2 L via nasal cannula.  It appears that this is not titrated off.  No fever    Labs notable for INR 2.4.  BNP 2500.  Troponin detectable near 20.  Procalcitonin level elevated at 0.6.  Urine culture shows mixed urogenital nathan    Echocardiogram shows normal ejection fraction.  Pulmonary hypertension present.  Chest x-ray shows small bilateral pleural effusions with associated infiltrate suspicious for pneumonia.    It is suspected the patient is hypervolemic as a cause of her symptoms.  She is receiving Lasix 20 mg IV twice a day.  Patient was also started on azithromycin and IV Rocephin for concerns about possible pneumonia    Given plans for continued hospitalization, need for intravenous diuresis, need for IV antibiotics for suspected pneumonia, inpatient status appears most appropriate    MYRIAM Montalvo, was notified of this recommendation via text message today    At the time of admission with the information available to the attending physician more than 2 nights Hospital complex care was  anticipated, based on patient risk of adverse outcome if treated as outpatient and complex care required. Inpatient admission is appropriate based on the Medicare guidelines.    The information on this document is developed by the utilization review team in order for the business office to ensure compliance. This only denotes the appropriateness of proper admission status and does not reflect the quality of care rendered.   The definitions of Inpatient Status and Observation Status used in making the determination above are those provided in the CMS Coverage Manual, Chapter 1 and Chapter 6, section 70.4.     Sincerely,     Pino Aranda MD  Utilization Review   Physician Advisor   [facility]

## 2023-04-17 NOTE — PLAN OF CARE
PRIMARY DIAGNOSIS: CONGESTIVE HEART FAILURE  OUTPATIENT/OBSERVATION GOALS TO BE MET BEFORE DISCHARGE:  Dyspnea improved and O2 sats >88% at RA or at prior home O2 therapy level: Yes        SpO2: 94 %, O2 Device: Nasal cannula  Vitals:    04/16/23 1802   Weight: 53 kg (116 lb 14.4 oz)        ECHO and other diagnostic testing complete (if applicable):  Ordered for morning    Return to near baseline physical activity: Yes    Discharge Planner Nurse   Safe discharge environment identified: Yes  Barriers to discharge: Yes       Entered by: Darlene Webster RN 04/17/2023 2:31 AM     Vitals are Temp: 98.6  F (37  C) Temp src: Oral BP: 125/76 Pulse: 75   Resp: 16 SpO2: 94 %.  Patient is Alert and Oriented x4 but forgetful. SpO2 88% RA, placed on 2L O2 NC with sats 94%. They are 1 Assist with gait belt and Walker.  Pt is a cardiac and no caffeine diet. They are denying pain and sob. Patient is Saline locked. Will continue to monitor and provide supportive cares.      Please review provider order for any additional goals.   Nurse to notify provider when observation goals have been met and patient is ready for discharge.

## 2023-04-17 NOTE — PLAN OF CARE
Goal Outcome Evaluation:    Pt tolerating meds and po food today, denies pain continues to receive iv abx, plan for discharge tomorrow to independent living and home care. Pt states she feels confused tonight and was thinking she was at a hotel.  Pt still on 2 liter nc, cardiac telemetry shows a fib. Pt up to bathroom with stand by.

## 2023-04-17 NOTE — PROGRESS NOTES
[facility]    Medicine Progress Note - Hospitalist Service    Date of Admission:  4/16/2023    Assessment & Plan   Ilda Nassar is a 89 year old female admitted on 4/16/2023. She has a past medical history significant for atrial fibrillation, hypertension, hyperlipidemia, depression, and dementia.  She presented to emergency room with weakness and shortness of breath.  Found to have acute CHF exacerbation.    Acute medical issues:  # Acute exacerbation of chronic heart failure with preserved ejection fraction.  EF 85-60%.  Echo 4/17/2023 and demonstrates an EF of 55 to 60%.  Flattened septum consistent with RV pressure/volume overload.  The RV SF is borderline reduced.  Mild to moderate tricuspid regurgitation.  Pulmonary hypertension is noted with RSVP being approximately 38 mmHg plus the RAP.  Compared to prior TTE dated 11/5/2021 the right-sided pressures are elevated.  Severity of mitral regurgitation has increased from moderate to moderate to severe (2-3+).  Family is unaware of her underlying history of CHF.  Education is ongoing. Not on ACEi or BB.    -- Start Carvedilol 1.5625 mg PO BID with hold parameters.  Consider starting lisinopril in near future.    -- Continue furosemide 20 mg BID.  -- Replete elytes PRN. Goal K> 4 and Mg >/=2  -- CHF education.    -- Strict I and O. Daily weights.  -- Continue apixaban 2.5 mg p.o. twice daily    #CAP: Procalcitonin 0.65.  CXR with small bilateral pleural effusions and mild associated infiltrates suspicious for pneumonia.  Currently not hypoxic.  -- Azithromycin 250 mg p.o. daily and ceftriaxone 2 g IV daily.    #Type II MI (supply/demand) due to demand ischemia. Troponin (high-sensitivity troponin T) peaked at 19 and on repeat was at 17 (looking back over the past 3 months has been chronically elevated between 16 and 19).  -- Echocardiogram as above and below.  -- Continue apixaban  -- Continue telemetry monitoring    #Failure to thrive:  Weakness/deconditioning.  CT of the head done on 4/16/2023 shows no radiographic evidence of acute intracranial process.  There are associated brain atrophy and presumed chronic microvascular ischemic changes.  -- CT as above.  -- PT consult. [facility].     Chronic medical issues:  # HTN, HLD, atrial fibrillation.  [id number] score: 5.   # Pulmonary HTN  -- Continue apixaban. Per med rec -- appears to be on doxazosin but unclear for HTN or urinary symptoms (favor HTN).   -- Does not appear to be on either a beta-blocker or an ACE inhibitor. Review of outside records indicates possible issue with orthostasis in the past and was previously on hydrochlorothiazide 25 mg daily and amlodipine 2.5 mg daily but currently only on doxazosin.  Recommend stopping doxazosin and starting carvedilol 1.5625 mg BID with hold parameters.   Will titrate accordingly.  Would also like to trial low dose lisinopril if she can tolerate.   -- Discontinue doxazosin. No long on hydrochlorothiazide or amlodipine.   -- Continue simvastatin.  -- Diuresing as above.    #Dementia  #Major depressive disorder (stable).  Lives in assisted living facility.  Her siblings help manage her affairs.  Depression is stable.  Her CDR score is low indicating mild dementia  --Continue donepezil.        Diet: Combination Diet No Caffeine Diet, Low Saturated Fat Diet    DVT Prophylaxis: DOAC  Treviño Catheter: Not present  Lines: None     Cardiac Monitoring: None  Code Status: Full Code        Disposition Plan  home 1-2 days with C     Expected Discharge Date: 04/18/2023                The patient's care was discussed with the Bedside Nurse, Patient and Patient's Family.    CHRISTINE Ocasio [facility]  Hospitalist Service  [facility]  Securely message with [employer] (more info)  Text page via [employer] Paging/Directory   ______________________________________________________________________    Interval History   Assumed care. VSS  Not  hypoxic.  No respiratory distress.    Denies new complaints.   Sister updated at bedside.     Physical Exam   Vital Signs: Temp: 98.7  F (37.1  C) Temp src: Oral BP: (!) 144/76 Pulse: 78   Resp: 18 SpO2: 92 % O2 Device: None (Room air) Oxygen Delivery: 2 LPM  Weight: 114 lbs 1.6 oz    GEN:   Alert, oriented x 1-2, appears comfortable, NAD.  NECK:   Supple ,no mass or thyromegaly   HEENT:  Normocephalic/atraumatic, no scleral icterus, no nasal discharge, mouth moist.  CV:   Irregular rate and rhythm, no murmur or JVD.  S1 + S2 noted, no S3 or S4.  LUNGS: Clear to auscultation bilaterally without rales/rhonchi/wheezing/retractions.  Symmetric chest rise on inhalation noted.  ABD:   Active bowel sounds, soft, non-tender/non-distended.  No rebound/guarding/rigidity.  EXT:  2+ BLE edema.  No cyanosis.  No joint synovitis noted.  SKIN:  Dry to touch, no exanthems noted in the visualized areas.  Neurologic: Grossly intact,non focal.   Neuropsychiatric:  General: normal, calm and normal eye contact  Level of consciousness: alert / normal  Affect: normal  Orientation: oriented to self, place, situation but not time.  Recognizes family.     Medical Decision Making       45 MINUTES SPENT BY ME on the date of service doing chart review, history, exam, documentation & further activities per the note.      Data     I have personally reviewed the following data over the past 24 hrs:    5.0  \   13.0   / 249     135 (L) 97 (L) 17.9 /  101 (H)   3.9 31 (H) 0.74 \       Procal: N/A CRP: N/A Lactic Acid: N/A         Imaging results reviewed over the past 24 hrs:   Recent Results (from the past 24 hour(s))   CT Head w/o Contrast    Narrative    EXAM: CT HEAD W/O CONTRAST  LOCATION: [facility]  DATE/TIME: 4/16/2023 4:41 PM CDT    INDICATION: Falls, headache.  COMPARISON: 12/20/2022  TECHNIQUE: Routine CT Head without IV contrast. Multiplanar reformats. Dose reduction techniques were  used.    FINDINGS:  INTRACRANIAL CONTENTS: No intracranial hemorrhage, extraaxial collection, or mass effect.  No CT evidence of acute infarct. Moderate presumed chronic small vessel ischemic changes. Mild to moderate generalized volume loss. No hydrocephalus. Corpus   callosum is normal. Position of the cerebellar tonsils is satisfactory. Sella shows no acute abnormality. Vascular calcification.     VISUALIZED ORBITS/SINUSES/MASTOIDS: Prior bilateral cataract surgery. Visualized portions of the orbits are otherwise unremarkable. No paranasal sinus mucosal disease. Scattered fluid/membrane thickening in the left mastoid air cells. No apparent mass in   the posterior nasopharynx or skull base.. This is located inferiorly.    BONES/SOFT TISSUES: No evidence for fracture of the calvarium or skull base. Demineralization. Degenerative changes both TMJs. No swelling of the facial or scalp soft tissues.      Impression    IMPRESSION:  1.  No CT evidence for acute intracranial process.    2.  Brain atrophy and presumed chronic microvascular ischemic changes as above.    3.  No intracranial mass, mass effect, or hemorrhage.    4.  Nothing for subarachnoid, subdural, or epidural hemorrhage is specifically identified.    5.  No fractures.   Echocardiogram Complete   Result Value    LVEF  55-60%    [facility]    [id number]  [id number]  [id number]  [id number]^YENNY^PAVEL^ELBERT     [facility]  Echocardiography Laboratory  [address] [place] [address]     Name: ALEX SAWANT  MRN: [id number]  : 1932  Study Date: 2023 08:22 AM  Age: 89 yrs  Gender: Female  Patient Location: [facility]  Reason For Study: CHF  Ordering Physician: PAVEL RAMON  Referring Physician: Tatianna Swain MD  Performed By: Lizzie Riggs RDCS     BSA: 1.6 m2  Height: 65 in  Weight: 116 lb  HR: 83  BP: 124/70 mmHg  ______________________________________________________________________________  Procedure  Complete  Portable Echo Adult.  ______________________________________________________________________________  Interpretation Summary     Left ventricular systolic function is normal. The visual ejection fraction is  55-60%. Flattened septum is consistent with RV pressure/volume overload.  The right ventricular systolic function is borderline reduced.  There is mild to moderate (1-2+) tricuspid regurgitation.  Pulmonary hypertension present. The right ventricular systolic pressure is  approximated at 38mmHg plus the right atrial pressure.  There is moderate to mod-severe (2-3+) mitral regurgitation.  Dilation of the inferior vena cava is present with abnormal respiratory  variation in diameter.  The ascending aorta is Borderline dilated. Max diameter of the visualized  portion 3.9 cm.     This study was compared to a TTE from 11/5/2021. Estimated right-sided  pressures are elevated. The severity of MR has increased.  ______________________________________________________________________________  Left Ventricle  The left ventricle is normal in size. There is mild concentric left  ventricular hypertrophy. Left ventricular systolic function is normal. The  visual ejection fraction is 55-60%. Left ventricular diastolic function is not  assessable. Flattened septum is consistent with RV pressure/volume overload.     Right Ventricle  The right ventricle is normal size. The right ventricular systolic function is  borderline reduced.     Atria  The left atrium is severely dilated. The right atrium is severely dilated.     Mitral Valve  There is moderate to mod-severe (2-3+) mitral regurgitation.     Tricuspid Valve  There is mild to moderate (1-2+) tricuspid regurgitation. The right  ventricular systolic pressure is approximated at 38mmHg plus the right atrial  pressure. Pulmonary hypertension.     Aortic Valve  The aortic valve is trileaflet with aortic valve sclerosis.     Pulmonic Valve  The pulmonic valve is not well seen, but  is grossly normal. There is trace  pulmonic valvular regurgitation.     Vessels  The aortic root is normal size. The ascending aorta is Borderline dilated. Max  diameter of the visualized portion 3.9 cm. Dilation of the inferior vena cava  is present with abnormal respiratory variation in diameter.     Rhythm  The rhythm was atrial fibrillation.     ______________________________________________________________________________  MMode/2D Measurements & Calculations  IVSd: 1.2 cm  LVIDd: 4.1 cm  LVIDs: 2.4 cm  LVPWd: 1.2 cm  IVC diam: 2.4 cm  FS: 41.1 %  LV mass(C)d: 172.2 grams  LV mass(C)dI: 109.7 grams/m2     Ao root diam: 3.4 cm  LA dimension: 3.4 cm  asc Aorta Diam: 3.9 cm  LA/Ao: 1.0  LVOT diam: 2.3 cm  LVOT area: 4.2 cm2  LA Volume (BP): 125.0 ml  LA Volume Index (BP): 79.6 ml/m2  RV Base: 3.3 cm  RWT: 0.57  TAPSE: 1.3 cm     Doppler Measurements & Calculations  MV E max south: 157.0 cm/sec  MV max P.9 mmHg  MV mean P.3 mmHg  MV V2 VTI: 28.6 cm  MV P1/2t max south: 176.3 cm/sec  MV P1/2t: 43.3 msec  MVA(P1/2t): 5.1 cm2  MV dec slope: 1192 cm/sec2  MV dec time: 0.14 sec  MR PISA: 5.1 cm2  MR ERO: 0.27 cm2  MR volume: 42.5 ml  TR max south: 299.3 cm/sec  TR max P.9 mmHg  E/E' avg: 15.1  Lateral E/e': 9.3  Medial E/e': 20.9  RV S South: 14.4 cm/sec     ______________________________________________________________________________  Report approved by: Joel Kenyon 2023 10:40 AM

## 2023-04-17 NOTE — PLAN OF CARE
PRIMARY DIAGNOSIS: CONGESTIVE HEART FAILURE  OUTPATIENT/OBSERVATION GOALS TO BE MET BEFORE DISCHARGE:  1. Dyspnea improved and O2 sats >88% at RA or at prior home O2 therapy level: Yes        SpO2: 94 %, O2 Device: Nasal cannula  Vitals:    04/16/23 1802   Weight: 53 kg (116 lb 14.4 oz)        2. ECHO and other diagnostic testing complete (if applicable):  Ordered for morning    3. Return to near baseline physical activity: Yes    Discharge Planner Nurse   Safe discharge environment identified: Yes  Barriers to discharge: Yes       Entered by: Darlene Webster RN 04/17/2023 3:34 AM     Vitals are Temp: 98.7  F (37.1  C) Temp src: Oral BP: (!) 142/67 Pulse: 63   Resp: 18 SpO2: 93 %.  Patient is Alert and Oriented x4 but forgetful. SpO2 88% RA, placed on 2L O2 NC with sats 94%. Denies sob at rest and with activity. No respiratory distress noted. Strict I/O's, void missing hat. SBA. Pt is a cardiac and no caffeine diet. Denies pain. Saline locked. Plan: IV lasix BID, zithromax, rocephin, strict I/O's, daily weights.      Please review provider order for any additional goals.   Nurse to notify provider when observation goals have been met and patient is ready for discharge.

## 2023-04-18 ENCOUNTER — APPOINTMENT (OUTPATIENT)
Dept: PHYSICAL THERAPY | Facility: CLINIC | Age: 88
DRG: 280 | End: 2023-04-18
Payer: MEDICARE

## 2023-04-18 VITALS
DIASTOLIC BLOOD PRESSURE: 77 MMHG | WEIGHT: 107.9 LBS | HEART RATE: 96 BPM | OXYGEN SATURATION: 93 % | BODY MASS INDEX: 17.96 KG/M2 | RESPIRATION RATE: 17 BRPM | SYSTOLIC BLOOD PRESSURE: 139 MMHG | TEMPERATURE: 98.2 F

## 2023-04-18 LAB
ANION GAP SERPL CALCULATED.3IONS-SCNC: 8 MMOL/L (ref 7–15)
BUN SERPL-MCNC: 12.7 MG/DL (ref 8–23)
CALCIUM SERPL-MCNC: 8.3 MG/DL (ref 8.2–9.6)
CHLORIDE SERPL-SCNC: 91 MMOL/L (ref 98–107)
CREAT SERPL-MCNC: 0.69 MG/DL (ref 0.51–0.95)
DEPRECATED HCO3 PLAS-SCNC: 34 MMOL/L (ref 22–29)
ERYTHROCYTE [DISTWIDTH] IN BLOOD BY AUTOMATED COUNT: 13.3 % (ref 10–15)
GFR SERPL CREATININE-BSD FRML MDRD: 82 ML/MIN/1.73M2
GLUCOSE SERPL-MCNC: 111 MG/DL (ref 70–99)
HCT VFR BLD AUTO: 40 % (ref 35–47)
HGB BLD-MCNC: 13.3 G/DL (ref 11.7–15.7)
MAGNESIUM SERPL-MCNC: 1.6 MG/DL (ref 1.7–2.3)
MCH RBC QN AUTO: 28.9 PG (ref 26.5–33)
MCHC RBC AUTO-ENTMCNC: 33.3 G/DL (ref 31.5–36.5)
MCV RBC AUTO: 87 FL (ref 78–100)
PHOSPHATE SERPL-MCNC: 3.4 MG/DL (ref 2.5–4.5)
PLATELET # BLD AUTO: 248 10E3/UL (ref 150–450)
POTASSIUM SERPL-SCNC: 3.4 MMOL/L (ref 3.4–5.3)
RBC # BLD AUTO: 4.6 10E6/UL (ref 3.8–5.2)
SODIUM SERPL-SCNC: 133 MMOL/L (ref 136–145)
WBC # BLD AUTO: 6 10E3/UL (ref 4–11)

## 2023-04-18 PROCEDURE — 83735 ASSAY OF MAGNESIUM: CPT | Performed by: INTERNAL MEDICINE

## 2023-04-18 PROCEDURE — 250N000013 HC RX MED GY IP 250 OP 250 PS 637: Performed by: NURSE PRACTITIONER

## 2023-04-18 PROCEDURE — 36415 COLL VENOUS BLD VENIPUNCTURE: CPT | Performed by: NURSE PRACTITIONER

## 2023-04-18 PROCEDURE — 250N000011 HC RX IP 250 OP 636: Performed by: INTERNAL MEDICINE

## 2023-04-18 PROCEDURE — 99238 HOSP IP/OBS DSCHRG MGMT 30/<: CPT | Performed by: PHYSICIAN ASSISTANT

## 2023-04-18 PROCEDURE — 80048 BASIC METABOLIC PNL TOTAL CA: CPT | Performed by: NURSE PRACTITIONER

## 2023-04-18 PROCEDURE — 85027 COMPLETE CBC AUTOMATED: CPT | Performed by: NURSE PRACTITIONER

## 2023-04-18 PROCEDURE — 97530 THERAPEUTIC ACTIVITIES: CPT | Mod: GP | Performed by: PHYSICAL THERAPIST

## 2023-04-18 PROCEDURE — 250N000013 HC RX MED GY IP 250 OP 250 PS 637: Performed by: PHYSICIAN ASSISTANT

## 2023-04-18 PROCEDURE — 84100 ASSAY OF PHOSPHORUS: CPT | Performed by: INTERNAL MEDICINE

## 2023-04-18 PROCEDURE — 250N000013 HC RX MED GY IP 250 OP 250 PS 637: Performed by: INTERNAL MEDICINE

## 2023-04-18 RX ORDER — POTASSIUM CHLORIDE 1500 MG/1
20 TABLET, EXTENDED RELEASE ORAL DAILY
Qty: 90 TABLET | Refills: 0 | Status: SHIPPED | OUTPATIENT
Start: 2023-04-18 | End: 2024-04-16

## 2023-04-18 RX ORDER — POTASSIUM CHLORIDE 1500 MG/1
20 TABLET, EXTENDED RELEASE ORAL ONCE
Status: COMPLETED | OUTPATIENT
Start: 2023-04-18 | End: 2023-04-18

## 2023-04-18 RX ORDER — FUROSEMIDE 20 MG
20 TABLET ORAL DAILY
Qty: 90 TABLET | Refills: 1 | Status: SHIPPED | OUTPATIENT
Start: 2023-04-18 | End: 2024-04-16

## 2023-04-18 RX ORDER — CARVEDILOL 3.12 MG/1
1.56 TABLET ORAL 2 TIMES DAILY WITH MEALS
Qty: 90 TABLET | Refills: 1 | Status: ON HOLD | OUTPATIENT
Start: 2023-04-18 | End: 2023-05-08

## 2023-04-18 RX ORDER — AZITHROMYCIN 250 MG/1
250 TABLET, FILM COATED ORAL DAILY
Qty: 3 TABLET | Refills: 0 | Status: SHIPPED | OUTPATIENT
Start: 2023-04-18 | End: 2023-05-06

## 2023-04-18 RX ADMIN — APIXABAN 2.5 MG: 2.5 TABLET, FILM COATED ORAL at 07:49

## 2023-04-18 RX ADMIN — CARVEDILOL 1.56 MG: 3.12 TABLET, FILM COATED ORAL at 08:12

## 2023-04-18 RX ADMIN — FUROSEMIDE 20 MG: 10 INJECTION, SOLUTION INTRAMUSCULAR; INTRAVENOUS at 07:49

## 2023-04-18 RX ADMIN — CITALOPRAM HYDROBROMIDE 10 MG: 10 TABLET ORAL at 07:49

## 2023-04-18 RX ADMIN — POTASSIUM CHLORIDE 20 MEQ: 1500 TABLET, EXTENDED RELEASE ORAL at 09:21

## 2023-04-18 ASSESSMENT — ACTIVITIES OF DAILY LIVING (ADL)
ADLS_ACUITY_SCORE: 23

## 2023-04-18 NOTE — DISCHARGE INSTRUCTIONS
Your home care referral was sent to [facility]  If you haven't heard from them within the next 24-48 hours,  Please call them at [phone].

## 2023-04-18 NOTE — PROGRESS NOTES
Care Management Discharge Note    Discharge Date: 04/18/23     Discharge Disposition:  [facility]    Discharge Services:  Home Care PT    Discharge Transportation: family or friend will provide    Private pay costs discussed: Not applicable    Patient/Family in Agreement with the Plan:  Yes    Handoff Referral Completed: No    Additional Information:  HELEN spoke with Lilian REYES RN, [phone]. Pt is in correction receiving assist with medications, safety checks, and housekeeping. New meds to be sent to [facility] in [place]. Orders to be faxed to F) [phone].     [facility] accepted referral for Home PT. Will fax orders. AVS updated.     HELEN will continue to follow.     MARY Sadler, [facility]   Inpatient Care Coordination  [facility]   [phone]

## 2023-04-18 NOTE — PROGRESS NOTES
Patient's After Visit Summary was reviewed with patient and daughter.   Patient verbalized understanding of After Visit Summary, recommended follow up and was given an opportunity to ask questions.   Discharge medications sent home with patient/family: Not applicable   Discharged with daughter    OBSERVATION patient END time: [id number]

## 2023-04-18 NOTE — DISCHARGE SUMMARY
Intra operative event  Pt's ECG during the case unremarcable with several PVCs observed,  uneventfull spinal anesthesia till 10:35 when   Noticed PVCs converted to long run of V tachycardia ,did not respond to lidocaine bolus, , never lost BP never pt had [facility]  Hospitalist Discharge Summary      Date of Admission:  4/16/2023  Date of Discharge:  4/18/2023  Discharging Provider: Tori Wiseman PA-C  Discharge Service: Hospitalist Service    Discharge Diagnoses   CHF exacerbation  Shortness of breath  Fluid overload    Follow-ups Needed After Discharge   Follow-up Appointments     Follow-up and recommended labs and tests       Follow up with cardiologist as soon as possible             Unresulted Labs Ordered in the Past 30 Days of this Admission     No orders found from 3/17/2023 to 4/17/2023.          Discharge Disposition   Discharged to home  Condition at discharge: Stable      Hospital Course    Ilda Nassar is a 89 year old female admitted on 4/16/2023. She has a past medical history significant for atrial fibrillation, hypertension, hyperlipidemia, depression, and dementia.  She presented to emergency room with weakness and shortness of breath. She has been feeling weak for the past few days. Has noticed swelling in her lower extremities.  Has had a couple of falls over the past few days.  Does not think that she has hit her head.  Not currently having any pain.  She does note that swelling in her legs seems to be getting worse.  Shortness of breath also seems worse.  She does not remember having a cough.  Does not remember fevers.  Denies any chest pain.  Does not think that her legs have been swollen like this previously.       In the ED she was afebrile with blood pressure 124/70, pulse 88 and breathing comfortably on room air at 95% oxygen saturation.  Lab work was remarkable for creatinine 0.65, sodium 135, magnesium 1.6 and potassium 3.4.  LFTs were normal with the exception of total bilirubin of 1.8.  Glucose 94, BNP elevated 2589 and high-sensitivity troponin elevated at 19 with normal CBC.  UA did not appear infected, COVID/influenza/RSV was negative, EKG showed atrial fibrillation and chest x-ray showed small  bilateral pleural effusions with mild associated infiltrate suspicious for pneumonia with stable cardiac enlargement.  CT head was negative.    Suspicion for heart failure exacerbation given elevated BNP and chest x-ray findings with shortness of breath so was started on Lasix 20 mg IV twice daily.  She reports a baseline weight of 110 pounds with admission rate around 115.  Procalcitonin was checked on admission elevated at 0.65 so she was also started on azithromycin and ceftriaxone.  Echocardiogram was performed showing  EF 55-60%.  Echo 4/17/2023 and demonstrates an EF of 55 to 60%.  Flattened septum consistent with RV pressure/volume overload.  The RV SF is borderline reduced.  Mild to moderate tricuspid regurgitation.  Pulmonary hypertension is noted with RSVP being approximately 38 mmHg plus the RAP.  Compared to prior TTE dated 11/5/2021 the right-sided pressures are elevated.  Severity of mitral regurgitation has increased from moderate to moderate to severe (2-3+).     With diuresis her weight improved to 107 pounds and she was feeling much better.  She was started on Lasix daily with potassium supplement and doxazosin was discontinued in favor of carvedilol twice daily.  She will follow-up with her cardiologist for further adjustment to medicines.  We did order home health care at time of discharge.  Her discharge was discussed with her 2 sisters who also help her while she is at home            Consultations This Hospital Stay   PHYSICAL THERAPY ADULT IP CONSULT  CARE MANAGEMENT / SOCIAL WORK IP CONSULT  OCCUPATIONAL THERAPY ADULT IP CONSULT  NUTRITION SERVICES ADULT IP CONSULT    Code Status   Full Code    Time Spent on this Encounter   ITori PA-C, personally saw the patient today and spent less than or equal to 30 minutes discharging this patient.       Tori Wiseman PA-C  [facility] OBSERVATION DEPT  [facility] [address] [place] [address]  Phone:  [phone]  ______________________________________________________________________    Physical Exam   Vital Signs: Temp: 98.2  F (36.8  C) Temp src: Oral BP: 139/77 Pulse: 96   Resp: 17 SpO2: 93 % O2 Device: None (Room air) Oxygen Delivery: 1 LPM  Weight: 107 lbs 14.4 oz  General Appearance: Alert and oriented x3 but intermittently forgetful  Respiratory: Clear to auscultation bilaterally  Cardiovascular: Irregularly irregular without murmur  GI: Bowel sounds are present without tenderness  Skin: No rashes or open sores are noted         Primary Care Physician   Tatianna Swain    Discharge Orders      Primary Care - Care Coordination Referral      Follow-Up with Cardiology      Home Care Referral      Reason for your hospital stay    You were admitted for concerns of weakness and shortness of breath due to heart failure exacerbation and also possible pneumonia. We started you on antibiotics and Lasix with improvement of your symptoms. We would like you to complete the antibiotics for 3 more days. We have also started you on Lasix 20 mg daily. You should take this every day with potassium. Check your weight every day as well. If your weight is more than 2 pounds over your baseline weight take an extra dose of Lasix. Due to this exacerbation we have also stopped your Doxazosin and instead prescribed Carvedilol which can be heart protective.     Please see your Cardiologist at the next available appointment.     Follow-up and recommended labs and tests     Follow up with cardiologist as soon as possible     Activity    Your activity upon discharge: activity as tolerated     Monitor and record    Weigh yourself every morning     Monitor and record    weight every day and if increase of 2 pounds or more take extra dose of Lasix     Diet    Follow this diet upon discharge: Low sodium diet       Significant Results and Procedures   Most Recent 3 CBC's:Recent Labs   Lab Test 04/18/23  0547 04/17/23  0612 04/16/23  0821    WBC 6.0 5.0 5.0   HGB 13.3 13.0 13.2   MCV 87 89 89    249 241     Most Recent 3 BMP's:Recent Labs   Lab Test 04/18/23  0547 04/17/23  0612 04/16/23  2342 04/16/23  0821   * 135*  --  135*   POTASSIUM 3.4 3.9 4.3 3.4   CHLORIDE 91* 97*  --  99   CO2 34* 31*  --  27   BUN 12.7 17.9  --  17.1   CR 0.69 0.74  --  0.65   ANIONGAP 8 7  --  9   ESTEAL 8.3 8.2  --  8.2   * 101*  --  94   ,   Results for orders placed or performed during the hospital encounter of 04/16/23   XR Chest 2 Views    Narrative    EXAM: XR CHEST 2 VIEWS  LOCATION: [facility]  DATE/TIME: 04/16/2023, 10:48 AM CDT    INDICATION: Generalized weakness. Pulmonary rales.  COMPARISON: 12/20/2022.      Impression    IMPRESSION: Small bilateral pleural effusions, with mild associated infiltrate, suspicious for pneumonia. These findings are new since the previous exam. Stable cardiac enlargement. Pulmonary vascularity is within normal limits. Aortic calcification. No   pneumothorax.     CT Head w/o Contrast    Narrative    EXAM: CT HEAD W/O CONTRAST  LOCATION: [facility]  DATE/TIME: 4/16/2023 4:41 PM CDT    INDICATION: Falls, headache.  COMPARISON: 12/20/2022  TECHNIQUE: Routine CT Head without IV contrast. Multiplanar reformats. Dose reduction techniques were used.    FINDINGS:  INTRACRANIAL CONTENTS: No intracranial hemorrhage, extraaxial collection, or mass effect.  No CT evidence of acute infarct. Moderate presumed chronic small vessel ischemic changes. Mild to moderate generalized volume loss. No hydrocephalus. Corpus   callosum is normal. Position of the cerebellar tonsils is satisfactory. Sella shows no acute abnormality. Vascular calcification.     VISUALIZED ORBITS/SINUSES/MASTOIDS: Prior bilateral cataract surgery. Visualized portions of the orbits are otherwise unremarkable. No paranasal sinus mucosal disease. Scattered fluid/membrane thickening in the left mastoid air cells. No  apparent mass in   the posterior nasopharynx or skull base.. This is located inferiorly.    BONES/SOFT TISSUES: No evidence for fracture of the calvarium or skull base. Demineralization. Degenerative changes both TMJs. No swelling of the facial or scalp soft tissues.      Impression    IMPRESSION:  1.  No CT evidence for acute intracranial process.    2.  Brain atrophy and presumed chronic microvascular ischemic changes as above.    3.  No intracranial mass, mass effect, or hemorrhage.    4.  Nothing for subarachnoid, subdural, or epidural hemorrhage is specifically identified.    5.  No fractures.   Echocardiogram Complete     Value    LVEF  55-60%    [facility]    [id number]  [id number]  [id number]  [id number]^YENNY^PAVEL^ELBERT     [facility]  Echocardiography Laboratory  [address] [place] [address]     Name: ALEX SAWANT  MRN: [id number]  : 1932  Study Date: 2023 08:22 AM  Age: 89 yrs  Gender: Female  Patient Location: [facility]  Reason For Study: CHF  Ordering Physician: PAVEL RAMON  Referring Physician: Tatianna Swain MD  Performed By: Lizzie Riggs RDCS     BSA: 1.6 m2  Height: 65 in  Weight: 116 lb  HR: 83  BP: 124/70 mmHg  ______________________________________________________________________________  Procedure  Complete Portable Echo Adult.  ______________________________________________________________________________  Interpretation Summary     Left ventricular systolic function is normal. The visual ejection fraction is  55-60%. Flattened septum is consistent with RV pressure/volume overload.  The right ventricular systolic function is borderline reduced.  There is mild to moderate (1-2+) tricuspid regurgitation.  Pulmonary hypertension present. The right ventricular systolic pressure is  approximated at 38mmHg plus the right atrial pressure.  There is moderate to mod-severe (2-3+) mitral regurgitation.  Dilation of the inferior vena cava is present with  abnormal respiratory  variation in diameter.  The ascending aorta is Borderline dilated. Max diameter of the visualized  portion 3.9 cm.     This study was compared to a TTE from 11/5/2021. Estimated right-sided  pressures are elevated. The severity of MR has increased.  ______________________________________________________________________________  Left Ventricle  The left ventricle is normal in size. There is mild concentric left  ventricular hypertrophy. Left ventricular systolic function is normal. The  visual ejection fraction is 55-60%. Left ventricular diastolic function is not  assessable. Flattened septum is consistent with RV pressure/volume overload.     Right Ventricle  The right ventricle is normal size. The right ventricular systolic function is  borderline reduced.     Atria  The left atrium is severely dilated. The right atrium is severely dilated.     Mitral Valve  There is moderate to mod-severe (2-3+) mitral regurgitation.     Tricuspid Valve  There is mild to moderate (1-2+) tricuspid regurgitation. The right  ventricular systolic pressure is approximated at 38mmHg plus the right atrial  pressure. Pulmonary hypertension.     Aortic Valve  The aortic valve is trileaflet with aortic valve sclerosis.     Pulmonic Valve  The pulmonic valve is not well seen, but is grossly normal. There is trace  pulmonic valvular regurgitation.     Vessels  The aortic root is normal size. The ascending aorta is Borderline dilated. Max  diameter of the visualized portion 3.9 cm. Dilation of the inferior vena cava  is present with abnormal respiratory variation in diameter.     Rhythm  The rhythm was atrial fibrillation.     ______________________________________________________________________________  MMode/2D Measurements & Calculations  IVSd: 1.2 cm  LVIDd: 4.1 cm  LVIDs: 2.4 cm  LVPWd: 1.2 cm  IVC diam: 2.4 cm  FS: 41.1 %  LV mass(C)d: 172.2 grams  LV mass(C)dI: 109.7 grams/m2     Ao root diam: 3.4 cm  LA  dimension: 3.4 cm  asc Aorta Diam: 3.9 cm  LA/Ao: 1.0  LVOT diam: 2.3 cm  LVOT area: 4.2 cm2  LA Volume (BP): 125.0 ml  LA Volume Index (BP): 79.6 ml/m2  RV Base: 3.3 cm  RWT: 0.57  TAPSE: 1.3 cm     Doppler Measurements & Calculations  MV E max south: 157.0 cm/sec  MV max P.9 mmHg  MV mean P.3 mmHg  MV V2 VTI: 28.6 cm  MV P1/2t max south: 176.3 cm/sec  MV P1/2t: 43.3 msec  MVA(P1/2t): 5.1 cm2  MV dec slope: 1192 cm/sec2  MV dec time: 0.14 sec  MR PISA: 5.1 cm2  MR ERO: 0.27 cm2  MR volume: 42.5 ml  TR max south: 299.3 cm/sec  TR max P.9 mmHg  E/E' avg: 15.1  Lateral E/e': 9.3  Medial E/e': 20.9  RV S South: 14.4 cm/sec     ______________________________________________________________________________  Report approved by: Joel Kenyon 2023 10:40 AM               Discharge Medications   Current Discharge Medication List      START taking these medications    Details   amoxicillin-clavulanate (AUGMENTIN) 875-125 MG tablet Take 1 tablet by mouth 2 times daily for 3 days  Qty: 6 tablet, Refills: 0    Associated Diagnoses: Community acquired pneumonia, unspecified laterality      azithromycin (ZITHROMAX) 250 MG tablet Take 1 tablet (250 mg) by mouth daily  Qty: 3 tablet, Refills: 0    Associated Diagnoses: Community acquired pneumonia, unspecified laterality      carvedilol (COREG) 3.125 MG tablet Take 0.5 tablets (1.5625 mg) by mouth 2 times daily (with meals)  Qty: 90 tablet, Refills: 1    Associated Diagnoses: Acute on chronic congestive heart failure, unspecified heart failure type (H)      furosemide (LASIX) 20 MG tablet Take 1 tablet (20 mg) by mouth daily And if weight is greater than 2 pounds from baseline weight take extra pill  Qty: 90 tablet, Refills: 1    Associated Diagnoses: Acute on chronic congestive heart failure, unspecified heart failure type (H)      potassium chloride ER (KLOR-CON M) 20 MEQ CR tablet Take 1 tablet (20 mEq) by mouth daily  Qty: 90 tablet, Refills: 0    Associated  Diagnoses: Acute on chronic congestive heart failure, unspecified heart failure type (H)         CONTINUE these medications which have NOT CHANGED    Details   apixaban ANTICOAGULANT (ELIQUIS ANTICOAGULANT) 2.5 MG tablet Take 1 tablet (2.5 mg) by mouth 2 times daily  Qty: 180 tablet, Refills: 2    Associated Diagnoses: At high risk for falls; Chronic atrial fibrillation (H); Essential hypertension      citalopram (CELEXA) 10 MG tablet Take 1 tablet (10 mg) by mouth daily  Qty: 90 tablet, Refills: 1    Associated Diagnoses: Other depression      donepezil (ARICEPT) 10 MG tablet Take 1 tablet (10 mg) by mouth At Bedtime    Associated Diagnoses: Memory changes      simvastatin (ZOCOR) 40 MG tablet TAKE 1 TABLET (40 MG) BY MOUTH AT BEDTIME  Qty: 90 tablet, Refills: 0    Associated Diagnoses: Other hyperlipidemia         STOP taking these medications       doxazosin (CARDURA) 2 MG tablet Comments:   Reason for Stopping:             Allergies   No Known Allergies

## 2023-04-18 NOTE — PLAN OF CARE
Care from [id number]     Inpatient Progress Note:  For complete assessment see flow sheet documentation.     /74 (BP Location: Right arm)   Pulse 66   Temp 97.6  F (36.4  C) (Axillary)   Resp 18   Wt 74.9 kg (165 lb 3.2 oz)   LMP  (LMP Unknown)   SpO2 97%   BMI 28.36 kg/m           Orientation: Alert, oriented x 1-2, appears comfortable, NAD.  Neuro: WNL  Pain status: Denies pain  Activity:  up with assist of 1 to bathroom  Peripheral edema:   Resp: on 1liter oxygen to keep oxygen above 92% while sleeping, btw 94 to 96 when alert  Cardiac: Tele is control AF 86s   GI:WNL    :walk to bathroom to urinate with assistance    Skin: Dry to touch, no sore with area expose to observation  Infusions: saline lock, iV Line is out,   Pertinent Labs:   Diet: 2 Gram sodium die  Discharge Plan: discharge to [facility] today with home care assistance

## 2023-04-19 ENCOUNTER — CARE COORDINATION (OUTPATIENT)
Dept: FAMILY MEDICINE | Facility: CLINIC | Age: 88
End: 2023-04-19

## 2023-04-19 NOTE — PLAN OF CARE
Physical Therapy Discharge Summary    Reason for therapy discharge:    Discharged to home with home therapy.    Progress towards therapy goal(s). See goals on Care Plan in [facility] electronic health record for goal details.  Goals partially met.  Barriers to achieving goals:   discharge from facility.    Therapy recommendation(s):    Continued therapy is recommended.  Rationale/Recommendations:  HHPT to maximize safety and indep with mobility in the home environment.

## 2023-04-19 NOTE — PROGRESS NOTES
Care Coordination Initial Assessment    The patient was admitted into [facility] on 4/16/23 for numbness and tension headaches. She was discharged on 4/18/23 with instructions to follow up with PCP and neurology.    PCP: Tatianna Swain    Referral Source:  ED/IP List    Utilization:   Last PCP Appt.: 12/30/22    Health Maintenance Reviewed: Yes    Current Medical Health Concerns:   Please review patients current medical problem list.    Patient/Caregiver Understanding: NA-did not answer     Medication Management:   NA-patient did not answer the phone to review     Functional Status:   NA-did not answer     Current Behavioral Health Concerns:   NA-was not able to review, pt did not answer the phone     Patient/Caregiver Understanding:  NA    Psychosocial:  NA-unable to review, pt did not answer the phone     Gaps:    NA    Resources Given:    NA    Plan:   The patient did not answer the phone so I did send her a Viropro message informing her to call and get scheduled with Dr. Swain for her hospital follow up visit.

## 2023-05-05 ENCOUNTER — APPOINTMENT (OUTPATIENT)
Dept: GENERAL RADIOLOGY | Facility: CLINIC | Age: 88
DRG: 554 | End: 2023-05-05
Attending: EMERGENCY MEDICINE
Payer: MEDICARE

## 2023-05-05 ENCOUNTER — HOSPITAL ENCOUNTER (INPATIENT)
Facility: CLINIC | Age: 88
LOS: 2 days | Discharge: NURSING, REHAB OR LONG-TERM CARE | DRG: 554 | End: 2023-05-08
Attending: EMERGENCY MEDICINE | Admitting: INTERNAL MEDICINE
Payer: MEDICARE

## 2023-05-05 ENCOUNTER — APPOINTMENT (OUTPATIENT)
Dept: CT IMAGING | Facility: CLINIC | Age: 88
DRG: 554 | End: 2023-05-05
Attending: EMERGENCY MEDICINE
Payer: MEDICARE

## 2023-05-05 DIAGNOSIS — R55 SYNCOPE, UNSPECIFIED SYNCOPE TYPE: ICD-10-CM

## 2023-05-05 DIAGNOSIS — R00.1 BRADYCARDIA: ICD-10-CM

## 2023-05-05 DIAGNOSIS — M10.9 ARTHRITIS OF RIGHT WRIST DUE TO GOUT: Primary | ICD-10-CM

## 2023-05-05 DIAGNOSIS — M00.9 PYOGENIC ARTHRITIS OF LEFT WRIST, DUE TO UNSPECIFIED ORGANISM (H): ICD-10-CM

## 2023-05-05 LAB
ANION GAP SERPL CALCULATED.3IONS-SCNC: 9 MMOL/L (ref 7–15)
BASOPHILS # BLD AUTO: 0.1 10E3/UL (ref 0–0.2)
BASOPHILS NFR BLD AUTO: 1 %
BUN SERPL-MCNC: 23.5 MG/DL (ref 8–23)
CALCIUM SERPL-MCNC: 9.1 MG/DL (ref 8.2–9.6)
CHLORIDE SERPL-SCNC: 94 MMOL/L (ref 98–107)
CREAT SERPL-MCNC: 0.7 MG/DL (ref 0.51–0.95)
CRP SERPL-MCNC: 63.61 MG/L
DEPRECATED HCO3 PLAS-SCNC: 31 MMOL/L (ref 22–29)
EOSINOPHIL # BLD AUTO: 0 10E3/UL (ref 0–0.7)
EOSINOPHIL NFR BLD AUTO: 1 %
ERYTHROCYTE [DISTWIDTH] IN BLOOD BY AUTOMATED COUNT: 14.6 % (ref 10–15)
ERYTHROCYTE [SEDIMENTATION RATE] IN BLOOD BY WESTERGREN METHOD: 22 MM/HR (ref 0–30)
GFR SERPL CREATININE-BSD FRML MDRD: 82 ML/MIN/1.73M2
GLUCOSE SERPL-MCNC: 121 MG/DL (ref 70–99)
HCT VFR BLD AUTO: 46.2 % (ref 35–47)
HGB BLD-MCNC: 15 G/DL (ref 11.7–15.7)
IMM GRANULOCYTES # BLD: 0 10E3/UL
IMM GRANULOCYTES NFR BLD: 0 %
LACTATE SERPL-SCNC: 2.7 MMOL/L (ref 0.7–2)
LYMPHOCYTES # BLD AUTO: 0.8 10E3/UL (ref 0.8–5.3)
LYMPHOCYTES NFR BLD AUTO: 17 %
MCH RBC QN AUTO: 28.5 PG (ref 26.5–33)
MCHC RBC AUTO-ENTMCNC: 32.5 G/DL (ref 31.5–36.5)
MCV RBC AUTO: 88 FL (ref 78–100)
MONOCYTES # BLD AUTO: 1 10E3/UL (ref 0–1.3)
MONOCYTES NFR BLD AUTO: 22 %
NEUTROPHILS # BLD AUTO: 2.7 10E3/UL (ref 1.6–8.3)
NEUTROPHILS NFR BLD AUTO: 59 %
NRBC # BLD AUTO: 0 10E3/UL
NRBC BLD AUTO-RTO: 0 /100
PLAT MORPH BLD: NORMAL
PLATELET # BLD AUTO: 218 10E3/UL (ref 150–450)
POTASSIUM SERPL-SCNC: 4.3 MMOL/L (ref 3.4–5.3)
RBC # BLD AUTO: 5.27 10E6/UL (ref 3.8–5.2)
RBC MORPH BLD: NORMAL
SODIUM SERPL-SCNC: 134 MMOL/L (ref 136–145)
WBC # BLD AUTO: 4.6 10E3/UL (ref 4–11)

## 2023-05-05 PROCEDURE — 87040 BLOOD CULTURE FOR BACTERIA: CPT | Performed by: EMERGENCY MEDICINE

## 2023-05-05 PROCEDURE — 73110 X-RAY EXAM OF WRIST: CPT | Mod: LT

## 2023-05-05 PROCEDURE — 84145 PROCALCITONIN (PCT): CPT | Performed by: PHYSICIAN ASSISTANT

## 2023-05-05 PROCEDURE — 82310 ASSAY OF CALCIUM: CPT | Performed by: EMERGENCY MEDICINE

## 2023-05-05 PROCEDURE — 96366 THER/PROPH/DIAG IV INF ADDON: CPT

## 2023-05-05 PROCEDURE — 258N000003 HC RX IP 258 OP 636: Performed by: EMERGENCY MEDICINE

## 2023-05-05 PROCEDURE — 70450 CT HEAD/BRAIN W/O DYE: CPT | Mod: MG

## 2023-05-05 PROCEDURE — 36415 COLL VENOUS BLD VENIPUNCTURE: CPT | Performed by: EMERGENCY MEDICINE

## 2023-05-05 PROCEDURE — 96365 THER/PROPH/DIAG IV INF INIT: CPT

## 2023-05-05 PROCEDURE — 83605 ASSAY OF LACTIC ACID: CPT | Performed by: EMERGENCY MEDICINE

## 2023-05-05 PROCEDURE — 86140 C-REACTIVE PROTEIN: CPT | Performed by: EMERGENCY MEDICINE

## 2023-05-05 PROCEDURE — 250N000013 HC RX MED GY IP 250 OP 250 PS 637: Performed by: EMERGENCY MEDICINE

## 2023-05-05 PROCEDURE — 20605 DRAIN/INJ JOINT/BURSA W/O US: CPT | Mod: LT

## 2023-05-05 PROCEDURE — 84550 ASSAY OF BLOOD/URIC ACID: CPT | Performed by: INTERNAL MEDICINE

## 2023-05-05 PROCEDURE — 99285 EMERGENCY DEPT VISIT HI MDM: CPT | Mod: 25

## 2023-05-05 PROCEDURE — 85025 COMPLETE CBC W/AUTO DIFF WBC: CPT | Performed by: EMERGENCY MEDICINE

## 2023-05-05 PROCEDURE — 85652 RBC SED RATE AUTOMATED: CPT | Performed by: EMERGENCY MEDICINE

## 2023-05-05 PROCEDURE — 93005 ELECTROCARDIOGRAM TRACING: CPT

## 2023-05-05 PROCEDURE — 0R9N3ZZ DRAINAGE OF RIGHT WRIST JOINT, PERCUTANEOUS APPROACH: ICD-10-PCS | Performed by: EMERGENCY MEDICINE

## 2023-05-05 PROCEDURE — 250N000011 HC RX IP 250 OP 636: Performed by: EMERGENCY MEDICINE

## 2023-05-05 RX ORDER — ACETAMINOPHEN 500 MG
1000 TABLET ORAL ONCE
Status: COMPLETED | OUTPATIENT
Start: 2023-05-05 | End: 2023-05-05

## 2023-05-05 RX ORDER — LIDOCAINE HYDROCHLORIDE AND EPINEPHRINE 10; 10 MG/ML; UG/ML
5 INJECTION, SOLUTION INFILTRATION; PERINEURAL ONCE
Status: DISCONTINUED | OUTPATIENT
Start: 2023-05-05 | End: 2023-05-08 | Stop reason: HOSPADM

## 2023-05-05 RX ORDER — CEFTRIAXONE 1 G/1
1 INJECTION, POWDER, FOR SOLUTION INTRAMUSCULAR; INTRAVENOUS ONCE
Status: COMPLETED | OUTPATIENT
Start: 2023-05-05 | End: 2023-05-06

## 2023-05-05 RX ADMIN — CEFTRIAXONE 1 G: 1 INJECTION, POWDER, FOR SOLUTION INTRAMUSCULAR; INTRAVENOUS at 23:36

## 2023-05-05 RX ADMIN — ACETAMINOPHEN 1000 MG: 500 TABLET ORAL at 23:35

## 2023-05-05 RX ADMIN — SODIUM CHLORIDE 1000 ML: 9 INJECTION, SOLUTION INTRAVENOUS at 23:39

## 2023-05-05 ASSESSMENT — ENCOUNTER SYMPTOMS
HEADACHES: 0
DIZZINESS: 1
CHILLS: 0
MYALGIAS: 1
WEAKNESS: 1
COLOR CHANGE: 1
ACTIVITY CHANGE: 1
FATIGUE: 1
ARTHRALGIAS: 1
FEVER: 0
JOINT SWELLING: 1

## 2023-05-05 ASSESSMENT — ACTIVITIES OF DAILY LIVING (ADL)
ADLS_ACUITY_SCORE: 35
ADLS_ACUITY_SCORE: 35

## 2023-05-06 PROBLEM — M00.9: Status: ACTIVE | Noted: 2023-05-06

## 2023-05-06 LAB
ANION GAP SERPL CALCULATED.3IONS-SCNC: 9 MMOL/L (ref 7–15)
BASOPHILS # BLD AUTO: 0 10E3/UL (ref 0–0.2)
BASOPHILS NFR BLD AUTO: 1 %
BUN SERPL-MCNC: 14.6 MG/DL (ref 8–23)
CALCIUM SERPL-MCNC: 8.2 MG/DL (ref 8.2–9.6)
CHLORIDE SERPL-SCNC: 96 MMOL/L (ref 98–107)
CREAT SERPL-MCNC: 0.59 MG/DL (ref 0.51–0.95)
DEPRECATED HCO3 PLAS-SCNC: 25 MMOL/L (ref 22–29)
EOSINOPHIL # BLD AUTO: 0 10E3/UL (ref 0–0.7)
EOSINOPHIL NFR BLD AUTO: 0 %
ERYTHROCYTE [DISTWIDTH] IN BLOOD BY AUTOMATED COUNT: 14.7 % (ref 10–15)
GFR SERPL CREATININE-BSD FRML MDRD: 85 ML/MIN/1.73M2
GLUCOSE SERPL-MCNC: 101 MG/DL (ref 70–99)
GRAM STAIN RESULT: NORMAL
GRAM STAIN RESULT: NORMAL
HCT VFR BLD AUTO: 40.4 % (ref 35–47)
HGB BLD-MCNC: 13.3 G/DL (ref 11.7–15.7)
IMM GRANULOCYTES # BLD: 0 10E3/UL
IMM GRANULOCYTES NFR BLD: 0 %
LACTATE SERPL-SCNC: 1 MMOL/L (ref 0.7–2)
LYMPHOCYTES # BLD AUTO: 1 10E3/UL (ref 0.8–5.3)
LYMPHOCYTES NFR BLD AUTO: 14 %
MCH RBC QN AUTO: 28.4 PG (ref 26.5–33)
MCHC RBC AUTO-ENTMCNC: 32.9 G/DL (ref 31.5–36.5)
MCV RBC AUTO: 86 FL (ref 78–100)
MONOCYTES # BLD AUTO: 1.7 10E3/UL (ref 0–1.3)
MONOCYTES NFR BLD AUTO: 24 %
NEUTROPHILS # BLD AUTO: 4.3 10E3/UL (ref 1.6–8.3)
NEUTROPHILS NFR BLD AUTO: 61 %
NRBC # BLD AUTO: 0 10E3/UL
NRBC BLD AUTO-RTO: 0 /100
PLAT MORPH BLD: NORMAL
PLATELET # BLD AUTO: 176 10E3/UL (ref 150–450)
POTASSIUM SERPL-SCNC: 4.1 MMOL/L (ref 3.4–5.3)
PROCALCITONIN SERPL IA-MCNC: 0.1 NG/ML
RBC # BLD AUTO: 4.68 10E6/UL (ref 3.8–5.2)
RBC MORPH BLD: NORMAL
SODIUM SERPL-SCNC: 130 MMOL/L (ref 136–145)
URATE SERPL-MCNC: 6.2 MG/DL (ref 2.4–5.7)
WBC # BLD AUTO: 7 10E3/UL (ref 4–11)

## 2023-05-06 PROCEDURE — 80048 BASIC METABOLIC PNL TOTAL CA: CPT | Performed by: INTERNAL MEDICINE

## 2023-05-06 PROCEDURE — 89050 BODY FLUID CELL COUNT: CPT | Performed by: EMERGENCY MEDICINE

## 2023-05-06 PROCEDURE — 96367 TX/PROPH/DG ADDL SEQ IV INF: CPT

## 2023-05-06 PROCEDURE — 36415 COLL VENOUS BLD VENIPUNCTURE: CPT | Performed by: INTERNAL MEDICINE

## 2023-05-06 PROCEDURE — 120N000001 HC R&B MED SURG/OB

## 2023-05-06 PROCEDURE — 36415 COLL VENOUS BLD VENIPUNCTURE: CPT | Performed by: EMERGENCY MEDICINE

## 2023-05-06 PROCEDURE — 250N000013 HC RX MED GY IP 250 OP 250 PS 637: Performed by: INTERNAL MEDICINE

## 2023-05-06 PROCEDURE — 250N000011 HC RX IP 250 OP 636: Performed by: EMERGENCY MEDICINE

## 2023-05-06 PROCEDURE — 99223 1ST HOSP IP/OBS HIGH 75: CPT | Mod: AI | Performed by: INTERNAL MEDICINE

## 2023-05-06 PROCEDURE — 250N000011 HC RX IP 250 OP 636: Performed by: INTERNAL MEDICINE

## 2023-05-06 PROCEDURE — 85025 COMPLETE CBC W/AUTO DIFF WBC: CPT | Performed by: INTERNAL MEDICINE

## 2023-05-06 PROCEDURE — 250N000013 HC RX MED GY IP 250 OP 250 PS 637: Performed by: ORTHOPAEDIC SURGERY

## 2023-05-06 PROCEDURE — 258N000003 HC RX IP 258 OP 636: Performed by: EMERGENCY MEDICINE

## 2023-05-06 PROCEDURE — 87205 SMEAR GRAM STAIN: CPT | Performed by: EMERGENCY MEDICINE

## 2023-05-06 PROCEDURE — 83605 ASSAY OF LACTIC ACID: CPT | Performed by: EMERGENCY MEDICINE

## 2023-05-06 PROCEDURE — 87070 CULTURE OTHR SPECIMN AEROBIC: CPT | Performed by: EMERGENCY MEDICINE

## 2023-05-06 RX ORDER — OMEPRAZOLE
20 KIT
Status: DISCONTINUED | OUTPATIENT
Start: 2023-05-06 | End: 2023-05-06

## 2023-05-06 RX ORDER — ACETAMINOPHEN 325 MG/1
650 TABLET ORAL EVERY 6 HOURS PRN
Status: DISCONTINUED | OUTPATIENT
Start: 2023-05-06 | End: 2023-05-08 | Stop reason: HOSPADM

## 2023-05-06 RX ORDER — INDOMETHACIN 25 MG/1
25 CAPSULE ORAL
Status: DISCONTINUED | OUTPATIENT
Start: 2023-05-06 | End: 2023-05-08 | Stop reason: HOSPADM

## 2023-05-06 RX ORDER — ACETAMINOPHEN 650 MG/1
650 SUPPOSITORY RECTAL EVERY 6 HOURS PRN
Status: DISCONTINUED | OUTPATIENT
Start: 2023-05-06 | End: 2023-05-08 | Stop reason: HOSPADM

## 2023-05-06 RX ORDER — CITALOPRAM HYDROBROMIDE 10 MG/1
10 TABLET ORAL DAILY
Status: DISCONTINUED | OUTPATIENT
Start: 2023-05-07 | End: 2023-05-08 | Stop reason: HOSPADM

## 2023-05-06 RX ORDER — CARVEDILOL 3.12 MG/1
1.56 TABLET, FILM COATED ORAL 2 TIMES DAILY WITH MEALS
Status: DISCONTINUED | OUTPATIENT
Start: 2023-05-06 | End: 2023-05-08 | Stop reason: HOSPADM

## 2023-05-06 RX ORDER — DONEPEZIL HYDROCHLORIDE 10 MG/1
10 TABLET, FILM COATED ORAL AT BEDTIME
Status: DISCONTINUED | OUTPATIENT
Start: 2023-05-06 | End: 2023-05-08 | Stop reason: HOSPADM

## 2023-05-06 RX ORDER — LIDOCAINE 40 MG/G
CREAM TOPICAL
Status: DISCONTINUED | OUTPATIENT
Start: 2023-05-06 | End: 2023-05-08 | Stop reason: HOSPADM

## 2023-05-06 RX ORDER — VANCOMYCIN HYDROCHLORIDE 500 MG/10ML
500 INJECTION, POWDER, LYOPHILIZED, FOR SOLUTION INTRAVENOUS EVERY 12 HOURS
Status: DISCONTINUED | OUTPATIENT
Start: 2023-05-06 | End: 2023-05-06

## 2023-05-06 RX ORDER — SIMVASTATIN 40 MG
40 TABLET ORAL AT BEDTIME
Status: DISCONTINUED | OUTPATIENT
Start: 2023-05-06 | End: 2023-05-08 | Stop reason: HOSPADM

## 2023-05-06 RX ORDER — OLANZAPINE 5 MG/1
5 TABLET, ORALLY DISINTEGRATING ORAL DAILY PRN
Status: DISCONTINUED | OUTPATIENT
Start: 2023-05-06 | End: 2023-05-08 | Stop reason: HOSPADM

## 2023-05-06 RX ORDER — FUROSEMIDE 20 MG
20 TABLET ORAL DAILY
Status: DISCONTINUED | OUTPATIENT
Start: 2023-05-07 | End: 2023-05-08 | Stop reason: HOSPADM

## 2023-05-06 RX ADMIN — VANCOMYCIN HYDROCHLORIDE 1250 MG: 1 INJECTION, POWDER, LYOPHILIZED, FOR SOLUTION INTRAVENOUS at 01:21

## 2023-05-06 RX ADMIN — INDOMETHACIN 25 MG: 25 CAPSULE ORAL at 14:12

## 2023-05-06 RX ADMIN — CARVEDILOL 1.56 MG: 3.12 TABLET, FILM COATED ORAL at 18:59

## 2023-05-06 RX ADMIN — INDOMETHACIN 25 MG: 25 CAPSULE ORAL at 08:16

## 2023-05-06 RX ADMIN — SIMVASTATIN 40 MG: 40 TABLET, FILM COATED ORAL at 21:39

## 2023-05-06 RX ADMIN — VANCOMYCIN HYDROCHLORIDE 500 MG: 500 INJECTION, POWDER, LYOPHILIZED, FOR SOLUTION INTRAVENOUS at 14:12

## 2023-05-06 RX ADMIN — INDOMETHACIN 25 MG: 25 CAPSULE ORAL at 17:58

## 2023-05-06 RX ADMIN — DONEPEZIL HYDROCHLORIDE 10 MG: 10 TABLET ORAL at 21:39

## 2023-05-06 RX ADMIN — Medication 40 MG: at 08:16

## 2023-05-06 ASSESSMENT — ACTIVITIES OF DAILY LIVING (ADL)
DIFFICULTY_EATING/SWALLOWING: NO
ADLS_ACUITY_SCORE: 25
ADLS_ACUITY_SCORE: 24
ADLS_ACUITY_SCORE: 35
CHANGE_IN_FUNCTIONAL_STATUS_SINCE_ONSET_OF_CURRENT_ILLNESS/INJURY: NO
DRESSING/BATHING_DIFFICULTY: NO
FALL_HISTORY_WITHIN_LAST_SIX_MONTHS: YES
TOILETING_ISSUES: NO
ADLS_ACUITY_SCORE: 41
CONCENTRATING,_REMEMBERING_OR_MAKING_DECISIONS_DIFFICULTY: YES
ADLS_ACUITY_SCORE: 41
NUMBER_OF_TIMES_PATIENT_HAS_FALLEN_WITHIN_LAST_SIX_MONTHS: 4
ADLS_ACUITY_SCORE: 25
ADLS_ACUITY_SCORE: 35
WEAR_GLASSES_OR_BLIND: NO
ADLS_ACUITY_SCORE: 25
ADLS_ACUITY_SCORE: 24
ADLS_ACUITY_SCORE: 25
DOING_ERRANDS_INDEPENDENTLY_DIFFICULTY: NO
WALKING_OR_CLIMBING_STAIRS_DIFFICULTY: NO
ADLS_ACUITY_SCORE: 41
ADLS_ACUITY_SCORE: 41

## 2023-05-06 NOTE — PHARMACY-VANCOMYCIN DOSING SERVICE
Pharmacy Vancomycin Initial Note  Date of Service May 6, 2023  Patient's  1932  89 year old, female    Indication: Bone and Joint Infection    Current estimated CrCl = Estimated Creatinine Clearance: 40.6 mL/min (based on SCr of 0.7 mg/dL).    Creatinine for last 3 days  2023:  9:13 PM Creatinine 0.70 mg/dL    Recent Vancomycin Level(s) for last 3 days  No results found for requested labs within last 3 days.      Vancomycin IV Administrations (past 72 hours)                   vancomycin (VANCOCIN) 1,250 mg in 0.9% NaCl 250 mL intermittent infusion (mg) 1,250 mg New Bag 23 0121                Nephrotoxins and other renal medications (From now, onward)    Start     Dose/Rate Route Frequency Ordered Stop    23 1300  vancomycin (VANCOCIN) 500 mg vial to attach to  mL bag         500 mg  over 1 Hours Intravenous EVERY 12 HOURS 23 0415            Contrast Orders - past 72 hours (72h ago, onward)    None          [employer] Prediction of Planned Initial Vancomycin Regimen  Loading dose: 1250 mg  Regimen: 500 mg IV every 12 hours.  Regimen Start time: 13:21 on 2023  Exposure target: AUC24 (range)400-600 mg/L.hr   AUC24,ss: 510 mg/L.hr  Probability of AUC24 > 400: 76 %  Ctrough,ss: 17.4 mg/L  Probability of Ctrough,ss > 20: 36 %  Probability of nephrotoxicity (Lodise LY ): 13 %        Plan:  1. Start vancomycin  500 mg IV q12h  after loading dose.    2. Vancomycin monitoring method: AUC  3. Vancomycin therapeutic monitoring goal: 400-600 mg*h/L  4. Pharmacy will check vancomycin levels as appropriate in 1-3 Days.    5. Serum creatinine levels will be ordered daily for the first week of therapy and at least twice weekly for subsequent weeks.      Shawn Cooper, [facility]

## 2023-05-06 NOTE — PLAN OF CARE
Goal Outcome Evaluation:    [facility]    ED Boarding Nurse Handoff Addendum Report:    Date/time: 5/6/2023, 10:22 AM    Activity Level: Up with assist    Fall Risk: Yes:  nonskid shoes/slippers when out of bed, patient and family education, assistive device/personal items within reach, activity supervised and mobility aid in reach    Active Infusions: NA    Current Meds Due: NA    Current care needs: Ax1 to BR, Needs brace for L wrist. Disoriented to place and situation.    Oxygen requirements (liters/min and/or FiO2): NA    Respiratory status: Room air    Vital signs (within last 30 minutes):    Vitals:    05/06/23 0215 05/06/23 0230 05/06/23 0318 05/06/23 0756   BP:  126/56 128/77 131/85   BP Location:   Right arm Right arm   Pulse:  96 81    Resp:   16    Temp:   98  F (36.7  C) 99.9  F (37.7  C)   TempSrc:   Oral Oral   SpO2: 90% 91% 94% 95%   Weight:           Focused assessment within last 30 minutes:    See flowsheet    ED Boarding Nurse name: Colleen Galvan RN    RECEIVING UNIT ED HANDOFF REVIEW    Above ED Nurse Handoff Report was reviewed: Yes  Reviewed by: Ene Roberts RN on May 6, 2023 at 11:35 AM

## 2023-05-06 NOTE — PHARMACY-ADMISSION MEDICATION HISTORY
Pharmacist Admission Medication History    Admission medication history is complete. The information provided in this note is only as accurate as the sources available at the time of the update.    Medication reconciliation/reorder completed by provider prior to medication history? No    Information Source(s): Facility ([facility] medication list/MAR and CareEverywhere/SureScripts via N/A    Pertinent Information: none    Changes made to PTA medication list:    Added: None    Deleted: azithromycin    Changed: None    Medication Affordability:  Not including over the counter (OTC) medications, was there a time in the past 12 months when you did not take your medications as prescribed because of cost?: Unable to Assess    Allergies reviewed with patient and updates made in EHR: no    Medication History Completed By: Edward Lai RP 5/6/2023 10:59 AM    Prior to Admission medications    Medication Sig Last Dose Taking? Auth Provider Long Term End Date   apixaban ANTICOAGULANT (ELIQUIS ANTICOAGULANT) 2.5 MG tablet Take 1 tablet (2.5 mg) by mouth 2 times daily 5/5/2023 at PM Yes Antonino Hernández MD     carvedilol (COREG) 3.125 MG tablet Take 0.5 tablets (1.5625 mg) by mouth 2 times daily (with meals) 5/5/2023 at PM Yes Tori Wiseman PA-C Yes    citalopram (CELEXA) 10 MG tablet Take 1 tablet (10 mg) by mouth daily 5/5/2023 at AM Yes Tatianna Swain MD Yes    donepezil (ARICEPT) 10 MG tablet Take 1 tablet (10 mg) by mouth At Bedtime 5/4/2023 at HS Yes Lara Bonilla PA     furosemide (LASIX) 20 MG tablet Take 1 tablet (20 mg) by mouth daily And if weight is greater than 2 pounds from baseline weight take extra pill 5/5/2023 at AM Yes Tori Wiseman PA-C Yes    potassium chloride ER (KLOR-CON M) 20 MEQ CR tablet Take 1 tablet (20 mEq) by mouth daily 5/5/2023 at AM Yes Tori Wiseman PA-C     simvastatin (ZOCOR) 40 MG tablet TAKE 1 TABLET (40 MG) BY MOUTH AT BEDTIME  5/4/2023 at  Yes Tatianna Swain MD Yes

## 2023-05-06 NOTE — PROGRESS NOTES
Ortho Brief    89 yo F with L wrist pain, erythema, swelling.  WBC nml.  CRP up. Was down.  Xrays OA, no fx.  Image of wrist fluid reviewed, suspicious for gout.    Rec:  Treat empirically for gout (started 3 days of indomethacin with omeprazole, change if prefer something else)  Follow cultures  Ice, wrist brace  If she fails to improve or culture positive will consider wrist washout Sunday or Monday

## 2023-05-06 NOTE — ED PROVIDER NOTES
"  History   Chief Complaint:  Dizziness    The history is provided by the patient and a relative.      Ilda Nassar is a 89 year old female with a history of chronic atrial fibrillation, atrial flutter, hyperlipidemia, hypertension, memory changes, acute on chronic congestive heart failure who presents with dizziness and weakness. Virginia explains that she was sitting on the couch this afternoon (1300) and suddenly woke on the floor. She thinks that she slid off the couch and onto the floor after a syncopal episode. Virginia does not remember falling. She has been experiencing dizziness, weakness, fatigue, and myalgias for approximately one week. She did not wake this morning dizzy but became dizzy approximately one hour prior to her syncopal episode (1200). She is able to go to the bathroom and dress herself independently but has had more difficulty with dressing as of recently. Virginia has left hand and wrist pain, redness, and swelling as well as left lower extremity pain. Denies history of gout. Denies headaches, fevers, and chills.     Independent Historian:   Sister and brother-in-law - They explain that Virginia lives in her own apartment at [facility] in assisted living. The facility had called Virginia's daughter who lives out of state then they were made aware of Virginia's fall. Virginia receives her morning and evening medications with help of the facility. Yesterday, Virginia seemed \"foggy\" and was having exacerbated difficulty standing up without assistance. Her sister came to visit her this morning and she seemed \"bright eyed, sparkling\" and to have returned to baseline. She notes that the left hand was swollen and warm yesterday but has somewhat improved today.     Review of External Notes:  Reviewed discharge summary from 4/18/23 after pt had been admitted with CHF exacerbation.     ROS:  Review of Systems   Constitutional: Positive for activity change and fatigue. Negative for chills and fever. "   Cardiovascular: Positive for leg swelling.   Musculoskeletal: Positive for arthralgias, joint swelling and myalgias.   Skin: Positive for color change.   Neurological: Positive for dizziness, syncope and weakness. Negative for headaches.     Allergies:  The patient has no known allergies.     Medications:    Eliquis  Azithromycin 04/18/23-04/21/23  Carvedilol   Donepezil   Lasix   Simvastatin      Past Medical History:    Chronic atrial fibrillation   Atrial flutter  Hyperlipidemia   Hypertension   Memory changes   Depression   Generalized muscle weakness   Acute on chronic congestive heart failure     Social History:  Presents to the emergency department with her sister and brother-in-law.   Lives in assisted living.   PCP: Tatianna Swain MD     Physical Exam     Patient Vitals for the past 24 hrs:   BP Temp Temp src Pulse Resp SpO2 Weight   05/05/23 2328 -- -- -- -- -- -- 48.2 kg (106 lb 4.8 oz)   05/05/23 2110 (!) 155/80 -- -- 93 -- -- --   05/05/23 2105 (!) 154/93 -- -- 87 -- -- --   05/05/23 2015 (!) 169/108 -- -- 89 -- 92 % --   05/05/23 2010 (!) 144/125 98.7  F (37.1  C) Temporal 83 18 92 % --     Physical Exam  Nursing note and vitals reviewed.  HENT:   Mouth/Throat: Moist mucous membranes.   Eyes: EOMI, nonicteric sclera  Cardiovascular: Normal rate, regular rhythm, no murmurs, rubs, or gallops  Pulmonary/Chest: Effort normal and breath sounds normal. No respiratory distress. No wheezes. No rales.   Abdominal: Soft. Nontender, nondistended, no guarding or rigidity.   Musculoskeletal: Left wrist is erythematous, swollen, and painful with ROM.   Neurological: Alert. Moves all extremities spontaneously.   Skin: Skin is warm and dry. No rash noted.     Emergency Department Course     Imaging:  XR Wrist Left G/E 3 Views   Final Result   IMPRESSION: Osteopenia. No fractures are evident. Diffuse soft tissue swelling. Advanced degenerative changes in the STT, first CMC and DIP joints of the fifth finger. Mild  to moderate degenerative changes in the first MCP joint, IP joint of the thumb    and multiple additional IP joints.      Head CT w/o contrast   Final Result   IMPRESSION:   1.  No CT evidence for acute intracranial process.   2.  Brain atrophy and presumed chronic microvascular ischemic changes as above.           Laboratory:  Labs Ordered and Resulted from Time of ED Arrival to Time of ED Departure   BASIC METABOLIC PANEL - Abnormal       Result Value    Sodium 134 (*)     Potassium 4.3      Chloride 94 (*)     Carbon Dioxide (CO2) 31 (*)     Anion Gap 9      Urea Nitrogen 23.5 (*)     Creatinine 0.70      Calcium 9.1      Glucose 121 (*)     GFR Estimate 82     LACTIC ACID WHOLE BLOOD - Abnormal    Lactic Acid 2.7 (*)    CRP INFLAMMATION - Abnormal    CRP Inflammation 63.61 (*)    CBC WITH PLATELETS AND DIFFERENTIAL - Abnormal    WBC Count 4.6      RBC Count 5.27 (*)     Hemoglobin 15.0      Hematocrit 46.2      MCV 88      MCH 28.5      MCHC 32.5      RDW 14.6      Platelet Count 218      % Neutrophils 59      % Lymphocytes 17      % Monocytes 22      % Eosinophils 1      % Basophils 1      % Immature Granulocytes 0      NRBCs per 100 WBC 0      Absolute Neutrophils 2.7      Absolute Lymphocytes 0.8      Absolute Monocytes 1.0      Absolute Eosinophils 0.0      Absolute Basophils 0.1      Absolute Immature Granulocytes 0.0      Absolute NRBCs 0.0     ERYTHROCYTE SEDIMENTATION RATE AUTO - Normal    Erythrocyte Sedimentation Rate 22     RBC AND PLATELET MORPHOLOGY    Platelet Assessment        Value: Automated Count Confirmed. Platelet morphology is normal.    RBC Morphology Confirmed RBC Indices     CRYSTAL ID SYNOVIAL FLUID   LACTIC ACID WHOLE BLOOD   BLOOD CULTURE   BLOOD CULTURE   GRAM STAIN   AEROBIC BACTERIAL CULTURE ROUTINE   CELL COUNT WITH DIFFERENTIAL FLUID     Arthrocentesis    Date/Time: 5/5/2023 11:38 PM    Performed by: Harpreet Bejarano MD  Authorized by: Harpreet Bejarano MD    Risks,  benefits and alternatives discussed.      LOCATION:   Location:  Wrist  Wrist:  L radiocarpal  ANESTHESIA (see MAR for exact dosages):   Anesthesia method:  Local infiltration  Local anesthetic:  Lidocaine 1% WITH epi      PROCEDURE DETAILS:     Needle gauge: 21 G.    Ultrasound guidance: no      Approach:  Posterior    Aspirate amount:  <1mL    Aspirate characteristics:  Yellow    Steroid injected: no      Specimen collected: yes      Dressing: adhesive bandage      PROCEDURE    Patient Tolerance:  Patient tolerated the procedure well with no immediate complications    Emergency Department Course & Assessments:    Interventions:  Medications   lidocaine 1% with EPINEPHrine 1:100,000 injection 5 mL (has no administration in time range)   vancomycin (VANCOCIN) 1,250 mg in 0.9% NaCl 250 mL intermittent infusion (has no administration in time range)   acetaminophen (TYLENOL) tablet 1,000 mg (1,000 mg Oral $Given 5/5/23 2335)   cefTRIAXone (ROCEPHIN) 1 g vial to attach to  mL bag for ADULTS or NS 50 mL bag for PEDS (1 g Intravenous $New Bag 5/5/23 2336)   0.9% sodium chloride BOLUS (1,000 mLs Intravenous $New Bag 5/5/23 2339)     Assessments:  2046 I obtained history and examined the patient as noted above. I discussed findings and admission with the patient. All questions answered.    I rechecked the patient and performed the procedure as noted above.     Independent Interpretation (X-rays, CTs, rhythm strip):  XR independently reviewed. No fracture visualized.     Consultations/Discussion of Management or Tests:          Social Determinants of Health affecting care:   None    Disposition:  The patient was admitted to the hospital under the care of Dr. Danielle.       Medical Decision Making:  Pt presents with CC dizziness/weakness. Some concern for syncopal episode, though pt is a poor historian, and doesn't recall events that led to her being on floor. Vitals unremarkable. Exam notable for left wrist  pain/swelling/warmth. Ddx includes fracture, gout, septic arthritis, other inflammatory arthritis, among other etiologies. Imaging negative for fracture. No leukocytosis noted, but pt has CRP elevation and lactic acid elevation. Given exam, this is concerning for septic arthritis. I was able to aspirate a small amount of fluid from wrist that was sent for culture/gram stain. Not enough fluid to perform cell count and crystals. Pt started on vanc/rocephin after blood cultures obtained in case of seeding from bacteremia. Made orthopedic surgery aware of pt, though was unable to personally discuss. Discussed case with hospitalist service, Dr. Danielle, who accepts for admission. Pt admitted in stable condition. She's in agreement with plan as are her family. All questions answered.     Diagnosis:    ICD-10-CM    1. Pyogenic arthritis of left wrist, due to unspecified organism (H)  M00.9            Scribe Disclosure:  Bernadette EARL, am serving as a scribe at 8:19 PM on 5/5/2023 to document services personally performed by Harpreet Bejarano MD based on my observations and the provider's statements to me.         Harpreet Bejarano MD  05/06/23 0600

## 2023-05-06 NOTE — PLAN OF CARE
Goal Outcome Evaluation:      Plan of Care Reviewed With: patient, family    Overall Patient Progress: improving    RN from [id number]:    Pt A&O-intermittent confusion/forgetfulness. VS stable; afebrile. Denies pain. CMS intact. Up w/ SBA. Voiding. Tolerating regular diet.

## 2023-05-06 NOTE — ED NOTES
[facility]  ED Nurse Handoff Report    Ilda Nassar is a 89 year old female   ED Chief complaint: Dizziness  . ED Diagnosis:   Final diagnoses:   Pyogenic arthritis of left wrist, due to unspecified organism (H)     Allergies: No Known Allergies    Code Status: Full Code  Activity level - Baseline/Home:  Independent. Activity Level - Current:   Assist X 1. Lift room needed: No. Bariatric: No   Needed: No   Isolation: No. Infection: Not Applicable.     Vital Signs:   Vitals:    05/05/23 2015 05/05/23 2105 05/05/23 2110 05/05/23 2328   BP: (!) 169/108 (!) 154/93 (!) 155/80    Pulse: 89 87 93    Resp:       Temp:       TempSrc:       SpO2: 92%      Weight:    48.2 kg (106 lb 4.8 oz)       Cardiac Rhythm:  ,      Pain level:    Patient confused: Yes. Patient Falls Risk: Yes.   Elimination Status: Has voided   Patient Report - Initial Complaint: dizziness. Focused Assessment: Pt arrives via EMS with dizziness that she states started around 1200. Per pt she slide out of her chair at home sometime this afternoon. Pt states no pain but has noticeable swelling to left hand. ABCs intact, alert to person.    Tests Performed: labs, imaging Abnormal Results:   Labs Ordered and Resulted from Time of ED Arrival to Time of ED Departure   BASIC METABOLIC PANEL - Abnormal       Result Value    Sodium 134 (*)     Potassium 4.3      Chloride 94 (*)     Carbon Dioxide (CO2) 31 (*)     Anion Gap 9      Urea Nitrogen 23.5 (*)     Creatinine 0.70      Calcium 9.1      Glucose 121 (*)     GFR Estimate 82     LACTIC ACID WHOLE BLOOD - Abnormal    Lactic Acid 2.7 (*)    CRP INFLAMMATION - Abnormal    CRP Inflammation 63.61 (*)    CBC WITH PLATELETS AND DIFFERENTIAL - Abnormal    WBC Count 4.6      RBC Count 5.27 (*)     Hemoglobin 15.0      Hematocrit 46.2      MCV 88      MCH 28.5      MCHC 32.5      RDW 14.6      Platelet Count 218      % Neutrophils 59      % Lymphocytes 17      % Monocytes 22      % Eosinophils  1      % Basophils 1      % Immature Granulocytes 0      NRBCs per 100 WBC 0      Absolute Neutrophils 2.7      Absolute Lymphocytes 0.8      Absolute Monocytes 1.0      Absolute Eosinophils 0.0      Absolute Basophils 0.1      Absolute Immature Granulocytes 0.0      Absolute NRBCs 0.0     ERYTHROCYTE SEDIMENTATION RATE AUTO - Normal    Erythrocyte Sedimentation Rate 22     LACTIC ACID WHOLE BLOOD - Normal    Lactic Acid 1.0     RBC AND PLATELET MORPHOLOGY    Platelet Assessment        Value: Automated Count Confirmed. Platelet morphology is normal.    RBC Morphology Confirmed RBC Indices     CRYSTAL ID SYNOVIAL FLUID   CELL COUNT BODY FLUID   BLOOD CULTURE   BLOOD CULTURE   GRAM STAIN   AEROBIC BACTERIAL CULTURE ROUTINE   CELL COUNT WITH DIFFERENTIAL FLUID     XR Wrist Left G/E 3 Views   Final Result   IMPRESSION: Osteopenia. No fractures are evident. Diffuse soft tissue swelling. Advanced degenerative changes in the STT, first CMC and DIP joints of the fifth finger. Mild to moderate degenerative changes in the first MCP joint, IP joint of the thumb    and multiple additional IP joints.      Head CT w/o contrast   Final Result   IMPRESSION:   1.  No CT evidence for acute intracranial process.   2.  Brain atrophy and presumed chronic microvascular ischemic changes as above.         Treatments provided: See MAR  Family Comments: family at bedside & aware of admission  OBS brochure/video discussed/provided to patient:  No  ED Medications:   Medications   lidocaine 1% with EPINEPHrine 1:100,000 injection 5 mL (has no administration in time range)   vancomycin (VANCOCIN) 1,250 mg in 0.9% NaCl 250 mL intermittent infusion (1,250 mg Intravenous $New Bag 5/6/23 0121)   acetaminophen (TYLENOL) tablet 1,000 mg (1,000 mg Oral $Given 5/5/23 2335)   cefTRIAXone (ROCEPHIN) 1 g vial to attach to  mL bag for ADULTS or NS 50 mL bag for PEDS (0 g Intravenous Stopped 5/6/23 0118)   0.9% sodium chloride BOLUS (0 mLs Intravenous  Stopped 5/6/23 0118)     Drips infusing:  No  For the majority of the shift, the patient's behavior Green. Interventions performed were N/A.    Sepsis treatment initiated: No     Patient tested for COVID 19 prior to admission: NO    ED Nurse Name/Phone Number: Julita Marrero RN,   2:08 AM

## 2023-05-06 NOTE — CONSULTS
[facility]    Orthopedics Consultation    Date of Admission:  5/5/2023    Assessment & Plan   Ilda Nassar is a 89 year old female who was admitted on 5/5/2023 with left wrist pain for several days with erythema and swelling. WBC: 7.0, CRP: 63.61, ESR: 22. Uric Acid: 6.2. Wrist aspirated in ED. Image in chart of aspirate suspicious for gout.     Rec:   Treat empirically for gout (started 3 days of indomethacin with omeprazole, change if prefer something else)  Follow cultures  Ice, wrist brace  No plans for operative interventions today. Diet ordered.   If she fails to improve or culture positive will consider wrist washout Sunday or Monday    Juanjo Gill PA-C    Code Status    Full Code    Reason for Consult   Reason for consult: I was asked by Anne Danielle MD to evaluate this patient for left wrist pain, erythema, and swelling.    Primary Care Physician   *Tatianna Swain    History of Present Illness   Ilda Nassar is a 89 year old female with PMH of A flutter on DOAC, HTN, HLP, dementia, diastolic CHF who presents with left wrist pain, erythema, and edema for the past several days. Patient describes insidious onset with no history of trauma. Patient denies history of gout. Denies fevers or chills.     MEDS:   Current Outpatient Medications   Medication Sig Dispense Refill     apixaban ANTICOAGULANT (ELIQUIS ANTICOAGULANT) 2.5 MG tablet Take 1 tablet (2.5 mg) by mouth 2 times daily 180 tablet 2     azithromycin (ZITHROMAX) 250 MG tablet Take 1 tablet (250 mg) by mouth daily 3 tablet 0     carvedilol (COREG) 3.125 MG tablet Take 0.5 tablets (1.5625 mg) by mouth 2 times daily (with meals) 90 tablet 1     citalopram (CELEXA) 10 MG tablet Take 1 tablet (10 mg) by mouth daily 90 tablet 1     donepezil (ARICEPT) 10 MG tablet Take 1 tablet (10 mg) by mouth At Bedtime       furosemide (LASIX) 20 MG tablet Take 1 tablet (20 mg) by mouth daily And if weight is greater than 2 pounds  from baseline weight take extra pill 90 tablet 1     potassium chloride ER (KLOR-CON M) 20 MEQ CR tablet Take 1 tablet (20 mEq) by mouth daily 90 tablet 0     simvastatin (ZOCOR) 40 MG tablet TAKE 1 TABLET (40 MG) BY MOUTH AT BEDTIME 90 tablet 0       PAST MEDICAL HISTORY:   Past Medical History:   Diagnosis Date     Atrial fibrillation (H)      Atrial flutter (H)      Hyperlipidemia      Hypertension        PAST SURGICAL HISTORY: No past surgical history on file.    FAMILY HISTORY: No family history on file.    SOCIAL HISTORY:   Social History     Tobacco Use     Smoking status: Former     Smokeless tobacco: Never   Vaping Use     Vaping status: Not on file   Substance Use Topics     Alcohol use: Yes       ALLERGIES:  No Known Allergies    ROS:  10 point ROS neg other than the symptoms noted above in the HPI.      Physical Exam   Temp: 99.9  F (37.7  C) Temp src: Oral BP: 131/85 Pulse: 81   Resp: 16 SpO2: 95 % O2 Device: None (Room air)    Vital Signs with Ranges  Temp:  [98  F (36.7  C)-99.9  F (37.7  C)] 99.9  F (37.7  C)  Pulse:  [81-96] 81  Resp:  [16-18] 16  BP: (119-169)/() 131/85  SpO2:  [90 %-95 %] 95 %  106 lbs 4.8 oz    Constitutional: Baseline dementia. Pleasant, alert, appropriate, following commands.  HEENT: Head atraumatic normocephalic. Pupils equal round and reactive to light.  Respiratory: Unlabored breathing no audible wheeze  Lymph/Hematologic: No lymphadenopathy in areas examined  Skin: No rashes, no cyanosis, no edema.  Musculoskeletal: left wrist: minimal erythema dorsum wrist. Mild-moderate edema dorsum hand and wrist. No gross bony deformities. Mild TTP left wrist joint. No point tenderness. Able to make a fist and actively flex/extend all digits with little difficulty. Painless wrist flex/ext/supination/pronation. SILT. + radial pulse   Neurologic: normal without focal findings, mental status, speech normal, alert and oriented x iii, ANA MARÍA  Neuropsychiatric: normal mood and affect        Data   Results for orders placed or performed during the hospital encounter of 05/05/23 (from the past 24 hour(s))   CBC + differential    Narrative    The following orders were created for panel order CBC + differential.  Procedure                               Abnormality         Status                     ---------                               -----------         ------                     CBC with platelets and d...[[id number]]  Abnormal            Final result               RBC and Platelet Morphology[[id number]]                      Final result                 Please view results for these tests on the individual orders.   Basic metabolic panel (BMP)   Result Value Ref Range    Sodium 134 (L) 136 - 145 mmol/L    Potassium 4.3 3.4 - 5.3 mmol/L    Chloride 94 (L) 98 - 107 mmol/L    Carbon Dioxide (CO2) 31 (H) 22 - 29 mmol/L    Anion Gap 9 7 - 15 mmol/L    Urea Nitrogen 23.5 (H) 8.0 - 23.0 mg/dL    Creatinine 0.70 0.51 - 0.95 mg/dL    Calcium 9.1 8.2 - 9.6 mg/dL    Glucose 121 (H) 70 - 99 mg/dL    GFR Estimate 82 >60 mL/min/1.73m2   Erythrocyte sedimentation rate auto   Result Value Ref Range    Erythrocyte Sedimentation Rate 22 0 - 30 mm/hr   CRP inflammation   Result Value Ref Range    CRP Inflammation 63.61 (H) <5.00 mg/L   CBC with platelets and differential   Result Value Ref Range    WBC Count 4.6 4.0 - 11.0 10e3/uL    RBC Count 5.27 (H) 3.80 - 5.20 10e6/uL    Hemoglobin 15.0 11.7 - 15.7 g/dL    Hematocrit 46.2 35.0 - 47.0 %    MCV 88 78 - 100 fL    MCH 28.5 26.5 - 33.0 pg    MCHC 32.5 31.5 - 36.5 g/dL    RDW 14.6 10.0 - 15.0 %    Platelet Count 218 150 - 450 10e3/uL    % Neutrophils 59 %    % Lymphocytes 17 %    % Monocytes 22 %    % Eosinophils 1 %    % Basophils 1 %    % Immature Granulocytes 0 %    NRBCs per 100 WBC 0 <1 /100    Absolute Neutrophils 2.7 1.6 - 8.3 10e3/uL    Absolute Lymphocytes 0.8 0.8 - 5.3 10e3/uL    Absolute Monocytes 1.0 0.0 - 1.3 10e3/uL    Absolute Eosinophils 0.0 0.0 - 0.7  10e3/uL    Absolute Basophils 0.1 0.0 - 0.2 10e3/uL    Absolute Immature Granulocytes 0.0 <=0.4 10e3/uL    Absolute NRBCs 0.0 10e3/uL   RBC and Platelet Morphology   Result Value Ref Range    Platelet Assessment  Automated Count Confirmed. Platelet morphology is normal.     Automated Count Confirmed. Platelet morphology is normal.    RBC Morphology Confirmed RBC Indices    Uric acid   Result Value Ref Range    Uric Acid 6.2 (H) 2.4 - 5.7 mg/dL   Procalcitonin   Result Value Ref Range    Procalcitonin 0.10 (H) <0.05 ng/mL   Head CT w/o contrast    Narrative    EXAM: CT HEAD W/O CONTRAST  LOCATION: [facility]  DATE/TIME: 5/5/2023 9:44 PM CDT    INDICATION: Confusion. Unwitnessed fall. Traumatic injury.  COMPARISON: 04/16/2023  TECHNIQUE: Routine CT Head without IV contrast. Multiplanar reformats. Dose reduction techniques were used.    FINDINGS:  INTRACRANIAL CONTENTS: No intracranial hemorrhage, extraaxial collection, or mass effect.  No CT evidence of acute infarct. Moderate presumed chronic small vessel ischemic changes. Mild to moderate generalized volume loss. No hydrocephalus.     VISUALIZED ORBITS/SINUSES/MASTOIDS: Prior bilateral cataract surgery. Visualized portions of the orbits are otherwise unremarkable. No paranasal sinus mucosal disease. No middle ear or mastoid effusion.    BONES/SOFT TISSUES: No acute abnormality. Decreased osseous mineralization.      Impression    IMPRESSION:  1.  No CT evidence for acute intracranial process.  2.  Brain atrophy and presumed chronic microvascular ischemic changes as above.   XR Wrist Left G/E 3 Views    Narrative    EXAM: XR WRIST LEFT G/E 3 VIEWS  LOCATION: [facility]  DATE/TIME: 5/5/2023 9:54 PM CDT    INDICATION: left wrist erythema, warmth, pain with ROM  COMPARISON: None.      Impression    IMPRESSION: Osteopenia. No fractures are evident. Diffuse soft tissue swelling. Advanced degenerative changes in the STT, first  CMC and DIP joints of the fifth finger. Mild to moderate degenerative changes in the first MCP joint, IP joint of the thumb   and multiple additional IP joints.   Lactic acid whole blood   Result Value Ref Range    Lactic Acid 2.7 (H) 0.7 - 2.0 mmol/L   Arthrocentesis    Narrative    Harpreet Bejarano MD     5/6/2023  6:00 AM  Arthrocentesis    Date/Time: 5/5/2023 11:38 PM    Performed by: Harpreet Bejarano MD  Authorized by: Harpreet Bejarano MD    Risks, benefits and alternatives discussed.      LOCATION:   Location:  Wrist  Wrist:  L radiocarpal  ANESTHESIA (see MAR for exact dosages):   Anesthesia method:  Local infiltration  Local anesthetic:  Lidocaine 1% WITH epi      PROCEDURE DETAILS:     Needle gauge: 21 G.    Ultrasound guidance: no      Approach:  Posterior    Aspirate amount:  <1mL    Aspirate characteristics:  Yellow    Steroid injected: no      Specimen collected: yes      Dressing: adhesive bandage      PROCEDURE    Patient Tolerance:  Patient tolerated the procedure well with no immediate   complications   Cell count with differential fluid *Canceled*    Narrative    The following orders were created for panel order Cell count with differential fluid.  Procedure                               Abnormality         Status                     ---------                               -----------         ------                     Cell Count Body Fluid[[id number]]                                                       Differential Body Fluid[[id number]]                                                       Please view results for these tests on the individual orders.   Gram stain    Specimen: Wrist, Left; Synovial fluid   Result Value Ref Range    Gram Stain Result No organisms seen     Gram Stain Result 4+ WBC seen    Lactic acid whole blood   Result Value Ref Range    Lactic Acid 1.0 0.7 - 2.0 mmol/L   EKG 12 lead   Result Value Ref Range    Systolic Blood Pressure  mmHg    Diastolic Blood  Pressure  mmHg    Ventricular Rate 82 BPM    Atrial Rate 101 BPM    AR Interval  ms    QRS Duration 88 ms     ms    QTc 472 ms    P Axis  degrees    R AXIS 35 degrees    T Axis 8 degrees    Interpretation ECG       Atrial fibrillation with premature ventricular or aberrantly conducted complexes  Nonspecific T wave abnormality  Prolonged QT  Abnormal ECG  When compared with ECG of 16-APR-2023 09:05,  Previous ECG has undetermined rhythm, needs review  T wave inversion less evident in Anterior leads     CBC with platelets differential    Narrative    The following orders were created for panel order CBC with platelets differential.  Procedure                               Abnormality         Status                     ---------                               -----------         ------                     CBC with platelets and d...[[id number]]  Abnormal            Final result               RBC and Platelet Morphology[[id number]]                      Final result                 Please view results for these tests on the individual orders.   Basic metabolic panel   Result Value Ref Range    Sodium 130 (L) 136 - 145 mmol/L    Potassium 4.1 3.4 - 5.3 mmol/L    Chloride 96 (L) 98 - 107 mmol/L    Carbon Dioxide (CO2) 25 22 - 29 mmol/L    Anion Gap 9 7 - 15 mmol/L    Urea Nitrogen 14.6 8.0 - 23.0 mg/dL    Creatinine 0.59 0.51 - 0.95 mg/dL    Calcium 8.2 8.2 - 9.6 mg/dL    Glucose 101 (H) 70 - 99 mg/dL    GFR Estimate 85 >60 mL/min/1.73m2   CBC with platelets and differential   Result Value Ref Range    WBC Count 7.0 4.0 - 11.0 10e3/uL    RBC Count 4.68 3.80 - 5.20 10e6/uL    Hemoglobin 13.3 11.7 - 15.7 g/dL    Hematocrit 40.4 35.0 - 47.0 %    MCV 86 78 - 100 fL    MCH 28.4 26.5 - 33.0 pg    MCHC 32.9 31.5 - 36.5 g/dL    RDW 14.7 10.0 - 15.0 %    Platelet Count 176 150 - 450 10e3/uL    % Neutrophils 61 %    % Lymphocytes 14 %    % Monocytes 24 %    % Eosinophils 0 %    % Basophils 1 %    % Immature Granulocytes 0 %     NRBCs per 100 WBC 0 <1 /100    Absolute Neutrophils 4.3 1.6 - 8.3 10e3/uL    Absolute Lymphocytes 1.0 0.8 - 5.3 10e3/uL    Absolute Monocytes 1.7 (H) 0.0 - 1.3 10e3/uL    Absolute Eosinophils 0.0 0.0 - 0.7 10e3/uL    Absolute Basophils 0.0 0.0 - 0.2 10e3/uL    Absolute Immature Granulocytes 0.0 <=0.4 10e3/uL    Absolute NRBCs 0.0 10e3/uL   RBC and Platelet Morphology   Result Value Ref Range    Platelet Assessment  Automated Count Confirmed. Platelet morphology is normal.     Automated Count Confirmed. Platelet morphology is normal.    RBC Morphology Confirmed RBC Indices        ATTESTATION: All clinical, laboratory, and radiographic findings were reviewed with Dr. Helm. Total time spent on consult: 30 minutes

## 2023-05-06 NOTE — PLAN OF CARE
Goal Outcome Evaluation:             Patient remains confused, patient in AFIB, VSS, patient denies pain, ambulates with assist of 1-2 to commode, appears continent of urine and bowel, patient NPO for ortho surge consult today will cont. to monitor

## 2023-05-06 NOTE — PLAN OF CARE
Goal Outcome Evaluation:      Plan of Care Reviewed With: patient, sibling          Patient alert but forgetful. Assist x1 with walker and gait belt. Forgets to call for assistance to restroom. Regular diet. Patient denies pain. Vanco and indocin given. No surgery planned at this time. Cultures pending from wrist aspirate. Sisters are involved in managing care. Pt comes from assisted living.

## 2023-05-06 NOTE — H&P
[facility]    History and Physical - Hospitalist Service       Date of Admission:  5/5/2023    Assessment & Plan      Ilda Nassar is a 89 year old female admitted on 5/5/2023. She has a hx of a flutter on DOAC, HTN, HLP, dementia, diastolic CHF with moderate to moderate-severe MR per TTE 4/2023 who presents with dizziness and weakness. Was sitting on a couch and woke up on the floor. She thinks she may have slid on the floor with syncopal episode. Was dizzy prior to syncope.  Patient denies any chest pain or shortness of breath.  By all accounts the patient is a poor historian given memory impairment.  In the ER over here she was seen by Dr. Villalobos, I discussed care with him and I am asked to admit her for further evaluation.    1. ? Septic joint L wrist versus gouty arthritis.  - IV vanc.  - ortho consult.  - s/p arthro in ED, results pending.  - check uric acid.    2.  Chronic systolic heart failure.  -Resume home meds in the morning after reconciliation.    3.  A-flutter.   - Hold Eliquis.  -Resume rate control meds when reconciled.    4. Hyponatremia.  - chronic.       Diet:  NPO.  DVT Prophylaxis: DOAC  Treviño Catheter: Not present  Lines: None     Cardiac Monitoring: None  Code Status:  Full Code.    Clinically Significant Risk Factors Present on Admission               # Drug Induced Coagulation Defect: home medication list includes an anticoagulant medication                 Disposition Plan      Expected Discharge Date: 05/08/2023                  Anne Danielle MD  Hospitalist Service  [facility]  Securely message with FunCaptcha (more info)  Text page via [facility] Paging/Directory     ______________________________________________________________________    Chief Complaint     Dizziness.    History is obtained from the patient and emergency department physician    History of Present Illness   Ilda Nassar is a 89 year old female who has a hx of a flutter  on DOAC, HTN, HLP, dementia, diastolic CHF with moderate to moderate-severe MR per TTE 4/2023 who presents with dizziness and weakness. Was sitting on a couch and woke up on the floor. She thinks she may have slid on the floor with syncopal episode. Was dizzy prior to syncope.  Patient denies any chest pain or shortness of breath.  By all accounts the patient is a poor historian given memory impairment.  In the ER over here she was seen by Dr. Villalobos, I discussed care with him and I am asked to admit her for further evaluation.          Past Medical History    Past Medical History:   Diagnosis Date     Atrial fibrillation (H)      Atrial flutter (H)      Hyperlipidemia      Hypertension        Past Surgical History   No past surgical history on file.    Prior to Admission Medications   Prior to Admission Medications   Prescriptions Last Dose Informant Patient Reported? Taking?   apixaban ANTICOAGULANT (ELIQUIS ANTICOAGULANT) 2.5 MG tablet   No No   Sig: Take 1 tablet (2.5 mg) by mouth 2 times daily   azithromycin (ZITHROMAX) 250 MG tablet   No No   Sig: Take 1 tablet (250 mg) by mouth daily   carvedilol (COREG) 3.125 MG tablet   No No   Sig: Take 0.5 tablets (1.5625 mg) by mouth 2 times daily (with meals)   citalopram (CELEXA) 10 MG tablet   No No   Sig: Take 1 tablet (10 mg) by mouth daily   donepezil (ARICEPT) 10 MG tablet   Yes No   Sig: Take 1 tablet (10 mg) by mouth At Bedtime   furosemide (LASIX) 20 MG tablet   No No   Sig: Take 1 tablet (20 mg) by mouth daily And if weight is greater than 2 pounds from baseline weight take extra pill   potassium chloride ER (KLOR-CON M) 20 MEQ CR tablet   No No   Sig: Take 1 tablet (20 mEq) by mouth daily   simvastatin (ZOCOR) 40 MG tablet   No No   Sig: TAKE 1 TABLET (40 MG) BY MOUTH AT BEDTIME      Facility-Administered Medications: None        Review of Systems    Review of systems not obtained due to patient factors - mental status    Social History   I have reviewed  this patient's social history and updated it with pertinent information if needed.  Social History     Tobacco Use     Smoking status: Former     Smokeless tobacco: Never   Substance Use Topics     Alcohol use: Yes       Family History     Reviewed.    Allergies   No Known Allergies     Physical Exam   Vital Signs: Temp: 98.7  F (37.1  C) Temp src: Temporal BP: (!) 155/80 Pulse: 93   Resp: 18 SpO2: 92 % O2 Device: None (Room air)    Weight: 106 lbs 4.8 oz    Gen - Alert, awake, follows commands, frail.  Lungs - CTA B anteriorly.  Heart - RR,S1+S2 nml, no m/g/r.  Abd - soft, NT, ND, + BS.  Ext - no edema, + swelling erythema and warmth L wrist on the dorsum with ttp.    Medical Decision Making     80 MINUTES SPENT BY ME on the date of service doing chart review, history, exam, documentation & further activities per the note.      Data     I have personally reviewed the following data over the past 24 hrs:    4.6  \   15.0   / 218     134 (L) 94 (L) 23.5 (H) /  121 (H)   4.3 31 (H) 0.70 \       Procal: N/A CRP: 63.61 (H) Lactic Acid: 1.0         Imaging results reviewed over the past 24 hrs:   Recent Results (from the past 24 hour(s))   Head CT w/o contrast    Narrative    EXAM: CT HEAD W/O CONTRAST  LOCATION: [facility]  DATE/TIME: 5/5/2023 9:44 PM CDT    INDICATION: Confusion. Unwitnessed fall. Traumatic injury.  COMPARISON: 04/16/2023  TECHNIQUE: Routine CT Head without IV contrast. Multiplanar reformats. Dose reduction techniques were used.    FINDINGS:  INTRACRANIAL CONTENTS: No intracranial hemorrhage, extraaxial collection, or mass effect.  No CT evidence of acute infarct. Moderate presumed chronic small vessel ischemic changes. Mild to moderate generalized volume loss. No hydrocephalus.     VISUALIZED ORBITS/SINUSES/MASTOIDS: Prior bilateral cataract surgery. Visualized portions of the orbits are otherwise unremarkable. No paranasal sinus mucosal disease. No middle ear or mastoid  effusion.    BONES/SOFT TISSUES: No acute abnormality. Decreased osseous mineralization.      Impression    IMPRESSION:  1.  No CT evidence for acute intracranial process.  2.  Brain atrophy and presumed chronic microvascular ischemic changes as above.   XR Wrist Left G/E 3 Views    Narrative    EXAM: XR WRIST LEFT G/E 3 VIEWS  LOCATION: [facility]  DATE/TIME: 5/5/2023 9:54 PM CDT    INDICATION: left wrist erythema, warmth, pain with ROM  COMPARISON: None.      Impression    IMPRESSION: Osteopenia. No fractures are evident. Diffuse soft tissue swelling. Advanced degenerative changes in the STT, first CMC and DIP joints of the fifth finger. Mild to moderate degenerative changes in the first MCP joint, IP joint of the thumb   and multiple additional IP joints.

## 2023-05-06 NOTE — ED TRIAGE NOTES
Pt arrives via EMS with dizziness that she states started around 1200. Per pt she slide out of her chair at home sometime this afternoon. Pt states no pain but has noticeable swelling to left hand. ABCs intact, alert to person.     Triage Assessment     Row Name 05/05/23 2020       Triage Assessment (Adult)    Airway WDL WDL       Respiratory WDL    Respiratory WDL WDL       Cardiac WDL    Cardiac WDL WDL

## 2023-05-07 PROBLEM — R55 SYNCOPE, UNSPECIFIED SYNCOPE TYPE: Status: ACTIVE | Noted: 2023-05-07

## 2023-05-07 LAB
ANION GAP SERPL CALCULATED.3IONS-SCNC: 8 MMOL/L (ref 7–15)
BUN SERPL-MCNC: 17.4 MG/DL (ref 8–23)
CALCIUM SERPL-MCNC: 8.6 MG/DL (ref 8.2–9.6)
CHLORIDE SERPL-SCNC: 98 MMOL/L (ref 98–107)
CREAT SERPL-MCNC: 0.69 MG/DL (ref 0.51–0.95)
CRP SERPL-MCNC: 170.51 MG/L
DEPRECATED HCO3 PLAS-SCNC: 30 MMOL/L (ref 22–29)
GFR SERPL CREATININE-BSD FRML MDRD: 82 ML/MIN/1.73M2
GLUCOSE SERPL-MCNC: 98 MG/DL (ref 70–99)
POTASSIUM SERPL-SCNC: 3.8 MMOL/L (ref 3.4–5.3)
SODIUM SERPL-SCNC: 136 MMOL/L (ref 136–145)

## 2023-05-07 PROCEDURE — 86140 C-REACTIVE PROTEIN: CPT | Performed by: PHYSICIAN ASSISTANT

## 2023-05-07 PROCEDURE — 80048 BASIC METABOLIC PNL TOTAL CA: CPT | Performed by: INTERNAL MEDICINE

## 2023-05-07 PROCEDURE — 250N000013 HC RX MED GY IP 250 OP 250 PS 637: Performed by: ORTHOPAEDIC SURGERY

## 2023-05-07 PROCEDURE — 120N000001 HC R&B MED SURG/OB

## 2023-05-07 PROCEDURE — 250N000011 HC RX IP 250 OP 636: Performed by: INTERNAL MEDICINE

## 2023-05-07 PROCEDURE — 36415 COLL VENOUS BLD VENIPUNCTURE: CPT | Performed by: INTERNAL MEDICINE

## 2023-05-07 PROCEDURE — 250N000013 HC RX MED GY IP 250 OP 250 PS 637: Performed by: INTERNAL MEDICINE

## 2023-05-07 PROCEDURE — 99233 SBSQ HOSP IP/OBS HIGH 50: CPT | Performed by: INTERNAL MEDICINE

## 2023-05-07 RX ORDER — VANCOMYCIN HYDROCHLORIDE 1 G/200ML
1000 INJECTION, SOLUTION INTRAVENOUS EVERY 24 HOURS
Status: DISCONTINUED | OUTPATIENT
Start: 2023-05-07 | End: 2023-05-08

## 2023-05-07 RX ADMIN — CARVEDILOL 1.56 MG: 3.12 TABLET, FILM COATED ORAL at 18:08

## 2023-05-07 RX ADMIN — Medication 40 MG: at 06:51

## 2023-05-07 RX ADMIN — DONEPEZIL HYDROCHLORIDE 10 MG: 10 TABLET ORAL at 21:39

## 2023-05-07 RX ADMIN — CARVEDILOL 1.56 MG: 3.12 TABLET, FILM COATED ORAL at 08:49

## 2023-05-07 RX ADMIN — INDOMETHACIN 25 MG: 25 CAPSULE ORAL at 08:49

## 2023-05-07 RX ADMIN — VANCOMYCIN HYDROCHLORIDE 1000 MG: 1 INJECTION, SOLUTION INTRAVENOUS at 11:37

## 2023-05-07 RX ADMIN — CITALOPRAM HYDROBROMIDE 10 MG: 10 TABLET ORAL at 08:49

## 2023-05-07 RX ADMIN — INDOMETHACIN 25 MG: 25 CAPSULE ORAL at 11:49

## 2023-05-07 RX ADMIN — SIMVASTATIN 40 MG: 40 TABLET, FILM COATED ORAL at 21:39

## 2023-05-07 RX ADMIN — INDOMETHACIN 25 MG: 25 CAPSULE ORAL at 18:07

## 2023-05-07 RX ADMIN — FUROSEMIDE 20 MG: 20 TABLET ORAL at 08:49

## 2023-05-07 RX ADMIN — OLANZAPINE 5 MG: 5 TABLET, ORALLY DISINTEGRATING ORAL at 20:25

## 2023-05-07 ASSESSMENT — ACTIVITIES OF DAILY LIVING (ADL)
ADLS_ACUITY_SCORE: 23
ADLS_ACUITY_SCORE: 21
ADLS_ACUITY_SCORE: 24
ADLS_ACUITY_SCORE: 21
ADLS_ACUITY_SCORE: 23
ADLS_ACUITY_SCORE: 24
ADLS_ACUITY_SCORE: 24
ADLS_ACUITY_SCORE: 21
ADLS_ACUITY_SCORE: 24

## 2023-05-07 NOTE — PLAN OF CARE
Pt disorient to time, place and situation. Denies pain, N/V or diarrhea. CMS intact. Voiding without any issues. Up SBA. Will continue to provide supportive care.

## 2023-05-07 NOTE — PHARMACY-VANCOMYCIN DOSING SERVICE
Pharmacy Vancomycin Initial Note  Date of Service May 7, 2023  Patient's  1932  89 year old, female    Indication: Bone and Joint Infection    Current estimated CrCl = Estimated Creatinine Clearance: 42.4 mL/min (based on SCr of 0.69 mg/dL).    Creatinine for last 3 days  2023:  9:13 PM Creatinine 0.70 mg/dL  2023:  8:05 AM Creatinine 0.59 mg/dL  2023:  5:58 AM Creatinine 0.69 mg/dL    Recent Vancomycin Level(s) for last 3 days  No results found for requested labs within last 3 days.      Vancomycin IV Administrations (past 72 hours)                   vancomycin (VANCOCIN) 1000 mg in dextrose 5% 200 mL PREMIX (mg) 1,000 mg New Bag 23 1137    vancomycin (VANCOCIN) 500 mg vial to attach to  mL bag (mg) 500 mg New Bag 23 1412    vancomycin (VANCOCIN) 1,250 mg in 0.9% NaCl 250 mL intermittent infusion (mg) 1,250 mg New Bag 23 0121                Nephrotoxins and other renal medications (From now, onward)    Start     Dose/Rate Route Frequency Ordered Stop    23 1000  vancomycin (VANCOCIN) 1000 mg in dextrose 5% 200 mL PREMIX         1,000 mg  200 mL/hr over 1 Hours Intravenous EVERY 24 HOURS 23 0953      23 0800  furosemide (LASIX) tablet 20 mg        Note to Pharmacy: PTA Sig:Take 1 tablet (20 mg) by mouth daily And if weight is greater than 2 pounds from baseline weight take extra pill      20 mg Oral DAILY 23 1831      23 0800  indomethacin (INDOCIN) capsule 25 mg         25 mg Oral 3 TIMES DAILY WITH MEALS 23 0621 23 0759          Contrast Orders - past 72 hours (72h ago, onward)    None          [employer] Prediction of Planned Initial Vancomycin Regimen  Loading dose: N/A  Regimen: 1000 mg IV every 24 hours.  Start time: 11:37 on 2023  Exposure target: AUC24 (range)400-600 mg/L.hr   AUC24,ss: 504 mg/L.hr  Probability of AUC24 > 400: 74 %  Ctrough,ss: 14.8 mg/L  Probability of Ctrough,ss > 20: 25 %  Probability of  nephrotoxicity (Lodise LY 2009): 10 %          Plan:  1. Start vancomycin  1000 mg IV q24h.   2. Vancomycin monitoring method: AUC  3. Vancomycin therapeutic monitoring goal: 400-600 mg*h/L  4. Pharmacy will check vancomycin levels as appropriate in 1-3 Days.    5. Serum creatinine levels will be ordered daily for the first week of therapy and at least twice weekly for subsequent weeks.

## 2023-05-07 NOTE — PLAN OF CARE
Goal Outcome Evaluation:      Plan of Care Reviewed With: patient, sibling             VS-afebrile, stable, confused, but pleasant. Does not know place, year, month. Forgetful as well. Pt does get out of bed without calling on calllight. Bed alarm on.   Lung Sounds-clear, no cough, on room air. Using IS upto 1000.   O2-on room air.   GI-+Bs, +flatus. Lbm was a very small one this shift. Tolerating diet. Denies nausea.   -voiding in bathroom , no difficulties.   IVF-sl iv. New iv this shift. Vanco..   Dressings-brace applied to L wrist. Skin intact.   CMS-light pink on L wrist. Swelling trace. +radial pulse. Mod hand grasp in both hands. Denies numbness and tingling,   Drain-none.   Activity-walked all 3 hallways this shift. Walked to bathroom. Sat up in chair for meals.   Pain-rates pain level a 1 at the most. Indocin continues. Thumb stiffness per pt. Declines needing pain meds  D/C Plan-home when able. Lives at assisted living. Sisters visited this shift.     Patient vital signs are at baseline: Yes  Patient able to ambulate as they were prior to admission or with assist devices provided by therapies during their stay:  Yes  Patient MUST void prior to discharge:  Yes  Patient able to tolerate oral intake:  Yes  Pain has adequate pain control using Oral analgesics:  Yes  Does patient have an identified :  No,  Reason:  Pt is 89 , lives at assisted living  Has goal D/C date and time been discussed with patient:  Yes

## 2023-05-07 NOTE — PLAN OF CARE
Goal Outcome Evaluation:  Vital signs stable, remote telemetry monitoring.  Denies need for pain meds.  Ambulated with walker, gait belt and sba of 1.  Swelling to left wrist/hand improving.  CMS intact.  Falls risk precautions.    Plan of Care Reviewed With: patient

## 2023-05-07 NOTE — PROGRESS NOTES
Orthopedic Surgery  [place] ROBYN Cesario  05/07/2023    Admit Date:  5/5/2023    Patient resting comfortably in bed   Denies pain in left wrist this AM  Tolerating oral intake.    No events overnight.   Denies chest pain or shortness of breath   Pt disoriented with baseline dementia.     Temp:  [97  F (36.1  C)-98.3  F (36.8  C)] 98.3  F (36.8  C)  Pulse:  [60-79] 69  Resp:  [16] 16  BP: (115-137)/(52-85) 137/68  SpO2:  [91 %-99 %] 91 %    Unlabored breathing without audible wheeze    Left wrist: edema and erythema improved from yesterday.   Able to actively flex extend wrist and digits without pain or difficulty.  Left upper extremity is NVI.  +radial pulse     Labs:  Recent Labs   Lab Test 05/07/23  0558 05/06/23  0805 05/05/23 2113   POTASSIUM 3.8 4.1 4.3     Recent Labs   Lab Test 05/06/23  0805 05/05/23 2113 04/18/23  0547   HGB 13.3 15.0 13.3     Recent Labs   Lab Test 04/16/23  0821   INR 2.44*     Recent Labs   Lab Test 05/06/23  0805 05/05/23 2113 04/18/23  0547    218 248       A/P: 89 yo female with atraumatic left wrist pain. Gout vs. Septic arthritis.   1. Continue empiric treatment for gout.   2. Left wrist aspiration cx: NGTD.   3. Follow cultures   4. Left wrist brace for comfort.   5.   If sx fail to improve/worsen or cultures become positive will plan for I&D of wrist.     Juanjo Gill PA-C

## 2023-05-07 NOTE — PROGRESS NOTES
[facility]    Medicine Progress Note - Hospitalist Service    Date of Admission:  5/5/2023    Assessment & Plan   Ilda Nassar is a 89 year old female who came to attention on 5/5/2023 due to dizziness and weakness.  Unfortunately dementia limits her capacity to give a reasonable history.  In the emergency department, the patient was noted to have left wrist swelling and warmth.  The patient reported that she had passed out but the story was not clear and she was unable to give much in the way of details regarding that event.    Ms. Nassar resides at [facility] in [place].  I had become concerned because the patient has obvious dementia.  However, I was able to meet with the patient and her sister, Aarti Marr.  Ms. Marr reassured me that the patient does receive her medications administered by the nurses at [facility].  In addition she has a number of friends who keep an eye on her during the day.      In addition, Ms. Marr indicates that this is Ms. Nassar's fourth episode of apparent syncope.  This prompted additional review and I note that she has been relatively bradycardic on telemetry monitoring though she has not met criteria for me to be notified.    DX:  1.  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 S/p arthrocentesis by ED physician and < 1 ml obtained.  Not sent for crystal analysis and too little to send for cell count. Ortho following. empirically started on indomethacin w PPI for ulcer prophylaxis.  Noted the patient was given vancomycin and ceftriaxone in the emergency department but this was not continued on admission.  2.  Recurrent syncope.  Atrial fib/flutter (coarse a fib) managed with metoprolol and apixaban. Apixaban on hold for poss surgical intervention.  While the patient has not had dramatic pauses on telemetry monitoring here, she certainly becomes bradycardic at least intermittently.  3.  Dementia. Pt obviously disoriented when I evaluated her  on the evening following admission.  4.  Chronic HFrEF with moderate-severe MR.  Compensated.   5.  Mild, clinically non-significant, hyponatremia.  Normal kidney function.     PLAN:  1.  Resume vancomycin at least until we have additional sense that this is truly NOT infection.  Continue to monitor CRP which unfortunately has risen since she was admitted.  2.  Cont empiric indomethacin with PPI.  3.  Consider repeat attempt at arthrocentesis.  4.  Ordered as needed olanzapine for sundowning.  5.  Plan for outpatient cardiac monitoring with short-term follow-up with cardiology.  The patient is known to [facility] at [facility] and I will talk to them tomorrow before she is discharged.       Diet: Regular Diet Adult    DVT Prophylaxis: Pneumatic Compression Devices  Treviño Catheter: Not present  Lines: None     Cardiac Monitoring: ACTIVE order. Indication: Tachyarrhythmias, acute (48 hours)  Code Status: Full Code      Clinically Significant Risk Factors                                Disposition Plan     Expected Discharge Date: 05/08/2023                  Ghassan Car MD  Hospitalist Service  [facility]  Securely message with [employer] (more info)  Text page via [employer] Paging/Directory   ______________________________________________________________________    Interval History   Chart reviewed, pt interviewed.  I met Ms. Nassar last night and evaluated her briefly.  She did not recall that she was in the hospital and even after I told her that she was in the hospital, she seemed to think that I had come to visit her in her apartment.  She denied fevers at that time but clearly is not a reliable historian.      Last night, I called all 3 of the contacts on patient's emergency list and got an answering machine for each 1.  Ultimately I left a message with the patient's daughter, Margaret Nassar, that the patient was stable.    Stable overnight.    Pt pleasant and  mildly disoriented but more appropriate today.     I was able to meet with the patient and her sister at the bedside today.  Her sister, Aarti Marr was able to give additional history..    Physical Exam   Vital Signs: Temp: 98.3  F (36.8  C) Temp src: Temporal BP: 137/68 Pulse: 69   Resp: 16 SpO2: 91 % O2 Device: None (Room air)    Weight: 109 lbs 6.4 oz    Constitutional: awake, alert, cooperative, no apparent distress, and appears stated age  Eyes: extra-ocular muscles intact and sclera clear  ENT: normocepalic, without obvious abnormality  Respiratory: no increased work of breathing,  CTA  Cardiovascular: Irregularly irregular rate and rhythm and no murmur noted  GI: soft, NTND  Musculoskeletal: left wrist appears less swollen than yesterday with supple and comfortable ROM.  No surrounding erythema or superficial skin tenderness    Medical Decision Making       50 MINUTES SPENT BY ME on the date of service doing chart review, history, exam, documentation & further activities per the note.      Data   Results for orders placed or performed during the hospital encounter of 05/05/23 (from the past 24 hour(s))   Basic metabolic panel   Result Value Ref Range    Sodium 136 136 - 145 mmol/L    Potassium 3.8 3.4 - 5.3 mmol/L    Chloride 98 98 - 107 mmol/L    Carbon Dioxide (CO2) 30 (H) 22 - 29 mmol/L    Anion Gap 8 7 - 15 mmol/L    Urea Nitrogen 17.4 8.0 - 23.0 mg/dL    Creatinine 0.69 0.51 - 0.95 mg/dL    Calcium 8.6 8.2 - 9.6 mg/dL    Glucose 98 70 - 99 mg/dL    GFR Estimate 82 >60 mL/min/1.73m2   CRP inflammation   Result Value Ref Range    CRP Inflammation 170.51 (H) <5.00 mg/L

## 2023-05-08 ENCOUNTER — TRANSFERRED RECORDS (OUTPATIENT)
Dept: HEALTH INFORMATION MANAGEMENT | Facility: CLINIC | Age: 88
End: 2023-05-08

## 2023-05-08 ENCOUNTER — APPOINTMENT (OUTPATIENT)
Dept: CARDIOLOGY | Facility: CLINIC | Age: 88
DRG: 554 | End: 2023-05-08
Attending: INTERNAL MEDICINE
Payer: MEDICARE

## 2023-05-08 VITALS
WEIGHT: 109.4 LBS | RESPIRATION RATE: 16 BRPM | SYSTOLIC BLOOD PRESSURE: 146 MMHG | HEART RATE: 77 BPM | OXYGEN SATURATION: 99 % | BODY MASS INDEX: 18.21 KG/M2 | TEMPERATURE: 98.1 F | DIASTOLIC BLOOD PRESSURE: 74 MMHG

## 2023-05-08 DIAGNOSIS — R55 SYNCOPE: Primary | ICD-10-CM

## 2023-05-08 PROBLEM — M10.9: Status: ACTIVE | Noted: 2023-05-08

## 2023-05-08 PROBLEM — R00.1 BRADYCARDIA: Status: ACTIVE | Noted: 2023-05-08

## 2023-05-08 PROBLEM — G31.84 MILD COGNITIVE IMPAIRMENT WITH MEMORY LOSS: Status: ACTIVE | Noted: 2023-05-08

## 2023-05-08 LAB
ANION GAP SERPL CALCULATED.3IONS-SCNC: 10 MMOL/L (ref 7–15)
ATRIAL RATE - MUSE: 101 BPM
BUN SERPL-MCNC: 21.3 MG/DL (ref 8–23)
CALCIUM SERPL-MCNC: 8.9 MG/DL (ref 8.2–9.6)
CHLORIDE SERPL-SCNC: 100 MMOL/L (ref 98–107)
CREAT SERPL-MCNC: 0.95 MG/DL (ref 0.51–0.95)
CRP SERPL-MCNC: 132.81 MG/L
DEPRECATED HCO3 PLAS-SCNC: 30 MMOL/L (ref 22–29)
DIASTOLIC BLOOD PRESSURE - MUSE: NORMAL MMHG
ERYTHROCYTE [DISTWIDTH] IN BLOOD BY AUTOMATED COUNT: 14.4 % (ref 10–15)
GFR SERPL CREATININE-BSD FRML MDRD: 57 ML/MIN/1.73M2
GLUCOSE SERPL-MCNC: 97 MG/DL (ref 70–99)
HCT VFR BLD AUTO: 46.6 % (ref 35–47)
HGB BLD-MCNC: 15 G/DL (ref 11.7–15.7)
INTERPRETATION ECG - MUSE: NORMAL
MCH RBC QN AUTO: 29 PG (ref 26.5–33)
MCHC RBC AUTO-ENTMCNC: 32.2 G/DL (ref 31.5–36.5)
MCV RBC AUTO: 90 FL (ref 78–100)
P AXIS - MUSE: NORMAL DEGREES
PLATELET # BLD AUTO: 166 10E3/UL (ref 150–450)
POTASSIUM SERPL-SCNC: 3.5 MMOL/L (ref 3.4–5.3)
PR INTERVAL - MUSE: NORMAL MS
QRS DURATION - MUSE: 88 MS
QT - MUSE: 404 MS
QTC - MUSE: 472 MS
R AXIS - MUSE: 35 DEGREES
RBC # BLD AUTO: 5.18 10E6/UL (ref 3.8–5.2)
SODIUM SERPL-SCNC: 140 MMOL/L (ref 136–145)
SYSTOLIC BLOOD PRESSURE - MUSE: NORMAL MMHG
T AXIS - MUSE: 8 DEGREES
VENTRICULAR RATE- MUSE: 82 BPM
WBC # BLD AUTO: 4.6 10E3/UL (ref 4–11)

## 2023-05-08 PROCEDURE — 250N000013 HC RX MED GY IP 250 OP 250 PS 637: Performed by: INTERNAL MEDICINE

## 2023-05-08 PROCEDURE — 85014 HEMATOCRIT: CPT | Performed by: INTERNAL MEDICINE

## 2023-05-08 PROCEDURE — 99238 HOSP IP/OBS DSCHRG MGMT 30/<: CPT | Performed by: INTERNAL MEDICINE

## 2023-05-08 PROCEDURE — 250N000013 HC RX MED GY IP 250 OP 250 PS 637: Performed by: ORTHOPAEDIC SURGERY

## 2023-05-08 PROCEDURE — 80048 BASIC METABOLIC PNL TOTAL CA: CPT | Performed by: INTERNAL MEDICINE

## 2023-05-08 PROCEDURE — 36415 COLL VENOUS BLD VENIPUNCTURE: CPT | Performed by: INTERNAL MEDICINE

## 2023-05-08 PROCEDURE — 93242 EXT ECG>48HR<7D RECORDING: CPT

## 2023-05-08 PROCEDURE — 86140 C-REACTIVE PROTEIN: CPT | Performed by: INTERNAL MEDICINE

## 2023-05-08 RX ORDER — INDOMETHACIN 25 MG/1
25 CAPSULE ORAL
Qty: 21 CAPSULE | Refills: 0 | Status: SHIPPED | OUTPATIENT
Start: 2023-05-08 | End: 2023-05-19

## 2023-05-08 RX ORDER — OMEPRAZOLE 40 MG/1
40 CAPSULE, DELAYED RELEASE ORAL DAILY
Qty: 14 CAPSULE | Refills: 0 | Status: SHIPPED | OUTPATIENT
Start: 2023-05-08 | End: 2023-05-22

## 2023-05-08 RX ADMIN — INDOMETHACIN 25 MG: 25 CAPSULE ORAL at 11:34

## 2023-05-08 RX ADMIN — Medication 40 MG: at 07:56

## 2023-05-08 RX ADMIN — FUROSEMIDE 20 MG: 20 TABLET ORAL at 07:56

## 2023-05-08 RX ADMIN — INDOMETHACIN 25 MG: 25 CAPSULE ORAL at 07:56

## 2023-05-08 RX ADMIN — CARVEDILOL 1.56 MG: 3.12 TABLET, FILM COATED ORAL at 07:57

## 2023-05-08 RX ADMIN — CITALOPRAM HYDROBROMIDE 10 MG: 10 TABLET ORAL at 07:56

## 2023-05-08 ASSESSMENT — ACTIVITIES OF DAILY LIVING (ADL)
ADLS_ACUITY_SCORE: 23
ADLS_ACUITY_SCORE: 26
ADLS_ACUITY_SCORE: 26
DEPENDENT_IADLS:: CLEANING;COOKING;LAUNDRY;SHOPPING;MEAL PREPARATION;MEDICATION MANAGEMENT
ADLS_ACUITY_SCORE: 26
ADLS_ACUITY_SCORE: 27
ADLS_ACUITY_SCORE: 26

## 2023-05-08 NOTE — PROGRESS NOTES
Orthopedic Surgery  Ilda CARLSON Cesario  05/08/2023     Admit Date:  5/5/2023  Left wrist pain     Patient resting comfortably in bed.    Denies having pain in either wrist.   Tolerating oral intake.      Temp:  [97.2  F (36.2  C)-98.7  F (37.1  C)] 98.7  F (37.1  C)  Pulse:  [57-77] 77  Resp:  [16-18] 16  BP: (136-158)/(64-88) 143/75  SpO2:  [93 %-99 %] 97 %    On exam of bilateral wrists:  No erythema or ecchymosis.  No effusion.  Full ROM bilateral wrists without pain.  Full ROM of fingers.  Sensation and pulses intact.  Brisk cap refill.      Labs:  Recent Labs   Lab Test 05/08/23  0632 05/06/23  0805 05/05/23 2113   WBC 4.6 7.0 4.6   HGB 15.0 13.3 15.0    176 218     Recent Labs   Lab Test 04/16/23  0821   INR 2.44*     Recent Labs   Lab Test 05/08/23  0632 05/07/23  0558 05/05/23 2113   CRPI 132.81* 170.51* 63.61*     Plan:   Continue Indocin   No surgical intervention   Wrist brace for comfort, wear prn    Kristen Gómez PA-C

## 2023-05-08 NOTE — PLAN OF CARE
Goal Outcome Evaluation:      Plan of Care Reviewed With: patient, sibling        VS-afebrile, stable, confused, pleasant.   Lung Sounds-clear, infreq cough, dry nonprod. Using IS upto 750  O2-on room air.   GI-+Bs, +flatus. Lbm was yesterday.   -voiding adequately.   IVF-sl iv.   Dressings-none.   CMS-denies  numbness and tingling, +radial pulse. +wiggle fingers. Orthotics brace off, in room.   Drain-none.   Activity-up with sba. Walker belt.   Pain-denies pain. No pain meds given.   D/C Plan-home . sisters here followiing. Will need heart monitor before discharge.        Has brace for comfort.   1415: paged dr. Car  called from [place] a while ago--pt was down to a 47 but then would pop back up to 60-70's     Ortho: ok for pt to discharge

## 2023-05-08 NOTE — PROGRESS NOTES
Care Management Discharge Note    Discharge Date: 05/08/2023       Discharge Disposition: Assisted Living    Discharge Services: None    Discharge DME: None    Discharge Transportation: family or friend will provide    Private pay costs discussed: Not applicable    Does the patient's insurance plan have a 3 day qualifying hospital stay waiver?  No  Education Provided on the Discharge Plan: No  Persons Notified of Discharge Plans: Patient, AMY  Patient/Family in Agreement with the Plan: yes    Handoff Referral Completed: No    Additional Information:  Patient discharging today back to [facility] via her sisters. Orders were faxed to Summer TORRES at the [facility] with follow up phone call and she did receive the orders.    Sandie Jack, RN, BSN, CM  Inpatient Care Coordination  [facility]  [phone]

## 2023-05-08 NOTE — PLAN OF CARE
Goal Outcome Evaluation:    Patient alert to self, vital sign stable, pt on room air, pt up with stand by assist gait belt walker voids with out difficulty and had a BM x2 in this shift. Orthotics brace on L wrist in place, Iv on right hand, saline locked, Tele monitor, pt denied pain. Plan is to discharge back to her [place] maybe today. Will Continue to provide supportive cares.

## 2023-05-08 NOTE — PHARMACY-VANCOMYCIN DOSING SERVICE
Pharmacy Vancomycin Note  Date of Service May 8, 2023  Patient's  1932   89 year old, female    Indication: Bone and Joint Infection  Day of Therapy: 3  Current vancomycin regimen: 1000 mg IV q24h  Current vancomycin monitoring method: AUC  Current vancomycin therapeutic monitoring goal: 400-600 mg*h/L    InsightRX Prediction of Current Vancomycin Regimen  Loading dose: N/A  Regimen: 1000 mg IV every 24 hours.  Start time: 11:37 on 2023  Exposure target: AUC24 (range)400-600 mg/L.hr   AUC24,ss: 635 mg/L.hr  Probability of AUC24 > 400: 91 %  Ctrough,ss: 20.2 mg/L  Probability of Ctrough,ss > 20: 51 %  Probability of nephrotoxicity (Lodise LY ): 18 %      Current estimated CrCl = Estimated Creatinine Clearance: 30.8 mL/min (based on SCr of 0.95 mg/dL).    Creatinine for last 3 days  2023:  9:13 PM Creatinine 0.70 mg/dL  2023:  8:05 AM Creatinine 0.59 mg/dL  2023:  5:58 AM Creatinine 0.69 mg/dL  2023:  6:32 AM Creatinine 0.95 mg/dL    Recent Vancomycin Levels (past 3 days)  No results found for requested labs within last 3 days.    Vancomycin IV Administrations (past 72 hours)                   vancomycin (VANCOCIN) 1000 mg in dextrose 5% 200 mL PREMIX (mg) 1,000 mg New Bag 23 1137    vancomycin (VANCOCIN) 500 mg vial to attach to  mL bag (mg) 500 mg New Bag 23 1412    vancomycin (VANCOCIN) 1,250 mg in 0.9% NaCl 250 mL intermittent infusion (mg) 1,250 mg New Bag 23 0121                Nephrotoxins and other renal medications (From now, onward)    Start     Dose/Rate Route Frequency Ordered Stop    23 1800  vancomycin (VANCOCIN) 750 mg in sodium chloride 0.9 % 250 mL intermittent infusion         750 mg  over 90 Minutes Intravenous EVERY 24 HOURS 23 0932      23 0000  indomethacin (INDOCIN) 25 MG capsule         25 mg Oral 3 TIMES DAILY WITH MEALS 23 0833 05/15/23 2359    23 0800  furosemide (LASIX) tablet 20 mg        Note to  Pharmacy: [facility] Sig:Take 1 tablet (20 mg) by mouth daily And if weight is greater than 2 pounds from baseline weight take extra pill      20 mg Oral DAILY 05/06/23 1831      05/06/23 0800  indomethacin (INDOCIN) capsule 25 mg         25 mg Oral 3 TIMES DAILY WITH MEALS 05/06/23 0621 05/09/23 0759             Contrast Orders - past 72 hours (72h ago, onward)    None          Interpretation of levels and current regimen:  No Vanco level was drawn, SCr has risen so put through InsightRx program to predict AUC greater than 600    Has serum creatinine changed greater than 50% in last 72 hours: Yes    Urine output:  unable to determine    Renal Function: Worsening    InsightRX Prediction of Planned New Vancomycin Regimen  Loading dose: N/A  Regimen: 750 mg IV every 24 hours.  Start time: 1800 on 05/08/2023  Exposure target: AUC24 (range)400-600 mg/L.hr   AUC24,ss: 487 mg/L.hr  Probability of AUC24 > 400: 71 %  Ctrough,ss: 15.5 mg/L  Probability of Ctrough,ss > 20: 28 %  Probability of nephrotoxicity (Lodise LY 2009): 11 %      Plan:  1. Decrease Dose to 750 mg q24h  2. Vancomycin monitoring method: AUC  3. Vancomycin therapeutic monitoring goal: 400-600 mg*h/L  4. Pharmacy will check vancomycin levels as appropriate in 1-3 Days.  5. Serum creatinine levels will be ordered daily for the first week of therapy and at least twice weekly for subsequent weeks.    Devi Rosario [facility]

## 2023-05-08 NOTE — PLAN OF CARE
Goal Outcome Evaluation:      Plan of Care Reviewed With: patient, family          Patient vital signs are at baseline: Yes  Patient able to ambulate as they were prior to admission or with assist devices provided by therapies during their stay:  Yes  Patient MUST void prior to discharge:  Yes  Patient able to tolerate oral intake:  Yes  Pain has adequate pain control using Oral analgesics:  Yes  Does patient have an identified :  Yes  Has goal D/C date and time been discussed with patient:  Yes    Waiting for cardiopulmonary to place monitor. Sisters are here. Going over discharge paperwork.

## 2023-05-08 NOTE — PLAN OF CARE
Goal Outcome Evaluation:      Plan of Care Reviewed With: patient    3pm-11pm RN    Patient VS are at baseline: Yes  Patient able to ambulate as they were prior to admission or with assist devices provided by therapy during their stay: Yes  Patient must void prior to discharge: Yes  Patient able to tolerate oral intake: Yes  Patient has adequate pain control using oral analgesics: Denied pain    Orthotics brace on L wrist,denied pain. Alert to self, up on chair this shift. Walks with stand by assist had a shower this evening. Continues on vancomycin IV. Plan is to discharge back to her [place] maybe tomorrow.

## 2023-05-08 NOTE — DISCHARGE SUMMARY
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 Bejarano (ED)  X-ray left wrist  CT Head without contrast            Medications Prior to Admission:     Medications Prior to Admission   Medication Sig Dispense Refill Last Dose     apixaban ANTICOAGULANT (ELIQUIS ANTICOAGULANT) 2.5 MG tablet Take 1 tablet (2.5 mg) by mouth 2 times daily 180 tablet 2 5/5/2023 at PM     carvedilol (COREG) 3.125 MG tablet Take 0.5 tablets (1.5625 mg) by mouth 2 times daily (with meals) 90 tablet 1 5/5/2023 at PM     citalopram (CELEXA) 10 MG tablet Take 1 tablet (10 mg) by mouth daily 90 tablet 1 5/5/2023 at AM     donepezil (ARICEPT) 10 MG tablet Take 1 tablet (10 mg) by mouth At Bedtime   5/4/2023 at HS     furosemide (LASIX) 20 MG tablet Take 1 tablet (20 mg) by mouth daily And if weight is greater than 2 pounds from baseline weight take extra pill 90 tablet 1 5/5/2023 at AM     potassium chloride ER (KLOR-CON M) 20 MEQ CR tablet Take 1 tablet (20 mEq) by mouth daily 90 tablet 0 5/5/2023 at AM     simvastatin (ZOCOR) 40 MG tablet TAKE 1 TABLET (40 MG) BY MOUTH AT BEDTIME 90 tablet 0 5/4/2023 at HS             Discharge Medications:     Current Discharge Medication List      START  taking these medications    Details   indomethacin (INDOCIN) 25 MG capsule Take 1 capsule (25 mg) by mouth 3 times daily (with meals) for 7 days  Qty: 21 capsule, Refills: 0    Associated Diagnoses: Arthritis of right wrist due to gout      omeprazole (PRILOSEC) 40 MG DR capsule Take 1 capsule (40 mg) by mouth daily for 14 days  Qty: 14 capsule, Refills: 0    Associated Diagnoses: Arthritis of right wrist due to gout         CONTINUE these medications which have NOT CHANGED    Details   apixaban ANTICOAGULANT (ELIQUIS ANTICOAGULANT) 2.5 MG tablet Take 1 tablet (2.5 mg) by mouth 2 times daily  Qty: 180 tablet, Refills: 2    Associated Diagnoses: At high risk for falls; Chronic atrial fibrillation (H); Essential hypertension      citalopram (CELEXA) 10 MG tablet Take 1 tablet (10 mg) by mouth daily  Qty: 90 tablet, Refills: 1    Associated Diagnoses: Other depression      donepezil (ARICEPT) 10 MG tablet Take 1 tablet (10 mg) by mouth At Bedtime    Associated Diagnoses: Memory changes      furosemide (LASIX) 20 MG tablet Take 1 tablet (20 mg) by mouth daily And if weight is greater than 2 pounds from baseline weight take extra pill  Qty: 90 tablet, Refills: 1    Associated Diagnoses: Acute on chronic congestive heart failure, unspecified heart failure type (H)      potassium chloride ER (KLOR-CON M) 20 MEQ CR tablet Take 1 tablet (20 mEq) by mouth daily  Qty: 90 tablet, Refills: 0    Associated Diagnoses: Acute on chronic congestive heart failure, unspecified heart failure type (H)      simvastatin (ZOCOR) 40 MG tablet TAKE 1 TABLET (40 MG) BY MOUTH AT BEDTIME  Qty: 90 tablet, Refills: 0    Associated Diagnoses: Other hyperlipidemia         STOP taking these medications       carvedilol (COREG) 3.125 MG tablet Comments:   Reason for Stopping:                     Consultations:   Consultation during this admission received from orthopedics          Hospital Course:   Ilda CARLSON Cesario is a 89 year old female who came to  attention on 5/5/2023 due to dizziness and weakness.  Unfortunately dementia limits her capacity to give a reasonable history.  In the emergency department, the patient was noted to have left wrist swelling and warmth.  The patient reported that she had passed out but the story was not clear and she was unable to give much in the way of details regarding that event.     Ms. Nassar resides at [facility] in [place].  I was able to meet with the patient and her sister, Aarti Marr.  Ms. Marr reassured me that the patient does receive her medications administered by the nurses at [facility].  She also has a number of friends who keep an eye on her during the day.       In addition, Ms. Marr indicates that this is Ms. Nassar's fourth episode of apparent syncope.  This prompted additional review and I note that, while she has been here, Ms Nassar has been relatively bradycardic on telemetry monitoring though she has not met criteria for me to be notified.  Chart review indicates that she was previously evaluated for rate control in 2021 and at that time, there were no concerning pauses or prolonged periods of bradycardia.    BP (!) 155/88 (BP Location: Right arm)   Pulse 76   Temp 97.2  F (36.2  C) (Temporal)   Resp 16   Wt 49.6 kg (109 lb 6.4 oz)   LMP  (LMP Unknown)   SpO2 94%   BMI 18.21 kg/m    At the time of discharge, Ms. Nassar is alert and interactive. She is very peasant and fully appropriate, especially when her sister is present.  HEENT: no facial muscular asymmetry.  COR: IRRIR, no murmur  Chest: CTA  Abd: soft, NTND  Extrem: left wrist swelling and ROM has returned virtually to normal          Discharge Instructions and Follow-Up:   Discharge diet: Regular   Discharge activity: Activity as tolerated   Discharge follow-up: Follow up with Cardiology in about 2 weeks for review of the [facility] recording           Discharge Disposition:   Discharged to home      Attestation:  I have reviewed today's vital signs,  notes, medications, labs and imaging.    Ghassan Car MD

## 2023-05-08 NOTE — CONSULTS
Care Management Initial Consult    General Information  Assessment completed with: Patient, Care Team Member, Family, AMY Gomez and sister Jodi  Type of CM/SW Visit: Initial Assessment    Primary Care Provider verified and updated as needed: Yes   Readmission within the last 30 days: current reason for admission unrelated to previous admission      Reason for Consult: discharge planning, care coordination/care conference  Advance Care Planning:            Communication Assessment  Patient's communication style: spoken language (English or Bilingual)    Hearing Difficulty or Deaf: no   Wear Glasses or Blind: no    Cognitive  Cognitive/Neuro/Behavioral: .WDL except  Level of Consciousness: confused  Arousal Level: opens eyes spontaneously  Orientation: disoriented to, place, time, situation  Mood/Behavior: cooperative, behavior appropriate to situation  Best Language: 0 - No aphasia  Speech: spontaneous, clear, logical    Living Environment:   People in home: alone     Current living Arrangements: assisted living  Name of Facility: [facility].   Able to return to prior arrangements: yes       Family/Social Support:  Care provided by: self, other (see comments) ([facility])  Provides care for: no one  Marital Status:   Children, Sibling(s)          Description of Support System: Involved, Supportive    Support Assessment: Adequate social supports, Adequate family and caregiver support    Current Resources:   Patient receiving home care services: No     Community Resources: None  Equipment currently used at home: none  Supplies currently used at home: Incontinence Supplies    Employment/Financial:  Employment Status: retired        Financial Concerns:             Does the patient's insurance plan have a 3 day qualifying hospital stay waiver?  No    Lifestyle & Psychosocial Needs:  Social Determinants of Health     Tobacco Use: Medium Risk (12/30/2022)    Patient History      Smoking Tobacco  Use: Former      Smokeless Tobacco Use: Never      Passive Exposure: Not on file   Alcohol Use: Unknown (4/23/2021)    AUDIT-C      Frequency of Alcohol Consumption: 2-3 times a week      Average Number of Drinks: 1 or 2      Frequency of Binge Drinking: Not asked   Financial Resource Strain: Not on file   Food Insecurity: Not on file   Transportation Needs: Not on file   Physical Activity: Not on file   Stress: Not on file   Social Connections: Not on file   Intimate Partner Violence: Not on file   Depression: At risk (10/27/2022)    PHQ-2      PHQ-2 Score: 6   Housing Stability: Not on file       Functional Status:  Prior to admission patient needed assistance:   Dependent ADLs:: Independent  Dependent IADLs:: Cleaning, Cooking, Laundry, Shopping, Meal Preparation, Medication Management  Assesssment of Functional Status: At functional baseline               Additional Information:  Patient admitted after weakness, dizziness and subsequent fall. She injured her L wrist which is in a brace. She is being followed by ortho and the Hospitalist.  Patient resides at [facility].  Per the [facility], patient receives assistance with medication management, housekeeping, laundry and escorts to meals.  Patient was a poor historian today so CM received permission to call the [facility] and her sister Jodi for this assessment. She is independent with ADL's. She is SBA of 1 here,and is independent without assistive mobility devices.Her sister reports patient makes her own lunch and goes down to the dining room for dinner. Transportation to be provided by her sister.  [facility] contact is MELISSA Wheeler, at [phone] and her fax is [phone].   CM will continue to follow for discharge planning.    Sandie Jack RN, BSN, CM  Inpatient Care Coordination  [facility]  [phone]

## 2023-05-09 ENCOUNTER — CARE COORDINATION (OUTPATIENT)
Dept: FAMILY MEDICINE | Facility: CLINIC | Age: 88
End: 2023-05-09

## 2023-05-09 NOTE — PROGRESS NOTES
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 She was discharged on 5/8/23 with instructions to follow up with PCP and ortho.     PCP: Tatianna Swain    Referral Source:  ED/IP List    Utilization:   Last PCP Appt.: 12/30/22    Health Maintenance Reviewed: Yes    Current Medical Health Concerns:     Please review patients current medical problem list.    Patient/Caregiver Understanding: NA-did not answer the phone     Medication Management:   NA-did not answer the phone to review     Functional Status:   NA-did not answer the phone to review     Current Behavioral Health Concerns:   NA-did not answer the phone to review     Patient/Caregiver Understanding:  NA-did not answer the phone to be able to review     Psychosocial:  NA-did not answer the phone to be able to review     Gaps:    NA    Resources Given:    NA    Plan:   I attempted to reach the patient by phone to get her scheduled for her hospital follow up with Dr. Swain. I did leave her a message to give us a call back so that we can get her scheduled as she was informed to follow up with her primary care doctor per hospital discharge.

## 2023-05-10 NOTE — PROGRESS NOTES
Patients sister called and left a message on the CS line stating that she is not going to bring the patient in right now because she is doing all of her follow up with cardiology. She may call back later on to schedule her after she is done wearing her heart monitor and reviewing this with cardiology.

## 2023-05-11 LAB
BACTERIA BLD CULT: NO GROWTH
BACTERIA BLD CULT: NO GROWTH
BACTERIA SNV CULT: NO GROWTH

## 2023-05-19 ENCOUNTER — OFFICE VISIT (OUTPATIENT)
Dept: CARDIOLOGY | Facility: CLINIC | Age: 88
End: 2023-05-19
Attending: PHYSICIAN ASSISTANT
Payer: MEDICARE

## 2023-05-19 VITALS
DIASTOLIC BLOOD PRESSURE: 82 MMHG | HEART RATE: 68 BPM | BODY MASS INDEX: 15.62 KG/M2 | SYSTOLIC BLOOD PRESSURE: 124 MMHG | WEIGHT: 97.2 LBS | HEIGHT: 66 IN | OXYGEN SATURATION: 97 %

## 2023-05-19 DIAGNOSIS — I50.32 CHRONIC HEART FAILURE WITH PRESERVED EJECTION FRACTION (HFPEF) (H): ICD-10-CM

## 2023-05-19 DIAGNOSIS — I48.20 CHRONIC ATRIAL FIBRILLATION (H): ICD-10-CM

## 2023-05-19 DIAGNOSIS — I10 ESSENTIAL HYPERTENSION: ICD-10-CM

## 2023-05-19 DIAGNOSIS — I34.0 MITRAL VALVE INSUFFICIENCY, UNSPECIFIED ETIOLOGY: ICD-10-CM

## 2023-05-19 DIAGNOSIS — R55 SYNCOPE, UNSPECIFIED SYNCOPE TYPE: Primary | ICD-10-CM

## 2023-05-19 PROCEDURE — 99215 OFFICE O/P EST HI 40 MIN: CPT | Performed by: NURSE PRACTITIONER

## 2023-05-19 NOTE — PROGRESS NOTES
Cardiology Clinic Progress Note  Ilda Nassar MRN# [id number]   YOB: 1932 Age: 89 year old   Primary Cardiologist: Dr. Hernández  Reason for visit: Post hospital follow up            Assessment and Plan:   Ilda Nassar is a very pleasant 89 year old female here for post hospital follow up.     1. Syncope - history is limited, making it hard to discern possible etiology.   2. HFpEF - LVEF 55-60%   - NYHA class II   - Volume status: euvolemic   - Diuretic regimen: furosemide 20mg daily   - Goal weight: appears ~ 100#  3. Weight loss : this appears likely secondary to appropriate fluid loss, but sister is concerned about weight loss today, will need to monitor. Recommended PCP follow up.   4. Mitral regurgitation - moderate to mod-severe per echo 4/2023, volume overloaded at this time  5. Atrial fibrillation, chronic - asymptomatic, rate controlled, on appropriate low dose apixaban for thromboembolic prophylaxis.   6. Hypertension  7. Hyperlipidemia   8. Past history of tobacco use - quit many years ago  9. Mild to moderate tricuspid regurgitation  10. No known history of coronary artery disease     I met patient and her sister for the first time today for post hospital follow up. She has been hospitalized 2 times in the past month, her sister today reports both of these hospitalizations were for syncope. Per chart review appears hospitalization in April was for acute HFpEF exacerbation, during which she was diuresed and started on PO furosemide. Echo was completed which showed volume overload. She then again presented to the ED the beginning of May dizziness/lightheadedness and reported syncope. Per notes she had telemetry on during her hospitalization, no noted concerns. ZIO monitor was placed at discharge, results not available for review. No cardiology consult during hospitalization. No syncopal events since hospital discharge. History around her syncopal events is limited making it hard to  discern possible etiology.     At this time recommend placing event monitor, getting a repeat limited echo now that she is euvolemic (MR was noted to have been worse on previous echo at which time she was volume overloaded) and labs (BMP, mag, TSH). This will give us more information to better evaluate her reported syncopal events, advised if any recurrent events to present to the ED.     From a HF standpoint she appears compensated and euvolemic, she does not appear dehydrated though will get labs to help evaluate further. Her weight is down to 97# this is down ~ 20# since her HF admission 1 month ago. Unclear if this is all fluid or if there is another etiology for her weight loss. Requested she start monitoring her weight at home, recording and bringing to clinic follow up.     Lastly we did explore her thoughts on ischemic evaluation in the setting of syncope given she has not had an ischemic work up in the past. Patient and sister would like to hold off at this time but potentially consider in the future depending on other test results. She is having no anginal symptoms.     Changes today: none    Follow up plan:     BMP with magnesium, TSH     Event monitor for 30 days    Limit echocardiogram next available    Cardiology follow up with me on 6/30    Follow up with Dr. Hernández as scheduled 8/8/23        History of Presenting Illness:    Ilda Nassar is a very pleasant 89 year old female with a history of atrial fibrillation, hypertension, hyperlipidemia, chronic HFpEF, moderate-severe mitral regurgitation, mild to moderate tricuspid regurgitation, dementia, past history of tobacco - states she hasn't smoked in many years.     She has followed in our clinic in the past for her atrial fibrillation, last seen August 2022. Since then she has had 3 hospitalizations. December 2022 with generalized weakness due to COVID.     More recently she has had 2 hospitalizations in the past month. She was hospitalized  "4/16-4/18 for HF exacerbation, presenting with weakness/shortness of breath, she was diuresed with IV furosemide. She was started on PO furosemide 20mg daily with potassium.  Admission weight 116#. Discharged at 107#.     Echo 4/17/23 showed LVEF 55-60%, flattened septum consistent with volume overload, RV systolic function borderline reduced, mild to moderate tricuspid regurgitation, moderate to mod-severe mitral regurgitation, IVC dilated. When compared to prior study severity of MR increased.     Most recently hospitalized 5/5-5/8/23 presenting with dizziness/lightheadedness and reported syncope, but with her dementia unable to get clear story.Patient sister noted this was patients 4th syncopal episode.  She was also noted to have left wrist swelling for which orthopedics was consulted, underwent L wrist aspiration and empiric tx for gout. Patient had telemetry while in the hospital, not reported events, sent home with a ZIO monitor. Cardiology not consulted during admission.     She wore ZIO monitor for 1 week, results not available.     Patient is here today for post hospital follow up. Sister is present today and immediately asks me about getting a pace maker placed, noting she was told that what this visit was for.     Patient reports feeling good. Sister helps with history given patients dementia. Sister reports she has been passing out for 1.5 years, \"every time she has gone to the hospital it has been because she has passed out\". Unclear if she is symptomatic before events. Unable to details around events. Syncope has not been witnessed. No injuries noted. Sister notes weight is suppose to be monitored at assisted living, but unclear if being monitored. Later in visit staff here were able to connect with facility, checking weight monthly, 4/2 alberto 110#, 5/5 100#. Notes she hasn't seen PCP, sister notes \"she needs to see cardiology first\". Reports ~ 10# weight loss, noting her good weight used to be ~ " 110#, clinic weight is 97#. Denies shortness of breath at rest. Denies exertional dyspnea. Denies orthopnea or PND. Denies chest pain or chest tightness. Denies dizziness, lightheadedness or other presyncopal symptoms. Denies tachycardia or palpitations. Staff at assisted living administer patients medications. Denies episodes of bleeding.     No labs today, but labs from May hospitalization show stable kidney function and electrolytes, creatinine 0.95. Hemoglobin 15. Blood pressure 124/82 and HR 68 in clinic today.    Appetite good. Eating breakfast/lunch in apartment and dinner in dining mosquera. Sister notes she eats well. No set exercise routine. Denies alcohol use. Denies tobacco use.         Social History    Patient resides at [facility]  Social History     Socioeconomic History     Marital status:      Spouse name: Not on file     Number of children: Not on file     Years of education: Not on file     Highest education level: Not on file   Occupational History     Not on file   Tobacco Use     Smoking status: Former     Smokeless tobacco: Never   Vaping Use     Vaping status: Not on file   Substance and Sexual Activity     Alcohol use: Yes     Drug use: Not on file     Sexual activity: Not on file   Other Topics Concern     Not on file   Social History Narrative     Not on file     Social Determinants of Health     Financial Resource Strain: Not on file   Food Insecurity: Not on file   Transportation Needs: Not on file   Physical Activity: Not on file   Stress: Not on file   Social Connections: Not on file   Intimate Partner Violence: Not on file   Housing Stability: Not on file            Review of Systems:   10 point ROS neg other than the symptoms noted above in the HPI.         Physical Exam:   Vitals: LMP  (LMP Unknown)    Wt Readings from Last 4 Encounters:   05/06/23 49.6 kg (109 lb 6.4 oz)   04/18/23 48.9 kg (107 lb 14.4 oz)   12/30/22 49.9 kg (110 lb)   12/21/22 49.5 kg (109 lb 1.6 oz)      GEN: well nourished, in no acute distress.  HEENT:  Pupils equal, round. Sclerae nonicteric.   NECK: Supple, no masses appreciated. JVP appears normal.   C/V:  Irregularly irregular rate and rhythm  RESP: Respirations are unlabored. Clear to auscultation bilaterally without wheezing, rales, or rhonchi.  GI: Abdomen soft, nontender.  EXTREM: No LE edema.  NEURO: Alert and cooperative, confused at times  SKIN: Warm and dry.       Data:     LIPID RESULTS:  Lab Results   Component Value Date    CHOL 161 06/16/2022    HDL 66 06/16/2022    LDL 79 06/16/2022    TRIG 78 06/16/2022    CHOLHDLRATIO 2 06/16/2022     LIVER ENZYME RESULTS:  Lab Results   Component Value Date    AST 23 04/16/2023    ALT 16 04/16/2023     CBC RESULTS:  Lab Results   Component Value Date    WBC 4.6 05/08/2023    WBC 4.7 12/30/2022    RBC 5.18 05/08/2023    RBC 4.40 12/30/2022    HGB 15.0 05/08/2023    HGB 13.5 12/30/2022    HCT 46.6 05/08/2023    HCT 40.6 12/30/2022    MCV 90 05/08/2023    MCV 92.2 12/30/2022    MCH 29.0 05/08/2023    MCH 30.7 12/30/2022    MCHC 32.2 05/08/2023    MCHC 33.3 12/30/2022    RDW 14.4 05/08/2023    RDW 12.8 07/09/2021     05/08/2023     12/30/2022     07/09/2021     BMP RESULTS:  Lab Results   Component Value Date     05/08/2023    .4 12/30/2022    POTASSIUM 3.5 05/08/2023    POTASSIUM 4.69 12/30/2022    CHLORIDE 100 05/08/2023    CHLORIDE 101.4 12/30/2022    CO2 30 (H) 05/08/2023    CO2 32.7 (A) 12/30/2022    ANIONGAP 10 05/08/2023    ANIONGAP 6 09/20/2021    ANIONGAP 8 07/09/2021    GLC 97 05/08/2023     (A) 12/30/2022    BUN 21.3 05/08/2023    BUN 12 12/30/2022    BUN 13.3 12/30/2022    CR 0.95 05/08/2023    CR 0.90 12/30/2022    GFRESTIMATED 57 (L) 05/08/2023    GFRESTIMATED 61 07/09/2021    GFRESTBLACK 71 07/09/2021    ESTELA 8.9 05/08/2023    ESTELA 9.0 12/30/2022      A1C RESULTS:  Lab Results   Component Value Date    A1C 5.8 10/18/2022     INR RESULTS:  Lab Results    Component Value Date    INR 2.44 (H) 04/16/2023            Medications     Current Outpatient Medications   Medication Sig Dispense Refill     apixaban ANTICOAGULANT (ELIQUIS ANTICOAGULANT) 2.5 MG tablet Take 1 tablet (2.5 mg) by mouth 2 times daily 180 tablet 2     citalopram (CELEXA) 10 MG tablet Take 1 tablet (10 mg) by mouth daily 90 tablet 1     donepezil (ARICEPT) 10 MG tablet Take 1 tablet (10 mg) by mouth At Bedtime       furosemide (LASIX) 20 MG tablet Take 1 tablet (20 mg) by mouth daily And if weight is greater than 2 pounds from baseline weight take extra pill 90 tablet 1     indomethacin (INDOCIN) 25 MG capsule Take 1 capsule (25 mg) by mouth 3 times daily (with meals) for 7 days 21 capsule 0     omeprazole (PRILOSEC) 40 MG DR capsule Take 1 capsule (40 mg) by mouth daily for 14 days 14 capsule 0     potassium chloride ER (KLOR-CON M) 20 MEQ CR tablet Take 1 tablet (20 mEq) by mouth daily 90 tablet 0     simvastatin (ZOCOR) 40 MG tablet TAKE 1 TABLET (40 MG) BY MOUTH AT BEDTIME 90 tablet 0          Past Medical History     Past Medical History:   Diagnosis Date     Atrial fibrillation (H)      Atrial flutter (H)      Hyperlipidemia      Hypertension             Allergies   Patient has no known allergies.    60 minutes spent on the date of the encounter doing chart review, history and exam, documentation and further activities as noted above      CHRISTINE Ruggiero [facility] CARE  Pager: [phone]

## 2023-05-19 NOTE — LETTER
5/19/2023    Tatianna Swain MD  [address]  [facility] [address]    RE: Ilda Nassar       Dear Colleague,     I had the pleasure of seeing Ilda Nassar in the [facility].  Cardiology Clinic Progress Note  Ilda Nassar MRN# [id number]   YOB: 1932 Age: 89 year old   Primary Cardiologist: Dr. Hernández  Reason for visit: Post hospital follow up            Assessment and Plan:   Ilda Nassar is a very pleasant 89 year old female here for post hospital follow up.     1. Syncope - history is limited, making it hard to discern possible etiology.   2. HFpEF - LVEF 55-60%   - NYHA class II   - Volume status: euvolemic   - Diuretic regimen: furosemide 20mg daily   - Goal weight: appears ~ 100#  3. Weight loss : this appears likely secondary to appropriate fluid loss, but sister is concerned about weight loss today, will need to monitor. Recommended PCP follow up.   4. Mitral regurgitation - moderate to mod-severe per echo 4/2023, volume overloaded at this time  5. Atrial fibrillation, chronic - asymptomatic, rate controlled, on appropriate low dose apixaban for thromboembolic prophylaxis.   6. Hypertension  7. Hyperlipidemia   8. Past history of tobacco use - quit many years ago  9. Mild to moderate tricuspid regurgitation  10. No known history of coronary artery disease     I met patient and her sister for the first time today for post hospital follow up. She has been hospitalized 2 times in the past month, her sister today reports both of these hospitalizations were for syncope. Per chart review appears hospitalization in April was for acute HFpEF exacerbation, during which she was diuresed and started on PO furosemide. Echo was completed which showed volume overload. She then again presented to the ED the beginning of May dizziness/lightheadedness and reported syncope. Per notes she had telemetry on during her hospitalization, no noted concerns. ZIO monitor was placed  at discharge, results not available for review. No cardiology consult during hospitalization. No syncopal events since hospital discharge. History around her syncopal events is limited making it hard to discern possible etiology.     At this time recommend placing event monitor, getting a repeat limited echo now that she is euvolemic (MR was noted to have been worse on previous echo at which time she was volume overloaded) and labs (BMP, mag, TSH). This will give us more information to better evaluate her reported syncopal events, advised if any recurrent events to present to the ED.     From a HF standpoint she appears compensated and euvolemic, she does not appear dehydrated though will get labs to help evaluate further. Her weight is down to 97# this is down ~ 20# since her HF admission 1 month ago. Unclear if this is all fluid or if there is another etiology for her weight loss. Requested she start monitoring her weight at home, recording and bringing to clinic follow up.     Lastly we did explore her thoughts on ischemic evaluation in the setting of syncope given she has not had an ischemic work up in the past. Patient and sister would like to hold off at this time but potentially consider in the future depending on other test results. She is having no anginal symptoms.     Changes today: none    Follow up plan:     BMP with magnesium, TSH     Event monitor for 30 days    Limit echocardiogram next available    Cardiology follow up with me on 6/30    Follow up with Dr. Hernández as scheduled 8/8/23        History of Presenting Illness:    Ilda Nassar is a very pleasant 89 year old female with a history of atrial fibrillation, hypertension, hyperlipidemia, chronic HFpEF, moderate-severe mitral regurgitation, mild to moderate tricuspid regurgitation, dementia, past history of tobacco - states she hasn't smoked in many years.     She has followed in our clinic in the past for her atrial fibrillation, last seen  "August 2022. Since then she has had 3 hospitalizations. December 2022 with generalized weakness due to COVID.     More recently she has had 2 hospitalizations in the past month. She was hospitalized 4/16-4/18 for HF exacerbation, presenting with weakness/shortness of breath, she was diuresed with IV furosemide. She was started on PO furosemide 20mg daily with potassium.  Admission weight 116#. Discharged at 107#.     Echo 4/17/23 showed LVEF 55-60%, flattened septum consistent with volume overload, RV systolic function borderline reduced, mild to moderate tricuspid regurgitation, moderate to mod-severe mitral regurgitation, IVC dilated. When compared to prior study severity of MR increased.     Most recently hospitalized 5/5-5/8/23 presenting with dizziness/lightheadedness and reported syncope, but with her dementia unable to get clear story.Patient sister noted this was patients 4th syncopal episode.  She was also noted to have left wrist swelling for which orthopedics was consulted, underwent L wrist aspiration and empiric tx for gout. Patient had telemetry while in the hospital, not reported events, sent home with a ZIO monitor. Cardiology not consulted during admission.     She wore ZIO monitor for 1 week, results not available.     Patient is here today for post hospital follow up. Sister is present today and immediately asks me about getting a pace maker placed, noting she was told that what this visit was for.     Patient reports feeling good. Sister helps with history given patients dementia. Sister reports she has been passing out for 1.5 years, \"every time she has gone to the hospital it has been because she has passed out\". Unclear if she is symptomatic before events. Unable to details around events. Syncope has not been witnessed. No injuries noted. Sister notes weight is suppose to be monitored at assisted living, but unclear if being monitored. Later in visit staff here were able to connect with " "facility, checking weight monthly, 4/2 alberto 110#, 5/5 100#. Notes she hasn't seen PCP, sister notes \"she needs to see cardiology first\". Reports ~ 10# weight loss, noting her good weight used to be ~ 110#, clinic weight is 97#. Denies shortness of breath at rest. Denies exertional dyspnea. Denies orthopnea or PND. Denies chest pain or chest tightness. Denies dizziness, lightheadedness or other presyncopal symptoms. Denies tachycardia or palpitations. Staff at assisted living administer patients medications. Denies episodes of bleeding.     No labs today, but labs from May hospitalization show stable kidney function and electrolytes, creatinine 0.95. Hemoglobin 15. Blood pressure 124/82 and HR 68 in clinic today.    Appetite good. Eating breakfast/lunch in apartment and dinner in dining mosquera. Sister notes she eats well. No set exercise routine. Denies alcohol use. Denies tobacco use.         Social History    Patient resides at [facility]  Social History     Socioeconomic History    Marital status:      Spouse name: Not on file    Number of children: Not on file    Years of education: Not on file    Highest education level: Not on file   Occupational History    Not on file   Tobacco Use    Smoking status: Former    Smokeless tobacco: Never   Vaping Use    Vaping status: Not on file   Substance and Sexual Activity    Alcohol use: Yes    Drug use: Not on file    Sexual activity: Not on file   Other Topics Concern    Not on file   Social History Narrative    Not on file     Social Determinants of Health     Financial Resource Strain: Not on file   Food Insecurity: Not on file   Transportation Needs: Not on file   Physical Activity: Not on file   Stress: Not on file   Social Connections: Not on file   Intimate Partner Violence: Not on file   Housing Stability: Not on file            Review of Systems:   10 point ROS neg other than the symptoms noted above in the HPI.         Physical Exam:   Vitals: LMP  (LMP " Unknown)    Wt Readings from Last 4 Encounters:   05/06/23 49.6 kg (109 lb 6.4 oz)   04/18/23 48.9 kg (107 lb 14.4 oz)   12/30/22 49.9 kg (110 lb)   12/21/22 49.5 kg (109 lb 1.6 oz)     GEN: well nourished, in no acute distress.  HEENT:  Pupils equal, round. Sclerae nonicteric.   NECK: Supple, no masses appreciated. JVP appears normal.   C/V:  Irregularly irregular rate and rhythm  RESP: Respirations are unlabored. Clear to auscultation bilaterally without wheezing, rales, or rhonchi.  GI: Abdomen soft, nontender.  EXTREM: No LE edema.  NEURO: Alert and cooperative, confused at times  SKIN: Warm and dry.       Data:     LIPID RESULTS:  Lab Results   Component Value Date    CHOL 161 06/16/2022    HDL 66 06/16/2022    LDL 79 06/16/2022    TRIG 78 06/16/2022    CHOLHDLRATIO 2 06/16/2022     LIVER ENZYME RESULTS:  Lab Results   Component Value Date    AST 23 04/16/2023    ALT 16 04/16/2023     CBC RESULTS:  Lab Results   Component Value Date    WBC 4.6 05/08/2023    WBC 4.7 12/30/2022    RBC 5.18 05/08/2023    RBC 4.40 12/30/2022    HGB 15.0 05/08/2023    HGB 13.5 12/30/2022    HCT 46.6 05/08/2023    HCT 40.6 12/30/2022    MCV 90 05/08/2023    MCV 92.2 12/30/2022    MCH 29.0 05/08/2023    MCH 30.7 12/30/2022    MCHC 32.2 05/08/2023    MCHC 33.3 12/30/2022    RDW 14.4 05/08/2023    RDW 12.8 07/09/2021     05/08/2023     12/30/2022     07/09/2021     BMP RESULTS:  Lab Results   Component Value Date     05/08/2023    .4 12/30/2022    POTASSIUM 3.5 05/08/2023    POTASSIUM 4.69 12/30/2022    CHLORIDE 100 05/08/2023    CHLORIDE 101.4 12/30/2022    CO2 30 (H) 05/08/2023    CO2 32.7 (A) 12/30/2022    ANIONGAP 10 05/08/2023    ANIONGAP 6 09/20/2021    ANIONGAP 8 07/09/2021    GLC 97 05/08/2023     (A) 12/30/2022    BUN 21.3 05/08/2023    BUN 12 12/30/2022    BUN 13.3 12/30/2022    CR 0.95 05/08/2023    CR 0.90 12/30/2022    GFRESTIMATED 57 (L) 05/08/2023    GFRESTIMATED 61 07/09/2021     GFRESTBLACK 71 07/09/2021    ESTELA 8.9 05/08/2023    ESTELA 9.0 12/30/2022      A1C RESULTS:  Lab Results   Component Value Date    A1C 5.8 10/18/2022     INR RESULTS:  Lab Results   Component Value Date    INR 2.44 (H) 04/16/2023            Medications     Current Outpatient Medications   Medication Sig Dispense Refill    apixaban ANTICOAGULANT (ELIQUIS ANTICOAGULANT) 2.5 MG tablet Take 1 tablet (2.5 mg) by mouth 2 times daily 180 tablet 2    citalopram (CELEXA) 10 MG tablet Take 1 tablet (10 mg) by mouth daily 90 tablet 1    donepezil (ARICEPT) 10 MG tablet Take 1 tablet (10 mg) by mouth At Bedtime      furosemide (LASIX) 20 MG tablet Take 1 tablet (20 mg) by mouth daily And if weight is greater than 2 pounds from baseline weight take extra pill 90 tablet 1    indomethacin (INDOCIN) 25 MG capsule Take 1 capsule (25 mg) by mouth 3 times daily (with meals) for 7 days 21 capsule 0    omeprazole (PRILOSEC) 40 MG DR capsule Take 1 capsule (40 mg) by mouth daily for 14 days 14 capsule 0    potassium chloride ER (KLOR-CON M) 20 MEQ CR tablet Take 1 tablet (20 mEq) by mouth daily 90 tablet 0    simvastatin (ZOCOR) 40 MG tablet TAKE 1 TABLET (40 MG) BY MOUTH AT BEDTIME 90 tablet 0          Past Medical History     Past Medical History:   Diagnosis Date    Atrial fibrillation (H)     Atrial flutter (H)     Hyperlipidemia     Hypertension             Allergies   Patient has no known allergies.    60 minutes spent on the date of the encounter doing chart review, history and exam, documentation and further activities as noted above      CHRISTINE Ruggiero [facility]  Pager: [phone]        Thank you for allowing me to participate in the care of your patient.      Sincerely,     CHRISTINE Ruggiero CNP     [facility]  cc:   Tori Wiseman PA-C  [address] [place] [address]

## 2023-05-19 NOTE — PATIENT INSTRUCTIONS
Next week  Get labs check kidney function, electrolytes and thyroid  Get echo to look at mitral valve now that you don't have extra fluid  Get heart monitor placed that alerts us in the moment if anything abnormal  Cardiology follow up with me on 6/30, sooner if needed  Please call with any questions/concerns [phone]

## 2023-05-22 ENCOUNTER — HOSPITAL ENCOUNTER (OUTPATIENT)
Dept: CARDIOLOGY | Facility: CLINIC | Age: 88
Discharge: HOME | End: 2023-05-22
Attending: NURSE PRACTITIONER
Payer: MEDICARE

## 2023-05-22 ENCOUNTER — LAB (OUTPATIENT)
Dept: LAB | Facility: CLINIC | Age: 88
End: 2023-05-22
Payer: MEDICARE

## 2023-05-22 DIAGNOSIS — R55 SYNCOPE, UNSPECIFIED SYNCOPE TYPE: ICD-10-CM

## 2023-05-22 DIAGNOSIS — I50.32 CHRONIC HEART FAILURE WITH PRESERVED EJECTION FRACTION (HFPEF) (H): ICD-10-CM

## 2023-05-22 DIAGNOSIS — I48.20 CHRONIC ATRIAL FIBRILLATION (H): ICD-10-CM

## 2023-05-22 LAB
ANION GAP SERPL CALCULATED.3IONS-SCNC: 9 MMOL/L (ref 7–15)
BUN SERPL-MCNC: 23.6 MG/DL (ref 8–23)
CALCIUM SERPL-MCNC: 9.1 MG/DL (ref 8.2–9.6)
CHLORIDE SERPL-SCNC: 99 MMOL/L (ref 98–107)
CREAT SERPL-MCNC: 0.97 MG/DL (ref 0.51–0.95)
DEPRECATED HCO3 PLAS-SCNC: 30 MMOL/L (ref 22–29)
GFR SERPL CREATININE-BSD FRML MDRD: 55 ML/MIN/1.73M2
GLUCOSE SERPL-MCNC: 88 MG/DL (ref 70–99)
LVEF ECHO: NORMAL
MAGNESIUM SERPL-MCNC: 1.5 MG/DL (ref 1.7–2.3)
POTASSIUM SERPL-SCNC: 4.7 MMOL/L (ref 3.4–5.3)
SODIUM SERPL-SCNC: 138 MMOL/L (ref 136–145)
TSH SERPL DL<=0.005 MIU/L-ACNC: 1.17 UIU/ML (ref 0.3–4.2)

## 2023-05-22 PROCEDURE — 93325 DOPPLER ECHO COLOR FLOW MAPG: CPT

## 2023-05-22 PROCEDURE — 36415 COLL VENOUS BLD VENIPUNCTURE: CPT | Performed by: NURSE PRACTITIONER

## 2023-05-22 PROCEDURE — 93228 REMOTE 30 DAY ECG REV/REPORT: CPT | Performed by: INTERNAL MEDICINE

## 2023-05-22 PROCEDURE — 93325 DOPPLER ECHO COLOR FLOW MAPG: CPT | Mod: 26 | Performed by: INTERNAL MEDICINE

## 2023-05-22 PROCEDURE — 93270 REMOTE 30 DAY ECG REV/REPORT: CPT

## 2023-05-22 PROCEDURE — 83735 ASSAY OF MAGNESIUM: CPT | Performed by: NURSE PRACTITIONER

## 2023-05-22 PROCEDURE — 80048 BASIC METABOLIC PNL TOTAL CA: CPT | Performed by: NURSE PRACTITIONER

## 2023-05-22 PROCEDURE — 93308 TTE F-UP OR LMTD: CPT

## 2023-05-22 PROCEDURE — 93321 DOPPLER ECHO F-UP/LMTD STD: CPT | Mod: 26 | Performed by: INTERNAL MEDICINE

## 2023-05-22 PROCEDURE — 93308 TTE F-UP OR LMTD: CPT | Mod: 26 | Performed by: INTERNAL MEDICINE

## 2023-05-22 PROCEDURE — 84443 ASSAY THYROID STIM HORMONE: CPT | Performed by: NURSE PRACTITIONER

## 2023-05-24 ENCOUNTER — OFFICE VISIT (OUTPATIENT)
Dept: FAMILY MEDICINE | Facility: CLINIC | Age: 88
End: 2023-05-24

## 2023-05-24 VITALS
BODY MASS INDEX: 15.56 KG/M2 | OXYGEN SATURATION: 98 % | SYSTOLIC BLOOD PRESSURE: 118 MMHG | DIASTOLIC BLOOD PRESSURE: 72 MMHG | HEART RATE: 53 BPM | TEMPERATURE: 98.1 F | WEIGHT: 96.8 LBS | HEIGHT: 66 IN

## 2023-05-24 DIAGNOSIS — M10.9 ARTHRITIS OF RIGHT WRIST DUE TO GOUT: Primary | ICD-10-CM

## 2023-05-24 DIAGNOSIS — G31.84 MILD COGNITIVE IMPAIRMENT WITH MEMORY LOSS: ICD-10-CM

## 2023-05-24 DIAGNOSIS — R55 SYNCOPE, UNSPECIFIED SYNCOPE TYPE: ICD-10-CM

## 2023-05-24 DIAGNOSIS — R63.6 UNDERWEIGHT: ICD-10-CM

## 2023-05-24 PROBLEM — R79.89 TROPONIN LEVEL ELEVATED: Status: RESOLVED | Noted: 2022-12-20 | Resolved: 2023-05-24

## 2023-05-24 PROCEDURE — 99214 OFFICE O/P EST MOD 30 MIN: CPT | Performed by: FAMILY MEDICINE

## 2023-05-24 NOTE — NURSING NOTE
Chief Complaint   Patient presents with     Hospital F/U      04/16-04/18 for SOB and CHF exacerbation, then went back to the hospital from 05/05-05/08 for gout of the right wrist, doing well, says her wrist is no longer bothering her, Currently wearing a zio patch for 30 days ordered by cardiologist

## 2023-05-24 NOTE — PROGRESS NOTES
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     2. Mild cognitive impairment with memory loss  Followed by neurology.    3. Syncope, unspecified syncope type  She is currently seeing cardiology and is wearing a 30 day monitor.    4. Underweight  Family is monitoring her living situation, recheck in clinic in a month.    }   MED REC REQUIRED--reviewed with patient  Post Medication Reconciliation Status:       FUTURE APPOINTMENTS:       - Follow-up visit in 1 month for weight check.    No follow-ups on file.    Tatianna Swain MD  [facility]    Subjective   Pat is a 89 year old, presenting for the following health issues:  Hospital F/U    [facility]       Hospital Follow-up Visit:    Hospital/Nursing Home/IP Rehab Facility: [facility]  Date of Admission: 04/16-04/18 for SOB and CHF exacerbation, then went back to the hospital from 05/05-05/08 for gout of the right wrist    Was your hospitalization related to COVID-19? No   Problems taking medications regularly:  None  Medication changes since discharge: Updated in chart medication list, stopped carvedilol  Problems adhering to non-medication therapy:  None    Summary of hospitalization:  [facility] discharge summary reviewed  Diagnostic Tests/Treatments reviewed.  Follow up needed: with cardiology  Other Healthcare Providers Involved in Patient s Care:         None  Update since discharge: improved. Here with her daughter and son from out of town. Has been in the ER more than once. Shortness of breath, heart failure. Also had gout in the right wrist. Got indomethacin and her wrist is fine now.    Plan of care  communicated with patient and family             Review of Systems   Constitutional, HEENT, cardiovascular, pulmonary, gi and gu systems are negative, except as otherwise noted.      Objective    LMP  (LMP Unknown)   There is no height or weight on file to calculate BMI.  Physical Exam   GENERAL: healthy, alert and no distress  MS: no gross musculoskeletal defects noted, no edema  NEURO: Normal strength and tone, mentation intact and speech normal  PSYCH: mentation appears normal, affect normal/bright  Patient gives some history, family provides the rest  Bilateral wrists no inflammation apparent, normal rom    No results found for any visits on 05/24/23.

## 2023-06-03 ENCOUNTER — APPOINTMENT (OUTPATIENT)
Dept: ULTRASOUND IMAGING | Facility: CLINIC | Age: 88
End: 2023-06-03
Attending: EMERGENCY MEDICINE
Payer: MEDICARE

## 2023-06-03 ENCOUNTER — HOSPITAL ENCOUNTER (EMERGENCY)
Facility: CLINIC | Age: 88
Discharge: HOME | End: 2023-06-03
Attending: EMERGENCY MEDICINE | Admitting: EMERGENCY MEDICINE
Payer: MEDICARE

## 2023-06-03 ENCOUNTER — APPOINTMENT (OUTPATIENT)
Dept: GENERAL RADIOLOGY | Facility: CLINIC | Age: 88
End: 2023-06-03
Attending: EMERGENCY MEDICINE
Payer: MEDICARE

## 2023-06-03 ENCOUNTER — MEDICAL CORRESPONDENCE (OUTPATIENT)
Dept: EMERGENCY MEDICINE | Facility: CLINIC | Age: 88
End: 2023-06-03

## 2023-06-03 ENCOUNTER — APPOINTMENT (OUTPATIENT)
Dept: CT IMAGING | Facility: CLINIC | Age: 88
End: 2023-06-03
Attending: EMERGENCY MEDICINE
Payer: MEDICARE

## 2023-06-03 VITALS
SYSTOLIC BLOOD PRESSURE: 140 MMHG | DIASTOLIC BLOOD PRESSURE: 66 MMHG | HEART RATE: 94 BPM | RESPIRATION RATE: 16 BRPM | TEMPERATURE: 98.3 F | OXYGEN SATURATION: 93 %

## 2023-06-03 DIAGNOSIS — M25.511 ACUTE PAIN OF RIGHT SHOULDER: ICD-10-CM

## 2023-06-03 DIAGNOSIS — R10.84 ABDOMINAL PAIN, GENERALIZED: ICD-10-CM

## 2023-06-03 LAB
ALBUMIN SERPL BCG-MCNC: 3.7 G/DL (ref 3.5–5.2)
ALBUMIN UR-MCNC: NEGATIVE MG/DL
ALP SERPL-CCNC: 100 U/L (ref 35–104)
ALT SERPL W P-5'-P-CCNC: 22 U/L (ref 10–35)
ANION GAP SERPL CALCULATED.3IONS-SCNC: 15 MMOL/L (ref 7–15)
APPEARANCE UR: CLEAR
AST SERPL W P-5'-P-CCNC: 32 U/L (ref 10–35)
BASOPHILS # BLD AUTO: 0.1 10E3/UL (ref 0–0.2)
BASOPHILS NFR BLD AUTO: 1 %
BILIRUB SERPL-MCNC: 2.7 MG/DL
BILIRUB UR QL STRIP: NEGATIVE
BUN SERPL-MCNC: 14.7 MG/DL (ref 8–23)
CALCIUM SERPL-MCNC: 9.4 MG/DL (ref 8.2–9.6)
CHLORIDE SERPL-SCNC: 96 MMOL/L (ref 98–107)
COLOR UR AUTO: ABNORMAL
CREAT SERPL-MCNC: 0.71 MG/DL (ref 0.51–0.95)
DEPRECATED HCO3 PLAS-SCNC: 24 MMOL/L (ref 22–29)
EOSINOPHIL # BLD AUTO: 0.1 10E3/UL (ref 0–0.7)
EOSINOPHIL NFR BLD AUTO: 2 %
ERYTHROCYTE [DISTWIDTH] IN BLOOD BY AUTOMATED COUNT: 15.3 % (ref 10–15)
GFR SERPL CREATININE-BSD FRML MDRD: 80 ML/MIN/1.73M2
GLUCOSE SERPL-MCNC: 121 MG/DL (ref 70–99)
GLUCOSE UR STRIP-MCNC: NEGATIVE MG/DL
HCT VFR BLD AUTO: 46.3 % (ref 35–47)
HGB BLD-MCNC: 15.2 G/DL (ref 11.7–15.7)
HGB UR QL STRIP: NEGATIVE
IMM GRANULOCYTES # BLD: 0 10E3/UL
IMM GRANULOCYTES NFR BLD: 0 %
INR PPP: 1.3 (ref 0.85–1.15)
KETONES UR STRIP-MCNC: 10 MG/DL
LACTATE SERPL-SCNC: 0.7 MMOL/L (ref 0.7–2)
LACTATE SERPL-SCNC: 2.4 MMOL/L (ref 0.7–2)
LEUKOCYTE ESTERASE UR QL STRIP: NEGATIVE
LIPASE SERPL-CCNC: 22 U/L (ref 13–60)
LYMPHOCYTES # BLD AUTO: 1.3 10E3/UL (ref 0.8–5.3)
LYMPHOCYTES NFR BLD AUTO: 34 %
MCH RBC QN AUTO: 28.2 PG (ref 26.5–33)
MCHC RBC AUTO-ENTMCNC: 32.8 G/DL (ref 31.5–36.5)
MCV RBC AUTO: 86 FL (ref 78–100)
MONOCYTES # BLD AUTO: 0.8 10E3/UL (ref 0–1.3)
MONOCYTES NFR BLD AUTO: 20 %
MUCOUS THREADS #/AREA URNS LPF: PRESENT /LPF
NEUTROPHILS # BLD AUTO: 1.6 10E3/UL (ref 1.6–8.3)
NEUTROPHILS NFR BLD AUTO: 43 %
NITRATE UR QL: NEGATIVE
NRBC # BLD AUTO: 0 10E3/UL
NRBC BLD AUTO-RTO: 0 /100
PH UR STRIP: 8 [PH] (ref 5–7)
PLATELET # BLD AUTO: 266 10E3/UL (ref 150–450)
POTASSIUM SERPL-SCNC: 3.6 MMOL/L (ref 3.4–5.3)
PROT SERPL-MCNC: 6.9 G/DL (ref 6.4–8.3)
RBC # BLD AUTO: 5.39 10E6/UL (ref 3.8–5.2)
RBC URINE: <1 /HPF
SODIUM SERPL-SCNC: 135 MMOL/L (ref 136–145)
SP GR UR STRIP: 1.01 (ref 1–1.03)
SQUAMOUS EPITHELIAL: <1 /HPF
TROPONIN T SERPL HS-MCNC: 14 NG/L
UROBILINOGEN UR STRIP-MCNC: NORMAL MG/DL
WBC # BLD AUTO: 3.7 10E3/UL (ref 4–11)
WBC URINE: 1 /HPF

## 2023-06-03 PROCEDURE — 96374 THER/PROPH/DIAG INJ IV PUSH: CPT | Mod: 59

## 2023-06-03 PROCEDURE — 85048 AUTOMATED LEUKOCYTE COUNT: CPT | Performed by: EMERGENCY MEDICINE

## 2023-06-03 PROCEDURE — 76705 ECHO EXAM OF ABDOMEN: CPT

## 2023-06-03 PROCEDURE — 96361 HYDRATE IV INFUSION ADD-ON: CPT

## 2023-06-03 PROCEDURE — 258N000003 HC RX IP 258 OP 636: Performed by: EMERGENCY MEDICINE

## 2023-06-03 PROCEDURE — 83605 ASSAY OF LACTIC ACID: CPT | Performed by: EMERGENCY MEDICINE

## 2023-06-03 PROCEDURE — 93005 ELECTROCARDIOGRAM TRACING: CPT

## 2023-06-03 PROCEDURE — 250N000011 HC RX IP 250 OP 636: Performed by: EMERGENCY MEDICINE

## 2023-06-03 PROCEDURE — 36415 COLL VENOUS BLD VENIPUNCTURE: CPT | Performed by: EMERGENCY MEDICINE

## 2023-06-03 PROCEDURE — 74177 CT ABD & PELVIS W/CONTRAST: CPT | Mod: MA

## 2023-06-03 PROCEDURE — 83690 ASSAY OF LIPASE: CPT | Performed by: EMERGENCY MEDICINE

## 2023-06-03 PROCEDURE — 80053 COMPREHEN METABOLIC PANEL: CPT | Performed by: EMERGENCY MEDICINE

## 2023-06-03 PROCEDURE — 99285 EMERGENCY DEPT VISIT HI MDM: CPT | Mod: 25

## 2023-06-03 PROCEDURE — 84484 ASSAY OF TROPONIN QUANT: CPT | Performed by: EMERGENCY MEDICINE

## 2023-06-03 PROCEDURE — 81001 URINALYSIS AUTO W/SCOPE: CPT | Performed by: EMERGENCY MEDICINE

## 2023-06-03 PROCEDURE — 85610 PROTHROMBIN TIME: CPT | Performed by: EMERGENCY MEDICINE

## 2023-06-03 PROCEDURE — 73030 X-RAY EXAM OF SHOULDER: CPT | Mod: RT

## 2023-06-03 PROCEDURE — 250N000009 HC RX 250: Performed by: EMERGENCY MEDICINE

## 2023-06-03 RX ORDER — ONDANSETRON 2 MG/ML
4 INJECTION INTRAMUSCULAR; INTRAVENOUS EVERY 30 MIN PRN
Status: DISCONTINUED | OUTPATIENT
Start: 2023-06-03 | End: 2023-06-03 | Stop reason: HOSPADM

## 2023-06-03 RX ORDER — IOPAMIDOL 755 MG/ML
500 INJECTION, SOLUTION INTRAVASCULAR ONCE
Status: COMPLETED | OUTPATIENT
Start: 2023-06-03 | End: 2023-06-03

## 2023-06-03 RX ORDER — HYDROMORPHONE HYDROCHLORIDE 1 MG/ML
0.5 INJECTION, SOLUTION INTRAMUSCULAR; INTRAVENOUS; SUBCUTANEOUS
Status: DISCONTINUED | OUTPATIENT
Start: 2023-06-03 | End: 2023-06-03 | Stop reason: HOSPADM

## 2023-06-03 RX ADMIN — IOPAMIDOL 49 ML: 755 INJECTION, SOLUTION INTRAVENOUS at 10:07

## 2023-06-03 RX ADMIN — HYDROMORPHONE HYDROCHLORIDE 0.5 MG: 1 INJECTION, SOLUTION INTRAMUSCULAR; INTRAVENOUS; SUBCUTANEOUS at 08:47

## 2023-06-03 RX ADMIN — SODIUM CHLORIDE 52 ML: 9 INJECTION, SOLUTION INTRAVENOUS at 10:07

## 2023-06-03 RX ADMIN — SODIUM CHLORIDE 500 ML: 9 INJECTION, SOLUTION INTRAVENOUS at 09:46

## 2023-06-03 ASSESSMENT — ACTIVITIES OF DAILY LIVING (ADL)
ADLS_ACUITY_SCORE: 35
DEPENDENT_IADLS:: CLEANING;COOKING;LAUNDRY;SHOPPING;MEAL PREPARATION;MEDICATION MANAGEMENT

## 2023-06-03 NOTE — DISCHARGE INSTRUCTIONS
will arrange for palliative care consult  Tylenol as needed for home  Please follow up with your doctor next week as needed

## 2023-06-03 NOTE — ED NOTES
Pt dropped oxygen saturations to 87-88% on room air while at rest, MD notified, 2L via nasal cannula reapplied. No signs of respiratory distress noted, pt resting comfortably on cart.

## 2023-06-03 NOTE — CONSULTS
Care Management Initial Consult    General Information  Assessment completed with: Patient, Family, Aarti and Jodi  Type of CM/SW Visit: Initial Assessment    Primary Care Provider verified and updated as needed:     Readmission within the last 30 days: current reason for admission unrelated to previous admission      Reason for Consult: discharge planning, care coordination/care conference  Advance Care Planning:            Communication Assessment  Patient's communication style: spoken language (English or Bilingual)         Cognitive  Cognitive/Neuro/Behavioral: .WDL except, orientation  Level of Consciousness: confused     Orientation: disoriented to, place, time, situation             Living Environment:   People in home: facility resident, alone   Pt lives at [facility] in [place]   Current living Arrangements: assisted living    [facility] [phone]- Fax [phone]    Called facility to speak with RN. SW was told they DO NOT have RN staff on the weekends and it was ok for pt to return from the ED  Able to return to prior arrangements: yes       Family/Social Support:  Care provided by: self, other (see comments)  Provides care for: no one  Marital Status:   Sibling(s), Children, Facility resident(s)/Staff          Description of Support System: Supportive, Involved    Support Assessment: Adequate family and caregiver support, Adequate social supports    Current Resources:   Patient receiving home care services: No  Has previously had [facility] for Skilled home care support.      Community Resources: None  Equipment currently used at home:  Has a walker but does not use the support.   Supplies currently used at home: Incontinence Supplies    Employment/Financial:  Employment Status: retired        Lifestyle & Psychosocial Needs:  Social Determinants of Health     Tobacco Use: Medium Risk (5/24/2023)    Patient History      Smoking Tobacco Use: Former      Smokeless Tobacco Use:  Never      Passive Exposure: Never   Alcohol Use: Unknown (4/23/2021)    AUDIT-C      Frequency of Alcohol Consumption: 2-3 times a week      Average Number of Drinks: 1 or 2      Frequency of Binge Drinking: Not asked   Financial Resource Strain: Not on file   Food Insecurity: Not on file   Transportation Needs: Not on file   Physical Activity: Not on file   Stress: Not on file   Social Connections: Not on file   Intimate Partner Violence: Not on file   Depression: At risk (10/27/2022)    PHQ-2      PHQ-2 Score: 6   Housing Stability: Not on file       Functional Status:  Prior to admission patient needed assistance:   Dependent ADLs:: Ambulation-walker (dos not use)  Dependent IADLs:: Cleaning, Cooking, Laundry, Shopping, Meal Preparation, Medication Management       Mental Health Status:  Mental Health Status: No Current Concerns       Chemical Dependency Status:                Values/Beliefs:  Spiritual, Cultural Beliefs, Jewish Practices, Values that affect care:                 Additional Information:  Met with pt and her two sisters at bedside in the ED.. Family were speaking with Genna Negro to see if pt would qualify for Hospice support. PT is 89years old, had dementia CHF and frequent issues with  her BP. Pt has a pendent to call for support and med set up only at her facility. Pt's two sister Luly share POA for Health Care for pt. Pt's daughter Margaret lives out of state.  Spoke with pt and her family they there may not me a diagnosis for Hospice at this time. Pt was admitted after her facility found her sitting on the floor. It is assumed she had a fall and was complaining of shoulder pain.. Pt in the ED was seen, no injuries noted and pt is stable to return home.. RN asked about possible home o2. Family have declined this as pt will not remember to wear it and feel it posses a bigger falls risk.    Spoke with family about having a Hospice consult to determine if pt would meet criteria. Family  are open with checking with [facility] as her PCP is affiliated with [facility]  Called Intake and spoke with Sharon RN on call for Hospice. She will take referral and contact family to set up assessment for pt.   Jodi is going out of town for 5 days and would like to be present for assessment  SW updated family that we will fax paper work to [facility] even through there is not a RN on today F [phone]    PT has been cleared and family are able to take pt home in their car.     Corinne White, LSW  In pt Care mgt team  [phone]

## 2023-06-03 NOTE — ED PROVIDER NOTES
History     Chief Complaint:  Arm Pain       HPI   Ilda Nassar is a 89 year old female taking Eliquis with a history of hypertension, a-fib, and atrial flutter who presents to the ED for evaluation of arm pain. The friend reports that the patient has been complaining of right shoulder pain and chills. The patient reports that she has pain to her right forearm, wrist, and hand. She also mentions lower abdominal tenderness. Patient has a history of falls, but denies recent fall. Patient lives in an assisted living facility and has her own unit. She denies vomiting, diarrhea, and changes in urination.    Independent Historian: Friend    Review of External Notes:    Prior admission notes reviewed from 2023- work up for syncope    Medications:    Eliquis  Celexa  Aricept  Lasix  Zocor    Past Medical History:    A-fib  Atrial flutter  Hyperlipidemia  Hypertension    Physical Exam     Patient Vitals for the past 24 hrs:   BP Temp Pulse Resp SpO2   06/03/23 1329 -- -- -- -- 91 %   06/03/23 1320 -- -- -- -- (!) 87 %   06/03/23 1319 -- -- -- -- (!) 88 %   06/03/23 1300 -- -- -- -- 92 %   06/03/23 1245 -- -- -- -- 96 %   06/03/23 1230 (!) 167/100 -- 94 -- 98 %   06/03/23 1215 (!) 159/96 -- 80 -- 98 %   06/03/23 1200 (!) 168/96 -- 94 -- 98 %   06/03/23 1158 (!) 157/77 -- 79 -- 97 %   06/03/23 1115 (!) 175/96 -- 94 -- 97 %   06/03/23 1100 (!) 164/109 -- 84 -- 98 %   06/03/23 1045 (!) 183/83 -- 64 -- 98 %   06/03/23 1030 (!) 175/110 -- 73 -- 98 %   06/03/23 0930 (!) 179/83 -- 83 -- 99 %   06/03/23 0856 -- -- 89 -- 96 %   06/03/23 0854 -- -- 90 -- (!) 85 %   06/03/23 0816 (!) 145/108 98.3  F (36.8  C) 88 16 --        Physical Exam  Constitutional:       Appearance: She is well-developed.   HENT:      Right Ear: External ear normal.      Left Ear: External ear normal.      Mouth/Throat:      Mouth: Mucous membranes are moist.      Pharynx: Oropharynx is clear. No oropharyngeal exudate.   Eyes:      General: No scleral  icterus.     Conjunctiva/sclera: Conjunctivae normal.      Pupils: Pupils are equal, round, and reactive to light.   Cardiovascular:      Rate and Rhythm: Normal rate and regular rhythm.      Heart sounds: Normal heart sounds. No murmur heard.     No friction rub. No gallop.   Pulmonary:      Effort: Pulmonary effort is normal. No respiratory distress.      Breath sounds: Normal breath sounds. No wheezing or rales.   Abdominal:      General: Bowel sounds are normal. There is no distension.      Palpations: Abdomen is soft. There is no mass.      Tenderness: There is abdominal tenderness. There is no right CVA tenderness or left CVA tenderness.      Comments: Diffuse abd TTP, cries with exam   Musculoskeletal:         General: Tenderness present. Normal range of motion.      Comments: Tenderness palpation entire right upper extremity from the shoulder down to the wrist.  No obvious deformity or injury.  No swelling or ecchymosis was noted.  She has equal pulses in bilateral extremity.  Range of motion appears to be normal despite pain.   Skin:     General: Skin is warm and dry.      Capillary Refill: Capillary refill takes less than 2 seconds.      Findings: No rash.   Neurological:      Mental Status: She is alert.      Comments: Alert and oriented to person and place.  Speech is clear.  Moving all extremities normally without deficits.  Sensation light touch normal in all extremities.           Emergency Department Course   ECG  ECG taken at 0918, ECG read at 0922  Atrial fibrillation with premature ventricular or aberrantly conducted complexes. Right bundle branch block. T wave abnormality, consider inferior ischemia. Abnormal ECG.   Rate 96 bpm. MN interval * ms. QRS duration 126 ms. QT/QTc 392/495 ms. P-R-T axes * 72 -58.    Imaging:  US Abdomen Limited (RUQ)   Final Result   IMPRESSION:   1.  The common bile duct is at the upper limits of normal measuring 9 mm. This is likely normal for age. Correlate with  disease. If concern for biliary obstruction an MRCP should be considered.   2.  Otherwise, unremarkable abdominal ultrasound.            XR Shoulder Right G/E 3 Views   Final Result   IMPRESSION: Osteopenia. No fractures are evident. Superior subluxation of the humeral head with remodeling of the degenerative changes in the glenohumeral joint.      CT Chest/Abdomen/Pelvis w Contrast   Final Result   IMPRESSION:   1.  No evidence of acute pathology in the chest, abdomen or pelvis.   2.  Cardiomegaly and moderate atherosclerotic vascular calcification of the coronary arteries.   3.  Basilar predominant interstitial pulmonary opacities with mild traction bronchiectasis, could represent early/mild interstitial lung disease.   4.  Hypoattenuating material within the endometrial cavity, could represent thickened endometrial stripe versus blood breakdown products, can be further evaluated with pelvic ultrasound if clinically warranted.              Report per radiology    Laboratory:  Labs Ordered and Resulted from Time of ED Arrival to Time of ED Departure   INR - Abnormal       Result Value    INR 1.30 (*)    COMPREHENSIVE METABOLIC PANEL - Abnormal    Sodium 135 (*)     Potassium 3.6      Chloride 96 (*)     Carbon Dioxide (CO2) 24      Anion Gap 15      Urea Nitrogen 14.7      Creatinine 0.71      Calcium 9.4      Glucose 121 (*)     Alkaline Phosphatase 100      AST 32      ALT 22      Protein Total 6.9      Albumin 3.7      Bilirubin Total 2.7 (*)     GFR Estimate 80     LACTIC ACID WHOLE BLOOD - Abnormal    Lactic Acid 2.4 (*)    ROUTINE UA WITH MICROSCOPIC REFLEX TO CULTURE - Abnormal    Color Urine Light Yellow      Appearance Urine Clear      Glucose Urine Negative      Bilirubin Urine Negative      Ketones Urine 10 (*)     Specific Gravity Urine 1.010      Blood Urine Negative      pH Urine 8.0 (*)     Protein Albumin Urine Negative      Urobilinogen Urine Normal      Nitrite Urine Negative      Leukocyte  Esterase Urine Negative      Mucus Urine Present (*)     RBC Urine <1      WBC Urine 1      Squamous Epithelials Urine <1     CBC WITH PLATELETS AND DIFFERENTIAL - Abnormal    WBC Count 3.7 (*)     RBC Count 5.39 (*)     Hemoglobin 15.2      Hematocrit 46.3      MCV 86      MCH 28.2      MCHC 32.8      RDW 15.3 (*)     Platelet Count 266      % Neutrophils 43      % Lymphocytes 34      % Monocytes 20      % Eosinophils 2      % Basophils 1      % Immature Granulocytes 0      NRBCs per 100 WBC 0      Absolute Neutrophils 1.6      Absolute Lymphocytes 1.3      Absolute Monocytes 0.8      Absolute Eosinophils 0.1      Absolute Basophils 0.1      Absolute Immature Granulocytes 0.0      Absolute NRBCs 0.0     LIPASE - Normal    Lipase 22     TROPONIN T, HIGH SENSITIVITY - Normal    Troponin T, High Sensitivity 14     LACTIC ACID WHOLE BLOOD - Normal    Lactic Acid 0.7        Emergency Department Course & Assessments:     Interventions:  Medications   HYDROmorphone (PF) (DILAUDID) injection 0.5 mg (0.5 mg Intravenous $Given 6/3/23 0847)   ondansetron (ZOFRAN) injection 4 mg (has no administration in time range)   0.9% sodium chloride BOLUS (0 mLs Intravenous Stopped 6/3/23 1100)   CT Scan Flush (52 mLs Intravenous $Given 6/3/23 1007)   iopamidol (ISOVUE-370) solution 500 mL (49 mLs Intravenous $Given 6/3/23 1007)      Assessments:  0843 I spoke with the patient and assessed symptoms.    Independent Interpretation (X-rays, CTs, rhythm strip):  US Abdomen Limited (RUQ)   CT Chest/Abdomen/Pelvis w Contrast   XR Shoulder Right G/E 3 Views     Consultations/Discussion of Management or Tests:  1323 I spoke with Rajni, social work, regarding the patient.     Social Determinants of Health affecting care:  None     Disposition:  The patient was discharged to home.     Impression & Plan        Medical Decision Making:  Patient presents today after sudden onset of right upper extremity pain.  There is no obvious injury.  There is  signs of swelling or other etiologies of right arm pain was found.  No cardiac evidence of concern.  She is already on Eliquis.  I do not believe this represents a DVT given that she is well anticoagulated.  Given her abdominal tenderness, I did evaluate for referred pain from abdominal region.  There were no clear source of concern on her CT imaging today.  She has some mild hypoxia noted here.  She received 1 dose of Dilaudid early on her course but did not receive any further narcotics.  She was able to eat and drink and felt lot better.  When I took her off the oxygen, she was 93-94% on room air and does not have any shortness of breath or chest pain.  Family is concerned that she has had multiple ED visits this year and would not want any further ER visits or extensive work-up for any other concerns.  They did tell me she is DNR/DNI and would not want any invasive intervention for any illnesses even if it was a heart attack or stroke or massive brain bleed.  They want to take her home and does not want her admitted for any other reason.  I did discuss that I think she might be appropriate for palliative care given that their goal of care is to make her comfortable and not to bring her back to ER for other visits.   was consulted and they will have the palliative team do a consult in her home.  Patient and family have no further concern.  They desire to be discharged back home where she is comfortable.  Return precautions provided to family.    Diagnosis:    ICD-10-CM    1. Acute pain of right shoulder  M25.511       2. Abdominal pain, generalized  R10.84            Scribe Disclosure:  I, Daphnie Weaver, am serving as a scribe at 8:43 AM on 6/3/2023 to document services personally performed by Stan Anaya MD based on my observations and the provider's statements to me.  6/3/2023   Stan Anaya MD Cheng, Wenlan, MD  06/03/23 0930

## 2023-06-03 NOTE — ED NOTES
Bed: ED02  Expected date: 6/3/23  Expected time: 8:06 AM  Means of arrival: Ambulance  Comments:  BV1

## 2023-06-03 NOTE — ED NOTES
Pt up to bedside commode via standby assist without issues, pt endorses some slight dizziness with position changes that improved with rest.

## 2023-06-05 LAB
ATRIAL RATE - MUSE: NORMAL BPM
DIASTOLIC BLOOD PRESSURE - MUSE: NORMAL MMHG
INTERPRETATION ECG - MUSE: NORMAL
P AXIS - MUSE: NORMAL DEGREES
PR INTERVAL - MUSE: NORMAL MS
QRS DURATION - MUSE: 126 MS
QT - MUSE: 392 MS
QTC - MUSE: 495 MS
R AXIS - MUSE: 72 DEGREES
SYSTOLIC BLOOD PRESSURE - MUSE: NORMAL MMHG
T AXIS - MUSE: -58 DEGREES
VENTRICULAR RATE- MUSE: 96 BPM

## 2023-06-28 ENCOUNTER — TELEPHONE (OUTPATIENT)
Dept: CARDIOLOGY | Facility: CLINIC | Age: 88
End: 2023-06-28
Payer: MEDICARE

## 2023-06-28 DIAGNOSIS — R55 SYNCOPE, UNSPECIFIED SYNCOPE TYPE: ICD-10-CM

## 2023-06-28 DIAGNOSIS — I50.32 CHRONIC HEART FAILURE WITH PRESERVED EJECTION FRACTION (HFPEF) (H): Primary | ICD-10-CM

## 2023-06-28 DIAGNOSIS — I47.20 VENTRICULAR TACHYCARDIA (H): ICD-10-CM

## 2023-06-28 NOTE — TELEPHONE ENCOUNTER
Event monitor report reviewed and signed by Dr. Hernández. Copy given to Rebecca, clinic manager for visit with SAMANTHA Cruz on 6/30/23 in [place]. Per Dr. Hernández pt should have BMP and magnesium checked prior to visit due to 22 beat run NSVT on monitor. Orders entered.     Called pt's sister Jodi, consent to communicate on file. Reviewed recommendations. Jodi will bring pt for labs at the [facility] tomorrow, 6/29/23 at 11:15 am. Lab appointment slot held and message sent to scheduling.

## 2023-06-29 ENCOUNTER — LAB (OUTPATIENT)
Dept: LAB | Facility: CLINIC | Age: 88
End: 2023-06-29
Payer: MEDICARE

## 2023-06-29 DIAGNOSIS — I47.20 VENTRICULAR TACHYCARDIA (H): ICD-10-CM

## 2023-06-29 DIAGNOSIS — R55 SYNCOPE, UNSPECIFIED SYNCOPE TYPE: ICD-10-CM

## 2023-06-29 DIAGNOSIS — I50.32 CHRONIC HEART FAILURE WITH PRESERVED EJECTION FRACTION (HFPEF) (H): ICD-10-CM

## 2023-06-29 LAB
ANION GAP SERPL CALCULATED.3IONS-SCNC: 9 MMOL/L (ref 7–15)
BUN SERPL-MCNC: 16.4 MG/DL (ref 8–23)
CALCIUM SERPL-MCNC: 9.3 MG/DL (ref 8.2–9.6)
CHLORIDE SERPL-SCNC: 99 MMOL/L (ref 98–107)
CREAT SERPL-MCNC: 0.88 MG/DL (ref 0.51–0.95)
DEPRECATED HCO3 PLAS-SCNC: 31 MMOL/L (ref 22–29)
GFR SERPL CREATININE-BSD FRML MDRD: 62 ML/MIN/1.73M2
GLUCOSE SERPL-MCNC: 85 MG/DL (ref 70–99)
MAGNESIUM SERPL-MCNC: 1.7 MG/DL (ref 1.7–2.3)
POTASSIUM SERPL-SCNC: 4.1 MMOL/L (ref 3.4–5.3)
SODIUM SERPL-SCNC: 139 MMOL/L (ref 136–145)

## 2023-06-29 PROCEDURE — 36415 COLL VENOUS BLD VENIPUNCTURE: CPT | Performed by: INTERNAL MEDICINE

## 2023-06-29 PROCEDURE — 83735 ASSAY OF MAGNESIUM: CPT | Performed by: INTERNAL MEDICINE

## 2023-06-29 PROCEDURE — 80048 BASIC METABOLIC PNL TOTAL CA: CPT | Performed by: INTERNAL MEDICINE

## 2023-06-30 ENCOUNTER — OFFICE VISIT (OUTPATIENT)
Dept: CARDIOLOGY | Facility: CLINIC | Age: 88
End: 2023-06-30
Attending: NURSE PRACTITIONER
Payer: MEDICARE

## 2023-06-30 VITALS
BODY MASS INDEX: 16.86 KG/M2 | HEIGHT: 65 IN | OXYGEN SATURATION: 96 % | DIASTOLIC BLOOD PRESSURE: 62 MMHG | SYSTOLIC BLOOD PRESSURE: 130 MMHG | WEIGHT: 101.2 LBS | HEART RATE: 76 BPM

## 2023-06-30 DIAGNOSIS — I50.30 NYHA CLASS 3 HEART FAILURE WITH PRESERVED EJECTION FRACTION (H): ICD-10-CM

## 2023-06-30 DIAGNOSIS — I34.0 MITRAL VALVE INSUFFICIENCY, UNSPECIFIED ETIOLOGY: ICD-10-CM

## 2023-06-30 DIAGNOSIS — R55 SYNCOPE, UNSPECIFIED SYNCOPE TYPE: ICD-10-CM

## 2023-06-30 DIAGNOSIS — I48.11 LONGSTANDING PERSISTENT ATRIAL FIBRILLATION (H): ICD-10-CM

## 2023-06-30 DIAGNOSIS — I10 ESSENTIAL HYPERTENSION: Primary | ICD-10-CM

## 2023-06-30 PROCEDURE — 99215 OFFICE O/P EST HI 40 MIN: CPT | Performed by: NURSE PRACTITIONER

## 2023-06-30 NOTE — PROGRESS NOTES
Cardiology Clinic Progress Note  Ilda Nassar MRN# [id number]   YOB: 1932 Age: 89 year old   Primary Cardiologist: Dr. Hernández  Reason for visit: cardiology follow up           Assessment and Plan:   Ilda Nassar is a very pleasant 89 year old female here for post hospital follow up.      1. Syncope - ED visits x2 this spring, history is limited, making it hard to discern possible etiology.    - No recurrent episodes  2. HFpEF - LVEF 55-60%              - NYHA class II              - Volume status: euvolemic              - Diuretic regimen: furosemide 20mg daily              - Goal weight: appears ~ 100#  3. Mitral regurgitation - moderate to mod-severe per echo 4/2023 and 5/2023\   - Explored consideration for structural heart evaluation for mitral clip consideration, not in alignment with goals of care.   4. Atrial fibrillation, chronic - asymptomatic, rate controlled, on appropriate low dose apixaban for thromboembolic prophylaxis.   5. Hypertension  6. Hyperlipidemia   7. Past history of tobacco use - quit many years ago  8. Mild to moderate tricuspid regurgitation  9. No known history of coronary artery disease     I met with patient, son and sister for cardiology follow up today, patients daughter was present on the phone. Multiple questions about cardiac testing. We reviewed event monitor results which showed atrial fibrillation with PVCs and 2 episodes of NSVT on 5/23, otherwise no other arrhythmias or pauses. Echo 5/22 showed similar findings from April, LVEF 55-60%, no wall motion abnormalities, RV normal size/function, severe biatrial enlargement and moderately-severe MR. BMP showed stable kidney function and electrolytes. Patient is doing well, NYHA class II, compensated and euvolemic with no anginal symptoms. Unclear etiology for reported syncopal episodes, patient has had no recurrent episodes in the past 2 months.     We explored consideration for structural heart evaluation for  mitral clip consideration, not in alignment with goals of care. Patient does have cognitive impairment would likely not qualify her for valve intervention. Either way patient/family do not wish to pursue invasive procedures and do not wish to return to the ED. Sharing their plans to enroll in hospice. I answer their questions about hospice. We reviewed plan to cancel upcoming cardiology visits due to shift in goals of care to comfort based approach.     Changes today: None    Follow up plan:     Will cancel future cardiology visit as patient/family note plans to pursue hospice.         History of Presenting Illness:    Ilda Nassar is a very pleasant 89 year old female with a history of atrial fibrillation, hypertension, hyperlipidemia, chronic HFpEF, moderate-severe mitral regurgitation, mild to moderate tricuspid regurgitation, dementia, past history of tobacco - states she hasn't smoked in many years.      She has followed in our clinic in the past for her atrial fibrillation, last seen August 2022. Since then she has had 3 hospitalizations. December 2022 with generalized weakness due to COVID.      More recently she has had 2 hospitalizations in the past month. She was hospitalized 4/16-4/18 for HF exacerbation, presenting with weakness/shortness of breath, she was diuresed with IV furosemide. She was started on PO furosemide 20mg daily with potassium.  Admission weight 116#. Discharged at 107#.      Echo 4/17/23 showed LVEF 55-60%, flattened septum consistent with volume overload, RV systolic function borderline reduced, mild to moderate tricuspid regurgitation, moderate to mod-severe mitral regurgitation, IVC dilated. When compared to prior study severity of MR increased.      Most recently hospitalized 5/5-5/8/23 presenting with dizziness/lightheadedness and reported syncope, but with her dementia unable to get clear story.Patient sister noted this was patients 4th syncopal episode.  She was also noted to  have left wrist swelling for which orthopedics was consulted, underwent L wrist aspiration and empiric tx for gout. Patient had telemetry while in the hospital, not reported events, sent home with a ZIO monitor. Cardiology not consulted during admission.      She wore ZIO monitor for 1 week post hospitalization, results still remain unavailable.      I first met patient in May for post hospital follow up, she appeared compensated and euvolemic from a HF standpoint, at that time recommended repeat limited echo, 30 day event monitoring and labs. We did also explore consideration for ischemic evaluation in the setting of syncope given she has not had an ischemic work up in the past. Patient and sister would like to hold off at this time but potentially consider in the future depending on other test results. She having no anginal symptoms.      Echo 5/22 showed similar findings from April, LVEF 55-60%, no wall motion abnormalities, RV normal size/function, severe biatrial enlargement and moderately-severe MR. Event monitor showed atrial fibrillation with PVCs and 2 episodes of NSVT on 5/23, otherwise no other arrhythmias or pauses.     She had another ED visit on 6/3 for arm pain, unremarkable work up. Family noted concerns about frequent ED visits, telling ED provider she is DNR/DNI and would not want invasive intervention for any illnesses or to come back to the ED. Palliative care consulted.     Patient is here today for cardiology follow up. Patients sister and son present for visit, daughter present on the phone.     Patient reports feeling good. Monitoring weights daily a home, overall stable, does note she has gained a couple pounds. Denies shortness of breath at rest. Denies exertional dyspnea. Sleeping good. Denies orthopnea or PND. chest pain or chest tightness. Denies dizziness, lightheadedness or other presyncopal symptoms. Denies syncope. Denies tachycardia or palpitations. Taking medications daily.  Facility staff are taking medications daily.     Labs from yesterday showed stable kidney function and electrolytes, creatinine 0.88. Blood pressure 130/62 and HR 76 in clinic today.    Appetite good. Eating breakfast/lunch in apartment and dinner in dining [place]qu[place]. Sister notes she eats well. No set exercise routine. Denies alcohol use. Denies tobacco use.         Social History    Patient [facility] at [facility]  Social History     Socioeconomic History     Marital status:      Spouse name: Not on file     Number of children: Not on file     Years of education: Not on file     Highest education level: Not on file   Occupational History     Not on file   Tobacco Use     Smoking status: Former     Passive exposure: Never     Smokeless tobacco: Never   Substance and Sexual Activity     Alcohol use: Yes     Drug use: Never     Sexual activity: Not on file   Other Topics Concern     Not on file   Social History Narrative     Not on file     Social Determinants of Health     Financial Resource Strain: Not on file   Food Insecurity: Not on file   Transportation Needs: Not on file   Physical Activity: Not on file   Stress: Not on file   Social Connections: Not on file   Intimate Partner Violence: Not on file   Housing Stability: Not on file          Review of Systems:   10 point ROS neg other than the symptoms noted above in the HPI.         Physical Exam:   Vitals: LMP  (LMP Unknown)    Wt Readings from Last 4 Encounters:   05/24/23 43.9 kg (96 lb 12.8 oz)   05/19/23 44.1 kg (97 lb 3.2 oz)   05/06/23 49.6 kg (109 lb 6.4 oz)   04/18/23 48.9 kg (107 lb 14.4 oz)     GEN: well nourished, in no acute distress.  HEENT:  Pupils equal, round. Sclerae nonicteric.   NECK: Supple, no masses appreciated. JVP appears normal.   C/V:  Irregularly irregular rate and rhythm, holosystolic murmur  RESP: Respirations are unlabored. Clear to auscultation bilaterally without wheezing, rales, or rhonchi.  GI: Abdomen soft,  nontender.  EXTREM: No LE edema.  NEURO: Alert and cooperative.  SKIN: Warm and dry        Data:     LIPID RESULTS:  Lab Results   Component Value Date    CHOL 161 06/16/2022    HDL 66 06/16/2022    LDL 79 06/16/2022    TRIG 78 06/16/2022    CHOLHDLRATIO 2 06/16/2022     LIVER ENZYME RESULTS:  Lab Results   Component Value Date    AST 32 06/03/2023    ALT 22 06/03/2023     CBC RESULTS:  Lab Results   Component Value Date    WBC 3.7 (L) 06/03/2023    WBC 4.7 12/30/2022    RBC 5.39 (H) 06/03/2023    RBC 4.40 12/30/2022    HGB 15.2 06/03/2023    HGB 13.5 12/30/2022    HCT 46.3 06/03/2023    HCT 40.6 12/30/2022    MCV 86 06/03/2023    MCV 92.2 12/30/2022    MCH 28.2 06/03/2023    MCH 30.7 12/30/2022    MCHC 32.8 06/03/2023    MCHC 33.3 12/30/2022    RDW 15.3 (H) 06/03/2023    RDW 12.8 07/09/2021     06/03/2023     12/30/2022     07/09/2021     BMP RESULTS:  Lab Results   Component Value Date     06/29/2023    .4 12/30/2022    POTASSIUM 4.1 06/29/2023    POTASSIUM 4.69 12/30/2022    CHLORIDE 99 06/29/2023    CHLORIDE 101.4 12/30/2022    CO2 31 (H) 06/29/2023    CO2 32.7 (A) 12/30/2022    ANIONGAP 9 06/29/2023    ANIONGAP 6 09/20/2021    ANIONGAP 8 07/09/2021    GLC 85 06/29/2023     (A) 12/30/2022    BUN 16.4 06/29/2023    BUN 12 12/30/2022    BUN 13.3 12/30/2022    CR 0.88 06/29/2023    CR 0.90 12/30/2022    GFRESTIMATED 62 06/29/2023    GFRESTIMATED 61 07/09/2021    GFRESTBLACK 71 07/09/2021    ESTELA 9.3 06/29/2023    ESTELA 9.0 12/30/2022      A1C RESULTS:  Lab Results   Component Value Date    A1C 5.8 10/18/2022     INR RESULTS:  Lab Results   Component Value Date    INR 1.30 (H) 06/03/2023    INR 2.44 (H) 04/16/2023          Medications     Current Outpatient Medications   Medication Sig Dispense Refill     apixaban ANTICOAGULANT (ELIQUIS ANTICOAGULANT) 2.5 MG tablet Take 1 tablet (2.5 mg) by mouth 2 times daily 180 tablet 2     citalopram (CELEXA) 10 MG tablet Take 1 tablet (10  mg) by mouth daily 90 tablet 1     donepezil (ARICEPT) 10 MG tablet Take 1 tablet (10 mg) by mouth At Bedtime       furosemide (LASIX) 20 MG tablet Take 1 tablet (20 mg) by mouth daily And if weight is greater than 2 pounds from baseline weight take extra pill 90 tablet 1     potassium chloride ER (KLOR-CON M) 20 MEQ CR tablet Take 1 tablet (20 mEq) by mouth daily 90 tablet 0     simvastatin (ZOCOR) 40 MG tablet TAKE 1 TABLET (40 MG) BY MOUTH AT BEDTIME 90 tablet 0          Past Medical History     Past Medical History:   Diagnosis Date     Atrial fibrillation (H)      Atrial flutter (H)      Hyperlipidemia      Hypertension           Allergies   Patient has no known allergies.    40 minutes spent on the date of the encounter doing chart review, history and exam, documentation and further activities as noted above      CHRISTINE Ruggiero [facility]  Pager: [phone]

## 2023-06-30 NOTE — LETTER
6/30/2023    Tatianna Swain MD  [address]  [facility] [address]    RE: Ilda Nassar       Dear Colleague,     I had the pleasure of seeing Ilda Nassar in the [facility].  Cardiology Clinic Progress Note  Ilda Nassar MRN# [id number]   YOB: 1932 Age: 89 year old   Primary Cardiologist: Dr. Hernández  Reason for visit: cardiology follow up           Assessment and Plan:   Ilda Nassar is a very pleasant 89 year old female here for post hospital follow up.      1. Syncope - ED visits x2 this spring, history is limited, making it hard to discern possible etiology.    - No recurrent episodes  2. HFpEF - LVEF 55-60%              - NYHA class II              - Volume status: euvolemic              - Diuretic regimen: furosemide 20mg daily              - Goal weight: appears ~ 100#  3. Mitral regurgitation - moderate to mod-severe per echo 4/2023 and 5/2023\   - Explored consideration for structural heart evaluation for mitral clip consideration, not in alignment with goals of care.   4. Atrial fibrillation, chronic - asymptomatic, rate controlled, on appropriate low dose apixaban for thromboembolic prophylaxis.   5. Hypertension  6. Hyperlipidemia   7. Past history of tobacco use - quit many years ago  8. Mild to moderate tricuspid regurgitation  9. No known history of coronary artery disease     I met with patient, son and sister for cardiology follow up today, patients daughter was present on the phone. Multiple questions about cardiac testing. We reviewed event monitor results which showed atrial fibrillation with PVCs and 2 episodes of NSVT on 5/23, otherwise no other arrhythmias or pauses. Echo 5/22 showed similar findings from April, LVEF 55-60%, no wall motion abnormalities, RV normal size/function, severe biatrial enlargement and moderately-severe MR. BMP showed stable kidney function and electrolytes. Patient is doing well, NYHA class II, compensated and  euvolemic with no anginal symptoms. Unclear etiology for reported syncopal episodes, patient has had no recurrent episodes in the past 2 months.     We explored consideration for structural heart evaluation for mitral clip consideration, not in alignment with goals of care. Patient does have cognitive impairment would likely not qualify her for valve intervention. Either way patient/family do not wish to pursue invasive procedures and do not wish to return to the ED. Sharing their plans to enroll in hospice. I answer their questions about hospice. We reviewed plan to cancel upcoming cardiology visits due to shift in goals of care to comfort based approach.     Changes today: None    Follow up plan:     Will cancel future cardiology visit as patient/family note plans to pursue hospice.         History of Presenting Illness:    Ilda Nassar is a very pleasant 89 year old female with a history of atrial fibrillation, hypertension, hyperlipidemia, chronic HFpEF, moderate-severe mitral regurgitation, mild to moderate tricuspid regurgitation, dementia, past history of tobacco - states she hasn't smoked in many years.      She has followed in our clinic in the past for her atrial fibrillation, last seen August 2022. Since then she has had 3 hospitalizations. December 2022 with generalized weakness due to COVID.      More recently she has had 2 hospitalizations in the past month. She was hospitalized 4/16-4/18 for HF exacerbation, presenting with weakness/shortness of breath, she was diuresed with IV furosemide. She was started on PO furosemide 20mg daily with potassium.  Admission weight 116#. Discharged at 107#.      Echo 4/17/23 showed LVEF 55-60%, flattened septum consistent with volume overload, RV systolic function borderline reduced, mild to moderate tricuspid regurgitation, moderate to mod-severe mitral regurgitation, IVC dilated. When compared to prior study severity of MR increased.      Most recently  hospitalized 5/5-5/8/23 presenting with dizziness/lightheadedness and reported syncope, but with her dementia unable to get clear story.Patient sister noted this was patients 4th syncopal episode.  She was also noted to have left wrist swelling for which orthopedics was consulted, underwent L wrist aspiration and empiric tx for gout. Patient had telemetry while in the hospital, not reported events, sent home with a ZIO monitor. Cardiology not consulted during admission.      She wore ZIO monitor for 1 week post hospitalization, results still remain unavailable.      I first met patient in May for post hospital follow up, she appeared compensated and euvolemic from a HF standpoint, at that time recommended repeat limited echo, 30 day event monitoring and labs. We did also explore consideration for ischemic evaluation in the setting of syncope given she has not had an ischemic work up in the past. Patient and sister would like to hold off at this time but potentially consider in the future depending on other test results. She having no anginal symptoms.      Echo 5/22 showed similar findings from April, LVEF 55-60%, no wall motion abnormalities, RV normal size/function, severe biatrial enlargement and moderately-severe MR. Event monitor showed atrial fibrillation with PVCs and 2 episodes of NSVT on 5/23, otherwise no other arrhythmias or pauses.     She had another ED visit on 6/3 for arm pain, unremarkable work up. Family noted concerns about frequent ED visits, telling ED provider she is DNR/DNI and would not want invasive intervention for any illnesses or to come back to the ED. Palliative care consulted.     Patient is here today for cardiology follow up. Patients sister and son present for visit, daughter present on the phone.     Patient reports feeling good. Monitoring weights daily a home, overall stable, does note she has gained a couple pounds. Denies shortness of breath at rest. Denies exertional dyspnea.  Sleeping good. Denies orthopnea or PND. chest pain or chest tightness. Denies dizziness, lightheadedness or other presyncopal symptoms. Denies syncope. Denies tachycardia or palpitations. Taking medications daily. Facility staff are taking medications daily.     Labs from yesterday showed stable kidney function and electrolytes, creatinine 0.88. Blood pressure 130/62 and HR 76 in clinic today.    Appetite good. Eating breakfast/lunch in apartment and dinner in dining mosquera. Sister notes she eats well. No set exercise routine. Denies alcohol use. Denies tobacco use.         Social History    Patient resides at [facility]  Social History     Socioeconomic History    Marital status:      Spouse name: Not on file    Number of children: Not on file    Years of education: Not on file    Highest education level: Not on file   Occupational History    Not on file   Tobacco Use    Smoking status: Former     Passive exposure: Never    Smokeless tobacco: Never   Substance and Sexual Activity    Alcohol use: Yes    Drug use: Never    Sexual activity: Not on file   Other Topics Concern    Not on file   Social History Narrative    Not on file     Social Determinants of Health     Financial Resource Strain: Not on file   Food Insecurity: Not on file   Transportation Needs: Not on file   Physical Activity: Not on file   Stress: Not on file   Social Connections: Not on file   Intimate Partner Violence: Not on file   Housing Stability: Not on file          Review of Systems:   10 point ROS neg other than the symptoms noted above in the HPI.         Physical Exam:   Vitals: LMP  (LMP Unknown)    Wt Readings from Last 4 Encounters:   05/24/23 43.9 kg (96 lb 12.8 oz)   05/19/23 44.1 kg (97 lb 3.2 oz)   05/06/23 49.6 kg (109 lb 6.4 oz)   04/18/23 48.9 kg (107 lb 14.4 oz)     GEN: well nourished, in no acute distress.  HEENT:  Pupils equal, round. Sclerae nonicteric.   NECK: Supple, no masses appreciated. JVP appears normal.   C/V:   Irregularly irregular rate and rhythm, holosystolic murmur  RESP: Respirations are unlabored. Clear to auscultation bilaterally without wheezing, rales, or rhonchi.  GI: Abdomen soft, nontender.  EXTREM: No LE edema.  NEURO: Alert and cooperative.  SKIN: Warm and dry        Data:     LIPID RESULTS:  Lab Results   Component Value Date    CHOL 161 06/16/2022    HDL 66 06/16/2022    LDL 79 06/16/2022    TRIG 78 06/16/2022    CHOLHDLRATIO 2 06/16/2022     LIVER ENZYME RESULTS:  Lab Results   Component Value Date    AST 32 06/03/2023    ALT 22 06/03/2023     CBC RESULTS:  Lab Results   Component Value Date    WBC 3.7 (L) 06/03/2023    WBC 4.7 12/30/2022    RBC 5.39 (H) 06/03/2023    RBC 4.40 12/30/2022    HGB 15.2 06/03/2023    HGB 13.5 12/30/2022    HCT 46.3 06/03/2023    HCT 40.6 12/30/2022    MCV 86 06/03/2023    MCV 92.2 12/30/2022    MCH 28.2 06/03/2023    MCH 30.7 12/30/2022    MCHC 32.8 06/03/2023    MCHC 33.3 12/30/2022    RDW 15.3 (H) 06/03/2023    RDW 12.8 07/09/2021     06/03/2023     12/30/2022     07/09/2021     BMP RESULTS:  Lab Results   Component Value Date     06/29/2023    .4 12/30/2022    POTASSIUM 4.1 06/29/2023    POTASSIUM 4.69 12/30/2022    CHLORIDE 99 06/29/2023    CHLORIDE 101.4 12/30/2022    CO2 31 (H) 06/29/2023    CO2 32.7 (A) 12/30/2022    ANIONGAP 9 06/29/2023    ANIONGAP 6 09/20/2021    ANIONGAP 8 07/09/2021    GLC 85 06/29/2023     (A) 12/30/2022    BUN 16.4 06/29/2023    BUN 12 12/30/2022    BUN 13.3 12/30/2022    CR 0.88 06/29/2023    CR 0.90 12/30/2022    GFRESTIMATED 62 06/29/2023    GFRESTIMATED 61 07/09/2021    GFRESTBLACK 71 07/09/2021    ESTELA 9.3 06/29/2023    ESTELA 9.0 12/30/2022      A1C RESULTS:  Lab Results   Component Value Date    A1C 5.8 10/18/2022     INR RESULTS:  Lab Results   Component Value Date    INR 1.30 (H) 06/03/2023    INR 2.44 (H) 04/16/2023          Medications     Current Outpatient Medications   Medication Sig Dispense  Refill    apixaban ANTICOAGULANT (ELIQUIS ANTICOAGULANT) 2.5 MG tablet Take 1 tablet (2.5 mg) by mouth 2 times daily 180 tablet 2    citalopram (CELEXA) 10 MG tablet Take 1 tablet (10 mg) by mouth daily 90 tablet 1    donepezil (ARICEPT) 10 MG tablet Take 1 tablet (10 mg) by mouth At Bedtime      furosemide (LASIX) 20 MG tablet Take 1 tablet (20 mg) by mouth daily And if weight is greater than 2 pounds from baseline weight take extra pill 90 tablet 1    potassium chloride ER (KLOR-CON M) 20 MEQ CR tablet Take 1 tablet (20 mEq) by mouth daily 90 tablet 0    simvastatin (ZOCOR) 40 MG tablet TAKE 1 TABLET (40 MG) BY MOUTH AT BEDTIME 90 tablet 0          Past Medical History     Past Medical History:   Diagnosis Date    Atrial fibrillation (H)     Atrial flutter (H)     Hyperlipidemia     Hypertension           Allergies   Patient has no known allergies.    40 minutes spent on the date of the encounter doing chart review, history and exam, documentation and further activities as noted above      CHRISTINE Ruggiero CNP  [facility]  Pager: [phone]        Thank you for allowing me to participate in the care of your patient.      Sincerely,     CHRISTINE Ruggiero CNP     [facility]  cc:   CHRISTINE Yoder CNP  [address] [place] [id number]

## 2023-07-20 DIAGNOSIS — I10 ESSENTIAL HYPERTENSION: ICD-10-CM

## 2023-07-20 DIAGNOSIS — Z91.81 AT HIGH RISK FOR FALLS: ICD-10-CM

## 2023-07-20 DIAGNOSIS — I48.20 CHRONIC ATRIAL FIBRILLATION (H): ICD-10-CM

## 2023-07-21 RX ORDER — APIXABAN 2.5 MG/1
TABLET, FILM COATED ORAL
Qty: 60 TABLET | Refills: 0 | COMMUNITY
Start: 2023-07-21

## 2023-07-21 NOTE — TELEPHONE ENCOUNTER
Ilda Nassar is requesting a refill of:    Refused Prescriptions:                       Disp   Refills    ELIQUIS ANTICOAGULANT 2.5 MG tablet [Pharm*60 tab*0        Sig: TAKE 1 TAB BY MOUTH TWICE ADAY  Refused By: TALIA KRAFT  Reason for Refusal: Originating/Specialty Provider to approve    Refills go through cardiologist

## 2023-07-25 ENCOUNTER — MEDICAL CORRESPONDENCE (OUTPATIENT)
Dept: HEALTH INFORMATION MANAGEMENT | Facility: CLINIC | Age: 88
End: 2023-07-25
Payer: MEDICARE

## 2023-07-31 DIAGNOSIS — R41.3 MEMORY CHANGES: ICD-10-CM

## 2023-08-02 RX ORDER — DONEPEZIL HYDROCHLORIDE 10 MG/1
TABLET, FILM COATED ORAL
Qty: 30 TABLET | Refills: 0 | COMMUNITY
Start: 2023-08-02

## 2023-08-02 NOTE — TELEPHONE ENCOUNTER
Ilda Nassar is requesting a refill of:    Refused Prescriptions:                       Disp   Refills    donepezil (ARICEPT) 10 MG tablet [Pharmacy*30 tab*0        Sig: TAKE 1 TAB BY MOUTH AT BEDTIME  Refused By: TALIA KRAFT  Reason for Refusal: Originating/Specialty Provider to approve  Reason for Refusal Comment: Refills come from Neurologist

## 2023-12-23 ENCOUNTER — HEALTH MAINTENANCE LETTER (OUTPATIENT)
Age: 88
End: 2023-12-23

## 2024-01-05 ENCOUNTER — TELEPHONE (OUTPATIENT)
Dept: FAMILY MEDICINE | Facility: CLINIC | Age: 89
End: 2024-01-05

## 2024-01-05 NOTE — TELEPHONE ENCOUNTER
Received a voicemail on clinic support from MELISSA Davis at [facility], stating that Patricia was found sitting on the floor yesterday morning in her room. She denies falling out of bed. Felt weak and wanted to sit down. No injuries or interventions needed.    RN phone # [phone]

## 2024-03-05 DIAGNOSIS — F32.89 OTHER DEPRESSION: ICD-10-CM

## 2024-03-06 RX ORDER — CITALOPRAM HYDROBROMIDE 10 MG/1
10 TABLET ORAL DAILY
COMMUNITY
Start: 2024-03-06

## 2024-03-06 NOTE — TELEPHONE ENCOUNTER
Ilda Nassar is requesting a refill of:    Refused Prescriptions:                       Disp   Refills    citalopram (CELEXA) 10 MG tablet [Pharmacy*                Sig: TAKE 1 TABLET (10 MG) BY MOUTH DAILY  Refused By: TALIA KRAFT  Reason for Refusal: Patient has requested refill too soon  Reason for Refusal Comment: Refill sent on 11/28/22 for 90 tab with 1 refill    Denied, pt due for OV, refills last sent 11/28/22

## 2024-03-26 DIAGNOSIS — F32.89 OTHER DEPRESSION: ICD-10-CM

## 2024-03-26 RX ORDER — CITALOPRAM HYDROBROMIDE 10 MG/1
10 TABLET ORAL DAILY
COMMUNITY
Start: 2024-03-26

## 2024-03-26 NOTE — TELEPHONE ENCOUNTER
Ilda Nassar is requesting a refill of:    Refused Prescriptions:                       Disp   Refills    citalopram (CELEXA) 10 MG tablet [Pharmacy*                Sig: TAKE 1 TABLET (10 MG) BY MOUTH DAILY  Refused By: TALIA KRAFT  Reason for Refusal: Patient needs appointment    Pt due for FASTING OV

## 2024-04-05 NOTE — PROGRESS NOTES
Assessment & Plan   Problem List Items Addressed This Visit          Circulatory    Chronic atrial fibrillation (H)       Behavioral    Other depression    Relevant Medications    citalopram (CELEXA) 10 MG tablet       Other    Memory changes--followed by neurology     Other Visit Diagnoses       Medicare annual wellness visit, subsequent    -  Primary           1. Medicare annual wellness visit, subsequent  Completed.    2. Other depression  Restart citalopram. Side effects discussed.  - citalopram (CELEXA) 10 MG tablet; Take 1 tablet (10 mg) by mouth daily  Dispense: 90 tablet; Refill: 1    3. Chronic atrial fibrillation (H)  Followed by cardiology. She wants to stop some of her medications, I advised to discuss with cardiology, including blood thinner. Daughter said she is aware of increased risk of stroke.    4. Memory changes--followed by neurology  Dementia, daughter doesn't want her on some of her medications unless necessary, I explained that we recommend medications for treatment or management of certain medical conditions, but continuing them may also depend on her overall medical health and functioning.        FUTURE APPOINTMENTS:       - Follow-up visit in 6 months.    No follow-ups on file.    Tatianna Swain MD  [facility]    Subjective     Nursing Notes:   Jessica Barney, [facility]  4/16/2024 11:29 AM  Signed  Chief Complaint   Patient presents with    Recheck Medication     Non-fasting today, discuss medications, she has been on hospice care and would like to take as few medications as needed     Pre-visit Screening:  Immunizations:  not up to date - tdap at pharmacy  Colonoscopy:  NA  Mammogram: NA  Asthma Action Test/Plan:  NA  PHQ9:  NA  GAD7:  NA  Questioned patient about current smoking habits Pt. quit smoking some time ago.  Ok to leave detailed message on voice mail for today's visit only Yes, phone # [phone]       Ilda Nassar is a 89 year old female who presents to  clinic today for the following health issues   HPI     Here with daughter.   Was on hospice but is off of it now. Living at the [facility]. She and her daughter want her on as few meds as possible.  Not sure if she was on citalopram , daughter thinks she would benefit from being back on this. Not sure if she was on this at hospice.  She stopped her cholesterol medication and lasix, these were prescribed by cardiology.          Review of Systems   Constitutional, HEENT, cardiovascular, pulmonary, gi and gu systems are negative, except as otherwise noted.      Objective    /68 (BP Location: Right arm, Patient Position: Sitting, Cuff Size: Adult Regular)   Pulse 72   Temp 97.8  F (36.6  C) (Temporal)   Wt 51.3 kg (113 lb)   LMP  (LMP Unknown)   SpO2 97%   BMI 18.80 kg/m    Body mass index is 18.8 kg/m .  Physical Exam   GENERAL: alert and no distress  RESP: lungs clear to auscultation - no rales, rhonchi or wheezes  CV: regular rate and rhythm, normal S1 S2, no S3 or S4, no murmur, click or rub, no peripheral edema  MS: no gross musculoskeletal defects noted, no edema  NEURO: Normal strength and tone, mentation intact and speech normal  PSYCH: mentation appears normal, affect normal/bright    No results found for any visits on 04/16/24.  Refused labs.

## 2024-04-16 ENCOUNTER — OFFICE VISIT (OUTPATIENT)
Dept: FAMILY MEDICINE | Facility: CLINIC | Age: 89
End: 2024-04-16

## 2024-04-16 VITALS
SYSTOLIC BLOOD PRESSURE: 116 MMHG | TEMPERATURE: 97.8 F | BODY MASS INDEX: 18.8 KG/M2 | OXYGEN SATURATION: 97 % | DIASTOLIC BLOOD PRESSURE: 68 MMHG | WEIGHT: 113 LBS | HEART RATE: 72 BPM

## 2024-04-16 DIAGNOSIS — F32.89 OTHER DEPRESSION: ICD-10-CM

## 2024-04-16 DIAGNOSIS — R41.3 MEMORY CHANGES: ICD-10-CM

## 2024-04-16 DIAGNOSIS — Z00.00 MEDICARE ANNUAL WELLNESS VISIT, SUBSEQUENT: Primary | ICD-10-CM

## 2024-04-16 DIAGNOSIS — I48.20 CHRONIC ATRIAL FIBRILLATION (H): ICD-10-CM

## 2024-04-16 PROBLEM — U07.1 INFECTION DUE TO 2019 NOVEL CORONAVIRUS: Status: RESOLVED | Noted: 2022-12-20 | Resolved: 2024-04-16

## 2024-04-16 PROBLEM — Z87.891 PERSONAL HISTORY OF TOBACCO USE, PRESENTING HAZARDS TO HEALTH: Status: ACTIVE | Noted: 2021-04-23

## 2024-04-16 PROCEDURE — G0439 PPPS, SUBSEQ VISIT: HCPCS | Performed by: FAMILY MEDICINE

## 2024-04-16 PROCEDURE — 99214 OFFICE O/P EST MOD 30 MIN: CPT | Mod: 25 | Performed by: FAMILY MEDICINE

## 2024-04-16 RX ORDER — CITALOPRAM HYDROBROMIDE 10 MG/1
10 TABLET ORAL DAILY
Qty: 90 TABLET | Refills: 1 | Status: SHIPPED | OUTPATIENT
Start: 2024-04-16 | End: 2024-09-13

## 2024-04-16 RX ORDER — ASPIRIN 81 MG/1
1 TABLET, COATED ORAL DAILY
COMMUNITY
Start: 2024-03-25

## 2024-04-16 ASSESSMENT — ANXIETY QUESTIONNAIRES
5. BEING SO RESTLESS THAT IT IS HARD TO SIT STILL: NOT AT ALL
3. WORRYING TOO MUCH ABOUT DIFFERENT THINGS: NOT AT ALL
7. FEELING AFRAID AS IF SOMETHING AWFUL MIGHT HAPPEN: NOT AT ALL
1. FEELING NERVOUS, ANXIOUS, OR ON EDGE: NOT AT ALL
2. NOT BEING ABLE TO STOP OR CONTROL WORRYING: NOT AT ALL
GAD7 TOTAL SCORE: 0
IF YOU CHECKED OFF ANY PROBLEMS ON THIS QUESTIONNAIRE, HOW DIFFICULT HAVE THESE PROBLEMS MADE IT FOR YOU TO DO YOUR WORK, TAKE CARE OF THINGS AT HOME, OR GET ALONG WITH OTHER PEOPLE: NOT DIFFICULT AT ALL
GAD7 TOTAL SCORE: 0
6. BECOMING EASILY ANNOYED OR IRRITABLE: NOT AT ALL

## 2024-04-16 ASSESSMENT — PATIENT HEALTH QUESTIONNAIRE - PHQ9
SUM OF ALL RESPONSES TO PHQ QUESTIONS 1-9: 0
5. POOR APPETITE OR OVEREATING: NOT AT ALL

## 2024-04-16 NOTE — NURSING NOTE
Chief Complaint   Patient presents with    Recheck Medication     Non-fasting today, discuss medications, she has been on hospice care and would like to take as few medications as needed     Pre-visit Screening:  Immunizations:  not up to date - tdap at pharmacy  Colonoscopy:  NA  Mammogram: NA  Asthma Action Test/Plan:  NA  PHQ9:  NA  GAD7:  NA  Questioned patient about current smoking habits Pt. quit smoking some time ago.  Ok to leave detailed message on voice mail for today's visit only Yes, phone # [phone]

## 2024-04-16 NOTE — PATIENT INSTRUCTIONS
Health Maintenance   Topic Date Due    HF ACTION PLAN  Never done    RSV VACCINE (Pregnancy & 60+) (1 - 1-dose 60+ series) Never done    LIPID  06/16/2023    MEDICARE ANNUAL WELLNESS VISIT  11/09/2023    BMP  12/29/2023    INFLUENZA VACCINE (1) 06/30/2024 (Originally 9/1/2023)    DTAP/TDAP/TD IMMUNIZATION (1 - Tdap) 04/16/2025 (Originally 8/31/1957)    ALT  06/03/2024    CBC  06/03/2024    FALL RISK ASSESSMENT  04/16/2025    ADVANCE CARE PLANNING  05/26/2026    TSH W/FREE T4 REFLEX  Completed    PHQ-2 (once per calendar year)  Completed    Pneumococcal Vaccine: 65+ Years  Completed    ZOSTER IMMUNIZATION  Completed    COVID-19 Vaccine  Completed    IPV IMMUNIZATION  Aged Out    HPV IMMUNIZATION  Aged Out    MENINGITIS IMMUNIZATION  Aged Out    RSV MONOCLONAL ANTIBODY  Aged Out

## 2024-04-16 NOTE — PROGRESS NOTES
Ilda Nassar is a 89 year old female who presents for Medicare Annual Wellness Visit.    Current providers caring for this patient include:  Patient Care Team:  Tatianna Swain MD as PCP - General (Family Medicine)  Tatianna Swain MD as Assigned PCP  Nancy Grijalva APRN CNP as Nurse Practitioner (Cardiovascular Disease)    Complete Medical and Social history reviewed with patient, outlined below.    Patient Active Problem List   Diagnosis    ACP (advance care planning)    Health Care Home    Memory changes--followed by neurology    Other hyperlipidemia    Essential hypertension    Personal history of tobacco use, presenting hazards to health--quit    Longstanding persistent atrial fibrillation (H)--followed by cardiology    Atrial flutter (H)    Elevated fasting glucose    Other depression    Generalized muscle weakness    Chronic atrial fibrillation (H)    Acute on chronic congestive heart failure, unspecified heart failure type (H)    Pyogenic arthritis of left wrist, due to unspecified organism (H)    Syncope, unspecified syncope type    Arthritis of right 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    No family history on file.    Social History     Tobacco Use    Smoking status: Former     Passive exposure: Never    Smokeless tobacco: Never   Substance Use Topics    Alcohol use: Yes     Comment: On occ, glass of wine       Diet: regular, low salt/low fat, food is provided by her living facility   Physical Activity: active without specific exercise program  Depression Screen:    Over the past 2 weeks, patient has felt down, depressed, or hopeless:  No    Over the past 2 weeks, patient has felt little interest or pleasure in doing things: No    Functional ability/Safety  screen:  Up and go test (able to get up and walk longer than 30 seconds): Passed, she has a walker for use as needed   Patient needs assistance with: her living facility does laundry, housework, food prep, sisters and daughter help with things as well  Patient's home has the following possible safety concerns: she has grab bars   Patient has concerns about her hearing:  No  Cognitive Screen  Patient repeats three objects (ball, flag, tree)      Clock drawing test:    attempted, unable to do with memory concerns  Recalls three objects after 3 minutes (ball,flag,tree): unable to recall objects                                                                                             Physical Exam:  /68 (BP Location: Right arm, Patient Position: Sitting, Cuff Size: Adult Regular)   Pulse 72   Temp 97.8  F (36.6  C) (Temporal)   Wt 51.3 kg (113 lb)   LMP  (LMP Unknown)   SpO2 97%   BMI 18.80 kg/m     Body mass index is 18.8 kg/m .        Health Maintenance   Topic Date Due    RSV VACCINE (Pregnancy & 60+) (1 - 1-dose 60+ series) Never done    INFLUENZA VACCINE (1) 06/30/2024 (Originally 9/1/2023)    BMP  10/01/2024 (Originally 12/29/2023)    LIPID  10/01/2024 (Originally 6/16/2023)    DTAP/TDAP/TD IMMUNIZATION (1 - Tdap) 04/16/2025 (Originally 8/31/1957)    HF ACTION PLAN  04/01/2026 (Originally 8/31/1932)    ALT  06/03/2024    CBC  06/03/2024    MEDICARE ANNUAL WELLNESS VISIT  04/16/2025    FALL RISK ASSESSMENT  04/16/2025    ADVANCE CARE PLANNING  05/26/2026    TSH W/FREE T4 REFLEX  Completed    PHQ-2 (once per calendar year)  Completed    Pneumococcal Vaccine: 65+ Years  Completed    ZOSTER IMMUNIZATION  Completed    COVID-19 Vaccine  Completed    IPV IMMUNIZATION  Aged Out    HPV IMMUNIZATION  Aged Out    MENINGITIS IMMUNIZATION  Aged Out    RSV MONOCLONAL ANTIBODY  Aged Out         End of Life Planning:   Patient currently has an advanced directive: Yes.  Practitioner is supportive of  decision.    Education/Counseling:   Based on review of the above information, the following items were addressed:      Discussed diet and exercise    Appropriate preventive services were discussed with this patient, including applicable screening as appropriate for cardiovascular disease, diabetes, osteopenia/osteoporosis, and glaucoma.  As appropriate for age/gender, discussed screening for colorectal cancer, prostate cancer, breast cancer, and cervical cancer.   Checklist reviewing preventive services available has been given to the patient.    HM reviewed, refused labs. Otherwise up to date.

## 2024-06-03 ENCOUNTER — OFFICE VISIT (OUTPATIENT)
Dept: FAMILY MEDICINE | Facility: CLINIC | Age: 89
End: 2024-06-03

## 2024-06-03 VITALS
BODY MASS INDEX: 18.8 KG/M2 | DIASTOLIC BLOOD PRESSURE: 74 MMHG | OXYGEN SATURATION: 95 % | WEIGHT: 113 LBS | SYSTOLIC BLOOD PRESSURE: 114 MMHG | TEMPERATURE: 97.5 F | HEART RATE: 67 BPM

## 2024-06-03 DIAGNOSIS — F03.90 DEMENTIA, UNSPECIFIED DEMENTIA SEVERITY, UNSPECIFIED DEMENTIA TYPE, UNSPECIFIED WHETHER BEHAVIORAL, PSYCHOTIC, OR MOOD DISTURBANCE OR ANXIETY (H): ICD-10-CM

## 2024-06-03 DIAGNOSIS — R30.0 DYSURIA: Primary | ICD-10-CM

## 2024-06-03 DIAGNOSIS — R41.0 CONFUSION: ICD-10-CM

## 2024-06-03 DIAGNOSIS — R17 ELEVATED BILIRUBIN: ICD-10-CM

## 2024-06-03 DIAGNOSIS — M62.81 GENERALIZED MUSCLE WEAKNESS: ICD-10-CM
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
## 2024-06-03 LAB
% GRANULOCYTES: 52.8 %
ALBUMIN SERPL-MCNC: 3.9 G/DL (ref 3.6–5.1)
ALP SERPL-CCNC: 78 U/L (ref 33–130)
ALT 1742-6: 9 U/L (ref 0–32)
APPEARANCE UR: CLEAR
AST 1920-8: 19 U/L (ref 0–35)
BACTERIA, UR: NORMAL
BILIRUB SERPL-MCNC: 2.6 MG/DL (ref 0.2–1.2)
BILIRUB UR QL: NORMAL
BUN SERPL-MCNC: 22 MG/DL (ref 7–25)
BUN/CREATININE RATIO: 24 (ref 6–32)
CALCIUM SERPL-MCNC: 9.9 MG/DL (ref 8.6–10.3)
CASTS/LPF: NORMAL
CHLORIDE SERPLBLD-SCNC: 97.5 MMOL/L (ref 98–110)
CO2 SERPL-SCNC: 32.2 MMOL/L (ref 20–32)
COLOR UR: YELLOW
CREAT SERPL-MCNC: 0.92 MG/DL (ref 0.6–1.3)
EP/HPF: NORMAL
GLUCOSE SERPL-MCNC: 105 MG/DL (ref 60–99)
GLUCOSE URINE: NORMAL MG/DL
HCT VFR BLD AUTO: 48.5 % (ref 35–47)
HEMOGLOBIN: 15.8 G/DL (ref 11.7–15.7)
HGB UR QL: NORMAL
KETONES UR QL: NORMAL MG/DL
LYMPHOCYTES NFR BLD AUTO: 32.7 %
MAGNESIUM SERPL-MCNC: 2.1 MG/DL (ref 1.5–2.5)
MCH RBC QN AUTO: 30.9 PG (ref 26–33)
MCHC RBC AUTO-ENTMCNC: 32.6 G/DL (ref 31–36)
MCV RBC AUTO: 94.8 FL (ref 78–100)
MISC.: NORMAL
MONOCYTES NFR BLD AUTO: 14.5 %
NITRITE UR QL STRIP: NORMAL
PH UR STRIP: 6.5 PH (ref 5–7)
PLATELET COUNT - QUEST: 222 10^9/L (ref 150–375)
POTASSIUM SERPL-SCNC: 4.12 MMOL/L (ref 3.5–5.3)
PROT SERPL-MCNC: 7 G/DL (ref 6.1–8.1)
PROT UR QL: NORMAL MG/DL
RBC # BLD AUTO: 5.12 10*12/L (ref 3.8–5.2)
RBC, UR MICRO: NORMAL (ref ?–2)
SODIUM SERPL-SCNC: 137.5 MMOL/L (ref 135–146)
SP GR UR STRIP: 1.02
UROBILINOGEN UR QL STRIP: 0.2 EU/DL (ref 0.2–1)
WBC # BLD AUTO: 4.8 10*9/L (ref 4–11)
WBC #/AREA URNS HPF: NORMAL /[HPF]
WBC, UR MICRO: NORMAL (ref ?–2)

## 2024-06-03 PROCEDURE — 85025 COMPLETE CBC W/AUTO DIFF WBC: CPT | Performed by: FAMILY MEDICINE

## 2024-06-03 PROCEDURE — 99214 OFFICE O/P EST MOD 30 MIN: CPT | Performed by: FAMILY MEDICINE

## 2024-06-03 PROCEDURE — 83735 ASSAY OF MAGNESIUM: CPT | Performed by: FAMILY MEDICINE

## 2024-06-03 PROCEDURE — 36415 COLL VENOUS BLD VENIPUNCTURE: CPT | Performed by: FAMILY MEDICINE

## 2024-06-03 PROCEDURE — 81001 URINALYSIS AUTO W/SCOPE: CPT | Performed by: FAMILY MEDICINE

## 2024-06-03 PROCEDURE — 80053 COMPREHEN METABOLIC PANEL: CPT | Performed by: FAMILY MEDICINE

## 2024-06-03 PROCEDURE — G2211 COMPLEX E/M VISIT ADD ON: HCPCS | Performed by: FAMILY MEDICINE

## 2024-06-03 NOTE — NURSING NOTE
Chief Complaint   Patient presents with    Urinary Problem     Possible UTI. Pt felt weaker, slipped off the toilet. Lives at [facility] and the nurse told her to come get an test    Pre-visit Screening:  Immunizations:  up to date  Colonoscopy:  is up to date  Mammogram: is up to date  Asthma Action Test/Plan:  na  PHQ9:    GAD7:    Questioned patient about current smoking habits Pt. recently quit smoking.  Ok to leave detailed message on voice mail for today's visit only , phone #

## 2024-06-03 NOTE — PROGRESS NOTES
Assessment & Plan   Problem List Items Addressed This Visit          Musculoskeletal and Integumentary    Generalized muscle weakness    Relevant Orders    Physical Therapy  Referral - To a [facility] Location (Use POS/Location)     Other Visit Diagnoses       Dysuria    -  Primary    Relevant Orders    URINALYSIS, ROUTINE (BFP) (Completed)    Confusion        Relevant Orders    VENOUS COLLECTION (Completed)    Comprehensive Metobolic Panel (BFP) (Completed)    HEMOGRAM PLATELET DIFF (BFP) (Completed)    Magnesium (BFP) (Completed)    Physical Therapy  Referral - To a [facility] Location (Use POS/Location)    Fall, initial encounter        Relevant Orders    Physical Therapy  Referral - To a [facility] Location (Use POS/Location)    Dementia, unspecified dementia severity, unspecified dementia type, unspecified whether behavioral, psychotic, or mood disturbance or anxiety (H)        Relevant Orders    Physical Therapy  Referral - To a [facility] Location (Use POS/Location)           1. Dysuria  She doesn't really have dysuria, but urine checked, as this can be a cause of falls and confusion.  - URINALYSIS, ROUTINE (BFP)    2. Confusion  I advised that we check additional labs. We can send her to see neurology, sister wants to wait on this for now.  - VENOUS COLLECTION  - Comprehensive Metobolic Panel (BFP)  - HEMOGRAM PLATELET DIFF (BFP)  - Magnesium (BFP)  - Physical Therapy  Referral - To a [facility] Location (Use POS/Location)    3. Fall, initial encounter  She seems to have some generalized weakness, and sister thinks she would benefit from some physical therapy at her home, referral done.  - Physical Therapy  Referral - To a [facility] Location (Use POS/Location)    4. Generalized muscle weakness  See miya.  - Physical Therapy  Referral - To a [facility] Location (Use  POS/Location)     5. Dementia, unspecified dementia severity, unspecified dementia type, unspecified whether behavioral, psychotic, or mood disturbance or anxiety (H)  Followed by cardiology  - Physical Therapy  Referral - To a Non [facility] Location (Use POS/Location)          FUTURE APPOINTMENTS:       - Follow-up visit to recheck as needed, if symptoms change or worsen.  We manage her chronic medical care.    No follow-ups on file.    Tatianna Swain MD  [facility]    Subjective     Nursing Notes:   Laura Sandoval MA  6/3/2024  3:35 PM  Signed  Chief Complaint   Patient presents with    Urinary Problem     Possible UTI. Pt felt weaker, slipped off the toilet. Lives at [facility] and the nurse told her to come get an test    Pre-visit Screening:  Immunizations:  up to date  Colonoscopy:  is up to date  Mammogram: is up to date  Asthma Action Test/Plan:  na  PHQ9:    GAD7:    Questioned patient about current smoking habits Pt. recently quit smoking.  Ok to leave detailed message on voice mail for today's visit only , phone #       Ilda Nassar is a 89 year old female who presents to clinic today for the following health issues     HPI     Here with sister.. Was in nursing home, seemed weak and had her come in to check for uti. Fell a couple of times, just folded down or slid. Isn't hurt, nothing serious and doesn't need a ct scan. Lives at [facility]. Doesn't have a urinary infection. No worsening memory changes. Her daily life is about the same.   No pain.  The falls are not when walking, its more a stabilization and balance when she is changing position.        Review of Systems   Constitutional, HEENT, cardiovascular, pulmonary, gi and gu systems are negative, except as otherwise noted.      Objective    /74 (BP Location: Right arm, Patient Position: Sitting, Cuff Size: Adult Regular)   Pulse 67   Temp 97.5  F (36.4  C) (Temporal)   Wt 51.3 kg (113 lb)   LMP   (LMP Unknown)   SpO2 95%   BMI 18.80 kg/m    Body mass index is 18.8 kg/m .  Physical Exam   GENERAL: alert and no distress  RESP: lungs clear to auscultation - no rales, rhonchi or wheezes  CV: regular rate and rhythm, normal S1 S2, no S3 or S4, no murmur, click or rub, no peripheral edema  MS: no gross musculoskeletal defects noted, no edema  NEURO: Normal strength and tone, mentation intact and speech normal  PSYCH: mentation appears normal, affect normal/bright  Patient walks unassisted down the hallway with no apparent difficulty. The sister gives most of her history today but the patient contributes to the history meaningfully.    Results for orders placed or performed in visit on 06/03/24   URINALYSIS, ROUTINE (BFP)     Status: None   Result Value Ref Range    Color Urine Yellow     Appearance Urine Clear     Glucose Urine Neg mg/dL    Bilirubin Urine Neg     Ketones Urine Neg mg/dL    Specific Gravity Urine 1.020     Blood Urine Neg     pH Urine 6.5 pH    Protein Urine neg neg - neg mg/dL    Urobilinogen Urine 0.2 EU/dL    Nitrite Urine Neg     Leukocytes neg     Wbc, Urine Micro neg neg - 2    RBC Micro Urine neg neg - 2    EP/HPF neg     Bacteria Urine neg neg - neg    Casts/LPF neg     Miscellaneous neg    Comprehensive Metobolic Panel (BFP)     Status: Abnormal   Result Value Ref Range    Carbon Dioxide 32.2 (A) 20 - 32 mmol/L    Creatinine 0.92 0.60 - 1.30 mg/dL    Glucose 105 (A) 60 - 99 mg/dL    Sodium 137.5 135 - 146 mmol/L    Potassium 4.12 3.5 - 5.3 mmol/L    Chloride 97.5 (A) 98 - 110 mmol/L    Protein Total 7.0 6.1 - 8.1 g/dL    Albumin 3.9 3.6 - 5.1 g/dL    Alkaline Phosphatase 78 33 - 130 U/L    ALT 9 0 - 32 U/L    AST 19 0 - 35 U/L    Bilirubin Total 2.6 (A) 0.2 - 1.2 mg/dL    Urea Nitrogen 22 7 - 25 mg/dL    Calcium 9.9 8.6 - 10.3 mg/dL    BUN/Creatinine Ratio 24 6 - 32   HEMOGRAM PLATELET DIFF (BFP)     Status: Abnormal   Result Value Ref Range    WBC 4.8 4.0 - 11 10*9/L    RBC Count 5.12  3.8 - 5.2 10*12/L    Hemoglobin 15.8 (A) 11.7 - 15.7 g/dL    Hematocrit 48.5 (A) 35.0 - 47.0 %    MCV 94.8 78 - 100 fL    MCH 30.9 26 - 33 pg    MCHC 32.6 31 - 36 g/dL    Platelet Count 222 150 - 375 10^9/L    % Granulocytes 52.8 %    % Lymphocytes 32.7 %    % Monocytes 14.5 %   Magnesium (BFP)     Status: None   Result Value Ref Range    Magnesium 2.1 1.5 - 2.5 mg/dL

## 2024-06-11 ENCOUNTER — TELEPHONE (OUTPATIENT)
Dept: FAMILY MEDICINE | Facility: CLINIC | Age: 89
End: 2024-06-11

## 2024-06-11 NOTE — TELEPHONE ENCOUNTER
The notes that were sent does not support a PT order. They need a diagnosis of Dementia, arthritis for example. Wondering if you can addend your note    Fax - [phone]  Call if questions - [phone]    Please send back to nurse pool

## 2024-06-17 PROBLEM — Z76.89 HEALTH CARE HOME: Status: RESOLVED | Noted: 2021-04-23 | Resolved: 2024-06-17

## 2024-06-20 ENCOUNTER — MEDICAL CORRESPONDENCE (OUTPATIENT)
Dept: HEALTH INFORMATION MANAGEMENT | Facility: CLINIC | Age: 89
End: 2024-06-20

## 2024-07-05 ENCOUNTER — APPOINTMENT (OUTPATIENT)
Dept: ULTRASOUND IMAGING | Facility: CLINIC | Age: 89
End: 2024-07-05
Attending: EMERGENCY MEDICINE
Payer: MEDICARE

## 2024-07-05 ENCOUNTER — APPOINTMENT (OUTPATIENT)
Dept: GENERAL RADIOLOGY | Facility: CLINIC | Age: 89
End: 2024-07-05
Attending: EMERGENCY MEDICINE
Payer: MEDICARE

## 2024-07-05 ENCOUNTER — APPOINTMENT (OUTPATIENT)
Dept: CT IMAGING | Facility: CLINIC | Age: 89
End: 2024-07-05
Attending: EMERGENCY MEDICINE
Payer: MEDICARE

## 2024-07-05 ENCOUNTER — HOSPITAL ENCOUNTER (EMERGENCY)
Facility: CLINIC | Age: 89
Discharge: HOME | End: 2024-07-05
Attending: EMERGENCY MEDICINE | Admitting: EMERGENCY MEDICINE
Payer: MEDICARE

## 2024-07-05 VITALS
TEMPERATURE: 98.1 F | DIASTOLIC BLOOD PRESSURE: 82 MMHG | OXYGEN SATURATION: 94 % | SYSTOLIC BLOOD PRESSURE: 113 MMHG | RESPIRATION RATE: 24 BRPM | HEART RATE: 95 BPM

## 2024-07-05 DIAGNOSIS — M71.20 SYNOVIAL CYST OF POPLITEAL SPACE, UNSPECIFIED LATERALITY: ICD-10-CM

## 2024-07-05 DIAGNOSIS — R93.89 THICKENED ENDOMETRIUM: ICD-10-CM

## 2024-07-05 LAB
ALBUMIN SERPL BCG-MCNC: 3.3 G/DL (ref 3.5–5.2)
ALBUMIN UR-MCNC: NEGATIVE MG/DL
ALP SERPL-CCNC: 99 U/L (ref 40–150)
ALT SERPL W P-5'-P-CCNC: 12 U/L (ref 0–50)
ANION GAP SERPL CALCULATED.3IONS-SCNC: 14 MMOL/L (ref 7–15)
APPEARANCE UR: CLEAR
AST SERPL W P-5'-P-CCNC: 21 U/L (ref 0–45)
BASOPHILS # BLD AUTO: 0 10E3/UL (ref 0–0.2)
BASOPHILS NFR BLD AUTO: 0 %
BILIRUB DIRECT SERPL-MCNC: 0.58 MG/DL (ref 0–0.3)
BILIRUB SERPL-MCNC: 3.5 MG/DL
BILIRUB UR QL STRIP: NEGATIVE
BUN SERPL-MCNC: 23.9 MG/DL (ref 8–23)
CALCIUM SERPL-MCNC: 8.8 MG/DL (ref 8.2–9.6)
CHLORIDE SERPL-SCNC: 94 MMOL/L (ref 98–107)
COLOR UR AUTO: YELLOW
CREAT SERPL-MCNC: 0.85 MG/DL (ref 0.51–0.95)
D DIMER PPP FEU-MCNC: 1.37 UG/ML FEU (ref 0–0.5)
DEPRECATED HCO3 PLAS-SCNC: 26 MMOL/L (ref 22–29)
EGFRCR SERPLBLD CKD-EPI 2021: 64 ML/MIN/1.73M2
EOSINOPHIL # BLD AUTO: 0 10E3/UL (ref 0–0.7)
EOSINOPHIL NFR BLD AUTO: 0 %
ERYTHROCYTE [DISTWIDTH] IN BLOOD BY AUTOMATED COUNT: 13.2 % (ref 10–15)
GLUCOSE SERPL-MCNC: 120 MG/DL (ref 70–99)
GLUCOSE UR STRIP-MCNC: NEGATIVE MG/DL
HCT VFR BLD AUTO: 46.3 % (ref 35–47)
HGB BLD-MCNC: 15.4 G/DL (ref 11.7–15.7)
HGB UR QL STRIP: NEGATIVE
IMM GRANULOCYTES # BLD: 0 10E3/UL
IMM GRANULOCYTES NFR BLD: 0 %
KETONES UR STRIP-MCNC: NEGATIVE MG/DL
LEUKOCYTE ESTERASE UR QL STRIP: NEGATIVE
LIPASE SERPL-CCNC: 10 U/L (ref 13–60)
LYMPHOCYTES # BLD AUTO: 0.8 10E3/UL (ref 0.8–5.3)
LYMPHOCYTES NFR BLD AUTO: 11 %
MCH RBC QN AUTO: 30.3 PG (ref 26.5–33)
MCHC RBC AUTO-ENTMCNC: 33.3 G/DL (ref 31.5–36.5)
MCV RBC AUTO: 91 FL (ref 78–100)
MONOCYTES # BLD AUTO: 1.3 10E3/UL (ref 0–1.3)
MONOCYTES NFR BLD AUTO: 18 %
MUCOUS THREADS #/AREA URNS LPF: PRESENT /LPF
NEUTROPHILS # BLD AUTO: 4.9 10E3/UL (ref 1.6–8.3)
NEUTROPHILS NFR BLD AUTO: 70 %
NITRATE UR QL: NEGATIVE
NRBC # BLD AUTO: 0 10E3/UL
NRBC BLD AUTO-RTO: 0 /100
PH UR STRIP: 5.5 [PH] (ref 5–7)
PLATELET # BLD AUTO: 187 10E3/UL (ref 150–450)
POTASSIUM SERPL-SCNC: 4.4 MMOL/L (ref 3.4–5.3)
PROT SERPL-MCNC: 6.8 G/DL (ref 6.4–8.3)
RBC # BLD AUTO: 5.09 10E6/UL (ref 3.8–5.2)
RBC URINE: 1 /HPF
SODIUM SERPL-SCNC: 134 MMOL/L (ref 135–145)
SP GR UR STRIP: 1.02 (ref 1–1.03)
UROBILINOGEN UR STRIP-MCNC: NORMAL MG/DL
WBC # BLD AUTO: 7 10E3/UL (ref 4–11)
WBC URINE: 1 /HPF

## 2024-07-05 PROCEDURE — 85025 COMPLETE CBC W/AUTO DIFF WBC: CPT | Performed by: EMERGENCY MEDICINE

## 2024-07-05 PROCEDURE — 96360 HYDRATION IV INFUSION INIT: CPT | Mod: 59

## 2024-07-05 PROCEDURE — 82248 BILIRUBIN DIRECT: CPT | Performed by: EMERGENCY MEDICINE

## 2024-07-05 PROCEDURE — 74177 CT ABD & PELVIS W/CONTRAST: CPT | Mod: MG

## 2024-07-05 PROCEDURE — 85379 FIBRIN DEGRADATION QUANT: CPT | Performed by: EMERGENCY MEDICINE

## 2024-07-05 PROCEDURE — 84450 TRANSFERASE (AST) (SGOT): CPT | Performed by: EMERGENCY MEDICINE

## 2024-07-05 PROCEDURE — 99285 EMERGENCY DEPT VISIT HI MDM: CPT | Mod: 25

## 2024-07-05 PROCEDURE — 93970 EXTREMITY STUDY: CPT

## 2024-07-05 PROCEDURE — 36415 COLL VENOUS BLD VENIPUNCTURE: CPT | Performed by: EMERGENCY MEDICINE

## 2024-07-05 PROCEDURE — 81003 URINALYSIS AUTO W/O SCOPE: CPT | Performed by: EMERGENCY MEDICINE

## 2024-07-05 PROCEDURE — 96361 HYDRATE IV INFUSION ADD-ON: CPT

## 2024-07-05 PROCEDURE — 250N000009 HC RX 250: Performed by: EMERGENCY MEDICINE

## 2024-07-05 PROCEDURE — 258N000003 HC RX IP 258 OP 636: Performed by: EMERGENCY MEDICINE

## 2024-07-05 PROCEDURE — 73560 X-RAY EXAM OF KNEE 1 OR 2: CPT | Mod: 50

## 2024-07-05 PROCEDURE — 250N000011 HC RX IP 250 OP 636: Performed by: EMERGENCY MEDICINE

## 2024-07-05 PROCEDURE — 82040 ASSAY OF SERUM ALBUMIN: CPT | Performed by: EMERGENCY MEDICINE

## 2024-07-05 PROCEDURE — 83690 ASSAY OF LIPASE: CPT | Performed by: EMERGENCY MEDICINE

## 2024-07-05 RX ORDER — NAPROXEN 250 MG/1
250 TABLET ORAL 2 TIMES DAILY WITH MEALS
Qty: 20 TABLET | Refills: 0 | Status: SHIPPED | OUTPATIENT
Start: 2024-07-05 | End: 2024-07-24

## 2024-07-05 RX ORDER — IOPAMIDOL 755 MG/ML
500 INJECTION, SOLUTION INTRAVASCULAR ONCE
Status: COMPLETED | OUTPATIENT
Start: 2024-07-05 | End: 2024-07-05

## 2024-07-05 RX ADMIN — SODIUM CHLORIDE 500 ML: 9 INJECTION, SOLUTION INTRAVENOUS at 12:27

## 2024-07-05 RX ADMIN — IOPAMIDOL 57 ML: 755 INJECTION, SOLUTION INTRAVENOUS at 15:19

## 2024-07-05 RX ADMIN — SODIUM CHLORIDE 54 ML: 9 INJECTION, SOLUTION INTRAVENOUS at 15:19

## 2024-07-05 ASSESSMENT — ACTIVITIES OF DAILY LIVING (ADL)
ADLS_ACUITY_SCORE: 38

## 2024-07-05 ASSESSMENT — COLUMBIA-SUICIDE SEVERITY RATING SCALE - C-SSRS
1. IN THE PAST MONTH, HAVE YOU WISHED YOU WERE DEAD OR WISHED YOU COULD GO TO SLEEP AND NOT WAKE UP?: NO
2. HAVE YOU ACTUALLY HAD ANY THOUGHTS OF KILLING YOURSELF IN THE PAST MONTH?: NO
6. HAVE YOU EVER DONE ANYTHING, STARTED TO DO ANYTHING, OR PREPARED TO DO ANYTHING TO END YOUR LIFE?: NO

## 2024-07-05 NOTE — ED TRIAGE NOTES
Pt has not eaten for the past two days. States she just is not hungry. States she doesn't eat because she isn't hungry. States she does not keep track of the number of days since she has eaten and does not seem concerned about having not eaten. Pt is drinking water on a limited basis. Incontinent of urine during the past two days as well which is not typical for her.     Pt also notes that her legs hurt in the back. When asked if this has been evaluated prior to today she said no. States she probably just didn't mention it'. Pt is recently ambulating with the assist of a walker.     Pt is alert and able to describe symptoms and communicate aakash. Dr. Peña at the bedside.

## 2024-07-05 NOTE — DISCHARGE INSTRUCTIONS
We have found thickening of your endometrium.  This needs to be reassessed for the possibility of cancerous changes of the uterine wall.  Please follow-up with your primary doctor to discuss referral for this.  Your knees show cysts please take low-dose nonsteroidal anti-inflammatories.  Please follow-up with orthopedics to consider for further treatment if they continue to bother you.

## 2024-07-05 NOTE — ED PROVIDER NOTES
"  Emergency Department Note      History of Present Illness     Chief Complaint   Poor Appetite      GILDARDO Nassar is a 89 year old female with a history of atrial fibrillation anticoagulated on Eliquis, HTN, HLD, and depression who presents to the emergency department for evaluation of a poor appetite. She has not eaten for the past couple of days, and states that she \"just does not feel hungry.\" She has not been keeping track of the days since she has eaten last, and seems very unconcerned about this. She has been drinking some water on a minimal basis. Denies any dysuria but does endorse some urinary incontinence, which is new as of the last couple of days. She also complains of bilateral pain in the posterior aspect of her knees, but she has not been seen for this yet. This pain is non radiating. She has been ambulating with the assistance of a walker and states that this is not normal for her. She has not had a bowel movement recently. Overall, she is feeling generally weak and fatigued. She has not had any previous abdominal surgeries. She is currently taking blood thinning medication.    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  Physical Exam  Vitals reviewed.   HENT:      Head: Normocephalic.   Eyes:      Pupils: Pupils are equal, round, and reactive to light.   Cardiovascular:      Rate and Rhythm: Normal rate.      Pulses: Normal pulses.   Pulmonary:      Effort: Pulmonary effort is " normal.   Abdominal:      General: Abdomen is flat. Bowel sounds are normal.   Musculoskeletal:         General: Normal range of motion.   Skin:     General: Skin is warm.      Capillary Refill: Capillary refill takes less than 2 seconds.   Neurological:      General: No focal deficit present.      Mental Status: She is alert and oriented to person, place, and time.   Psychiatric:         Mood and Affect: Mood normal.           Diagnostics     Lab Results   Labs Ordered and Resulted from Time of ED Arrival to Time of ED Departure   D DIMER QUANTITATIVE - Abnormal       Result Value    D-Dimer Quantitative 1.37 (*)    COMPREHENSIVE METABOLIC PANEL - Abnormal    Sodium 134 (*)     Potassium 4.4      Carbon Dioxide (CO2) 26      Anion Gap 14      Urea Nitrogen 23.9 (*)     Creatinine 0.85      GFR Estimate 64      Calcium 8.8      Chloride 94 (*)     Glucose 120 (*)     Alkaline Phosphatase 99      AST 21      ALT 12      Protein Total 6.8      Albumin 3.3 (*)     Bilirubin Total 3.5 (*)    LIPASE - Abnormal    Lipase 10 (*)    ROUTINE UA WITH MICROSCOPIC REFLEX TO CULTURE - Abnormal    Color Urine Yellow      Appearance Urine Clear      Glucose Urine Negative      Bilirubin Urine Negative      Ketones Urine Negative      Specific Gravity Urine 1.017      Blood Urine Negative      pH Urine 5.5      Protein Albumin Urine Negative      Urobilinogen Urine Normal      Nitrite Urine Negative      Leukocyte Esterase Urine Negative      Mucus Urine Present (*)     RBC Urine 1      WBC Urine 1     BILIRUBIN DIRECT - Abnormal    Bilirubin Direct 0.58 (*)    CBC WITH PLATELETS AND DIFFERENTIAL    WBC Count 7.0      RBC Count 5.09      Hemoglobin 15.4      Hematocrit 46.3      MCV 91      MCH 30.3      MCHC 33.3      RDW 13.2      Platelet Count 187      % Neutrophils 70      % Lymphocytes 11      % Monocytes 18      % Eosinophils 0      % Basophils 0      % Immature Granulocytes 0      NRBCs per 100 WBC 0      Absolute  "Neutrophils 4.9      Absolute Lymphocytes 0.8      Absolute Monocytes 1.3      Absolute Eosinophils 0.0      Absolute Basophils 0.0      Absolute Immature Granulocytes 0.0      Absolute NRBCs 0.0         Imaging   CT Abdomen Pelvis w Contrast   Final Result   IMPRESSION:    1.  The endometrium is abnormally thickened for a postmenopausal   patient at 1.4 cm. Endometrial malignancy could have this appearance,   and gynecologic consultation is recommended.   2.  A 0.7 cm left lower lobe pulmonary nodule is unchanged.   3.  Colonic diverticulosis, without evidence for diverticulitis.   4.  Left lower rib fractures are new since the previous exam, but   appear subacute or chronic.      TEO ARTHUR MD            SYSTEM ID:  [id number]      XR Knee Bilateral 1/2 Views   Final Result   IMPRESSION: Mild degenerative narrowing of the lateral compartment of   both knees. Both knees are negative for fracture. Tiny bilateral knee   joint effusion.      VANI LIU MD            SYSTEM ID:  [id number]      US Lower Extremity Venous Duplex Bilateral   Final Result   IMPRESSION: No evidence of deep venous thrombosis.  Complex Baker's   cyst in the popliteal fossae.      UMER BERG MD            SYSTEM ID:  [id number]          EKG   None    Independent Interpretation   None    ED Course      Medications Administered   Medications - No data to display    Procedures   Procedures     Discussion of Management   None    ED Course   ED Course as of 07/05/24 1210   Fri Jul 05, 2024   1155 I obtained history and examined the patient as noted above.         Optional/Additional Documentation  None    Medical Decision Making / Diagnosis     CMS Diagnoses: None    MIPS       None    [facility]   Ilda Nassar is a 89 year old female patient presents with decreased appetite.  States she is \"not hungry\".  Did have some vague pain.  Extensive imaging and lab test entertained due to age and lack of appetite.  No clear findings were identified " other than endometrial thickening.  Patient was able to ambulate with a steady gait.  Offered reassurance with negative ER evaluation.  Encouraged follow-up as an outpatient for endometrial thickening and was discharged home in stable condition.    Disposition   The patient was discharged.     Diagnosis     ICD-10-CM    1. Synovial cyst of popliteal space, unspecified laterality  M71.20       2. Thickened endometrium  R93.89            Discharge Medications   Discharge Medication List as of 7/5/2024  5:27 PM        START taking these medications    Details   naproxen (NAPROSYN) 250 MG tablet Take 1 tablet (250 mg) by mouth 2 times daily (with meals), Disp-20 tablet, R-0, Local Print               Scribe Disclosure:  I, Venecia Brian, am serving as a scribe at 12:09 PM on 7/5/2024 to document services personally performed by Claude Peña MD based on my observations and the provider's statements to me.        Claude Peña MD  07/07/24 [id number]

## 2024-07-22 ENCOUNTER — HOSPITAL ENCOUNTER (EMERGENCY)
Facility: CLINIC | Age: 89
Discharge: HOME | End: 2024-07-22
Attending: EMERGENCY MEDICINE | Admitting: EMERGENCY MEDICINE
Payer: MEDICARE

## 2024-07-22 ENCOUNTER — APPOINTMENT (OUTPATIENT)
Dept: GENERAL RADIOLOGY | Facility: CLINIC | Age: 89
End: 2024-07-22
Attending: EMERGENCY MEDICINE
Payer: MEDICARE

## 2024-07-22 VITALS
RESPIRATION RATE: 16 BRPM | WEIGHT: 111.77 LBS | HEART RATE: 83 BPM | HEIGHT: 65 IN | OXYGEN SATURATION: 94 % | BODY MASS INDEX: 18.62 KG/M2 | SYSTOLIC BLOOD PRESSURE: 181 MMHG | TEMPERATURE: 97.5 F | DIASTOLIC BLOOD PRESSURE: 104 MMHG

## 2024-07-22 DIAGNOSIS — M54.50 ACUTE LOW BACK PAIN WITHOUT SCIATICA, UNSPECIFIED BACK PAIN LATERALITY: ICD-10-CM

## 2024-07-22 PROCEDURE — 99283 EMERGENCY DEPT VISIT LOW MDM: CPT

## 2024-07-22 PROCEDURE — 72100 X-RAY EXAM L-S SPINE 2/3 VWS: CPT

## 2024-07-22 RX ORDER — ACETAMINOPHEN 325 MG/1
650-975 TABLET ORAL EVERY 6 HOURS PRN
Qty: 60 TABLET | Refills: 0 | Status: SHIPPED | OUTPATIENT
Start: 2024-07-22 | End: 2024-07-24

## 2024-07-22 ASSESSMENT — ACTIVITIES OF DAILY LIVING (ADL)
ADLS_ACUITY_SCORE: 38
ADLS_ACUITY_SCORE: 40

## 2024-07-22 NOTE — ED TRIAGE NOTES
Pt presents via EMS from [facility] for complaint of low back pain for x2 days. Denies trauma. Pt states pain worse with movement. Hx of mild dementia. ABC intact. Alert.     Triage Assessment (Adult)       Row Name 07/22/24 1200          Triage Assessment    Airway WDL WDL        Respiratory WDL    Respiratory WDL WDL        Skin Circulation/Temperature WDL    Skin Circulation/Temperature WDL WDL        Cardiac WDL    Cardiac WDL WDL        Peripheral/Neurovascular WDL    Peripheral Neurovascular WDL WDL        Cognitive/Neuro/Behavioral WDL    Cognitive/Neuro/Behavioral WDL WDL

## 2024-07-22 NOTE — ED PROVIDER NOTES
"Emergency Department Note      Code Status: Prior    History of Present Illness     Chief Complaint:  Back Pain     The history is provided by the patient.      Ilda Nassar is a 89 year old female with a history of dementia presenting with back pain. Upon examination, the patient denies trauma or any pain. No pain in the shoulder, head, knees, ankles, or hips. Patient notes that she's unsure why she's here.     History is limited due to patient's history of dementia.     Independent Historian:    None    Review of External Notes  None    Past Medical History   Medical History, Surgical History, Problem List, and Medications  Reviewed in Epic    Physical Exam   Patient Vitals for the past 24 hrs:   BP Temp Pulse Resp SpO2 Height Weight   07/22/24 1415 (!) 181/104 -- 82 -- 94 % -- --   07/22/24 1258 -- -- -- -- 93 % -- --   07/22/24 1205 -- -- -- -- -- 1.651 m (5' 5\") 50.7 kg (111 lb 12.4 oz)   07/22/24 1200 (!) 159/110 97.5  F (36.4  C) 90 16 94 % -- --       Physical Exam  Constitutional: Vital signs reviewed as above.   Eyes: PEERL, EOMI B/L  Neck: No JVD noted. FROM   Cardiovascular: normal rate, Regular rhythm and normal heart sounds.  No murmur heard. Equal B/L peripheral pulses.  Pulmonary/Chest: Effort normal and breath sounds normal. No respiratory distress. Patient has no wheezes. Patient has no rales.   Gastrointestinal: Soft. There is no tenderness.   Musculoskeletal/Extremities: There is no midline thoracic or lumbar spine tenderness.  There does not appear to be PSIS, ASIS, trochanteric, knee, shin, ankle, or foot tenderness on either extremity.  Neurological: Alert  Skin: Skin is warm and dry. There is no diaphoresis noted.   Psychiatric: The patient appears calm.     Diagnostics     Laboratory: Imaging:   Labs Ordered and Resulted from Time of ED Arrival to Time of ED Departure - No data to display  Lumbar spine XR, 2-3 views   Final Result   IMPRESSION: Bones appear demineralized. Mild grade 1 " anterolisthesis   of L4 on L5 and L5 on S1. No obvious loss of vertebral body height by   plain film evaluation. No significant loss of intervertebral disc   height. Facet hypertrophy in the lower lumbar spine.       DUNG MCGILL MD            SYSTEM ID:  [id number]            Independent Interpretation  See ED course    ED Course    Medications Administered  Medications - No data to display    Procedures  Procedures     Discussion of Management  See ED Course    ED Course  ED Course as of 07/22/24 1514   Mon Jul 22, 2024   1257 I obtained the history and examined the patient as noted above.      1351 Lumbar spine XR, 2-3 views  My interpretation: No gross compression deformitiy noted.   1354 Rechecked and updated.       Optional/Additional Documentation: None    Medical Decision Making / Diagnosis     MIPS     None    Medical Decision Making:  This 89-year-old female presents to the ED via EMS.  At the time of my evaluation, the patient had no specific complaints and was unsure why she was here.  EMS had reported to nursing that the patient had back pain.  It was not known if she fell.  I did not find any specific traumatic injuries on my exam.  The patient seemed to be uncomfortable as I helped her sit forward, but again did not localize midline back pain on exam.  X-ray imaging was ordered given the patient's history of dementia.  Fortunately, no acute compression deformities are noted.  The patient remained quite comfortable and I otherwise felt she was safe to be discharged back to her care facility.  Reportedly, the patient does not have Tylenol on her medication list so that could not be administered.  I was able to send a prescription of this to the facility's pharmacy of choice for as needed Tylenol administration in the future.  Outpatient primary care follow-up can otherwise be pursued.    Critical Care:  None.    Disposition:  See ED Course and MDM    ICD-10 Codes:    ICD-10-CM    1. Acute low back pain  without sciatica, unspecified back pain laterality  M54.50            Discharge Medications:  New Prescriptions    ACETAMINOPHEN (TYLENOL) 325 MG TABLET    Take 2-3 tablets (650-975 mg) by mouth every 6 hours as needed for mild pain or fever     Scribe Disclosure:  I, Tiffanie Sheffield, am serving as a scribe at 12:48 PM on 7/22/2024 to document services personally performed by Steven Gómez DO based on my observations and the provider's statements to me.     7/22/2024   Steven Gómez DO     [employer]                    Steven Gómez DO  07/22/24 5507

## 2024-07-23 ENCOUNTER — APPOINTMENT (OUTPATIENT)
Dept: CT IMAGING | Facility: CLINIC | Age: 89
End: 2024-07-23
Attending: EMERGENCY MEDICINE
Payer: MEDICARE

## 2024-07-23 ENCOUNTER — HOSPITAL ENCOUNTER (EMERGENCY)
Facility: CLINIC | Age: 89
Discharge: HOME | End: 2024-07-23
Attending: EMERGENCY MEDICINE | Admitting: EMERGENCY MEDICINE
Payer: MEDICARE

## 2024-07-23 VITALS
RESPIRATION RATE: 20 BRPM | TEMPERATURE: 97.4 F | DIASTOLIC BLOOD PRESSURE: 104 MMHG | HEART RATE: 85 BPM | SYSTOLIC BLOOD PRESSURE: 183 MMHG | OXYGEN SATURATION: 95 %

## 2024-07-23 DIAGNOSIS — S22.069A CLOSED FRACTURE OF EIGHTH THORACIC VERTEBRA, UNSPECIFIED FRACTURE MORPHOLOGY, INITIAL ENCOUNTER (H): ICD-10-CM

## 2024-07-23 DIAGNOSIS — I10 UNCONTROLLED HYPERTENSION: ICD-10-CM

## 2024-07-23 DIAGNOSIS — K55.069 OCCLUSION OF SUPERIOR MESENTERIC ARTERY (H): ICD-10-CM

## 2024-07-23 LAB
ALBUMIN SERPL BCG-MCNC: 3.8 G/DL (ref 3.5–5.2)
ALP SERPL-CCNC: 119 U/L (ref 40–150)
ALT SERPL W P-5'-P-CCNC: 16 U/L (ref 0–50)
ANION GAP SERPL CALCULATED.3IONS-SCNC: 15 MMOL/L (ref 7–15)
AST SERPL W P-5'-P-CCNC: 25 U/L (ref 0–45)
ATRIAL RATE - MUSE: 300 BPM
BASOPHILS # BLD AUTO: 0.1 10E3/UL (ref 0–0.2)
BASOPHILS NFR BLD AUTO: 1 %
BILIRUB SERPL-MCNC: 2.2 MG/DL
BUN SERPL-MCNC: 15.8 MG/DL (ref 8–23)
CALCIUM SERPL-MCNC: 9.6 MG/DL (ref 8.8–10.4)
CHLORIDE SERPL-SCNC: 92 MMOL/L (ref 98–107)
CREAT SERPL-MCNC: 0.71 MG/DL (ref 0.51–0.95)
DIASTOLIC BLOOD PRESSURE - MUSE: NORMAL MMHG
EGFRCR SERPLBLD CKD-EPI 2021: 80 ML/MIN/1.73M2
EOSINOPHIL # BLD AUTO: 0 10E3/UL (ref 0–0.7)
EOSINOPHIL NFR BLD AUTO: 1 %
ERYTHROCYTE [DISTWIDTH] IN BLOOD BY AUTOMATED COUNT: 13.4 % (ref 10–15)
GLUCOSE SERPL-MCNC: 111 MG/DL (ref 70–99)
HCO3 SERPL-SCNC: 28 MMOL/L (ref 22–29)
HCT VFR BLD AUTO: 48.6 % (ref 35–47)
HGB BLD-MCNC: 15.9 G/DL (ref 11.7–15.7)
HOLD SPECIMEN: NORMAL
IMM GRANULOCYTES # BLD: 0 10E3/UL
IMM GRANULOCYTES NFR BLD: 0 %
INTERPRETATION ECG - MUSE: NORMAL
LIPASE SERPL-CCNC: 14 U/L (ref 13–60)
LYMPHOCYTES # BLD AUTO: 0.9 10E3/UL (ref 0.8–5.3)
LYMPHOCYTES NFR BLD AUTO: 13 %
MCH RBC QN AUTO: 29.5 PG (ref 26.5–33)
MCHC RBC AUTO-ENTMCNC: 32.7 G/DL (ref 31.5–36.5)
MCV RBC AUTO: 90 FL (ref 78–100)
MONOCYTES # BLD AUTO: 1 10E3/UL (ref 0–1.3)
MONOCYTES NFR BLD AUTO: 15 %
NEUTROPHILS # BLD AUTO: 4.8 10E3/UL (ref 1.6–8.3)
NEUTROPHILS NFR BLD AUTO: 71 %
NRBC # BLD AUTO: 0 10E3/UL
NRBC BLD AUTO-RTO: 0 /100
P AXIS - MUSE: NORMAL DEGREES
PLATELET # BLD AUTO: 263 10E3/UL (ref 150–450)
POTASSIUM SERPL-SCNC: 3.6 MMOL/L (ref 3.4–5.3)
PR INTERVAL - MUSE: NORMAL MS
PROT SERPL-MCNC: 7.4 G/DL (ref 6.4–8.3)
QRS DURATION - MUSE: 82 MS
QT - MUSE: 406 MS
QTC - MUSE: 468 MS
R AXIS - MUSE: 51 DEGREES
RBC # BLD AUTO: 5.39 10E6/UL (ref 3.8–5.2)
SODIUM SERPL-SCNC: 135 MMOL/L (ref 135–145)
SYSTOLIC BLOOD PRESSURE - MUSE: NORMAL MMHG
T AXIS - MUSE: -56 DEGREES
TROPONIN T SERPL HS-MCNC: 22 NG/L
VENTRICULAR RATE- MUSE: 80 BPM
WBC # BLD AUTO: 6.7 10E3/UL (ref 4–11)

## 2024-07-23 PROCEDURE — 36415 COLL VENOUS BLD VENIPUNCTURE: CPT | Performed by: EMERGENCY MEDICINE

## 2024-07-23 PROCEDURE — 71260 CT THORAX DX C+: CPT | Mod: MG

## 2024-07-23 PROCEDURE — 82565 ASSAY OF CREATININE: CPT | Performed by: EMERGENCY MEDICINE

## 2024-07-23 PROCEDURE — 99285 EMERGENCY DEPT VISIT HI MDM: CPT | Mod: 25

## 2024-07-23 PROCEDURE — 83690 ASSAY OF LIPASE: CPT | Performed by: EMERGENCY MEDICINE

## 2024-07-23 PROCEDURE — 85025 COMPLETE CBC W/AUTO DIFF WBC: CPT | Performed by: EMERGENCY MEDICINE

## 2024-07-23 PROCEDURE — 84484 ASSAY OF TROPONIN QUANT: CPT | Performed by: EMERGENCY MEDICINE

## 2024-07-23 PROCEDURE — 93005 ELECTROCARDIOGRAM TRACING: CPT

## 2024-07-23 PROCEDURE — 250N000013 HC RX MED GY IP 250 OP 250 PS 637: Performed by: EMERGENCY MEDICINE

## 2024-07-23 PROCEDURE — 250N000011 HC RX IP 250 OP 636: Performed by: EMERGENCY MEDICINE

## 2024-07-23 RX ORDER — ACETAMINOPHEN 500 MG
1000 TABLET ORAL ONCE
Status: COMPLETED | OUTPATIENT
Start: 2024-07-23 | End: 2024-07-23

## 2024-07-23 RX ORDER — OXYCODONE HYDROCHLORIDE 5 MG/1
5 TABLET ORAL ONCE
Status: COMPLETED | OUTPATIENT
Start: 2024-07-23 | End: 2024-07-23

## 2024-07-23 RX ORDER — IOPAMIDOL 755 MG/ML
500 INJECTION, SOLUTION INTRAVASCULAR ONCE
Status: COMPLETED | OUTPATIENT
Start: 2024-07-23 | End: 2024-07-23

## 2024-07-23 RX ADMIN — OXYCODONE HYDROCHLORIDE 5 MG: 5 TABLET ORAL at 03:41

## 2024-07-23 RX ADMIN — IOPAMIDOL 72 ML: 755 INJECTION, SOLUTION INTRAVENOUS at 04:35

## 2024-07-23 RX ADMIN — ACETAMINOPHEN 1000 MG: 500 TABLET, FILM COATED ORAL at 03:41

## 2024-07-23 ASSESSMENT — COLUMBIA-SUICIDE SEVERITY RATING SCALE - C-SSRS
2. HAVE YOU ACTUALLY HAD ANY THOUGHTS OF KILLING YOURSELF IN THE PAST MONTH?: NO
6. HAVE YOU EVER DONE ANYTHING, STARTED TO DO ANYTHING, OR PREPARED TO DO ANYTHING TO END YOUR LIFE?: NO
1. IN THE PAST MONTH, HAVE YOU WISHED YOU WERE DEAD OR WISHED YOU COULD GO TO SLEEP AND NOT WAKE UP?: NO

## 2024-07-23 ASSESSMENT — ACTIVITIES OF DAILY LIVING (ADL)
ADLS_ACUITY_SCORE: 38

## 2024-07-23 NOTE — ED PROVIDER NOTES
Emergency Department Note      History of Present Illness     Chief Complaint   Abdominal Pain (Diffuse abdo pain throughout abdomen. Describes as 'nails in stomach'. /Was here yesterday for back pain, discharged back to care home- poor historian)      GILDARDO   Ilda Nassar is a 89 year old female who presents to the ER via EMS from [facility] for evaluation of reported abdominal pain.  Here in the ER she complains of mid and lower back pain as well as upper abdominal pain with unclear timeframe.  She denies chest pain or shortness of breath or vomiting or diarrhea.  Further history limited due to cognitive impairment.      Review of External Notes   Reviewed ER visit from yesterday at this hospital  Past Medical History     Medical History and Problem List   Past Medical History:   Diagnosis Date    Atrial fibrillation (H)     Atrial flutter (H)     Hyperlipidemia     Hypertension        Medications   acetaminophen (TYLENOL) 325 MG tablet  ASPIRIN LOW DOSE 81 MG EC tablet  citalopram (CELEXA) 10 MG tablet  donepezil (ARICEPT) 10 MG tablet  naproxen (NAPROSYN) 250 MG tablet        Surgical History   No past surgical history on file.    Physical Exam     Patient Vitals for the past 24 hrs:   BP Temp Temp src Pulse Resp SpO2   07/23/24 0324 (!) 191/121 97.3  F (36.3  C) Oral 91 19 92 %     Physical Exam  VS: Reviewed per above  HENT: Mucous membranes moist  EYES: sclera anicteric  CV: Rate as noted,  regular rhythm.   RESP: Effort normal. Breath sounds are normal bilaterally.  GI: no focal tenderness/rebound/guarding, not distended.  NEURO: Alert, moving all extremities, gcs 14 (baseline confusion/dementia)  MSK: No deformity of the extremities, ttp of the thoracic and upper l spine region  SKIN: Warm and dry      Diagnostics     Lab Results   Labs Ordered and Resulted from Time of ED Arrival to Time of ED Departure   COMPREHENSIVE METABOLIC PANEL - Abnormal       Result Value    Sodium 135      Potassium 3.6       Carbon Dioxide (CO2) 28      Anion Gap 15      Urea Nitrogen 15.8      Creatinine 0.71      GFR Estimate 80      Calcium 9.6      Chloride 92 (*)     Glucose 111 (*)     Alkaline Phosphatase 119      AST 25      ALT 16      Protein Total 7.4      Albumin 3.8      Bilirubin Total 2.2 (*)    TROPONIN T, HIGH SENSITIVITY - Abnormal    Troponin T, High Sensitivity 22 (*)    CBC WITH PLATELETS AND DIFFERENTIAL - Abnormal    WBC Count 6.7      RBC Count 5.39 (*)     Hemoglobin 15.9 (*)     Hematocrit 48.6 (*)     MCV 90      MCH 29.5      MCHC 32.7      RDW 13.4      Platelet Count 263      % Neutrophils 71      % Lymphocytes 13      % Monocytes 15      % Eosinophils 1      % Basophils 1      % Immature Granulocytes 0      NRBCs per 100 WBC 0      Absolute Neutrophils 4.8      Absolute Lymphocytes 0.9      Absolute Monocytes 1.0      Absolute Eosinophils 0.0      Absolute Basophils 0.1      Absolute Immature Granulocytes 0.0      Absolute NRBCs 0.0     LIPASE - Normal    Lipase 14         Imaging   CT Aortic Survey w Contrast   Final Result   IMPRESSION:   1.  No evidence of acute aortic syndrome.      2.  Trace left pleural effusion.      3.  There are several healing left rib fractures.      4.  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      5.  Severe T8 vertebral body compression fracture, likely chronic.             EKG   ECG results from 07/23/24   EKG 12-lead, tracing only     Value    Systolic Blood Pressure     Diastolic Blood Pressure     Ventricular Rate 80    Atrial Rate 300    NM Interval     QRS Duration 82        QTc 468    P Axis     R AXIS 51    T Axis -56    Interpretation ECG      Atrial fibrillation  Minimal voltage criteria for LVH, may be normal variant  ST & T wave abnormality, consider inferior ischemia  ST & T wave abnormality, consider anterolateral ischemia  Abnormal ECG  When compared with ECG of 03-JUN-2023 09:18,  Right bundle branch block is  no longer Present           ED Course      Medications Administered   Medications   acetaminophen (TYLENOL) tablet 1,000 mg (1,000 mg Oral $Given 7/23/24 0341)   oxyCODONE (ROXICODONE) tablet 5 mg (5 mg Oral $Given 7/23/24 0341)   iopamidol (ISOVUE-370) solution 500 mL (72 mLs Intravenous $Given 7/23/24 0435)   sodium chloride (PF) 0.9% PF flush 100 mL (60 mLs Intravenous $Given 7/23/24 0435)     Procedures   Procedures     Discussion of Management   I left a message with power of , daughter Margaret Nassar.  I then spoke over the phone with her sister Kaylah Marr who is documented in epic as the first alternative healthcare agent.  We discussed evaluation plan in the ER today.  She also plans to come to the ER to see the patient.    ED Course          Medical Decision Making / Diagnosis     ALEKSANDRA Nassar is a 89 year old female who presents to the ER for evaluation of mid/lower back pain and upper abdominal pain.  On arrival she is hypertensive.  On exam she has tenderness of the thoracic and L-spine and no significant abdominal tenderness.  She has some baseline cognitive impairment/dementia.  In discussion with her sister, plan for CT imaging which was pursued.  There was evidence of chronic appearing T8 compression deformity and chronic SMA occlusion.  Both of these could be contributing to her pain.  Labs showed very minimal troponin elevation and ECG that shows nonspecific ST segment changes with similar morphology to previous ECG.  After some pain medication she is feeling improved.  At this juncture, sister (1st alternative healthcare agent) felt that we could stop with further evaluation for our findings based on overall goals of care.  Plan to have patient follow-up in primary care to discuss pain control further.  Sister plans to bring the patient back to her residence this morning.  Return precautions discussed.    Disposition   The patient was discharged.     Diagnosis     ICD-10-CM    1.  Closed fracture of eighth thoracic vertebra, unspecified fracture morphology, initial encounter (H)  S22.065L     chronic appearing      2. 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 Uncontrolled hypertension  I10            Discharge Medications   New Prescriptions    No medications on file        Daniel Smith MD  07/23/24 0525

## 2024-07-23 NOTE — DISCHARGE INSTRUCTIONS
Discharge Instructions  Back Pain  You were seen today for back pain. Back pain can have many causes, but most will get better without surgery or other specific treatment. Sometimes there is a herniated ( slipped ) disc. We do not usually do MRI scans to look for these right away, since most herniated discs will get better on their own with time.  Today, we did not find any evidence that your back pain was caused by a serious condition. However, sometimes symptoms develop over time and cannot be found during an emergency visit, so it is very important that you follow up with your primary provider.  Generally, every Emergency Department visit should have a follow-up clinic visit with either a primary or a specialty clinic/provider. Please follow-up as instructed by your emergency provider today.    Return to the Emergency Department if:  You develop a fever with your back pain.   You have weakness or change in sensation in one or both legs.  You lose control of your bowels or bladder, or cannot empty your bladder (cannot pee).  Your pain gets much worse.     Follow-up with your provider:  Unless your pain has completely gone away, please make an appointment with your provider within one week. Most of the routine care for back pain is available in a clinic and not the Emergency Department. You may need further management of your back pain, such as more pain medication, imaging such as an X-ray or MRI, or physical therapy.    What can I do to help myself?  Remain Active -- People are often afraid that they will hurt their back further or delay recovery by remaining active, but this is one of the best things you can do for your back. In fact, staying in bed for a long time to rest is not recommended. Studies have shown that people with low back pain recover faster when they remain active. Movement helps to bring blood flow to the muscles and relieve muscle spasms as well as preventing loss of muscle strength.  Heat --  Using a heating pad can help with low back pain during the first few weeks. Do not sleep with a heating pad, as you can be burned.   Pain medications - You may take a pain medication such as Tylenol  (acetaminophen), Advil , Motrin  (ibuprofen) or Aleve  (naproxen).  If you were given a prescription for medicine here today, be sure to read all of the information (including the package insert) that comes with your prescription.  This will include important information about the medicine, its side effects, and any warnings that you need to know about.  The pharmacist who fills the prescription can provide more information and answer questions you may have about the medicine.  If you have questions or concerns that the pharmacist cannot address, please call or return to the Emergency Department.   Remember that you can always come back to the Emergency Department if you are not able to see your regular provider in the amount of time listed above, if you get any new symptoms, or if there is anything that worries you.    Discharge Instructions  Abdominal Pain    Abdominal pain (belly pain) can be caused by many things. Your evaluation today does not show the exact cause for your pain. Your provider today has decided that it is unlikely your pain is due to a life threatening problem, or a problem requiring surgery or hospital admission. Sometimes those problems cannot be found right away, so it is very important that you follow up as directed.  Sometimes only the changes which occur over time allow the cause of your pain to be found.    Generally, every Emergency Department visit should have a follow-up clinic visit with either a primary or a specialty clinic/provider. Please follow-up as instructed by your emergency provider today. With abdominal pain, we often recommend very close follow-up, such as the following day.    ADULTS:  Return to the Emergency Department right away if:    You get an oral temperature above  102oF or as directed by your provider.  You have blood in your stools. This may be bright red or appear as black, tarry stools.    You keep vomiting (throwing up) or cannot drink liquids.  You see blood when you vomit.   You cannot have a bowel movement or you cannot pass gas.  Your stomach gets bloated or bigger.  Your skin or the whites of your eyes look yellow.  You faint.  You have bloody, frequent or painful urination (peeing).  You have new symptoms or anything that worries you.    CHILDREN:  Return to the Emergency Department right away if your child has any of the above-listed symptoms or the following:    Pushes your hand away or screams/cries when his/her belly is touched.  You notice your child is very fussy or weak.  Your child is very tired and is too tired to eat or drink.  Your child is dehydrated.  Signs of dehydration can be:  Significant change in the amount of wet diapers/urine.  Your infant or child starts to have dry mouth and lips, or no saliva (spit) or tears.    PREGNANT WOMEN:  Return to the Emergency Department right away if you have any of the above-listed symptoms or the following:    You have bleeding, leaking fluid or passing tissue from the vagina.  You have worse pain or cramping, or pain in your shoulder or back.  You have vomiting that will not stop.  You have a temperature of 100oF or more.  Your baby is not moving as much as usual.  You faint.  You get a bad headache with or without eye problems and abdominal pain.  You have a seizure.  You have unusual discharge from your vagina and abdominal pain.    Abdominal pain is pretty common during pregnancy.  Your pain may or may not be related to your pregnancy. You should follow-up closely with your OB provider so they can evaluate you and your baby.  Until you follow-up with your regular provider, do the following:     Avoid sex and do not put anything in your vagina.  Drink clear fluids.  Only take medications approved by your  "provider.    MORE INFORMATION:    Appendicitis:  A possible cause of abdominal pain in any person who still has their appendix is acute appendicitis. Appendicitis is often hard to diagnose.  Testing does not always rule out early appendicitis or other causes of abdominal pain. Close follow-up with your provider and re-evaluations may be needed to figure out the reason for your abdominal pain.    Follow-up:  It is very important that you make an appointment with your clinic and go to the appointment.  If you do not follow-up with your primary provider, it may result in missing an important development which could result in permanent injury or disability and/or lasting pain.  If there is any problem keeping your appointment, call your provider or return to the Emergency Department.    Medications:  Take your medications as directed by your provider today.  Before using over-the-counter medications, ask your provider and make sure to take the medications as directed.  If you have any questions about medications, ask your provider.    Diet:  Resume your normal diet as much as possible, but do not eat fried, fatty or spicy foods while you have pain.  Do not drink alcohol or have caffeine.  Do not smoke tobacco.    Probiotics: If you have been given an antibiotic, you may want to also take a probiotic pill or eat yogurt with live cultures. Probiotics have \"good bacteria\" to help your intestines stay healthy. Studies have shown that probiotics help prevent diarrhea (loose stools) and other intestine problems (including C. diff infection) when you take antibiotics. You can buy these without a prescription in the pharmacy section of the store.     If you were given a prescription for medicine here today, be sure to read all of the information (including the package insert) that comes with your prescription.  This will include important information about the medicine, its side effects, and any warnings that you need to know " about.  The pharmacist who fills the prescription can provide more information and answer questions you may have about the medicine.  If you have questions or concerns that the pharmacist cannot address, please call or return to the Emergency Department.       Remember that you can always come back to the Emergency Department if you are not able to see your regular provider in the amount of time listed above, if you get any new symptoms, or if there is anything that worries you.    Discharge Instructions  Hypertension - High Blood Pressure    During you visit to the Emergency Department, your blood pressure was higher than the recommended blood pressure.  This may be related to stress, pain, medication or other temporary conditions. In these cases, your blood pressure may return to normal on its own. If you have a history of high blood pressure, you may need to have your provider adjust your medications. Sometimes, your high measurement here may indicate that you have developed high blood pressure that will stay high unless it is treated. As a general rule, high blood pressure causes problems over years rather than days, weeks, or months. So, while it is important to treat blood pressure, it is rarely important to treat blood pressure immediately. Occasionally we will begin a medication in the Emergency Department; more often we will recommend close follow-up for medications with a primary doctor/clinic.    Generally, every Emergency Department visit should have a follow-up clinic visit with either a primary or a specialty clinic/provider. Please follow-up as instructed by your emergency provider today.    Return to the Emergency Department if you start to have:  A severe headache.  Chest pain.  Shortness of breath.  Weakness or numbness that affects one part of the body.  Confusion.  Vision changes.  Significant swelling of legs and/or eyes.  A reaction to any medication started in the Emergency Department.    What  can I do to help myself?  Avoid alcohol.  Take any blood pressure medicine that you are prescribed.  Get a good night s sleep.  Lower your salt intake.  Exercise.  Lose weight.  Manage stress.  See your doctor regularly    If blood pressure medication was started in the Emergency Department:  The medicine may not have an immediate effect. The body and brain determine what blood pressure you have. The medicine s job is to retrain the body s  thermostat  to a lower blood pressure.  You will need to follow up with your provider to see how this medicine is working for you.  If you were given a prescription for medicine here today, be sure to read all of the information (including the package insert) that comes with your prescription.  This will include important information about the medicine, its side effects, and any warnings that you need to know about.  The pharmacist who fills the prescription can provide more information and answer questions you may have about the medicine.  If you have questions or concerns that the pharmacist cannot address, please call or return to the Emergency Department.   Remember that you can always come back to the Emergency Department if you are not able to see your regular provider in the amount of time listed above, if you get any new symptoms, or if there is anything that worries you.

## 2024-07-23 NOTE — ED NOTES
Pt discharged home by writer. Pt ambulatory with walker, able to ambulate down the mosquera well. Minimal assistance on getting out of bed only. MD ok to discharge with BP at 183/104. Pt assisted into wheelchair by writer and pt wheeled out by sister. Sister of pt received AVS and agreeable with discharge plan. Will take pt home, no concerns.

## 2024-07-24 ENCOUNTER — OFFICE VISIT (OUTPATIENT)
Dept: FAMILY MEDICINE | Facility: CLINIC | Age: 89
End: 2024-07-24

## 2024-07-24 VITALS
HEART RATE: 76 BPM | DIASTOLIC BLOOD PRESSURE: 78 MMHG | TEMPERATURE: 97.8 F | OXYGEN SATURATION: 97 % | SYSTOLIC BLOOD PRESSURE: 136 MMHG

## 2024-07-24 DIAGNOSIS — R93.89 THICKENED ENDOMETRIUM: ICD-10-CM

## 2024-07-24 DIAGNOSIS — S22.000A COMPRESSION FRACTURE OF THORACIC VERTEBRA, CLOSED, INITIAL ENCOUNTER (H): Primary | ICD-10-CM

## 2024-07-24 DIAGNOSIS — R17 ELEVATED BILIRUBIN: ICD-10-CM

## 2024-07-24 DIAGNOSIS — S22.42XG CLOSED FRACTURE OF MULTIPLE RIBS OF LEFT SIDE WITH DELAYED HEALING, SUBSEQUENT ENCOUNTER: ICD-10-CM

## 2024-07-24 DIAGNOSIS — R41.3 MEMORY CHANGES: ICD-10-CM

## 2024-07-24 PROCEDURE — 99214 OFFICE O/P EST MOD 30 MIN: CPT | Performed by: FAMILY MEDICINE

## 2024-07-24 PROCEDURE — G2211 COMPLEX E/M VISIT ADD ON: HCPCS | Performed by: FAMILY MEDICINE

## 2024-07-24 RX ORDER — ACETAMINOPHEN 500 MG
500 TABLET ORAL EVERY 4 HOURS PRN
Qty: 40 TABLET | Refills: 0 | Status: SHIPPED | OUTPATIENT
Start: 2024-07-24 | End: 2024-07-24

## 2024-07-24 RX ORDER — ACETAMINOPHEN 500 MG
500-1000 TABLET ORAL EVERY 8 HOURS PRN
Qty: 40 TABLET | Refills: 0 | Status: SHIPPED | OUTPATIENT
Start: 2024-07-24 | End: 2024-07-24

## 2024-07-24 NOTE — NURSING NOTE
"Chief Complaint   Patient presents with    Er F/u     She was seen at [facility] on 07/22 and 07/23 for abdominal pain and low back pain, she was found to have an old back fracture and \"plaque\" build up in her abdomen, today pain is in middle of her back, pain is a constant \"aching\"     Pre-visit Screening:  Immunizations:  not up to date - tdap at pharmacy  Colonoscopy:  NA  Mammogram: NA  Asthma Action Test/Plan:  NA  PHQ9:  NA  GAD7:  NA  Questioned patient about current smoking habits Pt. quit smoking some time ago.  Ok to leave detailed message on voice mail for today's visit only Yes, phone # [phone]    "

## 2024-07-24 NOTE — PROGRESS NOTES
Assessment & Plan   Problem List Items Addressed This Visit          Other    Memory changes--followed by neurology     Other Visit Diagnoses       Compression fracture of thoracic vertebra, closed, initial encounter (H)    -  Primary    Relevant Medications    acetaminophen (TYLENOL) 500 MG tablet    Other Relevant Orders    Orthopedic  Referral - To a [facility] Location (Use POS/Location)    Thickened endometrium        Relevant Orders    Ob/Gyn  Referral - To a [facility] Location (Use POS/Location)    Closed fracture of multiple ribs of left side with delayed healing, subsequent encounter        Relevant Medications    acetaminophen (TYLENOL) 500 MG tablet    Other Relevant Orders    Orthopedic  Referral - To a [facility] Location (Use POS/Location)    Elevated bilirubin               1. Compression fracture of thoracic vertebra, closed, initial encounter (H)  This appeared to be an old fracture, but she reports pain with palpation in this area. Sister requested pain medication, at the very least extra strength tylenol instead of regular strength tylenol. She also requested as needed stronger pain medication, I explained to her twice that anything stronger may increase her confusion, she lives alone and is a fall risk also. Additionally, if her pain worsens, we would want to know about this and not cover it up. She stated that she understands.  - Orthopedic  Referral - To a [facility] Location (Use POS/Location)  - acetaminophen (TYLENOL) 500 MG tablet; Take 2 tablets (1,000 mg) by mouth every morning AND 1 tablet (500 mg) daily (with lunch) AND 1 tablet (500 mg) at bedtime. Do all this for 10 days.  Dispense: 40 tablet; Refill: 0    2. Thickened endometrium  Discussed that this is a risk for endometrial cancer, I recommend a referral to gynecology to discuss this further, possible procedure. She said she would discuss this with her  "other sisters, not sure that she will follow through with it.  - Ob/Gyn  Referral - To a [facility] Location (Use POS/Location)    3. Closed fracture of multiple ribs of left side with delayed healing, subsequent encounter  She continues to have significant pain, I will refer to ortho. Per sister, it's not clear when she fell.  - Orthopedic  Referral - To a [facility] Location (Use POS/Location)  - acetaminophen (TYLENOL) 500 MG tablet; Take 2 tablets (1,000 mg) by mouth every morning AND 1 tablet (500 mg) daily (with lunch) AND 1 tablet (500 mg) at bedtime. Do all this for 10 days.  Dispense: 40 tablet; Refill: 0    4. Memory changes--followed by neurology  Followed by neurology.    5. Elevated bilirubin  I recommend ultrasound, followup with GI, she said she will discuss with her sisters and  let us know if this is something to followup on .             FUTURE APPOINTMENTS:       - Follow-up visit as needed. Also with ortho. We will continue to manage her chronic medical care.    No follow-ups on file.    Tatianna Swain MD  [facility]    Subjective     Nursing Notes:   Jessica Barney CMA  7/24/2024 12:36 PM  Signed  Chief Complaint   Patient presents with    Er F/u     She was seen at [facility] on 07/22 and 07/23 for abdominal pain and low back pain, she was found to have an old back fracture and \"plaque\" build up in her abdomen, today pain is in middle of her back, pain is a constant \"aching\"     Pre-visit Screening:  Immunizations:  not up to date - tdap at pharmacy  Colonoscopy:  NA  Mammogram: NA  Asthma Action Test/Plan:  NA  PHQ9:  NA  GAD7:  NA  Questioned patient about current smoking habits Pt. quit smoking some time ago.  Ok to leave detailed message on voice mail for today's visit only Yes, phone # [phone]       Ilda Nassar is a 89 year old female who presents to clinic today for the following health issues     HPI       Here with " "sister, she has been in a lot of pain for the past 4 days. Has been in the ER 2x and has called paramedics twice. The only thing they have come up with --  Did a ct scan last night has an old compound fracture and plaque in the abdominal area. They don't know if she has fallen. Noone knows. In the morning the pain is really bad. Has also memory problems.  Came in a couple of weeks ago for dehydration, but is now starting to drink more water.   She lives in [facility] living. They have increased the number of times she has a visit per day. Now will be every 3 hours someone will go in to help her or check on her.   The nurses said she needs \"serious medications for her\". For pain.   The tylenol this morning didn't do anything for her. Sister gave her an extra strength tylenol this morning and this helped.  She also has naprosyn on her list. Not sure if she is taking this.  Pain in mid thoracic spine area, also sometimes in the abdomen.  No urinary sx, previously neg ua on 07,05  Elevated bili, us 1 year ago did not show anything concerning. But it is higher now. Advised us, sister declined.        Review of Systems   Constitutional, HEENT, cardiovascular, pulmonary, gi and gu systems are negative, except as otherwise noted.      Objective    /78 (BP Location: Right arm, Patient Position: Sitting, Cuff Size: Adult Regular)   Pulse 76   Temp 97.8  F (36.6  C) (Temporal)   LMP  (LMP Unknown)   SpO2 97%   There is no height or weight on file to calculate BMI.  Physical Exam   GENERAL: alert and no distress  RESP: lungs clear to auscultation - no rales, rhonchi or wheezes  CV: regular rate and rhythm, normal S1 S2, no S3 or S4, no murmur, click or rub, no peripheral edema  MS: no gross musculoskeletal defects noted, no edema  NEURO: Normal strength and tone, mentation intact and speech normal  PSYCH: mentation appears normal, affect normal/bright  Abdomen non tender to palpation, tenderness to palpation " mid thoracic spine area to palpation    No results found for any visits on 07/24/24.

## 2024-07-25 ENCOUNTER — APPOINTMENT (OUTPATIENT)
Dept: MRI IMAGING | Facility: CLINIC | Age: 89
DRG: 552 | End: 2024-07-25
Attending: INTERNAL MEDICINE
Payer: MEDICARE

## 2024-07-25 ENCOUNTER — HOSPITAL ENCOUNTER (INPATIENT)
Facility: CLINIC | Age: 89
LOS: 4 days | Discharge: NURSING, REHAB OR LONG-TERM CARE | DRG: 552 | End: 2024-07-30
Attending: EMERGENCY MEDICINE | Admitting: INTERNAL MEDICINE
Payer: MEDICARE

## 2024-07-25 ENCOUNTER — APPOINTMENT (OUTPATIENT)
Dept: GENERAL RADIOLOGY | Facility: CLINIC | Age: 89
DRG: 552 | End: 2024-07-25
Attending: EMERGENCY MEDICINE
Payer: MEDICARE

## 2024-07-25 DIAGNOSIS — S22.42XG CLOSED FRACTURE OF MULTIPLE RIBS OF LEFT SIDE WITH DELAYED HEALING, SUBSEQUENT ENCOUNTER: ICD-10-CM

## 2024-07-25 DIAGNOSIS — S22.061A CLOSED STABLE BURST FRACTURE OF EIGHTH THORACIC VERTEBRA, INITIAL ENCOUNTER (H): Primary | ICD-10-CM

## 2024-07-25 DIAGNOSIS — M54.42 ACUTE LEFT-SIDED LOW BACK PAIN WITH LEFT-SIDED SCIATICA: ICD-10-CM

## 2024-07-25 DIAGNOSIS — E87.1 HYPONATREMIA: ICD-10-CM

## 2024-07-25 DIAGNOSIS — S22.000A COMPRESSION FRACTURE OF THORACIC VERTEBRA, CLOSED, INITIAL ENCOUNTER (H): ICD-10-CM

## 2024-07-25 DIAGNOSIS — M54.59 INTRACTABLE LOW BACK PAIN: ICD-10-CM

## 2024-07-25 LAB
ANION GAP SERPL CALCULATED.3IONS-SCNC: 15 MMOL/L (ref 7–15)
BASOPHILS # BLD AUTO: 0.1 10E3/UL (ref 0–0.2)
BASOPHILS NFR BLD AUTO: 1 %
BUN SERPL-MCNC: 15.7 MG/DL (ref 8–23)
CALCIUM SERPL-MCNC: 9 MG/DL (ref 8.8–10.4)
CHLORIDE SERPL-SCNC: 89 MMOL/L (ref 98–107)
CREAT SERPL-MCNC: 0.74 MG/DL (ref 0.51–0.95)
EGFRCR SERPLBLD CKD-EPI 2021: 76 ML/MIN/1.73M2
EOSINOPHIL # BLD AUTO: 0 10E3/UL (ref 0–0.7)
EOSINOPHIL NFR BLD AUTO: 0 %
ERYTHROCYTE [DISTWIDTH] IN BLOOD BY AUTOMATED COUNT: 13.1 % (ref 10–15)
GLUCOSE SERPL-MCNC: 100 MG/DL (ref 70–99)
HCO3 SERPL-SCNC: 27 MMOL/L (ref 22–29)
HCT VFR BLD AUTO: 46.6 % (ref 35–47)
HGB BLD-MCNC: 15.4 G/DL (ref 11.7–15.7)
IMM GRANULOCYTES # BLD: 0 10E3/UL
IMM GRANULOCYTES NFR BLD: 0 %
LYMPHOCYTES # BLD AUTO: 1 10E3/UL (ref 0.8–5.3)
LYMPHOCYTES NFR BLD AUTO: 13 %
MCH RBC QN AUTO: 29.3 PG (ref 26.5–33)
MCHC RBC AUTO-ENTMCNC: 33 G/DL (ref 31.5–36.5)
MCV RBC AUTO: 89 FL (ref 78–100)
MONOCYTES # BLD AUTO: 1.3 10E3/UL (ref 0–1.3)
MONOCYTES NFR BLD AUTO: 18 %
NEUTROPHILS # BLD AUTO: 4.9 10E3/UL (ref 1.6–8.3)
NEUTROPHILS NFR BLD AUTO: 68 %
NRBC # BLD AUTO: 0 10E3/UL
NRBC BLD AUTO-RTO: 0 /100
PLATELET # BLD AUTO: 265 10E3/UL (ref 150–450)
POTASSIUM SERPL-SCNC: 4.1 MMOL/L (ref 3.4–5.3)
RBC # BLD AUTO: 5.26 10E6/UL (ref 3.8–5.2)
SODIUM SERPL-SCNC: 131 MMOL/L (ref 135–145)
WBC # BLD AUTO: 7.3 10E3/UL (ref 4–11)

## 2024-07-25 PROCEDURE — G0378 HOSPITAL OBSERVATION PER HR: HCPCS

## 2024-07-25 PROCEDURE — 99497 ADVNCD CARE PLAN 30 MIN: CPT | Mod: 25 | Performed by: INTERNAL MEDICINE

## 2024-07-25 PROCEDURE — 99222 1ST HOSP IP/OBS MODERATE 55: CPT | Mod: A1 | Performed by: INTERNAL MEDICINE

## 2024-07-25 PROCEDURE — 72148 MRI LUMBAR SPINE W/O DYE: CPT | Mod: MF

## 2024-07-25 PROCEDURE — 36415 COLL VENOUS BLD VENIPUNCTURE: CPT | Performed by: EMERGENCY MEDICINE

## 2024-07-25 PROCEDURE — 250N000013 HC RX MED GY IP 250 OP 250 PS 637: Performed by: INTERNAL MEDICINE

## 2024-07-25 PROCEDURE — 99285 EMERGENCY DEPT VISIT HI MDM: CPT | Mod: 25

## 2024-07-25 PROCEDURE — 250N000013 HC RX MED GY IP 250 OP 250 PS 637: Performed by: EMERGENCY MEDICINE

## 2024-07-25 PROCEDURE — 72170 X-RAY EXAM OF PELVIS: CPT

## 2024-07-25 PROCEDURE — 80048 BASIC METABOLIC PNL TOTAL CA: CPT | Performed by: EMERGENCY MEDICINE

## 2024-07-25 PROCEDURE — 72146 MRI CHEST SPINE W/O DYE: CPT | Mod: MG

## 2024-07-25 PROCEDURE — 85004 AUTOMATED DIFF WBC COUNT: CPT | Performed by: EMERGENCY MEDICINE

## 2024-07-25 PROCEDURE — 250N000012 HC RX MED GY IP 250 OP 636 PS 637: Performed by: INTERNAL MEDICINE

## 2024-07-25 RX ORDER — CALCITONIN SALMON 200 [IU]/.09ML
1 SPRAY, METERED NASAL DAILY
Status: DISCONTINUED | OUTPATIENT
Start: 2024-07-25 | End: 2024-07-30 | Stop reason: HOSPADM

## 2024-07-25 RX ORDER — DONEPEZIL HYDROCHLORIDE 10 MG/1
10 TABLET, FILM COATED ORAL AT BEDTIME
Status: DISCONTINUED | OUTPATIENT
Start: 2024-07-25 | End: 2024-07-30 | Stop reason: HOSPADM

## 2024-07-25 RX ORDER — ONDANSETRON 2 MG/ML
4 INJECTION INTRAMUSCULAR; INTRAVENOUS EVERY 6 HOURS PRN
Status: DISCONTINUED | OUTPATIENT
Start: 2024-07-25 | End: 2024-07-30 | Stop reason: HOSPADM

## 2024-07-25 RX ORDER — NALOXONE HYDROCHLORIDE 0.4 MG/ML
0.2 INJECTION, SOLUTION INTRAMUSCULAR; INTRAVENOUS; SUBCUTANEOUS
Status: DISCONTINUED | OUTPATIENT
Start: 2024-07-25 | End: 2024-07-30 | Stop reason: HOSPADM

## 2024-07-25 RX ORDER — POLYETHYLENE GLYCOL 3350 17 G/17G
17 POWDER, FOR SOLUTION ORAL 2 TIMES DAILY PRN
Status: DISCONTINUED | OUTPATIENT
Start: 2024-07-25 | End: 2024-07-30 | Stop reason: HOSPADM

## 2024-07-25 RX ORDER — ONDANSETRON 4 MG/1
4 TABLET, ORALLY DISINTEGRATING ORAL EVERY 6 HOURS PRN
Status: DISCONTINUED | OUTPATIENT
Start: 2024-07-25 | End: 2024-07-30 | Stop reason: HOSPADM

## 2024-07-25 RX ORDER — METHOCARBAMOL 500 MG/1
500 TABLET, FILM COATED ORAL 4 TIMES DAILY
Status: DISCONTINUED | OUTPATIENT
Start: 2024-07-25 | End: 2024-07-26

## 2024-07-25 RX ORDER — FUROSEMIDE 20 MG
20 TABLET ORAL DAILY
COMMUNITY

## 2024-07-25 RX ORDER — HYDROXYZINE HYDROCHLORIDE 25 MG/1
25 TABLET, FILM COATED ORAL EVERY 6 HOURS PRN
Status: DISCONTINUED | OUTPATIENT
Start: 2024-07-25 | End: 2024-07-30 | Stop reason: HOSPADM

## 2024-07-25 RX ORDER — HYDROXYZINE HYDROCHLORIDE 25 MG/1
25 TABLET, FILM COATED ORAL 3 TIMES DAILY PRN
Status: DISCONTINUED | OUTPATIENT
Start: 2024-07-25 | End: 2024-07-25

## 2024-07-25 RX ORDER — NALOXONE HYDROCHLORIDE 0.4 MG/ML
0.4 INJECTION, SOLUTION INTRAMUSCULAR; INTRAVENOUS; SUBCUTANEOUS
Status: DISCONTINUED | OUTPATIENT
Start: 2024-07-25 | End: 2024-07-30 | Stop reason: HOSPADM

## 2024-07-25 RX ORDER — ASPIRIN 81 MG/1
81 TABLET ORAL DAILY
Status: DISCONTINUED | OUTPATIENT
Start: 2024-07-26 | End: 2024-07-30 | Stop reason: HOSPADM

## 2024-07-25 RX ORDER — AMOXICILLIN 250 MG
1 CAPSULE ORAL 2 TIMES DAILY PRN
Status: DISCONTINUED | OUTPATIENT
Start: 2024-07-25 | End: 2024-07-30 | Stop reason: HOSPADM

## 2024-07-25 RX ORDER — POTASSIUM CHLORIDE 1500 MG/1
20 TABLET, EXTENDED RELEASE ORAL DAILY
COMMUNITY

## 2024-07-25 RX ORDER — OXYCODONE HYDROCHLORIDE 5 MG/1
5 TABLET ORAL EVERY 4 HOURS PRN
Status: DISCONTINUED | OUTPATIENT
Start: 2024-07-25 | End: 2024-07-30 | Stop reason: HOSPADM

## 2024-07-25 RX ORDER — CARVEDILOL 3.12 MG/1
1.56 TABLET, FILM COATED ORAL 2 TIMES DAILY WITH MEALS
COMMUNITY
End: 2024-09-13

## 2024-07-25 RX ORDER — CARVEDILOL 3.12 MG/1
1.56 TABLET, FILM COATED ORAL 2 TIMES DAILY WITH MEALS
Status: DISCONTINUED | OUTPATIENT
Start: 2024-07-25 | End: 2024-07-30 | Stop reason: HOSPADM

## 2024-07-25 RX ORDER — CITALOPRAM HYDROBROMIDE 10 MG/1
10 TABLET ORAL DAILY
Status: DISCONTINUED | OUTPATIENT
Start: 2024-07-26 | End: 2024-07-30 | Stop reason: HOSPADM

## 2024-07-25 RX ORDER — ACETAMINOPHEN 500 MG
1000 TABLET ORAL 4 TIMES DAILY
Status: DISCONTINUED | OUTPATIENT
Start: 2024-07-25 | End: 2024-07-30 | Stop reason: HOSPADM

## 2024-07-25 RX ORDER — AMOXICILLIN 250 MG
2 CAPSULE ORAL 2 TIMES DAILY PRN
Status: DISCONTINUED | OUTPATIENT
Start: 2024-07-25 | End: 2024-07-30 | Stop reason: HOSPADM

## 2024-07-25 RX ORDER — LIDOCAINE 4 G/G
1 PATCH TOPICAL ONCE
Status: COMPLETED | OUTPATIENT
Start: 2024-07-25 | End: 2024-07-26

## 2024-07-25 RX ORDER — PREDNISONE 20 MG/1
20 TABLET ORAL DAILY
Status: DISCONTINUED | OUTPATIENT
Start: 2024-07-25 | End: 2024-07-30 | Stop reason: HOSPADM

## 2024-07-25 RX ORDER — BISACODYL 10 MG
10 SUPPOSITORY, RECTAL RECTAL DAILY PRN
Status: DISCONTINUED | OUTPATIENT
Start: 2024-07-25 | End: 2024-07-30 | Stop reason: HOSPADM

## 2024-07-25 RX ORDER — ACETAMINOPHEN 500 MG
TABLET ORAL
Qty: 40 TABLET | Refills: 0 | Status: ON HOLD | OUTPATIENT
Start: 2024-07-25 | End: 2024-07-29

## 2024-07-25 RX ADMIN — OXYCODONE HYDROCHLORIDE 2.5 MG: 5 TABLET ORAL at 16:17

## 2024-07-25 RX ADMIN — DONEPEZIL HYDROCHLORIDE 10 MG: 10 TABLET ORAL at 21:37

## 2024-07-25 RX ADMIN — METHOCARBAMOL 500 MG: 500 TABLET ORAL at 20:11

## 2024-07-25 RX ADMIN — ACETAMINOPHEN 1000 MG: 500 TABLET, FILM COATED ORAL at 20:11

## 2024-07-25 RX ADMIN — LIDOCAINE 1 PATCH: 4 PATCH TOPICAL at 16:17

## 2024-07-25 RX ADMIN — CARVEDILOL 1.56 MG: 3.12 TABLET, FILM COATED ORAL at 21:35

## 2024-07-25 RX ADMIN — PREDNISONE 20 MG: 20 TABLET ORAL at 21:00

## 2024-07-25 ASSESSMENT — ACTIVITIES OF DAILY LIVING (ADL)
ADLS_ACUITY_SCORE: 36
ADLS_ACUITY_SCORE: 43
ADLS_ACUITY_SCORE: 40
ADLS_ACUITY_SCORE: 38
ADLS_ACUITY_SCORE: 40
ADLS_ACUITY_SCORE: 43

## 2024-07-25 ASSESSMENT — COLUMBIA-SUICIDE SEVERITY RATING SCALE - C-SSRS
6. HAVE YOU EVER DONE ANYTHING, STARTED TO DO ANYTHING, OR PREPARED TO DO ANYTHING TO END YOUR LIFE?: NO
2. HAVE YOU ACTUALLY HAD ANY THOUGHTS OF KILLING YOURSELF IN THE PAST MONTH?: NO
1. IN THE PAST MONTH, HAVE YOU WISHED YOU WERE DEAD OR WISHED YOU COULD GO TO SLEEP AND NOT WAKE UP?: NO

## 2024-07-25 NOTE — ED TRIAGE NOTES
Pt comes in with back pain.  Per pt she was diagnosed with a fracture here recently.  She was seen by PCP and they refused to give her pain medications and she was sent to ortho who also refused to given pain medications. Pt called EMS as she is still having back pain, although during triage she is denying pain.       Triage Assessment (Adult)       Row Name 07/25/24 1456          Triage Assessment    Airway WDL WDL        Respiratory WDL    Respiratory WDL WDL        Cardiac WDL    Cardiac WDL WDL

## 2024-07-25 NOTE — H&P
[facility]    History and Physical - Hospitalist Service       Date of Admission:  7/25/2024  Primary Care Physician   Tatianna Swain  CONSULTANTS: Physical therapy    Assessment & Plan      Ilda Nassar is a 89 year old female who has a history of anxiety, hypertension, dementia, A-fib, hyperlipidemia who presents with back pain.  This is the third visit that she has had in recent days.  She recently had a lumbar x-ray on 7/22 along with a CT scan 7/23.  She lives at home and assisted living apartment on her own has not been able to get out of bed due to her pain.  She is only been treating it with Tylenol.  T she denies any current sciatic.  Has no incontinence to speak of.  States that she is having significant muscle spasms which is contributing to her pain.  Previous imaging showed a T8 compression fracture which seems to be more chronic with previous rib fractures.  Patient apparently had a fall months ago per the sisters at least 7 however they are not sure given her dementia and confusion if she is fallen since then.  Patient is being admitted for pain control and possibly placement.    In the emergency room patient was found to have a sodium of 131 but otherwise her BMP is okay with a creatinine 0.7, CBC is actually normal, pelvis x-ray shows degenerative changes but no fracture.       Thoracic/lumbar back pain  Patient is presenting with low back pain for the third time to the emergency room in the last 3 days.  She denies any current sciatica but it is severe enough where she has been able to get out of bed.  Will be placing her on scheduled Tylenol, Robaxin, prednisone, have Atarax as needed as well as low-dose oxycodone.  Given the fact that this is her third visit will be getting an MRI of the thoracic and lumbar spine looking for bulging disks to see if an JANICE injection would be helpful.  Patient needs to be seen by physical therapy and likely cannot return home and will  need a TCU at disposition.  Patient will have to be seen by  to help with this    History of T8 compression fracture, previous falls, left rib fractures healing  Patient had a fall that we know of 7 months ago per the sisters but given her dementia and confusion were not sure if she has had any further falls.  She did have a CT scan showing a T8 compression fracture that likely is chronic with previous left rib fractures that are healing.  Not sure if this could be contributing factor in her pain so we will add calcitonin to her regimen.  Will be getting an MRI as well.    Anxiety   Continue on her home Celexa    History of chronic SMA occlusion, proximal   Seen on her CT scan    History of atrial fibrillation   Is on a beta-blocker for rate control, is not on any anticoagulation which is completely appropriate given her history of falls and dementia    Hyperlipidemia   Is not currently being treated with a statin as the risk far outweighs the benefit at this point    Essential hypertension  Is on Coreg.  Does have Lasix as needed for edema which will be held.  Goal blood pressure at her age with dementia should be less than 180 to prevent side effects like orthostatic hypotension, weakness, and falls    Dementia  Is on Aricept.  Patient is at high risk for sundowning as well as having confusion due to side effects from her medications.  Ideally if she gets confused we can just redirect her I would rather stay away from further medications to try and treat this.    Advanced care planning  Had a 25-minute conversation with the patient along with her sisters bedside.  Patient has a healthcare directive naming power of 's to her daughter in [place] who his name is Margaret but also to her sisters that are local.  She made it clear that she does not want to be intubated and does not want CPR.  The sisters are on board with this decision.       4Ms:  Polypharmacy: Medications reviewed and  appropriate  Mentation:  SLUMS N/A,  BIMS N/A,  CPT N/A, Capacity mostly intact to help make her own medical decisions though she is confused and leans on her sisters to help  Social support: Patient is , lives in a senior assisted living independent apartment on her own, she has 3 kids that live outside the state and has 2 sisters that are local and supportive  Mobility: Uses a 2 wheeled walker  What is importmant: To be comfortable    Discussed plan of care with Dr Ivan, 2 sisters bedside and updated  Diet:  Regular  DVT Prophylaxis: Pneumatic Compression Devices  Treviño Catheter: Not present  Lines: None     Cardiac Monitoring: None    RESTRAINTS: Not indicated  Code Status:  DNR/I         This document was created using voice recognition technology.  Please excuse any typographical errors that may have occurred.  Please call with any questions.                 # Drug Induced Platelet Defect: home medication list includes an antiplatelet medication   # Hypertension: Noted on problem list  # Chronic heart failure with preserved ejection fraction: heart failure noted on problem list and last echo with EF >50%                   Disposition Plan      Expected Discharge Date: 07/26/2024                  Steven Membreno MD  Hospitalist Service  [facility]  Securely message with [email or web address] (more info)  Text page via [employer] Paging/Directory     .[email or web address]    Length of stay: Anticipateless than 2 midnight hospitalization for evaluation and treatment of back pain though she may be here longer until a Tcu can be found.           ______________________________________________________________________    Chief Complaint   Severe back pain with left-sided sciatica    History is obtained from the patient  Epic reviewed, patient has 2 sisters bedside also providing history    History of Present Illness   Ilda Nassar is a 89 year old female who has a history of anxiety, hypertension, dementia,  A-fib, hyperlipidemia who presents with back pain.  This is the third visit that she has had in recent days.  She recently had a lumbar x-ray on 7/22 along with a CT scan 7/23.  She lives at home and assisted living apartment on her own has not been able to get out of bed due to her pain.  She is only been treating it with Tylenol.  T she denies any current sciatic.  Has no incontinence to speak of.  States that she is having significant muscle spasms which is contributing to her pain.  Previous imaging showed a T8 compression fracture which seems to be more chronic with previous rib fractures.  Patient apparently had a fall months ago per the sisters at least 7 however they are not sure given her dementia and confusion if she is fallen since then.  Patient is being admitted for pain control and possibly placement.    In the emergency room patient was found to have a sodium of 131 but otherwise her BMP is okay with a creatinine 0.7, CBC is actually normal, pelvis x-ray shows degenerative changes but no fracture.      Past Medical History    Past Medical History:   Diagnosis Date    Atrial fibrillation (H)     Atrial flutter (H)     Hyperlipidemia     Hypertension        Past Surgical History   No past surgical history on file.    Prior to Admission Medications pharmacy reconciled  Prior to Admission Medications   Prescriptions Last Dose Informant Patient Reported? Taking?   ASPIRIN LOW DOSE 81 MG EC tablet Unknown  Yes Yes   Sig: Take 1 tablet by mouth daily   acetaminophen (TYLENOL) 500 MG tablet Unknown  No Yes   Sig: Take 2 tablets (1,000 mg) by mouth every morning AND 1 tablet (500 mg) daily (with lunch) AND 1 tablet (500 mg) at bedtime. Do all this for 10 days.   carvedilol (COREG) 1.5625 mg half-tab Unknown  Yes Yes   Sig: Take 1.5625 mg by mouth 2 times daily (with meals)   citalopram (CELEXA) 10 MG tablet Unknown  No Yes   Sig: Take 1 tablet (10 mg) by mouth daily   donepezil (ARICEPT) 10 MG tablet Unknown   Yes No   Sig: Take 1 tablet (10 mg) by mouth At Bedtime   furosemide (LASIX) 20 MG tablet Unknown  Yes Yes   Sig: Take 20 mg by mouth daily If weight greater than 2 pounds from baseline, then take an extra tablet   potassium chloride caesar ER (KLOR-CON M20) 20 MEQ CR tablet Unknown  Yes Yes   Sig: Take 20 mEq by mouth daily      Facility-Administered Medications: None        Allergies   No Known Allergies     REVIEW OF SYSTEMS:  A comprehensive review of systems was negative except for items noted in the HPI.  Patient currently denies any fever, chills, sweats, nausea, vomiting, diarrhea, shortness of breath, or chest pain.        Physical Exam   Vital Signs: Temp: 97.3  F (36.3  C) Temp src: Temporal BP: (!) 161/87 Pulse: 73   Resp: 18 SpO2: 92 % O2 Device: None (Room air)    Weight: 0 lbs 0 oz    General appearance: Patient is alert and oriented x3 though is a little confused with some of the details of her story, no apparent distress, pleasant and conversing normally, speaking in full sentences, appears stated age, elderly and frail, lying in a cot in the emergency room  HEENT:   Mucous membranes are moist  RESPIRATORY: Clear to auscultation bilateral, good air movement  CARDIOVASCULAR: Regular rate and rhythm, normal S1/S2, no murmurs, rubs, or gallops present, peripheral pulses intact  GASTROINTESTINAL: Non-distended, non-tender, soft, bowel sounds present throughout,  NEUROLOGIC:  Cranial nerves II-XII intact, without any focal deficits, strength 5/5 throughout, globally weak though  Back: Does have some pain of her low back with straight leg raises  EXTREMITIES:  Moves all extremities, no clubbing, cyanosis, nor edema  :  Treviño not present         Data   Imaging results reviewed over the past 24 hrs:   Recent Results (from the past 24 hour(s))   XR Pelvis 1/2 Views    Narrative    EXAM: XR PELVIS 1/2 VIEWS  LOCATION: [facility]  DATE: 7/25/2024    INDICATION: pain  COMPARISON: None.       Impression    IMPRESSION: Degenerative change both hip joints. Both hips negative for fracture. Pelvis negative for fracture.     Imaging:   Results for orders placed or performed during the hospital encounter of 07/25/24   XR Pelvis 1/2 Views    Narrative    EXAM: XR PELVIS 1/2 VIEWS  LOCATION: [facility]  DATE: 7/25/2024    INDICATION: pain  COMPARISON: None.      Impression    IMPRESSION: Degenerative change both hip joints. Both hips negative for fracture. Pelvis negative for fracture.     Procedures: Previous CT scan of the back along with lumbar x-ray results reviewed  I have personally have reviewed the patient's most up to date radiologic exams, EKG, labs, orders, and medications myself

## 2024-07-25 NOTE — ED PROVIDER NOTES
Emergency Department Note      History of Present Illness     Chief Complaint   Back Pain      HPI   Ilda Nassar is a 89 year old female with history of dementia, hypertension, A-fib who presents with 3 days of severe low back pain in the left low back rating into the left hip.  No known falls but family does not think she would know for sure whether she had a fall.  She was seen here on 7/22 and 7/23 for the same symptoms.  She had a lumbar x-ray that showed no acute findings.  She had a CT of the chest, abdomen, and pelvis that showed a chronic T8 fracture but no acute findings.  Patient has been taking acetaminophen without relief.  She followed up with her primary care physician today who did not add any additional medications.  She has not had any other symptoms including no fever, weakness, numbness, vomiting, diarrhea, urinary symptoms.  Patient lives in an assisted living facility where she is essentially independent during majority the day.  Family is worried about the patient's safety and ability to care for self given her pain and limited mobility from the back pain.    Independent Historian   Sisters as detailed above.    Review of External Notes   I reviewed the ER visits from 7/22 and 7/23 when she was seen for similar pains and had imaging performed.  Reviewed the primary care office visit from yesterday when she was following up for these ER visits.    Past Medical History     Medical History and Problem List   Past Medical History:   Diagnosis Date    Atrial fibrillation (H)     Atrial flutter (H)     Hyperlipidemia     Hypertension        Medications   acetaminophen (TYLENOL) 500 MG tablet  ASPIRIN LOW DOSE 81 MG EC tablet  citalopram (CELEXA) 10 MG tablet  donepezil (ARICEPT) 10 MG tablet        Surgical History   No past surgical history on file.    Physical Exam     Patient Vitals for the past 24 hrs:   BP Temp Temp src Pulse Resp SpO2   07/25/24 1800 (!) 161/87 -- -- 73 -- 92 %   07/25/24  1719 (!) 144/92 -- -- 67 -- 95 %   07/25/24 1536 (!) 152/94 -- -- -- -- 96 %   07/25/24 1456 (!) 168/94 97.3  F (36.3  C) Temporal 70 18 92 %     Physical Exam  Vitals and nursing note reviewed.   Constitutional:       General: She is not in acute distress.     Appearance: She is not ill-appearing.   HENT:      Head: Normocephalic and atraumatic.      Right Ear: External ear normal.      Left Ear: External ear normal.      Nose: Nose normal.      Mouth/Throat:      Mouth: Mucous membranes are moist.   Eyes:      Extraocular Movements: Extraocular movements intact.      Conjunctiva/sclera: Conjunctivae normal.   Cardiovascular:      Rate and Rhythm: Normal rate and regular rhythm.      Heart sounds: No murmur heard.  Pulmonary:      Effort: Pulmonary effort is normal. No respiratory distress.      Breath sounds: Normal breath sounds. No wheezing, rhonchi or rales.   Abdominal:      General: Abdomen is flat. Bowel sounds are normal. There is no distension.      Palpations: Abdomen is soft.      Tenderness: There is no abdominal tenderness. There is no guarding or rebound.   Musculoskeletal:         General: No deformity or signs of injury.      Cervical back: Normal range of motion and neck supple.      Lumbar back: Tenderness (Left lumbar muscles and left gluteal area) present. No bony tenderness.   Skin:     General: Skin is warm and dry.      Findings: No rash.   Neurological:      Mental Status: She is alert.      Sensory: No sensory deficit.      Motor: No weakness.   Psychiatric:         Behavior: Behavior normal.           Diagnostics     Lab Results   Labs Ordered and Resulted from Time of ED Arrival to Time of ED Departure   BASIC METABOLIC PANEL - Abnormal       Result Value    Sodium 131 (*)     Potassium 4.1      Chloride 89 (*)     Carbon Dioxide (CO2) 27      Anion Gap 15      Urea Nitrogen 15.7      Creatinine 0.74      GFR Estimate 76      Calcium 9.0      Glucose 100 (*)    CBC WITH PLATELETS AND  DIFFERENTIAL - Abnormal    WBC Count 7.3      RBC Count 5.26 (*)     Hemoglobin 15.4      Hematocrit 46.6      MCV 89      MCH 29.3      MCHC 33.0      RDW 13.1      Platelet Count 265      % Neutrophils 68      % Lymphocytes 13      % Monocytes 18      % Eosinophils 0      % Basophils 1      % Immature Granulocytes 0      NRBCs per 100 WBC 0      Absolute Neutrophils 4.9      Absolute Lymphocytes 1.0      Absolute Monocytes 1.3      Absolute Eosinophils 0.0      Absolute Basophils 0.1      Absolute Immature Granulocytes 0.0      Absolute NRBCs 0.0     ROUTINE UA WITH MICROSCOPIC REFLEX TO CULTURE       Imaging   XR Pelvis 1/2 Views   Final Result   IMPRESSION: Degenerative change both hip joints. Both hips negative for fracture. Pelvis negative for fracture.            Independent Interpretation   X-ray pelvis shows no obvious fractures    ED Course      Medications Administered   Medications   Lidocaine (LIDOCARE) 4 % Patch 1 patch (1 patch Transdermal $Patch/Med Applied 7/25/24 1617)   oxyCODONE IR (ROXICODONE) half-tab 2.5 mg (2.5 mg Oral $Given 7/25/24 1617)       Procedures   Procedures     Discussion of Management   Admitting Hospitalist, Dr Membreno    ED Course   ED Course as of 07/25/24 1824   Thu Jul 25, 2024 1747 I discussed with Dr. Membreno with the hospitalist team.       Optional/Additional Documentation  None    Medical Decision Making / Diagnosis     CMS Diagnoses: None    MIPS       None    MDM   Ilda Nassar is a 89 year old female who presents with ongoing left lower back pain radiating to the left buttocks.  She has been seen twice for this pain already.  Lumbar x-ray was negative for acute findings.  CT of the chest, abdomen, pelvis was also negative for anything acute.  She has been taking Tylenol without relief.  She has very limited mobility and inability to care for self at home due to the pain.  I suspect that she has sciatica.  She does not seem to have any neurologic deficits on  exam.  However I am concerned that she is not able to manage at home and care for herself with the pain and mobility limitations that she has currently.  I will repeat labs to ensure there is no acute changes and we will check a x-ray of the pelvis to ensure that there is no obvious pelvic fracture that we previously missed.  Otherwise I will treat her pain with a low-dose of oxycodone and a Lidoderm patch.  I suspect that she will likely need to be admitted for observation for pain control and physical therapy as it is not safe for her to go home at this time.    Disposition   The patient was admitted to the hospital.     Diagnosis     ICD-10-CM    1. Intractable low back pain  M54.59       2. Acute left-sided low back pain with left-sided sciatica  M54.42            Discharge Medications   New Prescriptions    No medications on file         MD Marzena Mckee Shaun M, MD  07/25/24 9577

## 2024-07-25 NOTE — PHARMACY-ADMISSION MEDICATION HISTORY
Pharmacist Admission Medication History    Admission medication history is complete. The information provided in this note is only as accurate as the sources available at the time of the update.    Information Source(s): Facility ([facility]/) medication list/MAR via N/A    Pertinent Information: Resident FaceSheet    Changes made to PTA medication list:  Added: lasix, coreg, klor-con  Deleted: None  Changed: None    Allergies reviewed with patient and updates made in EHR: yes    Medication History Completed By: Garland Reynolds RPH 7/25/2024 4:52 PM    PTA Med List   Medication Sig Last Dose    acetaminophen (TYLENOL) 500 MG tablet Take 2 tablets (1,000 mg) by mouth every morning AND 1 tablet (500 mg) daily (with lunch) AND 1 tablet (500 mg) at bedtime. Do all this for 10 days. Unknown    ASPIRIN LOW DOSE 81 MG EC tablet Take 1 tablet by mouth daily Unknown    carvedilol (COREG) 1.5625 mg half-tab Take 1.5625 mg by mouth 2 times daily (with meals) Unknown    citalopram (CELEXA) 10 MG tablet Take 1 tablet (10 mg) by mouth daily Unknown    furosemide (LASIX) 20 MG tablet Take 20 mg by mouth daily If weight greater than 2 pounds from baseline, then take an extra tablet Unknown    potassium chloride caesar ER (KLOR-CON M20) 20 MEQ CR tablet Take 20 mEq by mouth daily Unknown

## 2024-07-25 NOTE — ED NOTES
[facility]  ED Nurse Handoff Report    ED Chief complaint: Back Pain  . ED Diagnosis:   Final diagnoses:   None       Allergies: No Known Allergies    Code Status: DNR / DNI    Activity level - Baseline/Home:  independent prior to back fracture, 1 x assist since fracture, difficulty getting out of bed.   Activity Level - Current:   assist of 1. Unable to get out of bed independently, pain in back with movement.   Lift room needed: No.   Bariatric: No   Needed: No   Isolation: No.   Infection: Not Applicable.     Respiratory status: Room air    Vital Signs (within 30 minutes):   Vitals:    07/25/24 1456 07/25/24 1536   BP: (!) 168/94 (!) 152/94   Pulse: 70    Resp: 18    Temp: 97.3  F (36.3  C)    TempSrc: Temporal    SpO2: 92% 96%       Cardiac Rhythm:  ,      Pain level:    Patient confused: No.   Patient Falls Risk: nonskid shoes/slippers when out of bed and patient and family education.   Elimination Status: Has voided     Patient Report - Initial Complaint: Pt comes in with back pain. Per pt she was diagnosed with a fracture here recently. She was seen by PCP and they refused to give her pain medications and she was sent to ortho who also refused to given pain medications. Pt called EMS as she is still having back pain, although during triage she is denying pain.   Focused Assessment: MusculoskeletalMusculoskeletal WDL: .WDL except; mobilityGeneral Mobility: moderately impairedAdditional Documentation: Back Pain Assessment (Group)  Back Pain AssessmentBack Pain Location: lumbarDescription/Character: intermittent     Abnormal Results:   Labs Ordered and Resulted from Time of ED Arrival to Time of ED Departure   BASIC METABOLIC PANEL - Abnormal       Result Value    Sodium 131 (*)     Potassium 4.1      Chloride 89 (*)     Carbon Dioxide (CO2) 27      Anion Gap 15      Urea Nitrogen 15.7      Creatinine 0.74      GFR Estimate 76      Calcium 9.0      Glucose 100 (*)    CBC WITH  PLATELETS AND DIFFERENTIAL - Abnormal    WBC Count 7.3      RBC Count 5.26 (*)     Hemoglobin 15.4      Hematocrit 46.6      MCV 89      MCH 29.3      MCHC 33.0      RDW 13.1      Platelet Count 265      % Neutrophils 68      % Lymphocytes 13      % Monocytes 18      % Eosinophils 0      % Basophils 1      % Immature Granulocytes 0      NRBCs per 100 WBC 0      Absolute Neutrophils 4.9      Absolute Lymphocytes 1.0      Absolute Monocytes 1.3      Absolute Eosinophils 0.0      Absolute Basophils 0.1      Absolute Immature Granulocytes 0.0      Absolute NRBCs 0.0     ROUTINE UA WITH MICROSCOPIC REFLEX TO CULTURE        XR Pelvis 1/2 Views   Final Result   IMPRESSION: Degenerative change both hip joints. Both hips negative for fracture. Pelvis negative for fracture.          Treatments provided: see MAR   Family Comments: two visitors at bedside  OBS brochure/video discussed/provided to patient:  No  ED Medications:   Medications   Lidocaine (LIDOCARE) 4 % Patch 1 patch (1 patch Transdermal $Patch/Med Applied 7/25/24 1617)   oxyCODONE IR (ROXICODONE) half-tab 2.5 mg (2.5 mg Oral $Given 7/25/24 1617)       Drips infusing:  No  For the majority of the shift this patient was Green.   Interventions performed were n/a.    Sepsis treatment initiated: No    Cares/treatment/interventions/medications to be completed following ED care: see orders    ED Nurse Name: Susanne Maldonado RN  5:37 PM      RECEIVING UNIT ED HANDOFF REVIEW    Above ED Nurse Handoff Report was reviewed: Yes  Reviewed by: Arslan Dumont RN on July 25, 2024 at 7:34 PM   RAJAT Borden called the ED to inform them the note was read: Yes

## 2024-07-26 ENCOUNTER — APPOINTMENT (OUTPATIENT)
Dept: PHYSICAL THERAPY | Facility: CLINIC | Age: 89
DRG: 552 | End: 2024-07-26
Attending: INTERNAL MEDICINE
Payer: MEDICARE

## 2024-07-26 ENCOUNTER — APPOINTMENT (OUTPATIENT)
Dept: GENERAL RADIOLOGY | Facility: CLINIC | Age: 89
DRG: 552 | End: 2024-07-26
Attending: PHYSICIAN ASSISTANT
Payer: MEDICARE

## 2024-07-26 LAB
ALBUMIN UR-MCNC: 10 MG/DL
ANION GAP SERPL CALCULATED.3IONS-SCNC: 15 MMOL/L (ref 7–15)
APPEARANCE UR: CLEAR
BILIRUB UR QL STRIP: NEGATIVE
BUN SERPL-MCNC: 17.4 MG/DL (ref 8–23)
CALCIUM SERPL-MCNC: 8.9 MG/DL (ref 8.8–10.4)
CHLORIDE SERPL-SCNC: 87 MMOL/L (ref 98–107)
COLOR UR AUTO: ABNORMAL
CREAT SERPL-MCNC: 0.61 MG/DL (ref 0.51–0.95)
EGFRCR SERPLBLD CKD-EPI 2021: 84 ML/MIN/1.73M2
GLUCOSE SERPL-MCNC: 123 MG/DL (ref 70–99)
GLUCOSE UR STRIP-MCNC: NEGATIVE MG/DL
HCO3 SERPL-SCNC: 24 MMOL/L (ref 22–29)
HGB UR QL STRIP: NEGATIVE
HYALINE CASTS: 1 /LPF
KETONES UR STRIP-MCNC: 40 MG/DL
LEUKOCYTE ESTERASE UR QL STRIP: ABNORMAL
MUCOUS THREADS #/AREA URNS LPF: PRESENT /LPF
NITRATE UR QL: NEGATIVE
OSMOLALITY SERPL: 280 MMOL/KG (ref 280–301)
OSMOLALITY UR: 449 MMOL/KG (ref 100–1200)
PH UR STRIP: 5.5 [PH] (ref 5–7)
POTASSIUM SERPL-SCNC: 4.6 MMOL/L (ref 3.4–5.3)
RBC URINE: 1 /HPF
SODIUM SERPL-SCNC: 124 MMOL/L (ref 135–145)
SODIUM SERPL-SCNC: 126 MMOL/L (ref 135–145)
SODIUM UR-SCNC: 39 MMOL/L
SP GR UR STRIP: 1.01 (ref 1–1.03)
SQUAMOUS EPITHELIAL: <1 /HPF
UROBILINOGEN UR STRIP-MCNC: NORMAL MG/DL
WBC URINE: 10 /HPF

## 2024-07-26 PROCEDURE — 258N000003 HC RX IP 258 OP 636

## 2024-07-26 PROCEDURE — 87086 URINE CULTURE/COLONY COUNT: CPT

## 2024-07-26 PROCEDURE — 81001 URINALYSIS AUTO W/SCOPE: CPT | Performed by: EMERGENCY MEDICINE

## 2024-07-26 PROCEDURE — 97530 THERAPEUTIC ACTIVITIES: CPT | Mod: GP

## 2024-07-26 PROCEDURE — 83930 ASSAY OF BLOOD OSMOLALITY: CPT

## 2024-07-26 PROCEDURE — G0378 HOSPITAL OBSERVATION PER HR: HCPCS

## 2024-07-26 PROCEDURE — 80048 BASIC METABOLIC PNL TOTAL CA: CPT

## 2024-07-26 PROCEDURE — 99232 SBSQ HOSP IP/OBS MODERATE 35: CPT

## 2024-07-26 PROCEDURE — 258N000003 HC RX IP 258 OP 636: Performed by: NURSE PRACTITIONER

## 2024-07-26 PROCEDURE — 84295 ASSAY OF SERUM SODIUM: CPT

## 2024-07-26 PROCEDURE — 84300 ASSAY OF URINE SODIUM: CPT

## 2024-07-26 PROCEDURE — 36415 COLL VENOUS BLD VENIPUNCTURE: CPT

## 2024-07-26 PROCEDURE — 120N000001 HC R&B MED SURG/OB

## 2024-07-26 PROCEDURE — 250N000012 HC RX MED GY IP 250 OP 636 PS 637: Performed by: INTERNAL MEDICINE

## 2024-07-26 PROCEDURE — 250N000013 HC RX MED GY IP 250 OP 250 PS 637: Performed by: INTERNAL MEDICINE

## 2024-07-26 PROCEDURE — 72070 X-RAY EXAM THORAC SPINE 2VWS: CPT

## 2024-07-26 PROCEDURE — 83935 ASSAY OF URINE OSMOLALITY: CPT

## 2024-07-26 PROCEDURE — 99203 OFFICE O/P NEW LOW 30 MIN: CPT | Performed by: PHYSICIAN ASSISTANT

## 2024-07-26 PROCEDURE — 250N000013 HC RX MED GY IP 250 OP 250 PS 637

## 2024-07-26 PROCEDURE — 97161 PT EVAL LOW COMPLEX 20 MIN: CPT | Mod: GP

## 2024-07-26 RX ORDER — METHOCARBAMOL 500 MG/1
250 TABLET ORAL 4 TIMES DAILY
Status: DISCONTINUED | OUTPATIENT
Start: 2024-07-26 | End: 2024-07-27

## 2024-07-26 RX ORDER — SODIUM CHLORIDE 9 MG/ML
INJECTION, SOLUTION INTRAVENOUS CONTINUOUS
Status: ACTIVE | OUTPATIENT
Start: 2024-07-26 | End: 2024-07-27

## 2024-07-26 RX ORDER — CEFPODOXIME PROXETIL 100 MG/1
100 TABLET, FILM COATED ORAL 2 TIMES DAILY
Status: DISCONTINUED | OUTPATIENT
Start: 2024-07-26 | End: 2024-07-26

## 2024-07-26 RX ORDER — CEFDINIR 300 MG/1
300 CAPSULE ORAL 2 TIMES DAILY
Status: DISCONTINUED | OUTPATIENT
Start: 2024-07-26 | End: 2024-07-27

## 2024-07-26 RX ADMIN — ASPIRIN 81 MG: 81 TABLET, COATED ORAL at 08:29

## 2024-07-26 RX ADMIN — PREDNISONE 20 MG: 20 TABLET ORAL at 08:29

## 2024-07-26 RX ADMIN — HYDROXYZINE HYDROCHLORIDE 25 MG: 25 TABLET ORAL at 17:52

## 2024-07-26 RX ADMIN — ACETAMINOPHEN 1000 MG: 500 TABLET, FILM COATED ORAL at 17:22

## 2024-07-26 RX ADMIN — CARVEDILOL 1.56 MG: 3.12 TABLET, FILM COATED ORAL at 08:36

## 2024-07-26 RX ADMIN — CALCITONIN SALMON 1 SPRAY: 200 SPRAY, METERED NASAL at 08:35

## 2024-07-26 RX ADMIN — ACETAMINOPHEN 1000 MG: 500 TABLET, FILM COATED ORAL at 11:19

## 2024-07-26 RX ADMIN — CARVEDILOL 1.56 MG: 3.12 TABLET, FILM COATED ORAL at 17:22

## 2024-07-26 RX ADMIN — SODIUM CHLORIDE 500 ML: 9 INJECTION, SOLUTION INTRAVENOUS at 19:51

## 2024-07-26 RX ADMIN — HYDROXYZINE HYDROCHLORIDE 25 MG: 25 TABLET ORAL at 02:04

## 2024-07-26 RX ADMIN — METHOCARBAMOL 500 MG: 500 TABLET ORAL at 08:29

## 2024-07-26 RX ADMIN — SODIUM CHLORIDE: 9 INJECTION, SOLUTION INTRAVENOUS at 11:19

## 2024-07-26 RX ADMIN — CITALOPRAM HYDROBROMIDE 10 MG: 10 TABLET ORAL at 08:29

## 2024-07-26 RX ADMIN — CEFDINIR 300 MG: 300 CAPSULE ORAL at 19:44

## 2024-07-26 RX ADMIN — DONEPEZIL HYDROCHLORIDE 10 MG: 10 TABLET ORAL at 21:26

## 2024-07-26 RX ADMIN — METHOCARBAMOL 500 MG: 500 TABLET ORAL at 11:19

## 2024-07-26 RX ADMIN — OXYCODONE HYDROCHLORIDE 5 MG: 5 TABLET ORAL at 02:04

## 2024-07-26 RX ADMIN — ACETAMINOPHEN 1000 MG: 500 TABLET, FILM COATED ORAL at 08:29

## 2024-07-26 ASSESSMENT — ACTIVITIES OF DAILY LIVING (ADL)
DEPENDENT_IADLS:: CLEANING;COOKING;LAUNDRY;SHOPPING;MEAL PREPARATION;MEDICATION MANAGEMENT;MONEY MANAGEMENT;TRANSPORTATION
ADLS_ACUITY_SCORE: 44
ADLS_ACUITY_SCORE: 42
ADLS_ACUITY_SCORE: 44
ADLS_ACUITY_SCORE: 42
ADLS_ACUITY_SCORE: 44
ADLS_ACUITY_SCORE: 44
ADLS_ACUITY_SCORE: 42
ADLS_ACUITY_SCORE: 43
ADLS_ACUITY_SCORE: 42
ADLS_ACUITY_SCORE: 44
ADLS_ACUITY_SCORE: 43
ADLS_ACUITY_SCORE: 44
ADLS_ACUITY_SCORE: 43
ADLS_ACUITY_SCORE: 44
ADLS_ACUITY_SCORE: 43
ADLS_ACUITY_SCORE: 44

## 2024-07-26 NOTE — PLAN OF CARE
PRIMARY DIAGNOSIS: Intractable low back pain  OUTPATIENT/OBSERVATION GOALS TO BE MET BEFORE DISCHARGE:  1. Pain Status: Improved-controlled with oral pain medications.    2. Return to near baseline physical activity: No    3. Cleared for discharge by consultants (if involved): No    Discharge Planner Nurse   Safe discharge environment identified: Yes  Barriers to discharge: Yes  A & O X 3 with forgetfulness. Pleasant. Lung sounds clear to auscultation. No wheezes, crackles, or rales auscultated. Denies , chills, shortness of breath, dizziness, nausea, or vomit.  Bowel sounds present to all quads and active. Hesitancy with urination . Denies dysuria, or burning with urination. Bladder scanned 609 mL, voided 600 mL tea color urine. Urine sample collected and sent to the lab-pending results. On regular diet. A X 1 to bedside commode. Peripheral IV saline lock. Pain is managed with scheduled Tylenol, Lidocaine patch, Robaxin , prn Oxycodone, and Atarax available. C/O pain rating at 8, prn Oxycodone , and Atarax given X 1-See MAR. Afebrile. PT/CM/SW consulted.  Will continue to provide supportive cares.  BP (!) 146/86 (BP Location: Left arm)   Pulse 75   Temp 97.4  F (36.3  C) (Oral)   Resp 16   Wt 49.1 kg (108 lb 3.9 oz)   LMP  (LMP Unknown)   SpO2 94%   BMI 18.01 kg/m          Entered by: Arslan Dumont RN 07/26/2024 6:32 AM     Problem: Adult Inpatient Plan of Care  Goal: Plan of Care Review  Description: The Plan of Care Review/Shift note should be completed every shift.  The Outcome Evaluation is a brief statement about your assessment that the patient is improving, declining, or no change.  This information will be displayed automatically on your shift  note.  7/26/2024 0631 by Arslan Dumont RN  Outcome: Progressing  Flowsheets (Taken 7/26/2024 0631)  Plan of Care Reviewed With: patient  7/25/2024 2224 by Arslan Dumont RN  Outcome: Progressing  Flowsheets (Taken 7/25/2024 2224)  Plan of  "Care Reviewed With: patient  Goal: Patient-Specific Goal (Individualized)  Description: You can add care plan individualizations to a care plan. Examples of Individualization might be:  \"Parent requests to be called daily at 9am for status\", \"I have a hard time hearing out of my right ear\", or \"Do not touch me to wake me up as it startles  me\".  7/26/2024 0631 by Arslan Dumont RN  Outcome: Progressing  7/25/2024 2224 by Arslan Dumont RN  Outcome: Progressing  Goal: Absence of Hospital-Acquired Illness or Injury  7/26/2024 0631 by Arslan Dumont RN  Outcome: Progressing  7/25/2024 2224 by Arslan Dumont RN  Outcome: Progressing  Intervention: Identify and Manage Fall Risk  Recent Flowsheet Documentation  Taken 7/25/2024 2152 by Arslan Dumont RN  Safety Promotion/Fall Prevention:   activity supervised   clutter free environment maintained   increased rounding and observation   increase visualization of patient   nonskid shoes/slippers when out of bed   patient and family education   room near nurse's station   room organization consistent   safety round/check completed   supervised activity  Intervention: Prevent Skin Injury  Recent Flowsheet Documentation  Taken 7/25/2024 2152 by Arslan Dumont RN  Body Position: supine, head elevated  Device Skin Pressure Protection: absorbent pad utilized/changed  Intervention: Prevent and Manage VTE (Venous Thromboembolism) Risk  Recent Flowsheet Documentation  Taken 7/25/2024 2152 by Arslan Dumont RN  VTE Prevention/Management: SCDs off (sequential compression devices)  Intervention: Prevent Infection  Recent Flowsheet Documentation  Taken 7/25/2024 2152 by Arslan Dumont RN  Infection Prevention:   hand hygiene promoted   rest/sleep promoted   single patient room provided  Goal: Optimal Comfort and Wellbeing  7/26/2024 0631 by Arslan Dumont RN  Outcome: Progressing  7/25/2024 2224 by Arslan Dumont RN  Outcome: " Progressing  Intervention: Monitor Pain and Promote Comfort  Recent Flowsheet Documentation  Taken 7/26/2024 0159 by Arslan Dumont RN  Pain Management Interventions: medication (see MAR)  Goal: Readiness for Transition of Care  7/26/2024 0631 by Arslan Dumont, RN  Outcome: Progressing  7/25/2024 2224 by Arslan Dumont, RN  Outcome: Progressing     Please review provider order for any additional goals.   Nurse to notify provider when observation goals have been met and patient is ready for discharge.Goal Outcome Evaluation:      Plan of Care Reviewed With: patient

## 2024-07-26 NOTE — PLAN OF CARE
"PRIMARY DIAGNOSIS: Intractable low back pain.  OUTPATIENT/OBSERVATION GOALS TO BE MET BEFORE DISCHARGE:  1. Pain Status: Improved-controlled with oral pain medications.    2. Return to near baseline physical activity: No    3. Cleared for discharge by consultants (if involved): No    Discharge Planner Nurse   Safe discharge environment identified: Yes  Barriers to discharge: Yes  A & O X 3 with forgetfulness. Pleasant. Lung sounds clear to auscultation. No wheezes, crackles, or rales auscultated. Denies pain, chills, shortness of breath, dizziness, nausea, or vomit.  Bowel sounds present to all quads and active. Pure wick in place . On regular diet. Peripheral IV saline lock. Pain is managed with scheduled Tylenol, Lidocaine patch, Robaxin , prn Oxycodone, and Atarax available. Afebrile. PT/CM/SW consulted.  Will continue to provide supportive cares.  BP (!) 165/94 (BP Location: Left arm)   Pulse 77   Temp 97.8  F (36.6  C) (Axillary)   Resp 18   Wt 49.1 kg (108 lb 3.9 oz)   LMP  (LMP Unknown)   SpO2 93%   BMI 18.01 kg/m          Entered by: Arslan Dumont RN 07/25/2024 10:24 PM     Problem: Adult Inpatient Plan of Care  Goal: Plan of Care Review  Description: The Plan of Care Review/Shift note should be completed every shift.  The Outcome Evaluation is a brief statement about your assessment that the patient is improving, declining, or no change.  This information will be displayed automatically on your shift  note.  Outcome: Progressing  Flowsheets (Taken 7/25/2024 2224)  Plan of Care Reviewed With: patient  Goal: Patient-Specific Goal (Individualized)  Description: You can add care plan individualizations to a care plan. Examples of Individualization might be:  \"Parent requests to be called daily at 9am for status\", \"I have a hard time hearing out of my right ear\", or \"Do not touch me to wake me up as it startles  me\".  Outcome: Progressing  Goal: Absence of Hospital-Acquired Illness or " "Injury  Outcome: Progressing  Intervention: Identify and Manage Fall Risk  Recent Flowsheet Documentation  Taken 7/25/2024 2152 by Arslan Dumont RN  Safety Promotion/Fall Prevention:   activity supervised   clutter free environment maintained   increased rounding and observation   increase visualization of patient   nonskid shoes/slippers when out of bed   patient and family education   room near nurse's station   room organization consistent   safety round/check completed   supervised activity  Intervention: Prevent Skin Injury  Recent Flowsheet Documentation  Taken 7/25/2024 2152 by Arslan Dumont RN  Body Position: supine, head elevated  Device Skin Pressure Protection: absorbent pad utilized/changed  Intervention: Prevent and Manage VTE (Venous Thromboembolism) Risk  Recent Flowsheet Documentation  Taken 7/25/2024 2152 by Arslan Dumont RN  VTE Prevention/Management: SCDs off (sequential compression devices)  Intervention: Prevent Infection  Recent Flowsheet Documentation  Taken 7/25/2024 2152 by Arslan Dumont RN  Infection Prevention:   hand hygiene promoted   rest/sleep promoted   single patient room provided  Goal: Optimal Comfort and Wellbeing  Outcome: Progressing  Goal: Readiness for Transition of Care  Outcome: Progressing     Problem: Adult Inpatient Plan of Care  Goal: Plan of Care Review  Description: The Plan of Care Review/Shift note should be completed every shift.  The Outcome Evaluation is a brief statement about your assessment that the patient is improving, declining, or no change.  This information will be displayed automatically on your shift  note.  Outcome: Progressing  Flowsheets (Taken 7/25/2024 2224)  Plan of Care Reviewed With: patient  Goal: Patient-Specific Goal (Individualized)  Description: You can add care plan individualizations to a care plan. Examples of Individualization might be:  \"Parent requests to be called daily at 9am for status\", \"I have a hard " "time hearing out of my right ear\", or \"Do not touch me to wake me up as it startles  me\".  Outcome: Progressing  Goal: Absence of Hospital-Acquired Illness or Injury  Outcome: Progressing  Intervention: Identify and Manage Fall Risk  Recent Flowsheet Documentation  Taken 7/25/2024 2152 by Arslan Dumont RN  Safety Promotion/Fall Prevention:   activity supervised   clutter free environment maintained   increased rounding and observation   increase visualization of patient   nonskid shoes/slippers when out of bed   patient and family education   room near nurse's station   room organization consistent   safety round/check completed   supervised activity  Intervention: Prevent Skin Injury  Recent Flowsheet Documentation  Taken 7/25/2024 2152 by Arslan Dumont RN  Body Position: supine, head elevated  Device Skin Pressure Protection: absorbent pad utilized/changed  Intervention: Prevent and Manage VTE (Venous Thromboembolism) Risk  Recent Flowsheet Documentation  Taken 7/25/2024 2152 by Arslan Dumont RN  VTE Prevention/Management: SCDs off (sequential compression devices)  Intervention: Prevent Infection  Recent Flowsheet Documentation  Taken 7/25/2024 2152 by Arslan Dumont RN  Infection Prevention:   hand hygiene promoted   rest/sleep promoted   single patient room provided  Goal: Optimal Comfort and Wellbeing  Outcome: Progressing  Goal: Readiness for Transition of Care  Outcome: Progressing     Please review provider order for any additional goals.   Nurse to notify provider when observation goals have been met and patient is ready for discharge.      Plan of Care Reviewed With: patient                 "

## 2024-07-26 NOTE — PLAN OF CARE
ROOM # 220-1    Living Situation (if not independent, order SW consult) : [facility]  Facility name:  : [facility] ( sister)    Activity level at baseline: Independent with a walker   Activity level on admit: A X 2    Who will be transporting you at discharge: Jodi ( Sister )     Patient registered to observation; given Patient Bill of Rights; given the opportunity to ask questions about observation status and their plan of care.  Patient has been oriented to the observation room, bathroom and call light is in place.    Discussed discharge goals and expectations with patient/family.

## 2024-07-26 NOTE — PROGRESS NOTES
[facility]    Medicine Progress Note - Hospitalist Service    Date of Admission:  7/25/2024    Assessment & Plan   Ilda Nassar is a 89 year old female who has a history of anxiety, hypertension, dementia, A-fib, hyperlipidemia who presents with back pain. This is the third visit that she has had in recent days.  She recently had a lumbar x-ray on 7/22 along with a CT scan 7/23.  She lives at home and assisted living apartment on her own has not been able to get out of bed due to her pain.  She is only been treating it with Tylenol. she denies any current sciatic.  Has no incontinence to speak of.  States that she is having significant muscle spasms which is contributing to her pain.  Previous imaging showed a T8 compression fracture which seems to be more chronic with previous rib fractures.  Patient apparently had a fall months ago per the sisters at least 7 however they are not sure given her dementia and confusion if she is fallen since then.  Patient is being admitted for pain control and possibly placement.     Intractable back pain  T8 burst fracture with retropulsion  Nondisplaced T9 compression fracture  Prevertebral soft tissue swelling/hematoma at T7-T8  - MRI thoracic and lumbar obtained  - Neurosurgery consult due to retropulsion and prevertebral soft tissue swelling and hematoma concerns   - TLSO brace   - upright XR with TLSO brace    - needs follow up outpatient in 4-6 weeks with neurosurgery   - no lifting > 10 lbs, avoid bending, twisting, lifting  - Pain regimen: schedule tylenol, atarax and oxycodone PRN   - started on scheduled robaxin 500 mg QID --> decrease to 250 mg QID with hold for sedation parameters   - PT, SW consult   - needs outpatient follow up for bone density/health with PCP    Hypovolemia Hyponatremia   sodium labs confirm this with urine osm 449 and urine sodium 39.  - NS 75 ml/hr, repeat sodium this afternoon, and midnight   - if sodium > 132 at recheck  discontinue IVF  - Do not want to correct over 6 points over 24 hours    Abnormal UA concerning for UTI  10 WBC and small leukocyte esterase.  - start cefpodoxime 100 mg BID x 5 days, discontinue if UC with no growth     Hx previous falls, left rib fractures healing  Patient had a fall that we know of 7 months ago per the sisters but given her dementia and confusion were not sure if she has had any further falls.  She did have a CT scan showing a T8 compression fracture that likely is chronic with previous left rib fractures that are healing.       Anxiety  - continue pta Celexa        Hx atrial fibrillation  - continue pta coreg  - not on anticoagulation, appropriate give dementia and fall risk     Hyperlipidemia  - not on any statin pta      Essential hypertension  - continue pta coreg  - at home uses lasix PRN for edema, not reordered here    History of chronic SMA occlusion, proximal     Dementia  - continue pta aricept   - Patient is at high risk for sundowning as well as having confusion due to side effects from her medications       Observation Goals: -diagnostic tests and consults completed and resulted, -vital signs normal or at patient baseline, -safe disposition plan has been identified, Likely needs a TCu, Nurse to notify provider when observation goals have been met and patient is ready for discharge.  Diet: Regular Diet Adult    DVT Prophylaxis: Pneumatic Compression Devices  Treviño Catheter: Not present  Lines: None     Cardiac Monitoring: None  Code Status: No CPR- Do NOT Intubate      Clinically Significant Risk Factors Present on Admission         # Hyponatremia: Lowest Na = 126 mmol/L in last 2 days, will monitor as appropriate        # Drug Induced Platelet Defect: home medication list includes an antiplatelet medication   # Hypertension: Noted on problem list  # Chronic heart failure with preserved ejection fraction: heart failure noted on problem list and last echo with EF >50%                    Disposition Plan     Medically Ready for Discharge: Anticipated in 2-4 Days      The patient's care was discussed with the Bedside Nurse, Care Coordinator/, Patient, Patient's Family, and neurosurgery Consultant(s).    BECKY Cain PA-C  Hospitalist Service  [facility]  Securely message with [employer] (more info)  Text page via [facility] Paging/Directory   ______________________________________________________________________    Interval History   Patient resting comfortably in bed.  Sisters are present.  Pain is mostly located in the mid back.  No radiculopathy symptoms, saddle numbness or difficulty voiding per the patient.  Per discussion with sisters is alert to place but often does not know date or time.  They report she has been having progressive cognitive concerns for some time.  They have started discussions about needing a higher level of care.    Physical Exam   Vital Signs: Temp: 98  F (36.7  C) Temp src: Oral BP: (!) 164/97 Pulse: 78   Resp: 16 SpO2: 96 % O2 Device: None (Room air)    Weight: 108 lbs 3.93 oz    GENERAL:  Alert, Comfortable, cachexic appearing, No acute distress. Sleeping in bed.  PSYCH: pleasant, oriented.  HEENT:  Normocephalic, No scleral icterus or conjunctival injection  HEART:  Normal S1, S2 with no murmur, RRR  LUNGS:  Normal Respiratory effort. Clear to auscultation bilaterally with no wheezing, rales or ronchi.  EXTREMITIES:  No pedal edema, No cyanosis.   SKIN:  Warm, dry to touch. No rash.  NEUROLOGIC:  BL 5/5 symmetric lower extremity strength, sensation intact with no focal deficits. Speech clear, alert    Medical Decision Making       MANAGEMENT DISCUSSED with the following over the past 24 hours: patient, family, nursing, SW, PT   NOTE(S)/MEDICAL RECORDS REVIEWED over the past 24 hours: labs, imaging, progress notes       Data     I have personally reviewed the following data over the past 24 hrs:    7.3  \   15.4   / 265     126 (L) 87  (L) 17.4 /  123 (H)   4.6 24 0.61 \       Imaging results reviewed over the past 24 hrs:   Recent Results (from the past 24 hour(s))   XR Pelvis 1/2 Views    Narrative    EXAM: XR PELVIS 1/2 VIEWS  LOCATION: [facility]  DATE: 7/25/2024    INDICATION: pain  COMPARISON: None.      Impression    IMPRESSION: Degenerative change both hip joints. Both hips negative for fracture. Pelvis negative for fracture.   MR Thoracic Spine w/o Contrast    Narrative    EXAM: MR THORACIC SPINE W/O CONTRAST  LOCATION: [facility]  DATE: 7/25/2024    INDICATION: Back pain with sciatica, evaluate for bulging disc, T8 compression fracture on CT.  COMPARISON: CT aortic survey 7/23/2024.  TECHNIQUE: Routine Thoracic Spine MRI without IV contrast.    FINDINGS: There is a recent appearing T8 burst fracture with moderate-to-severe height loss and retropulsion of the posteroinferior endplate into the ventral epidural space. There are also findings concerning for a recent nondisplaced T9 anterosuperior   endplate compression fracture, without significant height loss. Vertebral body heights otherwise appear normal. Exaggerated segmental kyphosis with apex at T9 measuring approximately 41 degrees from the superior endplate of T5 to the inferior endplate of   T10. Sagittal alignment otherwise appears within normal limits. There is a presumed intraosseous hemangioma in the T7 vertebral body. Marrow signal otherwise unremarkable. No definite spinal cord signal abnormality is identified.    There is moderate disc height loss at T7-T8 and T8-T9 and mild/mild to moderate disc height loss elsewhere. Mild to moderate scattered degenerative facet arthropathy is present. There is a tiny central disc protrusion at T7-T8. Mild spinal canal stenosis   at T8-T9 primarily related to retropulsion of the posteroinferior endplate of T8 into the ventral epidural space, mild mass effect on the ventral aspect of the spinal  cord at that level. Small multilevel disc osteophyte complexes elsewhere. No evidence   for high-grade central spinal canal stenosis. Mild/mild to moderate scattered neural foraminal narrowing is present.    There appears to be some prevertebral edema/hematoma at the T7-T9 levels, likely related to the recent T8 and T9 fractures. There appears to be small bilateral pleural effusions. There appears to be prominence/dilation of the bilateral cardiac atria,   better assessed on the prior CTA exam. The visualized paraspinous soft tissues otherwise appear grossly unremarkable.      Impression    IMPRESSION:  1. Recent appearing T8 burst fracture with retropulsion of the posteroinferior endplate into the ventral epidural space, contributing to mild spinal canal stenosis at T8-T9 level.  2. Nondisplaced recent-appearing T9 superior endplate compression fracture.  3. Degenerative changes, as described. No high-grade spinal canal or neural foraminal stenosis.  4. Exaggerated segmental kyphosis centered at T8.  5. Prevertebral soft tissue swelling/hematoma centered at the T7-T8 level.   MR Lumbar Spine w/o Contrast    Narrative    EXAM: MR LUMBAR SPINE W/O CONTRAST  LOCATION: [facility]  DATE: 7/25/2024    INDICATION: Low back pain with sciatic, evaluate for bulging disc. Trauma and/or suspected fracture. Insignificant trauma, r/o compression fracture; at risk for osteoporosis; No lumbar x-ray result available.  COMPARISON: Lumbar spine radiographs dated 7/20/2024. CT abdomen and pelvis 7/5/2024.  TECHNIQUE: Routine Lumbar Spine MRI without IV contrast.    FINDINGS: Nomenclature is based on five lumbar vertebral bodies. Normal vertebral body heights. Grade 1 anterolisthesis of L4 on L5 measuring approximately 4 mm, which appears degenerative in nature. Sagittal alignment otherwise appears within normal   limits. Bone marrow signal appears normal. Diffuse intervertebral disc desiccation. Relatively close  apposition L4-S1 spinous processes. There is patchy T2/STIR hyperintense signal in the L4-L5 and L5-S1 interspinous ligament regions and there are a few   cystic-appearing lesions along the right paramedian aspect of the L4-L5 interspinous space (series 4 images 6-7). Findings are nonspecific, but may be seen with Baastrup syndrome in the appropriate clinical setting. Bilateral L4-L5 and L5-S1 facet joint   effusions are present.    Normal appearance of the distal spinal cord with the conus terminating at L2. There is a small presumed cyst of the left kidney. The visualized paraspinous soft tissues otherwise appear grossly unremarkable. Degenerative changes of bilateral sacroiliac   joints.    Segmental analysis:  T12-L1: Normal disc height. No herniation. Normal facets. No spinal canal or neural foraminal stenosis.    L1-L2: Normal disc height. No herniation. Mild facet arthropathy. No spinal canal stenosis. No neural foraminal stenosis.    L2-L3: Normal disc height. Disc bulge slightly eccentric to the right. Mild-to-moderate facet arthropathy. Borderline mild spinal canal stenosis. No neural foraminal stenosis.    L3-L4: Normal disc height. Symmetric disc bulge. Mild-to-moderate facet arthropathy. Borderline mild spinal canal stenosis. No significant neural foraminal stenosis.    L4-L5: Mild disc height loss. Uncovering of the posterior aspect of the disc related to spondylolisthesis. Symmetric disc bulge. Severe bilateral facet arthropathy. Bilateral facet joint effusions. Ligamentum flavum thickening. Mild-to-moderate central   spinal canal stenosis. Mild-to-moderate left and mild right neural foraminal stenosis.    L5-S1: Minimal disc height loss posteriorly. Symmetric disc bulge. Moderate facet arthropathy. Possible tiny synovial cysts along the medial aspect of the right facet joint (series 7 image 10). Ligamentum flavum thickening. There is mild bilateral   lateral recess narrowing without central spinal  canal stenosis. Mild left neural foraminal stenosis. Right neural foramen is patent.      Impression    IMPRESSION:  1. Multilevel degenerative changes of the lumbar spine, as described.  2. Grade 1 anterolisthesis of L4 on L5.  3. Mild-to-moderate spinal canal stenosis and left neural foraminal stenosis at L4-L5. Lesser degrees of spinal canal and neural foraminal narrowing elsewhere.  4. Relatively close apposition of the L4-L5 and L5-S1 interspinous spaces with associated signal abnormality, as detailed, which is nonspecific. This can be seen with Baastrup syndrome in the appropriate clinical setting.  5. Nonspecific bilateral L4-L5 and L5-S1 facet joint effusions, which may be reactive to degenerative facet disease.

## 2024-07-26 NOTE — PLAN OF CARE
PRIMARY DIAGNOSIS: GENERALIZED WEAKNESS    OUTPATIENT/OBSERVATION GOALS TO BE MET BEFORE DISCHARGE  1. Orthostatic performed: No    2. Tolerating PO medications: Yes.. w/applesauce HOB up!    3. Return to near baseline physical activity: No    4. Cleared for discharge by consultants (if involved): No    Discharge Planner Nurse   Safe discharge environment identified: No  Barriers to discharge: Yes       Entered by: Tatyana Thompson RN 07/26/2024 2:30 PM     Please review provider order for any additional goals.   Nurse to notify provider when observation goals have been met and patient is ready for discharge.Goal Outcome Evaluation:      Plan of Care Reviewed With: patient    Overall Patient Progress: improvingOverall Patient Progress: improving    Outcome Evaluation: PT A/O X3.. not oreinted to time. Pt supine in bed, very sleepy. Neurology consult schedulded Xray at 1700. Patient off bed rest and on light activity, no more than ten pounds, no accessive bending, twisting and turning at the lumbar spine etc.. (view Neurologist note). physical therpay consult scheduled for 1515. No brace on unit yet. Fluids still running at 75 mL/hr (view MAR)

## 2024-07-26 NOTE — CONSULTS
Care Management Initial Consult    General Information  Assessment completed with: Children, Spoke with son Norberto via phone  Type of CM/SW Visit: Initial Assessment    Primary Care Provider verified and updated as needed: Yes   Readmission within the last 30 days: no previous admission in last 30 days      Reason for Consult: discharge planning    Communication Assessment  Patient's communication style: spoken language (English or Bilingual)    Hearing Difficulty or Deaf: no      Cognitive  Cognitive/Neuro/Behavioral: .WDL except, arousability  Level of Consciousness: lethargic  Arousal Level: arouses to repeated stimulation, arouses to voice, arouses to touch/gentle shaking  Orientation: disoriented to, time  Mood/Behavior: calm, cooperative  Best Language: 0 - No aphasia  Speech: clear    Living Environment:   People in home: facility resident     Current living Arrangements: assisted living  Name of Facility: [facility] P: [phone] F: [phone]   Able to return to prior arrangements: yes     Family/Social Support:  Care provided by: self, other (see comments) ([facility] staff)  Provides care for: no one  Marital Status:   Children, Sibling(s), Facility resident(s)/Staff          Description of Support System: Supportive, Involved    Support Assessment: Adequate family and caregiver support    Current Resources:   Patient receiving home care services: Yes  Skilled Home Care Services: Physical Therapy  Community Resources: Home Care  Equipment currently used at home: none  Supplies currently used at home: Incontinence Supplies    Does the patient's insurance plan have a 3 day qualifying hospital stay waiver?  No    Lifestyle & Psychosocial Needs:  Social Determinants of Health     Food Insecurity: Not on file   Depression: Not at risk (4/16/2024)    PHQ-2     PHQ-2 Score: 0   Housing Stability: Not on file   Tobacco Use: Medium Risk (7/24/2024)    Patient History     Smoking Tobacco Use: Former      Smokeless Tobacco Use: Never     Passive Exposure: Never   Financial Resource Strain: Not on file   Alcohol Use: Unknown (4/23/2021)    AUDIT-C     Frequency of Alcohol Consumption: 2-3 times a week     Average Number of Drinks: 1 or 2     Frequency of Binge Drinking: Not asked   Transportation Needs: Not on file   Physical Activity: Not on file   Interpersonal Safety: Not on file   Stress: Not on file   Social Connections: Not on file   Health Literacy: Not on file       Functional Status:  Prior to admission patient needed assistance:   Dependent ADLs:: Independent, Bathing, Dressing  Dependent IADLs:: Cleaning, Cooking, Laundry, Shopping, Meal Preparation, Medication Management, Money Management, Transportation     Additional Information:  CM consulted for discharge planning, attempted to meet with pt at bedside but she was occupied with other staff. Called and spoke with son Norberto via phone, he reports that pt resides at [facility] P: [phone] F: [phone]. He reports that pt ambulates independently at baseline, staff assist with medications, meals, AM/PM cares, bathing and every 3 hour safety/toileting checks. Pt was open to [facility] [employer]  -[phone] prior to admission.     Explained to son that PT evaluation is still pending at this time. Also explained that pt's insurance would not cover TCU without 3 night IP stay, expect pt may need to return to [facility] with increased services/support pending PT recommendations. He gives CM permission to speak with and coordinate care with [facility].     Called [facility] and spoke with nurse Coronel who confirms the above information. She reports that they would be able to increase support for pt and pt could return on the weekend as assist of 1 if needed. CM to call [facility] triage nurse on weekend: [phone] to coordinate. New medications should be sent to [facility].     Per Norberto, pt's daughter Margaret [phone] is main contact  and is flying into town today and will be available tomorrow.     Tatyana Pace RN BSN   Inpatient Care Coordination  [facility]   Phone [phone]

## 2024-07-26 NOTE — PLAN OF CARE
"PRIMARY DIAGNOSIS: \"GENERIC\" NURSING  OUTPATIENT/OBSERVATION GOALS TO BE MET BEFORE DISCHARGE:  ADLs back to baseline: No    Activity and level of assistance: A2 in bed.. bed rest for now.    Pain status: 0    Return to near baseline physical activity: No     Discharge Planner Nurse   Safe discharge environment identified: No  Barriers to discharge: Yes       Entered by: Tatyana Thompson RN 07/26/2024 11:33 AM     Please review provider order for any additional goals.   Nurse to notify provider when observation goals have been met and patient is ready for discharge.Goal Outcome Evaluation:      Plan of Care Reviewed With: patient    Overall Patient Progress: improvingOverall Patient Progress: improving    Outcome Evaluation: Pt A/O X3.. not oriented to time, pt supine in bed, very tired. Started continous IV NaCl fluids at 75 mL/hr, Purewick placed by support staff to maintain bed rest, schedulded meds provided.      "

## 2024-07-26 NOTE — PROGRESS NOTES
07/26/24 1626   Appointment Info   Signing Clinician's Name / Credentials (PT) Tara Khan DPT   Rehab Comments (PT) spinal, TLSO   Living Environment   People in Home alone   Current Living Arrangements assisted living   Home Accessibility no concerns   Transportation Anticipated family or friend will provide   Living Environment Comments Pt lives alone in assisted living.   Self-Care   Usual Activity Tolerance good   Current Activity Tolerance fair   Regular Exercise No   Equipment Currently Used at Home walker, rolling   Fall history within last six months yes   Number of times patient has fallen within last six months 4   Activity/Exercise/Self-Care Comment History from sister, pt very lethargic and painful throughout. Sister reports pt has A for med management and check ins 6-8 times per day. 2 sisters live nearby and assist with decisions. Has assist for bathing available but has been IND. Has been using walker lately.   General Information   Onset of Illness/Injury or Date of Surgery 07/25/24   Referring Physician Steven Membreno MD   Patient/Family Therapy Goals Statement (PT) back to baseline mobility   Pertinent History of Current Problem (include personal factors and/or comorbidities that impact the POC) Ilda Nassar is a 89 year old female who has a history of anxiety, hypertension, dementia, A-fib, hyperlipidemia who presents with back pain. This is the third visit that she has had in recent days.  She recently had a lumbar x-ray on 7/22 along with a CT scan 7/23.  She lives at home and assisted living apartment on her own has not been able to get out of bed due to her pain.  She is only been treating it with Tylenol. she denies any current sciatic.  Has no incontinence to speak of.  States that she is having significant muscle spasms which is contributing to her pain.  Previous imaging showed a T8 compression fracture which seems to be more chronic with previous rib fractures.  Patient  "apparently had a fall months ago per the sisters at least 7 however they are not sure given her dementia and confusion if she is fallen since then.  Patient is being admitted for pain control and possibly placement.   Existing Precautions/Restrictions fall;brace worn when out of bed;spinal   Cognition   Affect/Mental Status (Cognition) low arousal/lethargic;unable/difficult to assess   Follows Commands (Cognition) WFL   Cognitive Status Comments dementia at baseline, appears lethargic d/t meds   Pain Assessment   Patient Currently in Pain Yes, see Vital Sign flowsheet   Integumentary/Edema   Integumentary/Edema Comments age related changes   Posture    Posture Forward head position;Protracted shoulders   Range of Motion (ROM)   Range of Motion ROM deficits secondary to pain   Strength (Manual Muscle Testing)   Strength (Manual Muscle Testing) Deficits observed during functional mobility   Strength Comments at least 3/5 in BLEs with functional mobility   Bed Mobility   Comment, (Bed Mobility) supine<>sit with mod A   Transfers   Comment, (Transfers) sit<>stand with FWW and Yunior   Gait/Stairs (Locomotion)   Comment, (Gait/Stairs) pt feeling \"woozy\" in standing, unsafe to attempt ambulation   Balance   Balance Comments impaired; needing FWW and Yunior   Clinical Impression   Criteria for Skilled Therapeutic Intervention Yes, treatment indicated   PT Diagnosis (PT) impaired functional mobility   Influenced by the following impairments weakness, pain, impaired balance, deconditioning   Functional limitations due to impairments diffiuclty with bed mobility, transfers, ambulation   Clinical Presentation (PT Evaluation Complexity) stable   Clinical Presentation Rationale clinical judgement   Clinical Decision Making (Complexity) low complexity   Planned Therapy Interventions (PT) balance training;bed mobility training;gait training;home exercise program;neuromuscular re-education;patient/family education;ROM (range of " motion);strengthening;transfer training;progressive activity/exercise;risk factor education;home program guidelines   Risk & Benefits of therapy have been explained evaluation/treatment results reviewed;care plan/treatment goals reviewed;risks/benefits reviewed;current/potential barriers reviewed;participants voiced agreement with care plan;participants included;patient;sibling   PT Total Evaluation Time   PT Eval, Low Complexity Minutes ([id number]) 10   Physical Therapy Goals   PT Frequency Daily   PT Predicted Duration/Target Date for Goal Attainment 08/02/24   PT Goals Bed Mobility;Gait;Transfers   PT: Bed Mobility Independent;Supine to/from sit;Within precautions   PT: Transfers Modified independent;Sit to/from stand;Assistive device;Within precautions   PT: Gait Modified independent;Assistive device;Within precautions;150 feet   PT Discharge Planning   PT Plan PT: logroll bed mob, repeated STS, trial gait as able   PT Discharge Recommendation (DC Rec) Transitional Care Facility   PT Rationale for DC Rec Patient significantly below baseline functional mobility. Pt lives alone in [facility], IND with mobility at baseline but has check ins throughout day. Pt currently needing A x 1 and walker to stand, lightheaded in standing and lethargic this date, unsafe to attempt ambulation. Pt limited by pain, deconditioning, imapired balance, and weakness, as well as lethargy possibly d/t medication. Rec TCU to progress strenght and IND mobility. If pt were to d/c to [facility], would need A x 1 for all mobility and currently is unable to ambulate.   PT Brief overview of current status A x 1-2 SPT with FWW, check orthostatics   Total Session Time   Total Session Time (sum of timed and untimed services) 10

## 2024-07-26 NOTE — PLAN OF CARE
PRIMARY DIAGNOSIS: Intractable low back pain, Acute left-sided low back pain w/left sided sciatica  OUTPATIENT/OBSERVATION GOALS TO BE MET BEFORE DISCHARGE:  1. Stable vital signs Yes  2. Tolerating diet:Yes  3. Pain controlled with oral pain medications: Yes  4. Positive bowel sounds:  Yes  5. Voiding without difficulty:  No  6. Able to ambulate:  Yes.. but on bed rest now.  7. Provider specific discharge goals met:  No    Discharge Planner Nurse   Safe discharge environment identified: No  Barriers to discharge: Yes         Entered by: Tatyana Thompson RN 07/26/2024 10:07 AM     Please review provider order for any additional goals.   Nurse to notify provider when observation goals have been met and patient is ready for discharge.Goal Outcome Evaluation:      Plan of Care Reviewed With: patient    Overall Patient Progress: improvingOverall Patient Progress: improving    Outcome Evaluation: Pt A/O x3.. not oriented to time. Pt rates pain 5/10 in back, pt takes pills one at time, noted ecchymoses on lower calfs with scab on right lower calf. Iv site visualized no issues noted, cleansed and placed curous cap on saline lock.

## 2024-07-26 NOTE — PROGRESS NOTES
Patient was fit with backpack style tlso.  Written and contact instructions given.  Nursing present and assisting.  Please call orthotics if questions.  Campbell Levi.

## 2024-07-26 NOTE — PLAN OF CARE
Patient is Aox3, VSS, LCTA, BS active. Little to no appetite only at 25% of breakfast, 50% of lunch, and refused dinner only would eat a cup of jello. Takes in minimal oral fluids even with encouragement, NS is running for low sodium, recheck was lower than morning labs. Even after holding methocarbamol patient still appears very drowsy. Scheduled tylenol was given and PRN hydroxyzine(25mg) was given 45 minutes later because patient was uncomfortable and groaning out loud in pain. Only one small urine episode since 0600 this morning, bladder scan was 230 at 6:30pm. Patient will not urinate with a purewick on, pivot transfer to [facility].     She was fit for her brace today and completed X-ray with the brace on. PT did see patient and recommends TCU or [facility] with [facility].

## 2024-07-27 ENCOUNTER — APPOINTMENT (OUTPATIENT)
Dept: PHYSICAL THERAPY | Facility: CLINIC | Age: 89
DRG: 552 | End: 2024-07-27
Payer: MEDICARE

## 2024-07-27 LAB
ANION GAP SERPL CALCULATED.3IONS-SCNC: 14 MMOL/L (ref 7–15)
BACTERIA UR CULT: NORMAL
BUN SERPL-MCNC: 17.9 MG/DL (ref 8–23)
CALCIUM SERPL-MCNC: 8.2 MG/DL (ref 8.8–10.4)
CHLORIDE SERPL-SCNC: 95 MMOL/L (ref 98–107)
CREAT SERPL-MCNC: 0.58 MG/DL (ref 0.51–0.95)
EGFRCR SERPLBLD CKD-EPI 2021: 85 ML/MIN/1.73M2
GLUCOSE SERPL-MCNC: 90 MG/DL (ref 70–99)
HCO3 SERPL-SCNC: 19 MMOL/L (ref 22–29)
POTASSIUM SERPL-SCNC: 4.2 MMOL/L (ref 3.4–5.3)
SODIUM SERPL-SCNC: 125 MMOL/L (ref 135–145)
SODIUM SERPL-SCNC: 128 MMOL/L (ref 135–145)
SODIUM SERPL-SCNC: 128 MMOL/L (ref 135–145)

## 2024-07-27 PROCEDURE — 84295 ASSAY OF SERUM SODIUM: CPT

## 2024-07-27 PROCEDURE — 250N000013 HC RX MED GY IP 250 OP 250 PS 637

## 2024-07-27 PROCEDURE — 99232 SBSQ HOSP IP/OBS MODERATE 35: CPT

## 2024-07-27 PROCEDURE — 250N000012 HC RX MED GY IP 250 OP 636 PS 637: Performed by: INTERNAL MEDICINE

## 2024-07-27 PROCEDURE — 258N000003 HC RX IP 258 OP 636

## 2024-07-27 PROCEDURE — 120N000001 HC R&B MED SURG/OB

## 2024-07-27 PROCEDURE — 36415 COLL VENOUS BLD VENIPUNCTURE: CPT

## 2024-07-27 PROCEDURE — 258N000003 HC RX IP 258 OP 636: Performed by: NURSE PRACTITIONER

## 2024-07-27 PROCEDURE — 250N000013 HC RX MED GY IP 250 OP 250 PS 637: Performed by: INTERNAL MEDICINE

## 2024-07-27 PROCEDURE — 97530 THERAPEUTIC ACTIVITIES: CPT | Mod: GP | Performed by: PHYSICAL THERAPIST

## 2024-07-27 RX ORDER — GUAIFENESIN 600 MG/1
600 TABLET, EXTENDED RELEASE ORAL 2 TIMES DAILY PRN
Status: DISCONTINUED | OUTPATIENT
Start: 2024-07-27 | End: 2024-07-30 | Stop reason: HOSPADM

## 2024-07-27 RX ORDER — SODIUM CHLORIDE 9 MG/ML
INJECTION, SOLUTION INTRAVENOUS CONTINUOUS
Status: DISCONTINUED | OUTPATIENT
Start: 2024-07-27 | End: 2024-07-27

## 2024-07-27 RX ORDER — LIDOCAINE 4 G/G
2 PATCH TOPICAL
Status: DISCONTINUED | OUTPATIENT
Start: 2024-07-27 | End: 2024-07-30 | Stop reason: HOSPADM

## 2024-07-27 RX ORDER — SODIUM CHLORIDE 9 MG/ML
INJECTION, SOLUTION INTRAVENOUS CONTINUOUS
Status: DISCONTINUED | OUTPATIENT
Start: 2024-07-27 | End: 2024-07-28

## 2024-07-27 RX ORDER — METHOCARBAMOL 500 MG/1
250 TABLET ORAL 3 TIMES DAILY PRN
Status: DISCONTINUED | OUTPATIENT
Start: 2024-07-27 | End: 2024-07-30 | Stop reason: HOSPADM

## 2024-07-27 RX ADMIN — GUAIFENESIN 600 MG: 600 TABLET ORAL at 12:36

## 2024-07-27 RX ADMIN — DONEPEZIL HYDROCHLORIDE 10 MG: 10 TABLET ORAL at 21:37

## 2024-07-27 RX ADMIN — HYDROXYZINE HYDROCHLORIDE 25 MG: 25 TABLET ORAL at 19:41

## 2024-07-27 RX ADMIN — CARVEDILOL 1.56 MG: 3.12 TABLET, FILM COATED ORAL at 07:48

## 2024-07-27 RX ADMIN — ACETAMINOPHEN 1000 MG: 500 TABLET, FILM COATED ORAL at 03:51

## 2024-07-27 RX ADMIN — CEFDINIR 300 MG: 300 CAPSULE ORAL at 07:45

## 2024-07-27 RX ADMIN — SODIUM CHLORIDE: 9 INJECTION, SOLUTION INTRAVENOUS at 01:41

## 2024-07-27 RX ADMIN — ACETAMINOPHEN 1000 MG: 500 TABLET, FILM COATED ORAL at 11:44

## 2024-07-27 RX ADMIN — LIDOCAINE 2 PATCH: 4 PATCH TOPICAL at 11:44

## 2024-07-27 RX ADMIN — ACETAMINOPHEN 1000 MG: 500 TABLET, FILM COATED ORAL at 17:13

## 2024-07-27 RX ADMIN — CITALOPRAM HYDROBROMIDE 10 MG: 10 TABLET ORAL at 07:45

## 2024-07-27 RX ADMIN — PREDNISONE 20 MG: 20 TABLET ORAL at 07:45

## 2024-07-27 RX ADMIN — ASPIRIN 81 MG: 81 TABLET, COATED ORAL at 07:45

## 2024-07-27 RX ADMIN — METHOCARBAMOL 250 MG: 500 TABLET ORAL at 07:45

## 2024-07-27 RX ADMIN — SODIUM CHLORIDE: 9 INJECTION, SOLUTION INTRAVENOUS at 20:06

## 2024-07-27 RX ADMIN — ACETAMINOPHEN 1000 MG: 500 TABLET, FILM COATED ORAL at 07:45

## 2024-07-27 RX ADMIN — HYDROXYZINE HYDROCHLORIDE 25 MG: 25 TABLET ORAL at 06:39

## 2024-07-27 RX ADMIN — SODIUM CHLORIDE: 9 INJECTION, SOLUTION INTRAVENOUS at 11:44

## 2024-07-27 RX ADMIN — CARVEDILOL 1.56 MG: 3.12 TABLET, FILM COATED ORAL at 17:14

## 2024-07-27 ASSESSMENT — ACTIVITIES OF DAILY LIVING (ADL)
ADLS_ACUITY_SCORE: 39
NUMBER_OF_TIMES_PATIENT_HAS_FALLEN_WITHIN_LAST_SIX_MONTHS: 4
ADLS_ACUITY_SCORE: 39
FALL_HISTORY_WITHIN_LAST_SIX_MONTHS: YES
TOILETING_ISSUES: NO
ADLS_ACUITY_SCORE: 39
DOING_ERRANDS_INDEPENDENTLY_DIFFICULTY: YES
ADLS_ACUITY_SCORE: 39
CONCENTRATING,_REMEMBERING_OR_MAKING_DECISIONS_DIFFICULTY: NO
ADLS_ACUITY_SCORE: 43
DIFFICULTY_EATING/SWALLOWING: YES
ADLS_ACUITY_SCORE: 43
DRESSING/BATHING_DIFFICULTY: NO
ADLS_ACUITY_SCORE: 43
ADLS_ACUITY_SCORE: 39
WEAR_GLASSES_OR_BLIND: NO
WALKING_OR_CLIMBING_STAIRS: AMBULATION DIFFICULTY, ASSISTANCE 1 PERSON
EQUIPMENT_CURRENTLY_USED_AT_HOME: WALKER, ROLLING
ADLS_ACUITY_SCORE: 43
ADLS_ACUITY_SCORE: 39
ADLS_ACUITY_SCORE: 39
ADLS_ACUITY_SCORE: 38
ADLS_ACUITY_SCORE: 43
ADLS_ACUITY_SCORE: 39
ADLS_ACUITY_SCORE: 43
ADLS_ACUITY_SCORE: 39
CHANGE_IN_FUNCTIONAL_STATUS_SINCE_ONSET_OF_CURRENT_ILLNESS/INJURY: YES
ADLS_ACUITY_SCORE: 43
ADLS_ACUITY_SCORE: 38
WALKING_OR_CLIMBING_STAIRS_DIFFICULTY: YES
ADLS_ACUITY_SCORE: 39
ADLS_ACUITY_SCORE: 39
EATING/SWALLOWING: OTHER (SEE COMMENTS)
ADLS_ACUITY_SCORE: 39

## 2024-07-27 NOTE — PROGRESS NOTES
[facility]    Medicine Progress Note - Hospitalist Service    Date of Admission:  7/25/2024    Assessment & Plan   Ilda Nassar is a 89 year old female who has a history of anxiety, hypertension, dementia, A-fib, hyperlipidemia who presents with back pain. This is the third visit that she has had in recent days.  She recently had a lumbar x-ray on 7/22 along with a CT scan 7/23.  She lives at home and assisted living apartment on her own has not been able to get out of bed due to her pain.  She is only been treating it with Tylenol. she denies any current sciatic.  Has no incontinence to speak of.  States that she is having significant muscle spasms which is contributing to her pain.  Previous imaging showed a T8 compression fracture which seems to be more chronic with previous rib fractures.  Patient apparently had a fall months ago per the sisters at least 7 however they are not sure given her dementia and confusion if she is fallen since then.  Patient is being admitted for pain control and possibly placement.    Daily update: Much more alert with reduction of Robaxin.  Nursing reporting fairly significant sedation with scheduled Robaxin dosing.  Will reduce to 3 times daily as needed, added lidocaine patch daily.  Progressing with PT.  Will need TCU.  Sodium is improving with IV fluids.  Will repeat sodium this evening.     Intractable back pain  T8 burst fracture with retropulsion  Nondisplaced T9 compression fracture  Prevertebral soft tissue swelling/hematoma at T7-T8  - MRI thoracic and lumbar obtained  - Neurosurgery consult due to retropulsion and prevertebral soft tissue swelling and hematoma concerns   - TLSO brace   - upright XR with TLSO brace    - needs follow up outpatient in 4-6 weeks with neurosurgery   - no lifting > 10 lbs, avoid bending, twisting, lifting  - Pain regimen: schedule tylenol, atarax and oxycodone PRN, lidocaine patch   - started on scheduled robaxin 500 mg  QID --> decrease to 250 mg QID with hold for sedation parameters, further reduce to 250 mg TID PRN due to sedation  - PT, SW consult: rec TCU, family agreeable     Osteopenia on imaging  - needs outpatient follow up for bone density/ bone health with PCP    Hypovolemia Hyponatremia - improving  sodium labs confirm this with urine osm 449 and urine sodium 39. Slightly dropped last evening, given 500 bolus and fluids increase to 100 ml/hr with improvement of sodium  - continue  ml/hr  - encourage PO intake  - repeat sodium at 6 pm and 6 am     Abnormal UA   10 WBC and small leukocyte esterase.  - started cefpodoxime 100 mg BID x 5 days on 7/26, UC now growing  < 10,000 mix nathan, with no urinary symptoms will discontinue abx.      Hx previous falls, left rib fractures healing  Patient had a fall that we know of 7 months ago per the sisters but given her dementia and confusion were not sure if she has had any further falls.  She did have a CT scan showing a T8 compression fracture that likely is chronic with previous left rib fractures that are healing.       Anxiety  - continue pta Celexa        Hx atrial fibrillation  - continue pta coreg  - not on anticoagulation, appropriate give dementia and fall risk     Hyperlipidemia  - not on any statin pta      Essential hypertension  - continue pta coreg  - at home uses lasix PRN for edema, not reordered here    History of chronic SMA occlusion, proximal     Dementia  - continue pta aricept   - Patient is at high risk for sundowning as well as having confusion due to side effects from her medications          Diet: Regular Diet Adult    DVT Prophylaxis: Pneumatic Compression Devices  Treviño Catheter: Not present  Lines: None     Cardiac Monitoring: None  Code Status: No CPR- Do NOT Intubate      Clinically Significant Risk Factors Present on Admission         # Hyponatremia: Lowest Na = 124 mmol/L in last 2 days, will monitor as appropriate        # Drug Induced  Platelet Defect: home medication list includes an antiplatelet medication   # Hypertension: Noted on problem list    # Chronic heart failure with preserved ejection fraction: heart failure noted on problem list and last echo with EF >50%                   Disposition Plan     Medically Ready for Discharge: Anticipated in 2-4 Days      The patient's care was discussed with the Bedside Nurse, Care Coordinator/, Patient, Patient's Family, and neurosurgery Consultant(s).    BECKY Cain PA-C  Hospitalist Service  [facility]  Securely message with [employer] (more info)  Text page via [employer] Paging/Directory   ______________________________________________________________________    Interval History   Pain controlled at rest worse with movement.  Was able to ambulate to bathroom with nursing staff this morning.  No numbness or tingling in the lower extremities.  Voiding adequately.  Denies any urinary symptoms.    Physical Exam   Vital Signs: Temp: 97.6  F (36.4  C) Temp src: Oral BP: (!) 137/105 Pulse: 79   Resp: 20 SpO2: 100 % O2 Device: None (Room air)    Weight: 108 lbs 3.93 oz    GENERAL:  Alert, Comfortable, cachexic appearing, No acute distress. Sitting up in bed  PSYCH: pleasant, oriented  HEENT:  Normocephalic, No scleral icterus or conjunctival injection  HEART:  Normal S1, S2 with no murmur, RRR  LUNGS:  Normal Respiratory effort. Clear to auscultation bilaterally anteriorly with no wheezing, rales or ronchi.  SKIN:  Warm, dry to touch. No rash.  NEUROLOGIC:  BL 5/5 symmetric lower and upper extremity strength, sensation intact with no focal deficits. Speech clear, alert    Medical Decision Making       MANAGEMENT DISCUSSED with the following over the past 24 hours: patient, family, nursing, SW, PT   NOTE(S)/MEDICAL RECORDS REVIEWED over the past 24 hours: labs, imaging, progress notes       Data     I have personally reviewed the following data over the past 24 hrs:    N/A  \    N/A   / N/A     128 (L) 95 (L) 17.9 /  90   4.2 19 (L) 0.58 \       Imaging results reviewed over the past 24 hrs:   Recent Results (from the past 24 hour(s))   XR Thoracic Spine 2 Views    Narrative    EXAM: XR THORACIC SPINE 2 VIEWS  LOCATION: [facility]  DATE: 7/26/2024    INDICATION: t8 burst, upright XR in brace  COMPARISON: MRI 7/25/2024      Impression    IMPRESSION: Patient is imaged in a spinal brace. Diffuse osteopenia. 12 thoracic vertebra are assumed. Marked anterior wedging of the presumed T8 vertebral body consistent with previously described burst fracture. Additional mild anterior wedging of T9.   Segmental kyphosis measures 47 degrees from T7 through T10 on radiographs compared to 27 degrees on the previous MRI. Vertebral body heights elsewhere are maintained. Suboptimal evaluation of the lung parenchyma due to overpenetration. Suspected small   left pleural effusion.

## 2024-07-27 NOTE — PLAN OF CARE
[id number]    Inpatient Progress Note: T8 compression fx    BP (!) 155/97 (BP Location: Left arm)   Pulse 75   Temp 97.6  F (36.4  C) (Axillary)   Resp 20   Wt 49.1 kg (108 lb 3.9 oz)   LMP  (LMP Unknown)   SpO2 92%   BMI 18.01 kg/m         Orientation: A&O x3 forgetful at times, was lethargic through-out the day around 9pm pt was more alert.  Pain status: 7/10 pain after pivoting to commode. Scheduled tylenol for pain, PRN robaxin.   Activity: Ax1 pivot to commode, PT to consult further today  Resp: WDL, denies SOB  Cardiac: WDL, denies chest pain  GI: WDL  :  X, hesitancy, doesn't use purewick, have to remind pt to get up and use bathroom.   Infusions: NS 100mL. Received 500 mL bolus in evening due to sodium being 124, recheck at midnight was 128, X-cover provider notified recheck in am.   Pertinent Labs: Sodium 128 -recheck in am.  Diet: Regular, X, Poor appetite, encourage fluids and oral intake.  Consults: Neurosurgery following-backbrace/ follow up in 4-6 weeks outpt. PT/SW following- possible TCU if she stays inp status for 3 days.   Discharge Plan: TCU or AFL    Will continue to monitor and provide cares.     Marilia Braun RN       Problem: Adult Inpatient Plan of Care  Goal: Plan of Care Review  Description: The Plan of Care Review/Shift note should be completed every shift.  The Outcome Evaluation is a brief statement about your assessment that the patient is improving, declining, or no change.  This information will be displayed automatically on your shift  note.  Outcome: Progressing  Flowsheets (Taken 7/27/2024 0501)  Outcome Evaluation: Pt resting in bed, pivot to commode Ax1 back brace when out of bed. PT consult today. NS running at 100ml. Recheck in the morning  Plan of Care Reviewed With: patient  Overall Patient Progress: no change  Goal: Patient-Specific Goal (Individualized)  Description: You can add care plan individualizations to a care plan. Examples of Individualization might be:   "\"Parent requests to be called daily at 9am for status\", \"I have a hard time hearing out of my right ear\", or \"Do not touch me to wake me up as it startles  me\".  Outcome: Progressing  Goal: Absence of Hospital-Acquired Illness or Injury  Outcome: Progressing  Intervention: Identify and Manage Fall Risk  Recent Flowsheet Documentation  Taken 7/26/2024 1926 by Marilia Braun RN  Safety Promotion/Fall Prevention:   activity supervised   assistive device/personal items within reach  Intervention: Prevent and Manage VTE (Venous Thromboembolism) Risk  Recent Flowsheet Documentation  Taken 7/26/2024 1926 by Marilia Braun RN  VTE Prevention/Management: SCDs off (sequential compression devices)  Goal: Optimal Comfort and Wellbeing  Outcome: Progressing  Intervention: Monitor Pain and Promote Comfort  Recent Flowsheet Documentation  Taken 7/26/2024 1926 by Marilia Braun RN  Pain Management Interventions: emotional support  Goal: Readiness for Transition of Care  Outcome: Progressing     Problem: Pain Acute  Goal: Optimal Pain Control and Function  Outcome: Progressing  Intervention: Develop Pain Management Plan  Recent Flowsheet Documentation  Taken 7/26/2024 1926 by Marilia Braun RN  Pain Management Interventions: emotional support  Intervention: Prevent or Manage Pain  Recent Flowsheet Documentation  Taken 7/26/2024 1926 by Marilia Braun RN  Medication Review/Management: medications reviewed     Problem: Comorbidity Management  Goal: Blood Pressure in Desired Range  Outcome: Progressing  Intervention: Maintain Blood Pressure Management  Recent Flowsheet Documentation  Taken 7/26/2024 1926 by Marilia Braun RN  Medication Review/Management: medications reviewed     Problem: Fall Injury Risk  Goal: Absence of Fall and Fall-Related Injury  Outcome: Progressing  Intervention: Identify and Manage Contributors  Recent Flowsheet Documentation  Taken 7/26/2024 1926 by Marilia Braun RN  Medication " Review/Management: medications reviewed  Intervention: Promote Injury-Free Environment  Recent Flowsheet Documentation  Taken 7/26/2024 1926 by Marilia Braun RN  Safety Promotion/Fall Prevention:   activity supervised   assistive device/personal items within reach   Goal Outcome Evaluation:      Plan of Care Reviewed With: patient    Overall Patient Progress: no changeOverall Patient Progress: no change    Outcome Evaluation: Pt resting in bed, pivot to commode Ax1 back brace when out of bed. PT consult today. NS running at 100ml. Recheck in the morning

## 2024-07-27 NOTE — PROGRESS NOTES
Care Management Follow Up    Expected Discharge Date: 07/29/2024     Concerns to be Addressed: Discharge planning     Patient plan of care discussed at interdisciplinary rounds: Yes    Anticipated Discharge Disposition: TCU     Anticipated Discharge Services: PT/OT    Patient/family educated on Medicare website which has current facility and service quality ratings: yes   Education Provided on the Discharge Plan: yes   Patient/Family in Agreement with the Plan: yes    Referrals Placed by CM/SW: skilled nursing facility   Private pay costs discussed: private room/amenity fees and transportation costs    Additional Information:  PT evaluated patient and recommend TCU. Patient is inpatient status as of 7/26, will have a 3 day qualifying stay 7/29.     SW met with patient and daughter Margaret at bedside. Discussed recs for TCU. Patient and daughter agreeable. SNF packet provided. Medicare.gov reviewed. They are requesting referrals be sent to [facility], [facility], and [facility]. They are agreeable to a semi-prvt room. Reviewed out of pocket cost for [facility] transport, $89.98 base and $5.79 per mile to the destination. Spoke with Margaret, they expressed understanding and are agreeable to this. CC/SW will continue to follow.    MARY Sadler, [facility]   Inpatient Care Coordination  [facility]   [phone]

## 2024-07-27 NOTE — PROVIDER NOTIFICATION
FYI- pt sodium at 1800 was 124, NS Bolus 500ml given and increase continuous NS rate to 100ml, recheck at Midnight was 128. Recheck in morning

## 2024-07-27 NOTE — PROGRESS NOTES
[facility]    Neurosurgery Progress Note    Date of Service (when I saw the patient): 07/27/2024     Assessment & Plan   Ilda Nassar is a 89 year old female who was admitted on 7/25/2024. Ilda Nassar is a 89 year old female on 81mg ASA daily with history of anxiety, hypertension, dementia, A-fib, hyperlipidemia who presented 7/25/24 with back pain and imaging evidence as demonstrated below. Patient has been in ED for ongoing back pain 3 times in the last 4 days. MRI obtained demonstrating recent T8 burst fracture with retropulsion.    Presently patient states that she is doing okay denies any current complaints of back pain notes slight pain with movement though has improved since admission. Denies any radicular upper or lower extremity pain numbness tingling or weakness     Xray reviewed with stable appearance of fracture but noted worsened thoracic kyphosis when compared to prior MRI       Plan:  Continue to wear TLSO brace when out of bed and sitting upright greater than 30 degrees  No further neurosurgical intervention indicated will sign off please re-consult or call with any questions  Will plan to follow up in 4-6 weeks with repeat xray at that time      I have discussed the following assessment and plan Dr. Wilder who is in agreement with initial plan and will follow up with further consultation recommendations.    Carol Willett PA-C  [facility] [place] [address]  Tel [phone]  Text page via [employer] Paging/Directory     Interval History   improving    Physical Exam   Temp: 97.6  F (36.4  C) Temp src: Oral BP: (!) 137/105 Pulse: 79   Resp: 20 SpO2: 100 % O2 Device: None (Room air)    Vitals:    07/25/24 2044   Weight: 49.1 kg (108 lb 3.9 oz)     Vital Signs with Ranges  Temp:  [96.7  F (35.9  C)-98.5  F (36.9  C)] 97.6  F (36.4  C)  Pulse:  [72-82] 79  Resp:  [18-20] 20  BP: (117-155)/()  137/105  SpO2:  [90 %-100 %] 100 %  I/O last 3 completed shifts:  In: 440 [P.O.:440]  Out: -      , Blood pressure (!) 137/105, pulse 79, temperature 97.6  F (36.4  C), temperature source Oral, resp. rate 20, weight 49.1 kg (108 lb 3.9 oz), SpO2 100%.  108 lbs 3.93 oz  HEENT:  Normocephalic.  PERRLA.  EOM s intact.    Neck:  Supple, non-tender, without lymphadenopathy.  Heart:  No peripheral edema  Lungs:  No SOB  Abdomen:  Soft, non-tender, non-distended.   Skin:  Warm and dry, good capillary refill.  Extremities:  Good radial and dorsalis pedis pulses bilaterally, no edema, cyanosis or clubbing.    NEUROLOGICAL EXAMINATION:   Mental status: alert and oriented x3 speech clear and appropriate   Cranial nerves: II-XII intact  Motor strength:   Biceps: 5/5 right, 5/5 left   Wrist extensors:5/5 right, 5/5 left   Triceps: 5/5 right, 5/5 left   Deltoids: 5/5 right, 5/5 left   Finger flexion: 5/5 right, 5/5 left   Finger abduction:5/5 right, 5/5 left   Hand : 5/5 right, 5/5 left   Hip flexors: 5/5 right, 5/5 left   Quadriceps: 5/5 right, 5/5 left  Ankle dorsiflexion: 5/5 right, 5/5 left    Ankle plantar flexion:5/5 right, 5/5 left   Extensor hallicus longus: 5/5 right, 5/5 left   Sensation:  intact  Reflexes:  Negative Babinski.  Negative Clonus.    Coordination:  Smooth finger to nose testing.   Negative pronator drift.    Gait:  not tested    Medications   Current Facility-Administered Medications   Medication Dose Route Frequency Provider Last Rate Last Admin    sodium chloride 0.9 % infusion   Intravenous Continuous Audrey Cain PA-C 100 mL/hr at 07/27/24 1144 New Bag at 07/27/24 1144     Current Facility-Administered Medications   Medication Dose Route Frequency Provider Last Rate Last Admin    acetaminophen (TYLENOL) tablet 1,000 mg  1,000 mg Oral 4x Daily Steven Membreno MD   1,000 mg at 07/27/24 1144    aspirin EC tablet 81 mg  81 mg Oral Daily Steven Membreno MD   81 mg at 07/27/24 0765     calcitonin (salmon) (MIACALCIN) nasal spray 1 spray  1 spray Alternating Nostrils Daily Steven Membreno MD   1 spray at 07/26/24 0835    carvedilol (COREG) half-tab 1.5625 mg  1.5625 mg Oral BID w/meals Audrey Cain PA-C   1.5625 mg at 07/27/24 0748    citalopram (celeXA) tablet 10 mg  10 mg Oral Daily Steven Membreno MD   10 mg at 07/27/24 0745    donepezil (ARICEPT) tablet 10 mg  10 mg Oral At Bedtime Steven Membreno MD   10 mg at 07/26/24 2126    Lidocaine (LIDOCARE) 4 % Patch 2 patch  2 patch Transdermal Q24H Audrey Cain PA-C   2 patch at 07/27/24 1144    predniSONE (DELTASONE) tablet 20 mg  20 mg Oral Daily Steven Membreno MD   20 mg at 07/27/24 0745       IMPRESSION: Patient is imaged in a spinal brace. Diffuse osteopenia. 12 thoracic vertebra are assumed. Marked anterior wedging of the presumed T8 vertebral body consistent with previously described burst fracture. Additional mild anterior wedging of T9.   Segmental kyphosis measures 47 degrees from T7 through T10 on radiographs compared to 27 degrees on the previous MRI. Vertebral body heights elsewhere are maintained. Suboptimal evaluation of the lung parenchyma due to overpenetration. Suspected small   left pleural effusion.     Data     CBC RESULTS:   Recent Labs   Lab Test 07/25/24  1636   WBC 7.3   RBC 5.26*   HGB 15.4   HCT 46.6   MCV 89   MCH 29.3   MCHC 33.0   RDW 13.1        Basic Metabolic Panel:  Lab Results   Component Value Date     07/27/2024    .5 06/03/2024      Lab Results   Component Value Date    POTASSIUM 4.2 07/27/2024    POTASSIUM 4.12 06/03/2024     Lab Results   Component Value Date    CHLORIDE 95 07/27/2024    CHLORIDE 97.5 06/03/2024     Lab Results   Component Value Date    ESTELA 8.2 07/27/2024    ESTELA 9.9 06/03/2024     Lab Results   Component Value Date    CO2 19 07/27/2024    CO2 32.2 06/03/2024     Lab Results   Component Value Date    BUN 17.9 07/27/2024    BUN 22 06/03/2024    BUN 24  06/03/2024     Lab Results   Component Value Date    CR 0.58 07/27/2024    CR 0.92 06/03/2024     Lab Results   Component Value Date    GLC 90 07/27/2024     06/03/2024     INR:  Lab Results   Component Value Date    INR 1.30 06/03/2023    INR 2.44 04/16/2023

## 2024-07-27 NOTE — PLAN OF CARE
Patient is Aox4, VSS, LCTA, BS active. She is an assist of 1 in her room going to a bedside commode to go to the bathroom, she will not pee in bed. Tolerating a regular diet, but still has a poor appetite only ate 25% of each of her meals today. Has increased her oral fluid intake. Robaxin was switched to a PRN due to it causing sedation. Another sodium was colleted at 6pm, awaiting results. PT is recommending TCU and they have sent referrals out, SW is following for this. Needs to wear a back brace when out of bed.       Plan of Care Reviewed With: patient    Overall Patient Progress: improvingOverall Patient Progress: improving    Outcome Evaluation: Pt resting in bed comfortably, pivots to bedside commode with Ax1, pain improved

## 2024-07-27 NOTE — PROGRESS NOTES
X Cover/House Office Note  7/26/2024 7:42 PM     Asked by my colleague to follow up on repeat Na labs    In essence, Ilda Nassar is a 89 year old female who has a history of anxiety, hypertension, dementia, A-fib, hyperlipidemia who presents with back pain. This is the third visit that she has had in recent days. Admitted for intractable back pain in the setting of T8 burst fracture with retropulsion and found to also have hypovolemia hyponatremia.    Hypovolemia hyponatremia:  Na labs -- urine osmo 449 and urine Na 39  Na at time of admit 126 (131) (baseline Na 134-140).  Suspect multifactorial (poor PO, pain, rx, age) but largely driven by poor PO intake.      Repeat Na at 1800 124 (continues to trend down).  Is receiving IVF NS at 75 ml/hr.      Plan:  NS bolus 500 ml X 1 and increase MIVF rate to 100 ml/hr.   Repeat Na at MN and in the AM (ordered).    Pino Perez Ed.D, APRN, CNP

## 2024-07-28 ENCOUNTER — APPOINTMENT (OUTPATIENT)
Dept: PHYSICAL THERAPY | Facility: CLINIC | Age: 89
DRG: 552 | End: 2024-07-28
Payer: MEDICARE

## 2024-07-28 LAB
ANION GAP SERPL CALCULATED.3IONS-SCNC: 9 MMOL/L (ref 7–15)
BUN SERPL-MCNC: 13 MG/DL (ref 8–23)
CALCIUM SERPL-MCNC: 8.1 MG/DL (ref 8.8–10.4)
CHLORIDE SERPL-SCNC: 99 MMOL/L (ref 98–107)
CREAT SERPL-MCNC: 0.54 MG/DL (ref 0.51–0.95)
EGFRCR SERPLBLD CKD-EPI 2021: 86 ML/MIN/1.73M2
GLUCOSE SERPL-MCNC: 87 MG/DL (ref 70–99)
HCO3 SERPL-SCNC: 23 MMOL/L (ref 22–29)
HGB BLD-MCNC: 15.5 G/DL (ref 11.7–15.7)
POTASSIUM SERPL-SCNC: 3.8 MMOL/L (ref 3.4–5.3)
SODIUM SERPL-SCNC: 127 MMOL/L (ref 135–145)
SODIUM SERPL-SCNC: 131 MMOL/L (ref 135–145)

## 2024-07-28 PROCEDURE — 85018 HEMOGLOBIN: CPT

## 2024-07-28 PROCEDURE — 36415 COLL VENOUS BLD VENIPUNCTURE: CPT

## 2024-07-28 PROCEDURE — 250N000013 HC RX MED GY IP 250 OP 250 PS 637

## 2024-07-28 PROCEDURE — 84295 ASSAY OF SERUM SODIUM: CPT

## 2024-07-28 PROCEDURE — 250N000012 HC RX MED GY IP 250 OP 636 PS 637: Performed by: INTERNAL MEDICINE

## 2024-07-28 PROCEDURE — 97116 GAIT TRAINING THERAPY: CPT | Mod: GP

## 2024-07-28 PROCEDURE — 250N000011 HC RX IP 250 OP 636

## 2024-07-28 PROCEDURE — 258N000003 HC RX IP 258 OP 636

## 2024-07-28 PROCEDURE — 97530 THERAPEUTIC ACTIVITIES: CPT | Mod: GP

## 2024-07-28 PROCEDURE — 99232 SBSQ HOSP IP/OBS MODERATE 35: CPT

## 2024-07-28 PROCEDURE — 120N000001 HC R&B MED SURG/OB

## 2024-07-28 PROCEDURE — 250N000013 HC RX MED GY IP 250 OP 250 PS 637: Performed by: INTERNAL MEDICINE

## 2024-07-28 PROCEDURE — 80048 BASIC METABOLIC PNL TOTAL CA: CPT

## 2024-07-28 RX ORDER — SODIUM CHLORIDE 9 MG/ML
INJECTION, SOLUTION INTRAVENOUS CONTINUOUS
Status: DISCONTINUED | OUTPATIENT
Start: 2024-07-28 | End: 2024-07-30 | Stop reason: HOSPADM

## 2024-07-28 RX ORDER — SODIUM CHLORIDE 1 G/1
1 TABLET ORAL 2 TIMES DAILY WITH MEALS
Status: DISCONTINUED | OUTPATIENT
Start: 2024-07-28 | End: 2024-07-30 | Stop reason: HOSPADM

## 2024-07-28 RX ORDER — ENOXAPARIN SODIUM 100 MG/ML
40 INJECTION SUBCUTANEOUS EVERY 24 HOURS
Status: DISCONTINUED | OUTPATIENT
Start: 2024-07-28 | End: 2024-07-30 | Stop reason: HOSPADM

## 2024-07-28 RX ADMIN — DONEPEZIL HYDROCHLORIDE 10 MG: 10 TABLET ORAL at 21:30

## 2024-07-28 RX ADMIN — CARVEDILOL 1.56 MG: 3.12 TABLET, FILM COATED ORAL at 19:26

## 2024-07-28 RX ADMIN — ASPIRIN 81 MG: 81 TABLET, COATED ORAL at 08:02

## 2024-07-28 RX ADMIN — SODIUM CHLORIDE 1 G: 1 TABLET ORAL at 19:26

## 2024-07-28 RX ADMIN — SODIUM CHLORIDE: 9 INJECTION, SOLUTION INTRAVENOUS at 03:57

## 2024-07-28 RX ADMIN — METHOCARBAMOL 250 MG: 500 TABLET ORAL at 01:35

## 2024-07-28 RX ADMIN — ACETAMINOPHEN 1000 MG: 500 TABLET, FILM COATED ORAL at 16:20

## 2024-07-28 RX ADMIN — ACETAMINOPHEN 1000 MG: 500 TABLET, FILM COATED ORAL at 19:26

## 2024-07-28 RX ADMIN — LIDOCAINE 1 PATCH: 4 PATCH TOPICAL at 08:03

## 2024-07-28 RX ADMIN — OXYCODONE HYDROCHLORIDE 2.5 MG: 5 TABLET ORAL at 13:39

## 2024-07-28 RX ADMIN — PREDNISONE 20 MG: 20 TABLET ORAL at 08:02

## 2024-07-28 RX ADMIN — SODIUM CHLORIDE 1 G: 1 TABLET ORAL at 09:56

## 2024-07-28 RX ADMIN — ACETAMINOPHEN 1000 MG: 500 TABLET, FILM COATED ORAL at 13:16

## 2024-07-28 RX ADMIN — CITALOPRAM HYDROBROMIDE 10 MG: 10 TABLET ORAL at 08:02

## 2024-07-28 RX ADMIN — ENOXAPARIN SODIUM 40 MG: 40 INJECTION SUBCUTANEOUS at 13:39

## 2024-07-28 RX ADMIN — ACETAMINOPHEN 1000 MG: 500 TABLET, FILM COATED ORAL at 08:01

## 2024-07-28 RX ADMIN — CARVEDILOL 1.56 MG: 3.12 TABLET, FILM COATED ORAL at 08:05

## 2024-07-28 ASSESSMENT — ACTIVITIES OF DAILY LIVING (ADL)
ADLS_ACUITY_SCORE: 39

## 2024-07-28 NOTE — PLAN OF CARE
"PRIMARY DIAGNOSIS: LOWER BACK PAIN  OUTPATIENT/OBSERVATION GOALS TO BE MET BEFORE DISCHARGE:  1. Pain Status: Improved-controlled with oral pain medications.    2. Return to near baseline physical activity: No    3. Cleared for discharge by consultants (if involved): No    Discharge Planner Nurse   Safe discharge environment identified: No  Barriers to discharge: Yes       Entered by: Nirali Ash RN 07/28/2024 1:50 PM   VSS,afeb.Lungs dim. in Ll's yu.posteriorly,+ BS x 4 quads.Due to dementia issues, difficult for pt. give accurate rating of back fx. pain.Was made aware of the fact that she does have oxycodone available to her as well as Tylenol for pain, with pt. refusing oxy until the afternoon after working with PT and sitting up in chair for lunch.Was given 2.5 mg Oxy x1.Pt. was assisted back to bed and is resting comfortably.Will con't to monitor for pain and safety.Will alert staff as needed.`Pt. received 1 NaCl tab Pox 1 for sodium of 131.  Problem: Adult Inpatient Plan of Care  Goal: Plan of Care Review  Description: The Plan of Care Review/Shift note should be completed every shift.  The Outcome Evaluation is a brief statement about your assessment that the patient is improving, declining, or no change.  This information will be displayed automatically on your shift  note.  Outcome: Progressing  Flowsheets (Taken 7/28/2024 1329)  Overall Patient Progress: improving  Goal: Patient-Specific Goal (Individualized)  Description: You can add care plan individualizations to a care plan. Examples of Individualization might be:  \"Parent requests to be called daily at 9am for status\", \"I have a hard time hearing out of my right ear\", or \"Do not touch me to wake me up as it startles  me\".  Outcome: Progressing  Goal: Absence of Hospital-Acquired Illness or Injury  Outcome: Progressing  Intervention: Identify and Manage Fall Risk  Recent Flowsheet Documentation  Taken 7/28/2024 0800 by Nirali Ash, RN  Safety " Promotion/Fall Prevention:   activity supervised   assistive device/personal items within reach  Intervention: Prevent Skin Injury  Recent Flowsheet Documentation  Taken 7/28/2024 0800 by Nirali Ash RN  Body Position:   supine, legs elevated   supine, head elevated  Intervention: Prevent and Manage VTE (Venous Thromboembolism) Risk  Recent Flowsheet Documentation  Taken 7/28/2024 0800 by Nirali Ash RN  VTE Prevention/Management: SCDs off (sequential compression devices)  Goal: Optimal Comfort and Wellbeing  Outcome: Not Progressing  Goal: Readiness for Transition of Care  Outcome: Progressing  Flowsheets (Taken 7/28/2024 1329)  Anticipated Changes Related to Illness: inability to care for self  Transportation Anticipated: agency  Concerns to be Addressed: discharge planning  Intervention: Mutually Develop Transition Plan  Recent Flowsheet Documentation  Taken 7/28/2024 1329 by Nirali Ash RN  Anticipated Changes Related to Illness: inability to care for self  Transportation Anticipated: agency  Concerns to be Addressed: discharge planning     Problem: Pain Acute  Goal: Optimal Pain Control and Function  Outcome: Not Progressing  Intervention: Prevent or Manage Pain  Recent Flowsheet Documentation  Taken 7/28/2024 0800 by Nirali Ash RN  Medication Review/Management: medications reviewed     Problem: Comorbidity Management  Goal: Blood Pressure in Desired Range  Outcome: Progressing  Intervention: Maintain Blood Pressure Management  Recent Flowsheet Documentation  Taken 7/28/2024 0800 by Nirali Ash RN  Medication Review/Management: medications reviewed     Problem: Fall Injury Risk  Goal: Absence of Fall and Fall-Related Injury  Outcome: Progressing  Intervention: Identify and Manage Contributors  Recent Flowsheet Documentation  Taken 7/28/2024 0800 by Nirali Ash RN  Medication Review/Management: medications reviewed  Intervention: Promote Injury-Free Environment  Recent Flowsheet  Documentation  Taken 7/28/2024 0800 by Nirali Ash RN  Safety Promotion/Fall Prevention:   activity supervised   assistive device/personal items within reach     Problem: Fall Injury Risk  Goal: Absence of Fall and Fall-Related Injury  Outcome: Progressing  Intervention: Identify and Manage Contributors  Recent Flowsheet Documentation  Taken 7/28/2024 0800 by Nirali Ash RN  Medication Review/Management: medications reviewed  Intervention: Promote Injury-Free Environment  Recent Flowsheet Documentation  Taken 7/28/2024 0800 by Nirali Ash RN  Safety Promotion/Fall Prevention:   activity supervised   assistive device/personal items within reach     Problem: Fall Injury Risk  Goal: Absence of Fall and Fall-Related Injury  Outcome: Progressing  Intervention: Identify and Manage Contributors  Recent Flowsheet Documentation  Taken 7/28/2024 0800 by Nirali Ash RN  Medication Review/Management: medications reviewed  Intervention: Promote Injury-Free Environment  Recent Flowsheet Documentation  Taken 7/28/2024 0800 by Nirali Ash RN  Safety Promotion/Fall Prevention:   activity supervised   assistive device/personal items with.  Please review provider order for any additional goals.   Nurse to notify provider when observation goals have been met and patient is ready for discharge.Goal Outcome Evaluation:           Overall Patient Progress: improvingOverall Patient Progress: improving

## 2024-07-28 NOTE — PROGRESS NOTES
[facility]    Medicine Progress Note - Hospitalist Service    Date of Admission:  7/25/2024    Assessment & Plan   Ilda Nassar is a 89 year old female who has a history of anxiety, hypertension, dementia, A-fib, hyperlipidemia who presents with back pain. This is the third visit that she has had in recent days.  She recently had a lumbar x-ray on 7/22 along with a CT scan 7/23.  She lives at home and assisted living apartment on her own has not been able to get out of bed due to her pain.  She is only been treating it with Tylenol. she denies any current sciatic.  Has no incontinence to speak of.  States that she is having significant muscle spasms which is contributing to her pain.  Previous imaging showed a T8 compression fracture which seems to be more chronic with previous rib fractures.  Patient apparently had a fall months ago per the sisters at least 7 however they are not sure given her dementia and confusion if she is fallen since then.  Patient is being admitted for pain control and possibly placement.    Daily update: Pain controlled at rest, alert, does not appear over sedated today. Stress the importance of eating and drinking. Reports poor appetite, but will try some foods today. Discontinue IVF, started salt tabs BID. Hopeful sodium will stay stable off IVF so can discharge to TCU in the next 1-2 days.     Intractable back pain - improving   T8 burst fracture with retropulsion  Nondisplaced T9 compression fracture  Prevertebral soft tissue swelling/hematoma at T7-T8  - MRI thoracic and lumbar obtained  - Neurosurgery consult due to retropulsion and prevertebral soft tissue swelling and hematoma   - TLSO brace   - upright XR with TLSO brace  - stable    - needs follow up outpatient in 4-6 weeks with neurosurgery   - no lifting > 10 lbs, avoid bending, twisting, lifting  - Pain regimen: schedule tylenol, atarax and oxycodone PRN, lidocaine patch   - started on scheduled  robaxin 500 mg QID --> decrease to 250 mg QID with hold for sedation parameters, further reduce to 250 mg TID PRN due to sedation  - PT, HELEN consult: rec TCU, family agreeable     Osteopenia on imaging  - needs outpatient follow up for bone density/ bone health with PCP    Hypovolemia Hyponatremia - improving  sodium labs confirm hypovolemia with urine osm 449 and urine sodium 39. Slightly dropped on 7/27, was given 500 bolus and fluids increase to 100 ml/hr with improvement of sodium. Again dropped overnight, fluids increased to 125ml/hr with improvement of sodium to 131 on 7/28. This is most certainly due to her not eating and drinking due to poor appetite.   - start salt tabs BID w/ meals on 7/28  - discontinue IVF, as patient should discharge to TCU in next 1-2 days and we need to get her off the IVF  - keep encouraging PO intake  - avoid over sedation with pain regimen     Abnormal UA   10 WBC and small leukocyte esterase.  - started cefpodoxime 100 mg BID x 5 days on 7/26, UC now growing  < 10,000 mix nathan, with no urinary symptoms will discontinue abx.      Hx previous falls, left rib fractures healing  Patient had a fall that we know of 7 months ago per the sisters but given her dementia and confusion were not sure if she has had any further falls.  She did have a CT scan showing a T8 compression fracture that likely is chronic with previous left rib fractures that are healing.       Anxiety  - continue pta Celexa        Hx atrial fibrillation  - continue pta coreg  - not on anticoagulation, appropriate give dementia and fall risk     Hyperlipidemia  - not on any statin pta      Essential hypertension  - continue pta coreg  - at home uses lasix PRN for edema, not reordered here    History of chronic SMA occlusion, proximal     Dementia  - continue pta aricept   - Patient is at high risk for sundowning as well as having confusion due to side effects from her medications          Diet: Regular Diet Adult     DVT Prophylaxis: will start Lovenox if hemoglobin stable, not ambulating frequently  Treviño Catheter: Not present  Lines: None     Cardiac Monitoring: None  Code Status: No CPR- Do NOT Intubate      Clinically Significant Risk Factors         # Hyponatremia: Lowest Na = 124 mmol/L in last 2 days, will monitor as appropriate          # Hypertension: Noted on problem list    # Chronic heart failure with preserved ejection fraction: heart failure noted on problem list and last echo with EF >50%                      Disposition Plan     Medically Ready for Discharge: Anticipated in 2-4 Days      The patient's care was discussed with the Bedside Nurse, Care Coordinator/, Patient, Patient's Family, and neurosurgery Consultant(s).    BECKY Cain PA-C  Hospitalist Service  [facility]  Securely message with [employer] (more info)  Text page via [employer] Paging/Directory   ______________________________________________________________________    Interval History   Pain controlled at rest.  No numbness or tingling in the lower extremities.  Voiding adequately. Still reporting poor appetite but will try her best to eat and drink today.     Physical Exam   Vital Signs: Temp: 97.2  F (36.2  C) Temp src: Tympanic BP: (!) 168/107 Pulse: 83   Resp: 16 SpO2: 96 % O2 Device: None (Room air)    Weight: 108 lbs 3.93 oz    GENERAL:  Alert, Comfortable, cachexic and frail appearing, No acute distress. Sitting up in bed  PSYCH: pleasant, oriented  HEENT:  Normocephalic, No scleral icterus or conjunctival injection  HEART:  Normal S1, S2 with no murmur, RRR  LUNGS:  Normal Respiratory effort. Clear to auscultation bilaterally anteriorly with no wheezing, rales or ronchi.  SKIN:  Warm, dry to touch. No rash.  NEUROLOGIC:  BL 5/5 symmetric lower extremity strength, sensation intact with no focal deficits. Speech clear, alert    Medical Decision Making       MANAGEMENT DISCUSSED with the following over the past  24 hours: patient, family, nursing, SW, PT   NOTE(S)/MEDICAL RECORDS REVIEWED over the past 24 hours: labs, imaging, progress notes       Data     I have personally reviewed the following data over the past 24 hrs:    N/A  \   N/A   / N/A     131 (L) 99 13.0 /  87   3.8 23 0.54 \       Imaging results reviewed over the past 24 hrs:   No results found for this or any previous visit (from the past 24 hour(s)).

## 2024-07-28 NOTE — PLAN OF CARE
"[id number]    Inpatient Progress Note: T8 compression fx    BP (!) 157/88 (BP Location: Left arm)   Pulse 69   Temp 97.3  F (36.3  C) (Oral)   Resp 18   Wt 49.1 kg (108 lb 3.9 oz)   LMP  (LMP Unknown)   SpO2 97%   BMI 18.01 kg/m         Orientation: A&Ox4, forgetful at times  Pain status: Ot complains of 8/10 pain in lower back, Pain management w scheduled tylenol, PRN Robaxin and atarax.  Activity: Ax1 pivot to the commode.  Resp: WDL, denies SOB  Cardiac: WDL, denies chest pain  GI: WDl  : X, pt has to be reminded, only uses commode   Skin: X, pale   Infusions:  mL/hr  Pertinent Labs: Sodium was 125, provider aware and NS rate changed to 125 ml/hr- recheck in the morning  Diet: Regular- very poor appetite- encourage fluids and meals.  Consults: PT/SW/Neurosurgery following- Back brace follow up outpt 4-6 weeks- Tcu referral.   Discharge Plan: Tcu, [facility]    Will continue to monitor and provide cares.     Marilia Braun RN       Problem: Adult Inpatient Plan of Care  Goal: Plan of Care Review  Description: The Plan of Care Review/Shift note should be completed every shift.  The Outcome Evaluation is a brief statement about your assessment that the patient is improving, declining, or no change.  This information will be displayed automatically on your shift  note.  Outcome: Progressing  Flowsheets (Taken 7/28/2024 0158)  Outcome Evaluation: Pt resting in bed, pain management. PT/SW- TCU  Plan of Care Reviewed With: patient  Overall Patient Progress: improving  Goal: Patient-Specific Goal (Individualized)  Description: You can add care plan individualizations to a care plan. Examples of Individualization might be:  \"Parent requests to be called daily at 9am for status\", \"I have a hard time hearing out of my right ear\", or \"Do not touch me to wake me up as it startles  me\".  Outcome: Progressing  Goal: Absence of Hospital-Acquired Illness or Injury  Outcome: Progressing  Intervention: Identify " and Manage Fall Risk  Recent Flowsheet Documentation  Taken 7/27/2024 1957 by Marilia Braun RN  Safety Promotion/Fall Prevention:   activity supervised   assistive device/personal items within reach  Intervention: Prevent and Manage VTE (Venous Thromboembolism) Risk  Recent Flowsheet Documentation  Taken 7/27/2024 1957 by Marilia Braun RN  VTE Prevention/Management: SCDs off (sequential compression devices)  Intervention: Prevent Infection  Recent Flowsheet Documentation  Taken 7/27/2024 1957 by Marilia Braun RN  Infection Prevention:   cohorting utilized   environmental surveillance performed   equipment surfaces disinfected   hand hygiene promoted   personal protective equipment utilized   rest/sleep promoted  Goal: Optimal Comfort and Wellbeing  Outcome: Progressing  Intervention: Monitor Pain and Promote Comfort  Recent Flowsheet Documentation  Taken 7/27/2024 1941 by Marilia Braun RN  Pain Management Interventions: medication (see MAR)  Goal: Readiness for Transition of Care  Outcome: Progressing     Problem: Pain Acute  Goal: Optimal Pain Control and Function  Outcome: Progressing  Intervention: Develop Pain Management Plan  Recent Flowsheet Documentation  Taken 7/27/2024 1941 by Marilia Braun RN  Pain Management Interventions: medication (see MAR)  Intervention: Prevent or Manage Pain  Recent Flowsheet Documentation  Taken 7/27/2024 1957 by Marilia Braun RN  Medication Review/Management: medications reviewed     Problem: Comorbidity Management  Goal: Blood Pressure in Desired Range  Outcome: Progressing  Intervention: Maintain Blood Pressure Management  Recent Flowsheet Documentation  Taken 7/27/2024 1957 by Marilia Braun RN  Medication Review/Management: medications reviewed     Problem: Fall Injury Risk  Goal: Absence of Fall and Fall-Related Injury  Outcome: Progressing  Intervention: Identify and Manage Contributors  Recent Flowsheet Documentation  Taken 7/27/2024 1957 by  Marilia Braun, RN  Medication Review/Management: medications reviewed  Intervention: Promote Injury-Free Environment  Recent Flowsheet Documentation  Taken 7/27/2024 1957 by Marilia Braun RN  Safety Promotion/Fall Prevention:   activity supervised   assistive device/personal items within reach   Goal Outcome Evaluation:      Plan of Care Reviewed With: patient    Overall Patient Progress: improvingOverall Patient Progress: improving    Outcome Evaluation: Pt resting in bed, pain management. PT/SW- TCU

## 2024-07-28 NOTE — PLAN OF CARE
Updated sodium lab was 125. Messaged [facility] provider if we would want to start sodium chloride tablets tonight due to patients decreased oral intake, continuous NS, and a bolus last night not helping with increasing sodium levels. [facility] said to wait until tomorrow mornings provider. Asked about sodium chloride tablets in sticky note to provider as well.       Plan of Care Reviewed With: patient    Overall Patient Progress: improvingOverall Patient Progress: improving    Outcome Evaluation: Pt resting in bed comfortably, pivots to bedside commode with Ax1, pain improved

## 2024-07-29 ENCOUNTER — APPOINTMENT (OUTPATIENT)
Dept: PHYSICAL THERAPY | Facility: CLINIC | Age: 89
DRG: 552 | End: 2024-07-29
Payer: MEDICARE

## 2024-07-29 LAB
ANION GAP SERPL CALCULATED.3IONS-SCNC: 12 MMOL/L (ref 7–15)
BUN SERPL-MCNC: 12.2 MG/DL (ref 8–23)
CALCIUM SERPL-MCNC: 8 MG/DL (ref 8.8–10.4)
CHLORIDE SERPL-SCNC: 99 MMOL/L (ref 98–107)
CREAT SERPL-MCNC: 0.51 MG/DL (ref 0.51–0.95)
EGFRCR SERPLBLD CKD-EPI 2021: 88 ML/MIN/1.73M2
GLUCOSE SERPL-MCNC: 82 MG/DL (ref 70–99)
HCO3 SERPL-SCNC: 22 MMOL/L (ref 22–29)
HOLD SPECIMEN: NORMAL
POTASSIUM SERPL-SCNC: 3.9 MMOL/L (ref 3.4–5.3)
SODIUM SERPL-SCNC: 133 MMOL/L (ref 135–145)

## 2024-07-29 PROCEDURE — 36415 COLL VENOUS BLD VENIPUNCTURE: CPT | Performed by: PHYSICIAN ASSISTANT

## 2024-07-29 PROCEDURE — 120N000001 HC R&B MED SURG/OB

## 2024-07-29 PROCEDURE — 250N000011 HC RX IP 250 OP 636

## 2024-07-29 PROCEDURE — 80048 BASIC METABOLIC PNL TOTAL CA: CPT | Performed by: PHYSICIAN ASSISTANT

## 2024-07-29 PROCEDURE — 250N000013 HC RX MED GY IP 250 OP 250 PS 637

## 2024-07-29 PROCEDURE — 250N000013 HC RX MED GY IP 250 OP 250 PS 637: Performed by: INTERNAL MEDICINE

## 2024-07-29 PROCEDURE — 99207 PR NO BILLABLE SERVICE THIS VISIT: CPT | Performed by: PHYSICIAN ASSISTANT

## 2024-07-29 PROCEDURE — 250N000011 HC RX IP 250 OP 636: Performed by: INTERNAL MEDICINE

## 2024-07-29 PROCEDURE — 97530 THERAPEUTIC ACTIVITIES: CPT | Mod: GP

## 2024-07-29 PROCEDURE — 250N000012 HC RX MED GY IP 250 OP 636 PS 637: Performed by: INTERNAL MEDICINE

## 2024-07-29 PROCEDURE — 99238 HOSP IP/OBS DSCHRG MGMT 30/<: CPT | Performed by: PHYSICIAN ASSISTANT

## 2024-07-29 RX ORDER — SODIUM CHLORIDE 1 G/1
1 TABLET ORAL 2 TIMES DAILY WITH MEALS
DISCHARGE
Start: 2024-07-29

## 2024-07-29 RX ORDER — AMOXICILLIN 250 MG
2 CAPSULE ORAL 2 TIMES DAILY PRN
DISCHARGE
Start: 2024-07-29

## 2024-07-29 RX ORDER — ACETAMINOPHEN 500 MG
1000 TABLET ORAL 3 TIMES DAILY
DISCHARGE
Start: 2024-07-29 | End: 2024-08-08

## 2024-07-29 RX ORDER — METHOCARBAMOL 500 MG/1
250 TABLET, FILM COATED ORAL 3 TIMES DAILY PRN
DISCHARGE
Start: 2024-07-29 | End: 2024-07-29

## 2024-07-29 RX ORDER — OXYCODONE HYDROCHLORIDE 5 MG/1
2.5 TABLET ORAL EVERY 4 HOURS PRN
Qty: 8 TABLET | Refills: 0 | Status: SHIPPED | OUTPATIENT
Start: 2024-07-29

## 2024-07-29 RX ORDER — METHOCARBAMOL 500 MG/1
250 TABLET, FILM COATED ORAL 3 TIMES DAILY PRN
Qty: 20 TABLET | Refills: 0 | Status: SHIPPED | OUTPATIENT
Start: 2024-07-29

## 2024-07-29 RX ADMIN — SODIUM CHLORIDE 1 G: 1 TABLET ORAL at 08:43

## 2024-07-29 RX ADMIN — ONDANSETRON 4 MG: 4 TABLET, ORALLY DISINTEGRATING ORAL at 18:06

## 2024-07-29 RX ADMIN — CITALOPRAM HYDROBROMIDE 10 MG: 10 TABLET ORAL at 08:43

## 2024-07-29 RX ADMIN — HYDROXYZINE HYDROCHLORIDE 25 MG: 25 TABLET ORAL at 21:25

## 2024-07-29 RX ADMIN — ACETAMINOPHEN 1000 MG: 500 TABLET, FILM COATED ORAL at 08:43

## 2024-07-29 RX ADMIN — ACETAMINOPHEN 1000 MG: 500 TABLET, FILM COATED ORAL at 21:16

## 2024-07-29 RX ADMIN — ACETAMINOPHEN 1000 MG: 500 TABLET, FILM COATED ORAL at 13:34

## 2024-07-29 RX ADMIN — OXYCODONE HYDROCHLORIDE 5 MG: 5 TABLET ORAL at 21:25

## 2024-07-29 RX ADMIN — CARVEDILOL 1.56 MG: 3.12 TABLET, FILM COATED ORAL at 19:00

## 2024-07-29 RX ADMIN — GUAIFENESIN 600 MG: 600 TABLET ORAL at 15:08

## 2024-07-29 RX ADMIN — CARVEDILOL 1.56 MG: 3.12 TABLET, FILM COATED ORAL at 08:52

## 2024-07-29 RX ADMIN — SODIUM CHLORIDE 1 G: 1 TABLET ORAL at 19:00

## 2024-07-29 RX ADMIN — PREDNISONE 20 MG: 20 TABLET ORAL at 08:43

## 2024-07-29 RX ADMIN — DONEPEZIL HYDROCHLORIDE 10 MG: 10 TABLET ORAL at 21:17

## 2024-07-29 RX ADMIN — ENOXAPARIN SODIUM 40 MG: 40 INJECTION SUBCUTANEOUS at 13:34

## 2024-07-29 RX ADMIN — ASPIRIN 81 MG: 81 TABLET, COATED ORAL at 08:43

## 2024-07-29 ASSESSMENT — ACTIVITIES OF DAILY LIVING (ADL)
ADLS_ACUITY_SCORE: 42
ADLS_ACUITY_SCORE: 40
ADLS_ACUITY_SCORE: 40
ADLS_ACUITY_SCORE: 39
ADLS_ACUITY_SCORE: 40
ADLS_ACUITY_SCORE: 39
ADLS_ACUITY_SCORE: 42
ADLS_ACUITY_SCORE: 40
ADLS_ACUITY_SCORE: 42
ADLS_ACUITY_SCORE: 40
ADLS_ACUITY_SCORE: 40
ADLS_ACUITY_SCORE: 39
ADLS_ACUITY_SCORE: 40
ADLS_ACUITY_SCORE: 39
ADLS_ACUITY_SCORE: 39

## 2024-07-29 NOTE — PLAN OF CARE
Inpatient Progress Note: T8 compression fx     Orientation: A&Ox4, forgetful at times  Pain status: pain in lower back, Pain management w scheduled tylenol  Activity: Ax1 pivot to the commode.  Resp: WDL  Cardiac: WDL, denies chest pain  GI: WDL  :WDL  Skin: X, pale   Infusions: SL  Diet: Regular- very poor appetite- encourage fluids and meals.  Consults: PT/SW/Neurosurgery following- Back brace follow up outpt 4-6 weeks- Tcu referral.   Discharge Plan: Tcu, comes from [facility]    BP (!) 169/96 (BP Location: Left arm)   Pulse 81   Temp 97.6  F (36.4  C) (Oral)   Resp 18   Wt 49.1 kg (108 lb 3.9 oz)   LMP  (LMP Unknown)   SpO2 95%   BMI 18.01 kg/m  .

## 2024-07-29 NOTE — PROGRESS NOTES
Care Management Discharge Note    Discharge Date: 07/29/2024       Discharge Disposition: Transitional Care  Discharge Services: None  Discharge DME: None  Discharge Transportation: agency    Private pay costs discussed: transportation costs    Does the patient's insurance plan have a 3 day qualifying hospital stay waiver?  No    PAS Confirmation Code: [id number]  Patient/family educated on Medicare website which has current facility and service quality ratings:  Yes    Education Provided on the Discharge Plan:  Yes  Persons Notified of Discharge Plans: Patient, dtr Margaret, provider, facility  Patient/Family in Agreement with the Plan: yes      Additional Information:  [facility] accepted for TCU placement and can take her today, CM updated dtr Margaret and patient who were agreeable to this placement.     Per provider patient is medically ready for discharge today, order in place and signed today. CM sent discharge orders to facility.     [facility] [place]/29 9121-8938, patient, dtr, facility, bedside/charge RN updated with transport time.     PAS completed.     Hard scripts for controlled meds were faxed to facility.    Chasity Andres RN, BSN  Inpatient Care Coordination  [facility]  [phone]

## 2024-07-29 NOTE — DISCHARGE SUMMARY
[facility]  Hospitalist Discharge Summary      Date of Admission:  7/25/2024  Date of Discharge:  7/29/2024  Discharging Provider: Sharon Leonardo PA-C  Discharge Service: Hospitalist Service    Discharge Diagnoses   Intractable back pain  T8 burst fx  T9 compression fx  Hypovolemia hyponatremia     Clinically Significant Risk Factors          Follow-ups Needed After Discharge   Follow-up Appointments     Follow Up and recommended labs and tests      Follow up with primary care provider in 1-2 weeks.  The following   labs/tests are recommended: BMP.  Continue salt tabs. Recheck Na level in 1-2 weeks.  Encourage PO intake        Follow-up and recommended labs and tests       With [facility] neurosurgery in 4-6 weeks with xray   Our office will call you in 2-3 business days after discharge to schedule   your follow-up appointments. If you have not received a call in 2-3   business days, please call our office at [phone].            Unresulted Labs Ordered in the Past 30 Days of this Admission       No orders found from 6/25/2024 to 7/26/2024.        These results will be followed up by PCP    Discharge Disposition   Discharged to rehabilitation facility  Condition at discharge: Stable    Hospital Course   Ilda Nassar is a 89 year old female who has a history of anxiety, hypertension, dementia, A-fib, hyperlipidemia who presents with back pain. This is the third visit that she has had in recent days.  She recently had a lumbar x-ray on 7/22 along with a CT scan 7/23.  She lives at home and assisted living apartment on her own has not been able to get out of bed due to her pain.  She is only been treating it with Tylenol. she denies any current sciatic.  Has no incontinence to speak of.  States that she is having significant muscle spasms which is contributing to her pain.  Previous imaging showed a T8 compression fracture which seems to be more chronic with previous rib fractures.   Patient apparently had a fall months ago per the sisters at least 7 however they are not sure given her dementia and confusion if she is fallen since then.  Patient is being admitted for pain control and possibly placement.    Intractable back pain - improving   T8 burst fracture with retropulsion  Nondisplaced T9 compression fracture  Prevertebral soft tissue swelling/hematoma at T7-T8  - MRI thoracic and lumbar obtained  - Neurosurgery consult due to retropulsion and prevertebral soft tissue swelling and hematoma   - TLSO brace   - upright XR with TLSO brace  - stable    - needs follow up outpatient in 4-6 weeks with neurosurgery   - no lifting > 10 lbs, avoid bending, twisting, lifting  - Pain regimen: schedule tylenol, atarax and oxycodone PRN, lidocaine patch   - started on scheduled robaxin 500 mg QID --> decrease to 250 mg QID with hold for sedation parameters, further reduce to 250 mg TID PRN due to sedation  - PT, SW consult: rec TCU, family agreeable     Osteopenia on imaging  - needs outpatient follow up for bone density/ bone health with PCP    Hypovolemia Hyponatremia - improving  Sodium labs confirm hypovolemia with urine osm 449 and urine sodium 39. Slightly dropped on 7/27, was given 500 bolus and fluids increase to 100 ml/hr with improvement of sodium. Again dropped overnight, fluids increased to 125ml/hr with improvement of sodium to 131 on 7/28. This is most certainly due to her not eating and drinking due to poor appetite.   - start salt tabs BID w/ meals on 7/28  - discontinued IVFs 7/28  - Na improved to 133 7/29  - keep encouraging PO intake    Abnormal UA   10 WBC and small leukocyte esterase.  - started cefpodoxime 100 mg BID x 5 days on 7/26, UC now growing  < 10,000 mix nathan, with no urinary symptoms, discontinued abx.      Hx previous falls, left rib fractures healing  Patient had a fall that we know of 7 months ago per the sisters but given her dementia and confusion we're not sure if  she has had any further falls.  She did have a CT scan showing a T8 compression fracture that likely is chronic with previous left rib fractures that are healing.       Anxiety  - continue pta Celexa        Hx atrial fibrillation  - continue pta coreg  - not on anticoagulation, appropriate give dementia and fall risk     Hyperlipidemia  - not on any statin pta      Essential hypertension  - continue pta coreg  - at home uses lasix PRN for edema, not reordered here    History of chronic SMA occlusion, proximal     Dementia  - continue pta aricept   - Patient is at high risk for sundowning as well as having confusion due to side effects from her medications    Consultations This Hospital Stay   CARE MANAGEMENT / SOCIAL WORK IP CONSULT  PHYSICAL THERAPY ADULT IP CONSULT  NEUROSURGERY IP CONSULT  OCCUPATIONAL THERAPY ADULT IP CONSULT  PHYSICAL THERAPY ADULT IP CONSULT    Code Status   No CPR- Do NOT Intubate    Time Spent on this Encounter   I, Sharon Leonardo PA-C, personally saw the patient today and spent less than or equal to 30 minutes discharging this patient.       Sharon Leonardo PA-C  [facility] OBSERVATION DEPT  [address] [place] [address]  Phone: [phone]  ______________________________________________________________________    Physical Exam   Vital Signs: Temp: 97.3  F (36.3  C) Temp src: Oral BP: (!) 157/89 Pulse: 68   Resp: 18 SpO2: 95 % O2 Device: None (Room air)    Weight: 108 lbs 3.93 oz    GENERAL:  Comfortable.  PSYCH: pleasant, No acute distress.  HEART:  RRR  LUNGS:  Normal Respiratory effort.   NEUROLOGIC:  grossly intact          Primary Care Physician   Tatianna Swain    Discharge Orders      XR Thoracic Spine 2 Views    Upright ap lateral     Follow-up and recommended labs and tests     With [facility] neurosurgery in 4-6 weeks with xray   Our office will call you in 2-3 business days after discharge to schedule your follow-up appointments. If you  have not received a call in 2-3 business days, please call our office at [phone].     Activity    *No lifting, pushing or pulling greater than 5-10 pounds (this is about a gallon of milk).  *No repetitive bending, twisting, or jarring activities  *No overhead work  *No aerobic or strenuous activity  *No activities with increased risk of falls  *You may move about your home as tolerated  *You may walk up and down stairs as tolerated    WALKING PROGRAM: As you can tolerate, walk daily-start with 5-10 minutes of continuous walking. This is in addition to the walking that you do as part of your daily activities. Increase the time that you walk by 5 minutes every couple of days. Do not exceed 30-45 minutes of continuous walking until seen in follow-up.   **Listen to your body, if you find that you are more painful or fatigued, you may need to proceed more slowly.    **Do not smoke or expose yourself to second hand smoke. Cigarette smoke can delay healing and cause complications.     DRIVING:  We recommend that you do not drive while taking medications for pain or muscle spasms. Always read and follow the advice on your prescription bottle. If you have questions, speak with your pharmacist.  We recommend that you do not drive while wearing a brace, as it could limit your range of motion.     WEARING THE LUMBAR BRACE:    * Wear the brace when out of bed or sitting upright in bed.  * You should apply the brace before standing.  * You may shower seated without brace.   Sit down on shower chair, remove brace   Shower   Dry   Re-apply brace before standing.   Take care not to fall  *If your brace is not fitting call [facility] [phone]  *Check  your incision and the skin under your brace at least daily.     General info for SNF    Length of Stay Estimate: Short Term Care: Estimated # of Days <30  Condition at Discharge: Stable  Level of care:skilled   Rehabilitation Potential: Good  Admission H&P remains  valid and up-to-date: Yes  Recent Chemotherapy: N/A  Use Nursing Home Standing Orders: Yes     Mantoux instructions    Give two-step Mantoux (PPD) Per Facility Policy Yes     Follow Up and recommended labs and tests    Follow up with primary care provider in 1-2 weeks.  The following labs/tests are recommended: BMP.  Continue salt tabs. Recheck Na level in 1-2 weeks.  Encourage PO intake     Reason for your hospital stay    Intractable back pain due to T8 burst fx and T9 compression fx. Your sodium was also noted to be low, likely due to poor po intake. You were given IVFs and eventually started on salt tablets with improvement in sodium. PT consulted and recommended TCU.     Daily weights    Call Provider for weight gain of more than 2 pounds per day or 5 pounds per week.     Activity - Up with nursing assistance     Activity - Up with assistive device     No CPR- Do NOT Intubate     Occupational Therapy Adult Consult    Evaluate and treat as clinically indicated.    Reason:  back pain, falls     Physical Therapy Adult Consult    Evaluate and treat as clinically indicated.    Reason:  back pain, falls     Fall precautions     Diet    Follow this diet upon discharge: Regular       Significant Results and Procedures   Most Recent 3 CBC's:  Recent Labs   Lab Test 07/28/24  1201 07/25/24  1636 07/23/24  0330 07/05/24  1227   WBC  --  7.3 6.7 7.0   HGB 15.5 15.4 15.9* 15.4   MCV  --  89 90 91   PLT  --  265 263 187     Most Recent 3 BMP's:  Recent Labs   Lab Test 07/29/24  0817 07/28/24  1802 07/28/24  0537 07/27/24  1816 07/27/24  0656   * 127* 131*   < > 128*   POTASSIUM 3.9  --  3.8  --  4.2   CHLORIDE 99  --  99  --  95*   CO2 22  --  23  --  19*   BUN 12.2  --  13.0  --  17.9   CR 0.51  --  0.54  --  0.58   ANIONGAP 12  --  9  --  14   ESTELA 8.0*  --  8.1*  --  8.2*   GLC 82  --  87  --  90    < > = values in this interval not displayed.     Most Recent 2 LFT's:  Recent Labs   Lab Test 07/23/24  0330  07/05/24  1227   AST 25 21   ALT 16 12   ALKPHOS 119 99   BILITOTAL 2.2* 3.5*     7-Day Micro Results       Collected Updated Procedure Result Status      07/26/2024 0620 07/27/2024 0801 Urine Culture [[id number]]   Urine, Clean Catch    Final result Component Value   Culture <10,000 CFU/mL Mixture of Urogenital Hyacinth                     Most Recent Urinalysis:  Recent Labs   Lab Test 07/26/24  0620 07/05/24  1309 06/03/24  0000   COLOR Light Yellow   < > Yellow   APPEARANCE Clear   < > Clear   URINEGLC Negative   < > Neg   URINEBILI Negative   < > Neg   URINEKETONE 40*   < > Neg   SG 1.014   < > 1.020   UBLD Negative   < > Neg   URINEPH 5.5   < > 6.5   PROTEIN 10*   < >  --    UROBILINOGEN  --   --  0.2   NITRITE Negative   < > Neg   LEUKEST Small*   < >  --    RBCU 1   < >  --    WBCU 10*   < >  --     < > = values in this interval not displayed.   ,   Results for orders placed or performed during the hospital encounter of 07/25/24   XR Pelvis 1/2 Views    Narrative    EXAM: XR PELVIS 1/2 VIEWS  LOCATION: [facility]  DATE: 7/25/2024    INDICATION: pain  COMPARISON: None.      Impression    IMPRESSION: Degenerative change both hip joints. Both hips negative for fracture. Pelvis negative for fracture.   MR Thoracic Spine w/o Contrast    Narrative    EXAM: MR THORACIC SPINE W/O CONTRAST  LOCATION: [facility]  DATE: 7/25/2024    INDICATION: Back pain with sciatica, evaluate for bulging disc, T8 compression fracture on CT.  COMPARISON: CT aortic survey 7/23/2024.  TECHNIQUE: Routine Thoracic Spine MRI without IV contrast.    FINDINGS: There is a recent appearing T8 burst fracture with moderate-to-severe height loss and retropulsion of the posteroinferior endplate into the ventral epidural space. There are also findings concerning for a recent nondisplaced T9 anterosuperior   endplate compression fracture, without significant height loss. Vertebral body heights otherwise  appear normal. Exaggerated segmental kyphosis with apex at T9 measuring approximately 41 degrees from the superior endplate of T5 to the inferior endplate of   T10. Sagittal alignment otherwise appears within normal limits. There is a presumed intraosseous hemangioma in the T7 vertebral body. Marrow signal otherwise unremarkable. No definite spinal cord signal abnormality is identified.    There is moderate disc height loss at T7-T8 and T8-T9 and mild/mild to moderate disc height loss elsewhere. Mild to moderate scattered degenerative facet arthropathy is present. There is a tiny central disc protrusion at T7-T8. Mild spinal canal stenosis   at T8-T9 primarily related to retropulsion of the posteroinferior endplate of T8 into the ventral epidural space, mild mass effect on the ventral aspect of the spinal cord at that level. Small multilevel disc osteophyte complexes elsewhere. No evidence   for high-grade central spinal canal stenosis. Mild/mild to moderate scattered neural foraminal narrowing is present.    There appears to be some prevertebral edema/hematoma at the T7-T9 levels, likely related to the recent T8 and T9 fractures. There appears to be small bilateral pleural effusions. There appears to be prominence/dilation of the bilateral cardiac atria,   better assessed on the prior CTA exam. The visualized paraspinous soft tissues otherwise appear grossly unremarkable.      Impression    IMPRESSION:  1. Recent appearing T8 burst fracture with retropulsion of the posteroinferior endplate into the ventral epidural space, contributing to mild spinal canal stenosis at T8-T9 level.  2. Nondisplaced recent-appearing T9 superior endplate compression fracture.  3. Degenerative changes, as described. No high-grade spinal canal or neural foraminal stenosis.  4. Exaggerated segmental kyphosis centered at T8.  5. Prevertebral soft tissue swelling/hematoma centered at the T7-T8 level.   MR Lumbar Spine w/o Contrast     Narrative    EXAM: MR LUMBAR SPINE W/O CONTRAST  LOCATION: [facility]  DATE: 7/25/2024    INDICATION: Low back pain with sciatic, evaluate for bulging disc. Trauma and/or suspected fracture. Insignificant trauma, r/o compression fracture; at risk for osteoporosis; No lumbar x-ray result available.  COMPARISON: Lumbar spine radiographs dated 7/20/2024. CT abdomen and pelvis 7/5/2024.  TECHNIQUE: Routine Lumbar Spine MRI without IV contrast.    FINDINGS: Nomenclature is based on five lumbar vertebral bodies. Normal vertebral body heights. Grade 1 anterolisthesis of L4 on L5 measuring approximately 4 mm, which appears degenerative in nature. Sagittal alignment otherwise appears within normal   limits. Bone marrow signal appears normal. Diffuse intervertebral disc desiccation. Relatively close apposition L4-S1 spinous processes. There is patchy T2/STIR hyperintense signal in the L4-L5 and L5-S1 interspinous ligament regions and there are a few   cystic-appearing lesions along the right paramedian aspect of the L4-L5 interspinous space (series 4 images 6-7). Findings are nonspecific, but may be seen with Baastrup syndrome in the appropriate clinical setting. Bilateral L4-L5 and L5-S1 facet joint   effusions are present.    Normal appearance of the distal spinal cord with the conus terminating at L2. There is a small presumed cyst of the left kidney. The visualized paraspinous soft tissues otherwise appear grossly unremarkable. Degenerative changes of bilateral sacroiliac   joints.    Segmental analysis:  T12-L1: Normal disc height. No herniation. Normal facets. No spinal canal or neural foraminal stenosis.    L1-L2: Normal disc height. No herniation. Mild facet arthropathy. No spinal canal stenosis. No neural foraminal stenosis.    L2-L3: Normal disc height. Disc bulge slightly eccentric to the right. Mild-to-moderate facet arthropathy. Borderline mild spinal canal stenosis. No neural foraminal  stenosis.    L3-L4: Normal disc height. Symmetric disc bulge. Mild-to-moderate facet arthropathy. Borderline mild spinal canal stenosis. No significant neural foraminal stenosis.    L4-L5: Mild disc height loss. Uncovering of the posterior aspect of the disc related to spondylolisthesis. Symmetric disc bulge. Severe bilateral facet arthropathy. Bilateral facet joint effusions. Ligamentum flavum thickening. Mild-to-moderate central   spinal canal stenosis. Mild-to-moderate left and mild right neural foraminal stenosis.    L5-S1: Minimal disc height loss posteriorly. Symmetric disc bulge. Moderate facet arthropathy. Possible tiny synovial cysts along the medial aspect of the right facet joint (series 7 image 10). Ligamentum flavum thickening. There is mild bilateral   lateral recess narrowing without central spinal canal stenosis. Mild left neural foraminal stenosis. Right neural foramen is patent.      Impression    IMPRESSION:  1. Multilevel degenerative changes of the lumbar spine, as described.  2. Grade 1 anterolisthesis of L4 on L5.  3. Mild-to-moderate spinal canal stenosis and left neural foraminal stenosis at L4-L5. Lesser degrees of spinal canal and neural foraminal narrowing elsewhere.  4. Relatively close apposition of the L4-L5 and L5-S1 interspinous spaces with associated signal abnormality, as detailed, which is nonspecific. This can be seen with Baastrup syndrome in the appropriate clinical setting.  5. Nonspecific bilateral L4-L5 and L5-S1 facet joint effusions, which may be reactive to degenerative facet disease.   XR Thoracic Spine 2 Views    Narrative    EXAM: XR THORACIC SPINE 2 VIEWS  LOCATION: [facility]  DATE: 7/26/2024    INDICATION: t8 burst, upright XR in brace  COMPARISON: MRI 7/25/2024      Impression    IMPRESSION: Patient is imaged in a spinal brace. Diffuse osteopenia. 12 thoracic vertebra are assumed. Marked anterior wedging of the presumed T8 vertebral body  consistent with previously described burst fracture. Additional mild anterior wedging of T9.   Segmental kyphosis measures 47 degrees from T7 through T10 on radiographs compared to 27 degrees on the previous MRI. Vertebral body heights elsewhere are maintained. Suboptimal evaluation of the lung parenchyma due to overpenetration. Suspected small   left pleural effusion.        Discharge Medications   Current Discharge Medication List        START taking these medications    Details   diclofenac (VOLTAREN) 1 % topical gel Apply 4 g topically 4 times daily as needed for moderate pain    Associated Diagnoses: Closed stable burst fracture of eighth thoracic vertebra, initial encounter (H); Compression fracture of thoracic vertebra, closed, initial encounter (H)      methocarbamol (ROBAXIN) 500 MG tablet Take 0.5 tablets (250 mg) by mouth 3 times daily as needed for muscle spasms    Associated Diagnoses: Closed stable burst fracture of eighth thoracic vertebra, initial encounter (H); Compression fracture of thoracic vertebra, closed, initial encounter (H)      oxyCODONE (ROXICODONE) 5 MG tablet Take 0.5 tablets (2.5 mg) by mouth every 4 hours as needed for severe pain  Qty: 8 tablet, Refills: 0    Associated Diagnoses: Closed stable burst fracture of eighth thoracic vertebra, initial encounter (H); Compression fracture of thoracic vertebra, closed, initial encounter (H)      senna-docusate (SENOKOT-S/PERICOLACE) 8.6-50 MG tablet Take 2 tablets by mouth 2 times daily as needed for constipation    Associated Diagnoses: Closed stable burst fracture of eighth thoracic vertebra, initial encounter (H); Compression fracture of thoracic vertebra, closed, initial encounter (H)      sodium chloride 1 GM tablet Take 1 tablet (1 g) by mouth 2 times daily (with meals)    Associated Diagnoses: Hyponatremia           CONTINUE these medications which have CHANGED    Details   acetaminophen (TYLENOL) 500 MG tablet Take 2 tablets (1,000  mg) by mouth 3 times daily for 10 days    Associated Diagnoses: Compression fracture of thoracic vertebra, closed, initial encounter (H); Closed fracture of multiple ribs of left side with delayed healing, subsequent encounter           CONTINUE these medications which have NOT CHANGED    Details   ASPIRIN LOW DOSE 81 MG EC tablet Take 1 tablet by mouth daily      carvedilol (COREG) 1.5625 mg half-tab Take 1.5625 mg by mouth 2 times daily (with meals)      citalopram (CELEXA) 10 MG tablet Take 1 tablet (10 mg) by mouth daily  Qty: 90 tablet, Refills: 1    Associated Diagnoses: Other depression      furosemide (LASIX) 20 MG tablet Take 20 mg by mouth daily If weight greater than 2 pounds from baseline, then take an extra tablet      potassium chloride caesar ER (KLOR-CON M20) 20 MEQ CR tablet Take 20 mEq by mouth daily      donepezil (ARICEPT) 10 MG tablet Take 1 tablet (10 mg) by mouth At Bedtime    Associated Diagnoses: Memory changes           Allergies   No Known Allergies

## 2024-07-29 NOTE — PROGRESS NOTES
[facility]    Medicine Progress Note - Hospitalist Service    Date of Admission:  7/25/2024    Assessment & Plan   Ilda aNssar is a 89 year old female who has a history of anxiety, hypertension, dementia, A-fib, hyperlipidemia who presents with back pain. This is the third visit that she has had in recent days.  She recently had a lumbar x-ray on 7/22 along with a CT scan 7/23.  She lives at home and assisted living apartment on her own has not been able to get out of bed due to her pain.  She is only been treating it with Tylenol. she denies any current sciatic.  Has no incontinence to speak of.  States that she is having significant muscle spasms which is contributing to her pain.  Previous imaging showed a T8 compression fracture which seems to be more chronic with previous rib fractures.  Patient apparently had a fall months ago per the sisters at least 7 however they are not sure given her dementia and confusion if she is fallen since then.  Patient is being admitted for pain control and possibly placement.    Daily update: Pain controlled at rest, alert, does not appear over sedated. Eating, tolerating salt tabs. Ok to discharge to TCU when bed available.     Intractable back pain - improving   T8 burst fracture with retropulsion  Nondisplaced T9 compression fracture  Prevertebral soft tissue swelling/hematoma at T7-T8  - MRI thoracic and lumbar obtained  - Neurosurgery consult due to retropulsion and prevertebral soft tissue swelling and hematoma   - TLSO brace   - upright XR with TLSO brace  - stable    - needs follow up outpatient in 4-6 weeks with neurosurgery   - no lifting > 10 lbs, avoid bending, twisting, lifting  - Pain regimen: schedule tylenol, atarax and oxycodone PRN, lidocaine patch   - started on scheduled robaxin 500 mg QID --> decrease to 250 mg QID with hold for sedation parameters, further reduce to 250 mg TID PRN due to sedation  - PT, SW consult: rec TCU, family  agreeable     Osteopenia on imaging  - needs outpatient follow up for bone density/ bone health with PCP    Hypovolemia Hyponatremia - improving  Sodium labs confirm hypovolemia with urine osm 449 and urine sodium 39. Slightly dropped on 7/27, was given 500 bolus and fluids increase to 100 ml/hr with improvement of sodium. Again dropped overnight, fluids increased to 125ml/hr with improvement of sodium to 131 on 7/28. This is most certainly due to her not eating and drinking due to poor appetite.   - start salt tabs BID w/ meals on 7/28  - discontinued IVFs 7/28  - Na improved to 133 7/29  - keep encouraging PO intake  - avoid over sedation with pain regimen   - ok to discharge medically    Abnormal UA   10 WBC and small leukocyte esterase.  - started cefpodoxime 100 mg BID x 5 days on 7/26, UC now growing  < 10,000 mix nathan, with no urinary symptoms, discontinued abx.      Hx previous falls, left rib fractures healing  Patient had a fall that we know of 7 months ago per the sisters but given her dementia and confusion we're not sure if she has had any further falls.  She did have a CT scan showing a T8 compression fracture that likely is chronic with previous left rib fractures that are healing.       Anxiety  - continue pta Celexa        Hx atrial fibrillation  - continue pta coreg  - not on anticoagulation, appropriate give dementia and fall risk     Hyperlipidemia  - not on any statin pta      Essential hypertension  - continue pta coreg  - at home uses lasix PRN for edema, not reordered here    History of chronic SMA occlusion, proximal     Dementia  - continue pta aricept   - Patient is at high risk for sundowning as well as having confusion due to side effects from her medications          Diet: Regular Diet Adult    DVT Prophylaxis: Enoxaparin (Lovenox) SQ  Treviño Catheter: Not present  Lines: None     Cardiac Monitoring: None  Code Status: No CPR- Do NOT Intubate      Clinically Significant Risk Factors          # Hyponatremia: Lowest Na = 125 mmol/L in last 2 days, will monitor as appropriate          # Hypertension: Noted on problem list  # Chronic heart failure with preserved ejection fraction: heart failure noted on problem list and last echo with EF >50%                      Disposition Plan     Medically Ready for Discharge: Ready Now           The patient's care was discussed with the Bedside Nurse, Care Coordinator/, and Patient.    Sharon Leonardo PA-C  Hospitalist Service  [facility]  Securely message with [employer] (more info)  Text page via [employer] Paging/Directory   ______________________________________________________________________    Interval History   No complaints. Na improved.     Physical Exam   Vital Signs: Temp: 97.4  F (36.3  C) Temp src: Oral BP: (!) 157/92 Pulse: 64   Resp: 16 SpO2: 95 % O2 Device: None (Room air)    Weight: 108 lbs 3.93 oz    GENERAL:  Comfortable.  PSYCH: pleasant, No acute distress.  HEART:  RRR  LUNGS:  Normal Respiratory effort.   NEUROLOGIC:  grossly intact    Medical Decision Making       45 MINUTES SPENT BY ME on the date of service doing chart review, history, exam, documentation & further activities per the note.      Data     I have personally reviewed the following data over the past 24 hrs:    N/A  \   15.5   / N/A     133 (L) 99 12.2 /  82   3.9 22 0.51 \       Imaging results reviewed over the past 24 hrs:   No results found for this or any previous visit (from the past 24 hour(s)).

## 2024-07-29 NOTE — PLAN OF CARE
"[id number]    Inpatient Progress Note: T8 compression fx    BP (!) 151/97 (BP Location: Left arm)   Pulse 80   Temp 97.8  F (36.6  C) (Oral)   Resp 16   Wt 49.1 kg (108 lb 3.9 oz)   LMP  (LMP Unknown)   SpO2 97%   BMI 18.01 kg/m         Orientation: A&Ox4, forgetful at times, Hx of dementia   Pain status: Pt complains of pain in lower back, managed w scheduled tylenol and Prn oxy.  Activity: Ax1 walker GB  Resp: WDL, denies SOB, RA  Cardiac: WDL, denies chest pain  GI: WDL  :  WDL, hesitancy, remind pt to get up and us bathroom  Skin: WDL  Infusions: SL  Pertinent Labs: Sodium 127, started on salt tablet  Diet:X,  Regular- poor appetite encourage oral intake    Consults: PT/SW/Neurosurgery- back brace- follow up outpt   Discharge Plan: TCU-referrals sent    Will continue to monitor and provide cares.     Marilia Braun RN       Problem: Adult Inpatient Plan of Care  Goal: Plan of Care Review  Description: The Plan of Care Review/Shift note should be completed every shift.  The Outcome Evaluation is a brief statement about your assessment that the patient is improving, declining, or no change.  This information will be displayed automatically on your shift  note.  Outcome: Progressing  Flowsheets (Taken 7/29/2024 0328)  Outcome Evaluation: Pt resting in bed. Pain Managment. PT/SW- TCU  Plan of Care Reviewed With: patient  Overall Patient Progress: improving  Goal: Patient-Specific Goal (Individualized)  Description: You can add care plan individualizations to a care plan. Examples of Individualization might be:  \"Parent requests to be called daily at 9am for status\", \"I have a hard time hearing out of my right ear\", or \"Do not touch me to wake me up as it startles  me\".  Outcome: Progressing  Goal: Absence of Hospital-Acquired Illness or Injury  Outcome: Progressing  Goal: Optimal Comfort and Wellbeing  Outcome: Progressing  Goal: Readiness for Transition of Care  Outcome: Progressing     Problem: Pain " Acute  Goal: Optimal Pain Control and Function  Outcome: Progressing     Problem: Comorbidity Management  Goal: Blood Pressure in Desired Range  Outcome: Progressing     Problem: Fall Injury Risk  Goal: Absence of Fall and Fall-Related Injury  Outcome: Progressing   Goal Outcome Evaluation:      Plan of Care Reviewed With: patient    Overall Patient Progress: improvingOverall Patient Progress: improving    Outcome Evaluation: Pt resting in bed. Pain Managment. PT/SW- TCU

## 2024-07-30 ENCOUNTER — LAB REQUISITION (OUTPATIENT)
Dept: LAB | Facility: CLINIC | Age: 89
End: 2024-07-30
Payer: MEDICARE

## 2024-07-30 VITALS
BODY MASS INDEX: 18.01 KG/M2 | SYSTOLIC BLOOD PRESSURE: 162 MMHG | TEMPERATURE: 97.8 F | DIASTOLIC BLOOD PRESSURE: 104 MMHG | RESPIRATION RATE: 17 BRPM | HEART RATE: 75 BPM | OXYGEN SATURATION: 94 % | WEIGHT: 108.25 LBS

## 2024-07-30 DIAGNOSIS — Z11.1 ENCOUNTER FOR SCREENING FOR RESPIRATORY TUBERCULOSIS: ICD-10-CM

## 2024-07-30 PROCEDURE — 250N000013 HC RX MED GY IP 250 OP 250 PS 637

## 2024-07-30 PROCEDURE — 250N000012 HC RX MED GY IP 250 OP 636 PS 637: Performed by: INTERNAL MEDICINE

## 2024-07-30 PROCEDURE — 250N000013 HC RX MED GY IP 250 OP 250 PS 637: Performed by: INTERNAL MEDICINE

## 2024-07-30 RX ADMIN — SODIUM CHLORIDE 1 G: 1 TABLET ORAL at 08:47

## 2024-07-30 RX ADMIN — PREDNISONE 20 MG: 20 TABLET ORAL at 08:47

## 2024-07-30 RX ADMIN — CARVEDILOL 1.56 MG: 3.12 TABLET, FILM COATED ORAL at 09:14

## 2024-07-30 RX ADMIN — ACETAMINOPHEN 1000 MG: 500 TABLET, FILM COATED ORAL at 08:47

## 2024-07-30 RX ADMIN — LIDOCAINE 2 PATCH: 4 PATCH TOPICAL at 09:20

## 2024-07-30 RX ADMIN — ASPIRIN 81 MG: 81 TABLET, COATED ORAL at 08:47

## 2024-07-30 RX ADMIN — CITALOPRAM HYDROBROMIDE 10 MG: 10 TABLET ORAL at 08:47

## 2024-07-30 ASSESSMENT — ACTIVITIES OF DAILY LIVING (ADL)
ADLS_ACUITY_SCORE: 40
ADLS_ACUITY_SCORE: 39
ADLS_ACUITY_SCORE: 39
ADLS_ACUITY_SCORE: 40
ADLS_ACUITY_SCORE: 39
ADLS_ACUITY_SCORE: 40
ADLS_ACUITY_SCORE: 39
ADLS_ACUITY_SCORE: 40
ADLS_ACUITY_SCORE: 39

## 2024-07-30 NOTE — PLAN OF CARE
[id number]    Inpatient Progress Note:  A & O X 3 with forgetfulness. Lung sounds clear to auscultation. No wheezes, crackles, or rales auscultated. Denies , chills, shortness of breath, dizziness.  Emesis X 1 this shift. Bowel sounds present to all quads and active . Denies dysuria, or burning with urination. On regular diet. A X 1 to bedside commode. No IV access. Admitting DX: T8 burst fracture, T9 compression fracture. Pain is managed with scheduled Tylenol, prn Oxycodone,Atarax, and Robaxin. C/O pain rating at 10 out of 10, prn Oxycodone , and Atarax given X 1-See MAR. Afebrile. Neurosurgery recommended TLSO brace when patient is OOB. Possible discharge on 7/30 to [facility] TCU via [facility]C [facility]. CM/SW for discharge planning.   BP (!) 141/93 (BP Location: Left arm)   Pulse 88   Temp 97.4  F (36.3  C) (Oral)   Resp 16   Wt 49.1 kg (108 lb 3.9 oz)   LMP  (LMP Unknown)   SpO2 96%   BMI 18.01 kg/m        Problem: Adult Inpatient Plan of Care  Goal: Absence of Hospital-Acquired Illness or Injury  Intervention: Identify and Manage Fall Risk  Recent Flowsheet Documentation  Taken 7/29/2024 2058 by Arslan Dumont RN  Safety Promotion/Fall Prevention:   activity supervised   assistive device/personal items within reach   clutter free environment maintained   lighting adjusted   nonskid shoes/slippers when out of bed   room near nurse's station   room organization consistent   treat underlying cause  Intervention: Prevent Skin Injury  Recent Flowsheet Documentation  Taken 7/29/2024 2058 by Arslan Dumont RN  Body Position:   supine, legs elevated   supine, head elevated  Device Skin Pressure Protection: absorbent pad utilized/changed  Intervention: Prevent and Manage VTE (Venous Thromboembolism) Risk  Recent Flowsheet Documentation  Taken 7/29/2024 2058 by Arslan Dumont RN  VTE Prevention/Management: SCDs off (sequential compression devices)  Intervention: Prevent Infection  Recent  Flowsheet Documentation  Taken 7/29/2024 2058 by Arslan Dumont RN  Infection Prevention:   hand hygiene promoted   personal protective equipment utilized   rest/sleep promoted  Goal: Optimal Comfort and Wellbeing  Intervention: Monitor Pain and Promote Comfort  Recent Flowsheet Documentation  Taken 7/29/2024 2124 by Arslan Dumont RN  Pain Management Interventions: medication (see MAR)     Problem: Pain Acute  Goal: Optimal Pain Control and Function  Intervention: Develop Pain Management Plan  Recent Flowsheet Documentation  Taken 7/29/2024 2124 by Arslan Dumont RN  Pain Management Interventions: medication (see MAR)  Intervention: Prevent or Manage Pain  Recent Flowsheet Documentation  Taken 7/29/2024 2058 by Arslan Dumont RN  Medication Review/Management: medications reviewed     Problem: Comorbidity Management  Goal: Blood Pressure in Desired Range  Intervention: Maintain Blood Pressure Management  Recent Flowsheet Documentation  Taken 7/29/2024 2058 by Arslan Dumont RN  Medication Review/Management: medications reviewed     Problem: Fall Injury Risk  Goal: Absence of Fall and Fall-Related Injury  Intervention: Identify and Manage Contributors  Recent Flowsheet Documentation  Taken 7/29/2024 2058 by Arslan Dumont RN  Medication Review/Management: medications reviewed  Intervention: Promote Injury-Free Environment  Recent Flowsheet Documentation  Taken 7/29/2024 2058 by Arslan Dumont RN  Safety Promotion/Fall Prevention:   activity supervised   assistive device/personal items within reach   clutter free environment maintained   lighting adjusted   nonskid shoes/slippers when out of bed   room near nurse's station   room organization consistent   treat underlying cause     Will continue to provide supportive cares.     Arslan Dumont RN

## 2024-07-30 NOTE — PLAN OF CARE
[id number]    Inpatient Progress Note:  A & O X 3 with forgetfulness. Lung sounds clear to auscultation. No wheezes, crackles, or rales auscultated. Denies , chills, shortness of breath, dizziness.  Emesis X 1 this shift. Bowel sounds present to all quads and active . Denies dysuria, or burning with urination. On regular diet. A X 1 to bedside commode. No IV access. Admitting DX: T8 burst fracture, T9 compression fracture. Pain is managed with scheduled Tylenol, prn Oxycodone,Atarax, and Robaxin. C/O pain rating at 10 out of 10, prn Oxycodone , and Atarax given X 1-See MAR. Afebrile. Neurosurgery recommended TLSO brace when patient is OOB. Possible discharge on 7/30 to [facility] TCU via [facility] W/C transport. CM/SW for discharge planning.   BP (!) 154/96 (BP Location: Left arm)   Pulse 76   Temp 97.3  F (36.3  C) (Oral)   Resp 16   Wt 49.1 kg (108 lb 3.9 oz)   LMP  (LMP Unknown)   SpO2 97%   BMI 18.01 kg/m       Problem: Adult Inpatient Plan of Care  Goal: Absence of Hospital-Acquired Illness or Injury  Intervention: Identify and Manage Fall Risk  Recent Flowsheet Documentation  Taken 7/29/2024 2058 by Arslan Dumont RN  Safety Promotion/Fall Prevention:   activity supervised   assistive device/personal items within reach   clutter free environment maintained   lighting adjusted   nonskid shoes/slippers when out of bed   room near nurse's station   room organization consistent   treat underlying cause  Intervention: Prevent Skin Injury  Recent Flowsheet Documentation  Taken 7/29/2024 2058 by Arslan Dumont RN  Body Position:   supine, legs elevated   supine, head elevated  Device Skin Pressure Protection: absorbent pad utilized/changed  Intervention: Prevent and Manage VTE (Venous Thromboembolism) Risk  Recent Flowsheet Documentation  Taken 7/29/2024 2058 by Arslan Dumont RN  VTE Prevention/Management: SCDs off (sequential compression devices)  Intervention: Prevent Infection  Recent Flowsheet  Documentation  Taken 7/29/2024 2058 by Arslan Dumont RN  Infection Prevention:   hand hygiene promoted   personal protective equipment utilized   rest/sleep promoted  Goal: Optimal Comfort and Wellbeing  Intervention: Monitor Pain and Promote Comfort  Recent Flowsheet Documentation  Taken 7/29/2024 2124 by Arslan Dumont RN  Pain Management Interventions: medication (see MAR)     Problem: Pain Acute  Goal: Optimal Pain Control and Function  Intervention: Develop Pain Management Plan  Recent Flowsheet Documentation  Taken 7/29/2024 2124 by Arslan Dumont RN  Pain Management Interventions: medication (see MAR)  Intervention: Prevent or Manage Pain  Recent Flowsheet Documentation  Taken 7/29/2024 2058 by Arslan Dumont RN  Medication Review/Management: medications reviewed     Problem: Comorbidity Management  Goal: Blood Pressure in Desired Range  Intervention: Maintain Blood Pressure Management  Recent Flowsheet Documentation  Taken 7/29/2024 2058 by Arslan Dumont RN  Medication Review/Management: medications reviewed     Problem: Fall Injury Risk  Goal: Absence of Fall and Fall-Related Injury  Intervention: Identify and Manage Contributors  Recent Flowsheet Documentation  Taken 7/29/2024 2058 by Arslan Dumont RN  Medication Review/Management: medications reviewed  Intervention: Promote Injury-Free Environment  Recent Flowsheet Documentation  Taken 7/29/2024 2058 by Arslan Dumont RN  Safety Promotion/Fall Prevention:   activity supervised   assistive device/personal items within reach   clutter free environment maintained   lighting adjusted   nonskid shoes/slippers when out of bed   room near nurse's station   room organization consistent   treat underlying cause     Will continue to provide supportive cares.     Arslan Dumont RN

## 2024-07-30 NOTE — PLAN OF CARE
"Orientation: A&Ox4, forgetful at times, Hx of dementia   Pain status: Pt complains of pain in lower back, managed w scheduled tylenol.  Activity: Ax1 walker GB and back brace when OOB  Resp: WDL, denies SOB, RA  Cardiac: WDL, BP a little high  GI: nausea/vom X1  :  WDL, hesitancy, remind pt to get up and us bathroom  Skin: WDL  Infusions: SL  Pertinent Labs: Sodium 133  Diet:X,  Regular- poor appetite encourage oral intake    Consults: PT/SW/Neurosurgery- back brace- follow up outpt   Discharge Plan: TCU tomorrow     Problem: Adult Inpatient Plan of Care  Goal: Plan of Care Review  Description: The Plan of Care Review/Shift note should be completed every shift.  The Outcome Evaluation is a brief statement about your assessment that the patient is improving, declining, or no change.  This information will be displayed automatically on your shift  note.  Outcome: Adequate for Care Transition  Flowsheets (Taken 7/29/2024 1937)  Outcome Evaluation: plan to discharge to TCU today  Plan of Care Reviewed With: patient  Goal: Patient-Specific Goal (Individualized)  Description: You can add care plan individualizations to a care plan. Examples of Individualization might be:  \"Parent requests to be called daily at 9am for status\", \"I have a hard time hearing out of my right ear\", or \"Do not touch me to wake me up as it startles  me\".  Outcome: Adequate for Care Transition  Goal: Absence of Hospital-Acquired Illness or Injury  Outcome: Adequate for Care Transition  Goal: Optimal Comfort and Wellbeing  Outcome: Adequate for Care Transition  Goal: Readiness for Transition of Care  Outcome: Adequate for Care Transition     Problem: Pain Acute  Goal: Optimal Pain Control and Function  Outcome: Adequate for Care Transition     Problem: Comorbidity Management  Goal: Blood Pressure in Desired Range  Outcome: Adequate for Care Transition     Problem: Fall Injury Risk  Goal: Absence of Fall and Fall-Related Injury  Outcome: Adequate " for Care Transition   Goal Outcome Evaluation:      Plan of Care Reviewed With: patient          Outcome Evaluation: plan to discharge to TCU today

## 2024-07-30 NOTE — PLAN OF CARE
Physical Therapy Discharge Summary    Reason for therapy discharge:    Discharged to transitional care facility.    Progress towards therapy goal(s). See goals on Care Plan in [facility] electronic health record for goal details.  Goals not met.  Barriers to achieving goals:   discharge from facility.    Therapy recommendation(s):    Continued therapy is recommended.  Rationale/Recommendations:  PT as indicated at TCU.

## 2024-07-30 NOTE — PROGRESS NOTES
Care Management Discharge Note    Discharge Date: 07/30/2024     Discharge Disposition: Assisted Living, Home Care    Discharge Services: None    Discharge DME: None    Discharge Transportation: agency    Private pay costs discussed: transportation costs    Does the patient's insurance plan have a 3 day qualifying hospital stay waiver?  No    PAS Confirmation Code: [id number]    Education Provided on the Discharge Plan:  Yes   Persons Notified of Discharge Plans: Pt/Dtr   Patient/Family in Agreement with the Plan: yes    Handoff Referral Completed: Yes    Additional Information:  Discharge on 7/29 cancelled due to emesis. Per conversations with bedside RN and provider today, pt is medically ready for discharge to TCU. [facility] WC ride scheduled for today between [id number]. Bedside RN updated and LM for dtr. Orders completed and sent to TCU. TCU aware of ride time.     Tatyana Pace RN BSN   Inpatient Care Coordination  [facility]   Phone [phone]

## 2024-07-30 NOTE — PLAN OF CARE
Orientation: A&Ox4, forgetful at times, Hx of dementia   Pain status: Pt complains of pain in lower back, managed w scheduled tylenol and lidocaine patches.  Activity: Ax1 walker GB and back brace when OOB  Resp: WDL, denies SOB, RA  Cardiac: WDL, BP a little high  GI: WDL  :  WDL, hesitancy, remind pt to get up and us bathroom  Skin: WDL  Infusions: SL  Pertinent Labs: Sodium 133  Diet:X,  Regular- poor appetite encourage oral intake    Consults: PT/SW/Neurosurgery- back brace- follow up outpt   Discharge Plan: TCU today

## 2024-07-31 PROCEDURE — 36415 COLL VENOUS BLD VENIPUNCTURE: CPT | Performed by: FAMILY MEDICINE

## 2024-07-31 PROCEDURE — P9603 ONE-WAY ALLOW PRORATED MILES: HCPCS | Performed by: FAMILY MEDICINE

## 2024-07-31 PROCEDURE — 86481 TB AG RESPONSE T-CELL SUSP: CPT | Performed by: FAMILY MEDICINE

## 2024-08-01 ENCOUNTER — DOCUMENTATION ONLY (OUTPATIENT)
Dept: OTHER | Facility: CLINIC | Age: 89
End: 2024-08-01
Payer: MEDICARE

## 2024-08-01 ENCOUNTER — CARE COORDINATION (OUTPATIENT)
Dept: FAMILY MEDICINE | Facility: CLINIC | Age: 89
End: 2024-08-01

## 2024-08-01 LAB
GAMMA INTERFERON BACKGROUND BLD IA-ACNC: 0.01 IU/ML
M TB IFN-G BLD-IMP: NEGATIVE
M TB IFN-G CD4+ BCKGRND COR BLD-ACNC: 5.26 IU/ML
MITOGEN IGNF BCKGRD COR BLD-ACNC: 0.05 IU/ML
MITOGEN IGNF BCKGRD COR BLD-ACNC: 0.06 IU/ML
QUANTIFERON MITOGEN: 5.27 IU/ML
QUANTIFERON NIL TUBE: 0.01 IU/ML
QUANTIFERON TB1 TUBE: 0.06 IU/ML
QUANTIFERON TB2 TUBE: 0.07

## 2024-08-01 NOTE — PROGRESS NOTES
Care Coordination Initial Assessment    The patient was admitted into [facility] on 7/25/24 for back pain and other concerns. She was discharged on 7/30/24 with instructions to follow up with PCP and specialty providers.    PCP: Tatianna Swain    Referral Source:  ED/IP List    Utilization:   Last PCP Appt.: 7/24/24    Health Maintenance Reviewed: Yes    Current Medical Health Concerns:   Please review patients current medical problem list.    Patient/Caregiver Understanding: NA-did not answer the phone     Medication Management:   NA-did not answer the phone     Functional Status:   NA-did not answer the phone     Current Behavioral Health Concerns:   NA-did not answer the phone to review     Patient/Caregiver Understanding:  NA-did not answer the phone     Psychosocial:  NA-did not answer the phone     Gaps:    NA    Resources Given:    NA    Plan:   I attempted to reach the patient by phone but she did not answer. I was able leave her a message to call back and get scheduled along with sending her a EDAN message. She should call back to get her hospital follow up visit scheduled with Dr. Swain.  Will wait for patient to call back.

## 2024-08-02 ENCOUNTER — LAB REQUISITION (OUTPATIENT)
Dept: LAB | Facility: CLINIC | Age: 89
End: 2024-08-02
Payer: MEDICARE

## 2024-08-02 DIAGNOSIS — S22.061D: ICD-10-CM

## 2024-08-05 LAB
ANION GAP SERPL CALCULATED.3IONS-SCNC: 10 MMOL/L (ref 7–15)
BUN SERPL-MCNC: 11.8 MG/DL (ref 8–23)
CALCIUM SERPL-MCNC: 8.5 MG/DL (ref 8.8–10.4)
CHLORIDE SERPL-SCNC: 95 MMOL/L (ref 98–107)
CREAT SERPL-MCNC: 0.79 MG/DL (ref 0.51–0.95)
EGFRCR SERPLBLD CKD-EPI 2021: 70 ML/MIN/1.73M2
GLUCOSE SERPL-MCNC: 85 MG/DL (ref 70–99)
HCO3 SERPL-SCNC: 30 MMOL/L (ref 22–29)
POTASSIUM SERPL-SCNC: 4 MMOL/L (ref 3.4–5.3)
SODIUM SERPL-SCNC: 135 MMOL/L (ref 135–145)

## 2024-08-05 PROCEDURE — P9603 ONE-WAY ALLOW PRORATED MILES: HCPCS | Performed by: FAMILY MEDICINE

## 2024-08-05 PROCEDURE — 82565 ASSAY OF CREATININE: CPT | Performed by: FAMILY MEDICINE

## 2024-08-05 PROCEDURE — 36415 COLL VENOUS BLD VENIPUNCTURE: CPT | Performed by: FAMILY MEDICINE

## 2024-08-07 ENCOUNTER — LAB REQUISITION (OUTPATIENT)
Dept: LAB | Facility: CLINIC | Age: 89
End: 2024-08-07
Payer: MEDICARE

## 2024-08-07 DIAGNOSIS — R11.2 NAUSEA WITH VOMITING, UNSPECIFIED: ICD-10-CM

## 2024-08-07 DIAGNOSIS — I48.91 UNSPECIFIED ATRIAL FIBRILLATION (H): ICD-10-CM

## 2024-08-08 NOTE — PROGRESS NOTES
I have attempted to reach the patient multiple times with no success to get her scheduled for her hospital follow up visit.

## 2024-08-12 LAB
ANION GAP SERPL CALCULATED.3IONS-SCNC: 17 MMOL/L (ref 7–15)
BUN SERPL-MCNC: 10.8 MG/DL (ref 8–23)
CALCIUM SERPL-MCNC: 9 MG/DL (ref 8.8–10.4)
CHLORIDE SERPL-SCNC: 97 MMOL/L (ref 98–107)
CREAT SERPL-MCNC: 0.61 MG/DL (ref 0.51–0.95)
EGFRCR SERPLBLD CKD-EPI 2021: 84 ML/MIN/1.73M2
ERYTHROCYTE [DISTWIDTH] IN BLOOD BY AUTOMATED COUNT: 15 % (ref 10–15)
GLUCOSE SERPL-MCNC: 104 MG/DL (ref 70–99)
HCO3 SERPL-SCNC: 28 MMOL/L (ref 22–29)
HCT VFR BLD AUTO: 48 % (ref 35–47)
HGB BLD-MCNC: 15 G/DL (ref 11.7–15.7)
MCH RBC QN AUTO: 29.7 PG (ref 26.5–33)
MCHC RBC AUTO-ENTMCNC: 31.3 G/DL (ref 31.5–36.5)
MCV RBC AUTO: 95 FL (ref 78–100)
PLATELET # BLD AUTO: 256 10E3/UL (ref 150–450)
POTASSIUM SERPL-SCNC: 3.9 MMOL/L (ref 3.4–5.3)
RBC # BLD AUTO: 5.05 10E6/UL (ref 3.8–5.2)
SODIUM SERPL-SCNC: 142 MMOL/L (ref 135–145)
WBC # BLD AUTO: 9.2 10E3/UL (ref 4–11)

## 2024-08-12 PROCEDURE — 82565 ASSAY OF CREATININE: CPT | Performed by: FAMILY MEDICINE

## 2024-08-12 PROCEDURE — 85027 COMPLETE CBC AUTOMATED: CPT | Performed by: FAMILY MEDICINE

## 2024-08-12 PROCEDURE — 36415 COLL VENOUS BLD VENIPUNCTURE: CPT | Performed by: FAMILY MEDICINE

## 2024-08-12 PROCEDURE — P9604 ONE-WAY ALLOW PRORATED TRIP: HCPCS | Performed by: FAMILY MEDICINE

## 2024-08-19 ENCOUNTER — LAB REQUISITION (OUTPATIENT)
Dept: LAB | Facility: CLINIC | Age: 89
End: 2024-08-19
Payer: MEDICARE

## 2024-08-19 DIAGNOSIS — I10 ESSENTIAL (PRIMARY) HYPERTENSION: ICD-10-CM

## 2024-08-23 LAB
ANION GAP SERPL CALCULATED.3IONS-SCNC: 10 MMOL/L (ref 7–15)
BUN SERPL-MCNC: 18.8 MG/DL (ref 8–23)
CALCIUM SERPL-MCNC: 8.4 MG/DL (ref 8.8–10.4)
CHLORIDE SERPL-SCNC: 103 MMOL/L (ref 98–107)
CREAT SERPL-MCNC: 0.83 MG/DL (ref 0.51–0.95)
EGFRCR SERPLBLD CKD-EPI 2021: 66 ML/MIN/1.73M2
GLUCOSE SERPL-MCNC: 87 MG/DL (ref 70–99)
HCO3 SERPL-SCNC: 32 MMOL/L (ref 22–29)
POTASSIUM SERPL-SCNC: 4 MMOL/L (ref 3.4–5.3)
SODIUM SERPL-SCNC: 145 MMOL/L (ref 135–145)

## 2024-08-23 PROCEDURE — 80048 BASIC METABOLIC PNL TOTAL CA: CPT | Performed by: FAMILY MEDICINE

## 2024-08-23 PROCEDURE — P9603 ONE-WAY ALLOW PRORATED MILES: HCPCS | Performed by: FAMILY MEDICINE

## 2024-08-23 PROCEDURE — 36415 COLL VENOUS BLD VENIPUNCTURE: CPT | Performed by: FAMILY MEDICINE

## 2024-08-29 ENCOUNTER — TRANSFERRED RECORDS (OUTPATIENT)
Dept: FAMILY MEDICINE | Facility: CLINIC | Age: 89
End: 2024-08-29

## 2024-08-29 ENCOUNTER — OFFICE VISIT (OUTPATIENT)
Dept: NEUROSURGERY | Facility: CLINIC | Age: 89
End: 2024-08-29
Attending: PHYSICIAN ASSISTANT
Payer: MEDICARE

## 2024-08-29 ENCOUNTER — ANCILLARY PROCEDURE (OUTPATIENT)
Dept: GENERAL RADIOLOGY | Facility: CLINIC | Age: 89
End: 2024-08-29
Attending: PHYSICIAN ASSISTANT
Payer: MEDICARE

## 2024-08-29 VITALS — SYSTOLIC BLOOD PRESSURE: 106 MMHG | OXYGEN SATURATION: 93 % | DIASTOLIC BLOOD PRESSURE: 70 MMHG | HEART RATE: 65 BPM

## 2024-08-29 DIAGNOSIS — S22.060A CLOSED WEDGE COMPRESSION FRACTURE OF T8 VERTEBRA, INITIAL ENCOUNTER (H): Primary | ICD-10-CM

## 2024-08-29 DIAGNOSIS — S22.070A CLOSED WEDGE COMPRESSION FRACTURE OF T9 VERTEBRA, INITIAL ENCOUNTER (H): ICD-10-CM

## 2024-08-29 DIAGNOSIS — S22.061A CLOSED STABLE BURST FRACTURE OF EIGHTH THORACIC VERTEBRA, INITIAL ENCOUNTER (H): ICD-10-CM

## 2024-08-29 PROCEDURE — G0463 HOSPITAL OUTPT CLINIC VISIT: HCPCS | Performed by: PHYSICIAN ASSISTANT

## 2024-08-29 PROCEDURE — 99203 OFFICE O/P NEW LOW 30 MIN: CPT | Performed by: PHYSICIAN ASSISTANT

## 2024-08-29 PROCEDURE — 72070 X-RAY EXAM THORAC SPINE 2VWS: CPT | Mod: TC | Performed by: RADIOLOGY

## 2024-08-29 NOTE — LETTER
8/29/2024      Ilda Nassar  [id number]  [facility] [address]      Dear Colleague,    Thank you for referring your patient, Ilda Nassar, to the [facility]UROSURGERY [facility] [place]. Please see a copy of my visit note below.    NEUROSURGERY CLINIC CONSULT NOTE     DATE OF VISIT: 8/29/2024     SUBJECTIVE:     Ilda Nassar is a pleasant 89 year old female who presents to the clinic today for consultation on a T8 and T9 compression fracture. She is referred to the Neurosurgery Clinic by Dr. Swain in Primary Care.     Today, she reports a six-week history of symptoms. She describes a daily with fluctuating intensity, sharp, aching, pain that initiates in the midline thoracic region and does not radiate what-so-ever nor is this pain accompanied by paresthesia, numbness or perceived weakness. Thoracolumbar mobility seems to aggravate the symptoms, while alleviation is obtained by rest. A fall is associated with the onset of the pain. There are no bowel or bladder changes. She denies saddle anesthesia.     She is wearing an OTS TLSO.        Current Outpatient Medications:      ASPIRIN LOW DOSE 81 MG EC tablet, Take 1 tablet by mouth daily, Disp: , Rfl:      carvedilol (COREG) 1.5625 mg half-tab, Take 1.5625 mg by mouth 2 times daily (with meals), Disp: , Rfl:      citalopram (CELEXA) 10 MG tablet, Take 1 tablet (10 mg) by mouth daily, Disp: 90 tablet, Rfl: 1     diclofenac (VOLTAREN) 1 % topical gel, Apply 4 g topically 4 times daily as needed for moderate pain, Disp: , Rfl:      donepezil (ARICEPT) 10 MG tablet, Take 1 tablet (10 mg) by mouth At Bedtime, Disp: , Rfl:      furosemide (LASIX) 20 MG tablet, Take 20 mg by mouth daily If weight greater than 2 pounds from baseline, then take an extra tablet, Disp: , Rfl:      methocarbamol (ROBAXIN) 500 MG tablet, Take 0.5 tablets (250 mg) by mouth 3 times daily as needed for muscle spasms, Disp: 20 tablet, Rfl: 0     oxyCODONE  (ROXICODONE) 5 MG tablet, Take 0.5 tablets (2.5 mg) by mouth every 4 hours as needed for severe pain, Disp: 8 tablet, Rfl: 0     potassium chloride caesar ER (KLOR-CON M20) 20 MEQ CR tablet, Take 20 mEq by mouth daily, Disp: , Rfl:      senna-docusate (SENOKOT-S/PERICOLACE) 8.6-50 MG tablet, Take 2 tablets by mouth 2 times daily as needed for constipation, Disp: , Rfl:      sodium chloride 1 GM tablet, Take 1 tablet (1 g) by mouth 2 times daily (with meals), Disp: , Rfl:      No Known Allergies     Past Medical History:   Diagnosis Date     Atrial fibrillation (H)      Atrial flutter (H)      Hyperlipidemia      Hypertension         ROS: 10 point review of symptoms are negative other than the symptoms noted above in the HPI.     Family History has been reviewed with the patient, there are no pertinent findings to presenting concern.     No past surgical history on file.     Social History     Tobacco Use     Smoking status: Former     Passive exposure: Never     Smokeless tobacco: Never   Substance Use Topics     Alcohol use: Yes     Comment: On occ, glass of wine     Drug use: Never        OBJECTIVE:   /70   Pulse 65   LMP  (LMP Unknown)   SpO2 93%    There is no height or weight on file to calculate BMI.     Imaging:     XR THORACIC SPINE 2 VIEWS  LOCATION: [facility]  DATE: 7/26/2024     INDICATION: t8 burst, upright XR in brace  COMPARISON: MRI 7/25/2024                                                                   IMPRESSION: Patient is imaged in a spinal brace. Diffuse osteopenia. 12 thoracic vertebra are assumed. Marked anterior wedging of the presumed T8 vertebral body consistent with previously described burst fracture. Additional mild anterior wedging of T9.   Segmental kyphosis measures 47 degrees from T7 through T10 on radiographs compared to 27 degrees on the previous MRI. Vertebral body heights elsewhere are maintained. Suboptimal evaluation of the lung parenchyma due  to overpenetration. Suspected small   left pleural effusion.     Full radiological report in chart. Imaging was reviewed with with patient today.     Exam:     Patient appears comfortable, conversational, and in no apparent distress.   Head: Normocephalic, without obvious abnormality, atraumatic, no facial asymmetry.   Eyes: conjunctivae/corneas clear. PERRL, EOM's intact.   Throat: lips, mucosa, and tongue normal; teeth and gums normal.   Neck: supple, symmetrical, trachea midline, no adenopathy and thyroid: not enlarged, symmetric, no tenderness/mass/nodules.   Lungs: clear to auscultation bilaterally.   Heart: regular rate and rhythm.   Abdomen: soft, non-tender; bowel sounds normal; no masses, no organomegaly.   Pulses: 2+ and symmetric.   Skin: Skin color, texture, turgor normal. No rashes or lesions.     CN II-XII grossly intact, alert and appropriate with conversation and following commands.   Gait is non-antalgic. Able to tandem walk.   Cervical spine is non tender to palpation. Appropriate range of motion of neck, not concerning for lhermitte's phenomenon.   Bilateral bicep 2/4 and tricep reflexes 1/4. Sensation intact throughout upper extremities.     UE muscle strength  Right  Left    Deltoid  5/5  5/5    Biceps  5/5  5/5    Triceps  5/5  5/5    Hand intrinsics  5/5  5/5    Hand grasp  5/5  5/5    Lopez signs  neg  neg      Lumbar spine is non tender to palpation.  Intact sensation throughout lower extremities.   Bilateral patellar 2/4 and achilles reflex 1/4.     LE muscle strength  Right  Left    Iliopsoas (hip flexion)  5/5  5/5    Quad (knee extension)  5/5  5/5    Hamstring (knee flexion)  5/5  5/5    Gastrocnemius (PF)  5/5  5/5    Tibialis Ant. (DF)  5/5  5/5    EHL  5/5  5/5      Negative Babinski bilaterally. Negative for clonus.   Calves are soft and non-tender bilaterally.       ASSESSMENT/PLAN:     Ilda Nassar is a 89 year old female who presents to the clinic for consultation on  traumatic T8 and T9 compression fractures. The patient's most recent imaging was reviewed with her today. It was explained that images show marked anterior wedging of the presumed T8 vertebral body consistent with previously described burst fracture. Additional mild anterior wedging of T9.   Segmental kyphosis measures 47 degrees from T7 through T10 on radiographs compared to 27 degrees on the previous MRI. On exam, she is noted to have appropriate strength, sensation and range of motion. She has attempted conservative management with an OTS TLSO.    We encouraged her to continue to avoid excessive bending, twisting, and turning the thoracolumbar spine and to avoid excessive jostling and jarring activities. She will stay in the brace for another six weeks at which time she will return for updated imaging. In the event that patient's symptoms worsen or change we would like to see her sooner. We also discussed signs of a worsening problem that she should seek being evaluated.     We briefly discussed vertebroplasty / kyphoplasty.        Respectfully,     NIMO Manning PA-C  [facility]  [facility]  Tel: [phone]      Dr. Hussein mazariegos.       Again, thank you for allowing me to participate in the care of your patient.        Sincerely,        Vu Blanca PA-C

## 2024-08-29 NOTE — PROGRESS NOTES
NEUROSURGERY CLINIC CONSULT NOTE     DATE OF VISIT: 8/29/2024     SUBJECTIVE:     Ilda Nassar is a pleasant 89 year old female who presents to the clinic today for consultation on a T8 and T9 compression fracture. She is referred to the Neurosurgery Clinic by Dr. Swain in Primary Care.     Today, she reports a six-week history of symptoms. She describes a daily with fluctuating intensity, sharp, aching, pain that initiates in the midline thoracic region and does not radiate what-so-ever nor is this pain accompanied by paresthesia, numbness or perceived weakness. Thoracolumbar mobility seems to aggravate the symptoms, while alleviation is obtained by rest. A fall is associated with the onset of the pain. There are no bowel or bladder changes. She denies saddle anesthesia.     She is wearing an OTS TLSO.        Current Outpatient Medications:     ASPIRIN LOW DOSE 81 MG EC tablet, Take 1 tablet by mouth daily, Disp: , Rfl:     carvedilol (COREG) 1.5625 mg half-tab, Take 1.5625 mg by mouth 2 times daily (with meals), Disp: , Rfl:     citalopram (CELEXA) 10 MG tablet, Take 1 tablet (10 mg) by mouth daily, Disp: 90 tablet, Rfl: 1    diclofenac (VOLTAREN) 1 % topical gel, Apply 4 g topically 4 times daily as needed for moderate pain, Disp: , Rfl:     donepezil (ARICEPT) 10 MG tablet, Take 1 tablet (10 mg) by mouth At Bedtime, Disp: , Rfl:     furosemide (LASIX) 20 MG tablet, Take 20 mg by mouth daily If weight greater than 2 pounds from baseline, then take an extra tablet, Disp: , Rfl:     methocarbamol (ROBAXIN) 500 MG tablet, Take 0.5 tablets (250 mg) by mouth 3 times daily as needed for muscle spasms, Disp: 20 tablet, Rfl: 0    oxyCODONE (ROXICODONE) 5 MG tablet, Take 0.5 tablets (2.5 mg) by mouth every 4 hours as needed for severe pain, Disp: 8 tablet, Rfl: 0    potassium chloride caesar ER (KLOR-CON M20) 20 MEQ CR tablet, Take 20 mEq by mouth daily, Disp: , Rfl:     senna-docusate (SENOKOT-S/PERICOLACE) 8.6-50  MG tablet, Take 2 tablets by mouth 2 times daily as needed for constipation, Disp: , Rfl:     sodium chloride 1 GM tablet, Take 1 tablet (1 g) by mouth 2 times daily (with meals), Disp: , Rfl:      No Known Allergies     Past Medical History:   Diagnosis Date    Atrial fibrillation (H)     Atrial flutter (H)     Hyperlipidemia     Hypertension         ROS: 10 point review of symptoms are negative other than the symptoms noted above in the HPI.     Family History has been reviewed with the patient, there are no pertinent findings to presenting concern.     No past surgical history on file.     Social History     Tobacco Use    Smoking status: Former     Passive exposure: Never    Smokeless tobacco: Never   Substance Use Topics    Alcohol use: Yes     Comment: On occ, glass of wine    Drug use: Never        OBJECTIVE:   /70   Pulse 65   LMP  (LMP Unknown)   SpO2 93%    There is no height or weight on file to calculate BMI.     Imaging:     XR THORACIC SPINE 2 VIEWS  LOCATION: [facility]  DATE: 7/26/2024     INDICATION: t8 burst, upright XR in brace  COMPARISON: MRI 7/25/2024                                                                   IMPRESSION: Patient is imaged in a spinal brace. Diffuse osteopenia. 12 thoracic vertebra are assumed. Marked anterior wedging of the presumed T8 vertebral body consistent with previously described burst fracture. Additional mild anterior wedging of T9.   Segmental kyphosis measures 47 degrees from T7 through T10 on radiographs compared to 27 degrees on the previous MRI. Vertebral body heights elsewhere are maintained. Suboptimal evaluation of the lung parenchyma due to overpenetration. Suspected small   left pleural effusion.     Full radiological report in chart. Imaging was reviewed with with patient today.     Exam:     Patient appears comfortable, conversational, and in no apparent distress.   Head: Normocephalic, without obvious abnormality,  atraumatic, no facial asymmetry.   Eyes: conjunctivae/corneas clear. PERRL, EOM's intact.   Throat: lips, mucosa, and tongue normal; teeth and gums normal.   Neck: supple, symmetrical, trachea midline, no adenopathy and thyroid: not enlarged, symmetric, no tenderness/mass/nodules.   Lungs: clear to auscultation bilaterally.   Heart: regular rate and rhythm.   Abdomen: soft, non-tender; bowel sounds normal; no masses, no organomegaly.   Pulses: 2+ and symmetric.   Skin: Skin color, texture, turgor normal. No rashes or lesions.     CN II-XII grossly intact, alert and appropriate with conversation and following commands.   Gait is non-antalgic. Able to tandem walk.   Cervical spine is non tender to palpation. Appropriate range of motion of neck, not concerning for lhermitte's phenomenon.   Bilateral bicep 2/4 and tricep reflexes 1/4. Sensation intact throughout upper extremities.     UE muscle strength  Right  Left    Deltoid  5/5  5/5    Biceps  5/5  5/5    Triceps  5/5  5/5    Hand intrinsics  5/5  5/5    Hand grasp  5/5  5/5    Lopez signs  neg  neg      Lumbar spine is non tender to palpation.  Intact sensation throughout lower extremities.   Bilateral patellar 2/4 and achilles reflex 1/4.     LE muscle strength  Right  Left    Iliopsoas (hip flexion)  5/5  5/5    Quad (knee extension)  5/5  5/5    Hamstring (knee flexion)  5/5  5/5    Gastrocnemius (PF)  5/5  5/5    Tibialis Ant. (DF)  5/5  5/5    EHL  5/5  5/5      Negative Babinski bilaterally. Negative for clonus.   Calves are soft and non-tender bilaterally.       ASSESSMENT/PLAN:     Ilda Nassar is a 89 year old female who presents to the clinic for consultation on traumatic T8 and T9 compression fractures. The patient's most recent imaging was reviewed with her today. It was explained that images show marked anterior wedging of the presumed T8 vertebral body consistent with previously described burst fracture. Additional mild anterior wedging of T9.    Segmental kyphosis measures 47 degrees from T7 through T10 on radiographs compared to 27 degrees on the previous MRI. On exam, she is noted to have appropriate strength, sensation and range of motion. She has attempted conservative management with an OTS TLSO.    We encouraged her to continue to avoid excessive bending, twisting, and turning the thoracolumbar spine and to avoid excessive jostling and jarring activities. She will stay in the brace for another six weeks at which time she will return for updated imaging. In the event that patient's symptoms worsen or change we would like to see her sooner. We also discussed signs of a worsening problem that she should seek being evaluated.     We briefly discussed vertebroplasty / kyphoplasty.        Respectfully,     NIMO Manning, PA-BRIAN  [facility] Neurosurgery  [facility]  Tel: [phone]      Dr. Hussein mazariegos.

## 2024-08-29 NOTE — NURSING NOTE
"Ilda Nassar is a 89 year old female who presents for:  Chief Complaint   Patient presents with    Hospital F/U        Vitals:    Vitals:    08/29/24 1342   BP: 106/70   Pulse: 65   SpO2: 93%       BMI:  Estimated body mass index is 18.01 kg/m  as calculated from the following:    Height as of 7/22/24: 5' 5\" (1.651 m).    Weight as of 7/25/24: 108 lb 3.9 oz (49.1 kg).            Amendo Phorn      "

## 2024-08-30 ENCOUNTER — LAB REQUISITION (OUTPATIENT)
Dept: LAB | Facility: CLINIC | Age: 89
End: 2024-08-30
Payer: MEDICARE

## 2024-08-30 DIAGNOSIS — E87.1 HYPO-OSMOLALITY AND HYPONATREMIA: ICD-10-CM

## 2024-09-03 ENCOUNTER — TRANSFERRED RECORDS (OUTPATIENT)
Dept: FAMILY MEDICINE | Facility: CLINIC | Age: 89
End: 2024-09-03

## 2024-09-03 LAB
ANION GAP SERPL CALCULATED.3IONS-SCNC: 10 MMOL/L (ref 7–15)
BUN SERPL-MCNC: 13.4 MG/DL (ref 8–23)
CALCIUM SERPL-MCNC: 8.5 MG/DL (ref 8.8–10.4)
CHLORIDE SERPL-SCNC: 100 MMOL/L (ref 98–107)
CREAT SERPL-MCNC: 0.82 MG/DL (ref 0.51–0.95)
EGFRCR SERPLBLD CKD-EPI 2021: 67 ML/MIN/1.73M2
GLUCOSE SERPL-MCNC: 87 MG/DL (ref 70–99)
HCO3 SERPL-SCNC: 32 MMOL/L (ref 22–29)
POTASSIUM SERPL-SCNC: 4.2 MMOL/L (ref 3.4–5.3)
SODIUM SERPL-SCNC: 142 MMOL/L (ref 135–145)

## 2024-09-03 PROCEDURE — 82565 ASSAY OF CREATININE: CPT | Performed by: FAMILY MEDICINE

## 2024-09-03 PROCEDURE — 36415 COLL VENOUS BLD VENIPUNCTURE: CPT | Performed by: FAMILY MEDICINE

## 2024-09-03 PROCEDURE — 82374 ASSAY BLOOD CARBON DIOXIDE: CPT | Performed by: FAMILY MEDICINE

## 2024-09-03 PROCEDURE — 80048 BASIC METABOLIC PNL TOTAL CA: CPT | Performed by: FAMILY MEDICINE

## 2024-09-03 PROCEDURE — P9604 ONE-WAY ALLOW PRORATED TRIP: HCPCS | Performed by: FAMILY MEDICINE

## 2024-09-04 ENCOUNTER — TELEPHONE (OUTPATIENT)
Dept: NEUROSURGERY | Facility: CLINIC | Age: 89
End: 2024-09-04
Payer: MEDICARE

## 2024-09-04 NOTE — TELEPHONE ENCOUNTER
[facility]-schedule follow-up appointment with an [facility] in [place] in 4-6 weeks with imaging prior per staff message.

## 2024-09-12 NOTE — PROGRESS NOTES
Assessment & Plan   Problem List Items Addressed This Visit       Memory changes--followed by neurology    Other depression    Relevant Medications    citalopram (CELEXA) 10 MG tablet    hydrOXYzine HCl (ATARAX) 10 MG tablet    Acute on chronic congestive heart failure, unspecified heart failure type (H)    Relevant Orders    Adult Cardiology Eval  Referral - To a [facility] Location    Compression fracture of body of thoracic vertebra (H)    Relevant Medications    acetaminophen (TYLENOL) 500 MG tablet     Other Visit Diagnoses       Vomiting, unspecified vomiting type, unspecified whether nausea present    -  Primary    Relevant Orders    Adult GI  Referral - Consult Only - To a [facility] Location (Use POS/Location)    Encounter for completion of form with patient               1. Other depression  This seems to be working well for her, refilled.  - citalopram (CELEXA) 10 MG tablet; Take 1 tablet (10 mg) by mouth daily.  Dispense: 90 tablet; Refill: 1    2. Vomiting, unspecified vomiting type, unspecified whether nausea present  The sisters mentioned that the vomiting could be due to the oxycodone or the pain, but would like to see GI in the event that there is another cause.  - Adult GI  Referral - Consult Only - To a [facility] Location (Use POS/Location)    3. Acute on chronic congestive heart failure, unspecified heart failure type (H)  Some medications are managed by cardiology, she hasn't seen them in over a year, referral sent for her to followup with [facility].  - Adult Cardiology Eval  Referral - To a [facility] Location; Future    4. Encounter for completion of form with patient  Polst discussed and signed with patient and sisters present.    5. Memory changes--followed by neurology  They are wondering about stopping her aricept, not sure if this is helping her. Not sure when is the last time she saw neurology, could be a year ago.  They will call to get her back in.    6. Compression fracture of body of thoracic vertebra (H)  Working with neurosurgery. I told them their notes state that if continued pain, they may talk to her about doing a procedure. They were unsure if this would be a good idea given her age. They don't like her to be on oxycodone due to possible side effects (vomiting), but want a prescription for something different. I suggested tylenol but they don't think this works, they should call neurosurgery about other options that they can prescribe and follow, she also has an apt with neurosurgery in 2 weeks. I explained that they will want to know how much pain she is actually having.              FUTURE APPOINTMENTS:       - Follow-up visit as needed or when due for medication check. We manage her chronic medical care.    No follow-ups on file.    Tatianna Swain MD  [facility]    Subjective     Nursing Notes:   Jessica Barney, [facility]  9/13/2024 11:52 AM  Signed  Chief Complaint   Patient presents with    Recheck Medication     Refill citalopram, she was prescribed oxycodone which causes vomitting     Pre-visit Screening:  Immunizations:  up to date  Colonoscopy:  NA  Mammogram: NA  Asthma Action Test/Plan:  NA  PHQ9:  NA  GAD7:  NA  Questioned patient about current smoking habits Pt. quit smoking some time ago.  Ok to leave detailed message on voice mail for today's visit only Yes, phone # [phone]       Ilda Nassar is a 89 year old female who presents to clinic today for the following health issues   HPI     Here with sisters. Was recently hospitalized, after a fall. Low back pain, had a fracture. Was in TCU. Was seeing spine specialist.  She was having pain. Still says she has a lot of pain. Wondering about what to do about pain--from her neurosurgery. Has flup apt in 2 weeks with neurosurgery. She has tylenol on her list. Staff reports that she has gone a few days without oxycodone. Is currently  written for as needed.  She has vomiting with pain and with oxycodone.  Is back living in her home now. Tylenol doesn't seem to be helping.    They are concerned about her weight being down and has some vomiting with the pain medications.    Wondering about her other medications. She is on furosemide and carvedilol.      I started her on citalopram--she is doing well on this.    Also has memory problems--sees neurology.          Review of Systems   Constitutional, HEENT, cardiovascular, pulmonary, gi and gu systems are negative, except as otherwise noted.      Objective    /72 (BP Location: Right arm, Patient Position: Sitting, Cuff Size: Adult Regular)   Pulse 68   Temp 98.5  F (36.9  C) (Temporal)   Wt 45.8 kg (101 lb)   LMP  (LMP Unknown)   SpO2 97%   BMI 16.81 kg/m    Body mass index is 16.81 kg/m .  Physical Exam   GENERAL: alert and no distress  MS: no gross musculoskeletal defects noted, no edema  PSYCH: mentation appears normal, affect normal/bright  Back to room with walker    No results found for any visits on 09/13/24.

## 2024-09-13 ENCOUNTER — OFFICE VISIT (OUTPATIENT)
Dept: FAMILY MEDICINE | Facility: CLINIC | Age: 89
End: 2024-09-13

## 2024-09-13 VITALS
HEART RATE: 68 BPM | WEIGHT: 101 LBS | OXYGEN SATURATION: 97 % | TEMPERATURE: 98.5 F | BODY MASS INDEX: 16.81 KG/M2 | DIASTOLIC BLOOD PRESSURE: 72 MMHG | SYSTOLIC BLOOD PRESSURE: 108 MMHG

## 2024-09-13 DIAGNOSIS — I50.9 ACUTE ON CHRONIC CONGESTIVE HEART FAILURE, UNSPECIFIED HEART FAILURE TYPE (H): ICD-10-CM

## 2024-09-13 DIAGNOSIS — S22.000A COMPRESSION FRACTURE OF BODY OF THORACIC VERTEBRA (H): ICD-10-CM

## 2024-09-13 DIAGNOSIS — R11.10 VOMITING, UNSPECIFIED VOMITING TYPE, UNSPECIFIED WHETHER NAUSEA PRESENT: Primary | ICD-10-CM

## 2024-09-13 DIAGNOSIS — Z02.89 ENCOUNTER FOR COMPLETION OF FORM WITH PATIENT: ICD-10-CM

## 2024-09-13 DIAGNOSIS — F32.89 OTHER DEPRESSION: ICD-10-CM

## 2024-09-13 DIAGNOSIS — S22.070A CLOSED WEDGE COMPRESSION FRACTURE OF T9 VERTEBRA, INITIAL ENCOUNTER (H): Primary | ICD-10-CM

## 2024-09-13 DIAGNOSIS — R41.3 MEMORY CHANGES: ICD-10-CM

## 2024-09-13 DIAGNOSIS — S22.060A CLOSED WEDGE COMPRESSION FRACTURE OF T8 VERTEBRA, INITIAL ENCOUNTER (H): ICD-10-CM

## 2024-09-13 PROCEDURE — 99214 OFFICE O/P EST MOD 30 MIN: CPT | Performed by: FAMILY MEDICINE

## 2024-09-13 PROCEDURE — G2211 COMPLEX E/M VISIT ADD ON: HCPCS | Performed by: FAMILY MEDICINE

## 2024-09-13 RX ORDER — CITALOPRAM HYDROBROMIDE 10 MG/1
10 TABLET ORAL DAILY
Qty: 90 TABLET | Refills: 1 | Status: SHIPPED | OUTPATIENT
Start: 2024-09-13

## 2024-09-13 RX ORDER — CARVEDILOL 3.12 MG/1
3.12 TABLET ORAL 2 TIMES DAILY WITH MEALS
COMMUNITY
Start: 2024-09-13

## 2024-09-13 RX ORDER — ACETAMINOPHEN 500 MG
500-1000 TABLET ORAL EVERY 6 HOURS PRN
COMMUNITY
Start: 2024-09-13

## 2024-09-13 RX ORDER — ONDANSETRON 4 MG/1
4 TABLET, ORALLY DISINTEGRATING ORAL EVERY 8 HOURS PRN
COMMUNITY
Start: 2024-09-13

## 2024-09-13 RX ORDER — HYDROXYZINE HYDROCHLORIDE 10 MG/1
10 TABLET, FILM COATED ORAL 3 TIMES DAILY PRN
COMMUNITY
Start: 2024-09-13

## 2024-09-13 RX ORDER — LIDOCAINE 4 G/G
1 PATCH TOPICAL EVERY 24 HOURS
Qty: 10 PATCH | Refills: 0 | Status: SHIPPED | OUTPATIENT
Start: 2024-09-13

## 2024-09-13 NOTE — TELEPHONE ENCOUNTER
Elisa Munroe PA-C recommends trying the atarax with tylenol and robaxin, try to alternate ibuprofen with tylenol,  OK to send lidocaine patches and voltaren gel to assist with pain.    We have not operated on this patient therefore we have not been in charge of medications at this time     If pain is unbearable patient should present for better pain control inpatient. Assuming if oxy causes vomiting likely dilaudid and other meds similar would cause same reaction.    Called patient's RN to discuss recommendations from neurosurgery provider    Facility RN is requesting a hold order for the oxycodone. Reviewed that it was not prescribed by neurosurgery. She states patient saw PCP who did not provide new oxycodone guidance. Wondering if neurosurgery can provide this. Will need order faxed to [phone].

## 2024-09-13 NOTE — TELEPHONE ENCOUNTER
Other: Jodi Paez is calling for Patricia and wondering if there is a different pain med that Pat can take because she is getting will sick when she takes the oxycodone. [facility]. Please call Jodi  or the RN at the [facility] that Pat is currently at [phone] name Berna Raghav    Could we send this information to you in [employer] or would you prefer to receive a phone call?:   Patient would prefer a phone call   Okay to leave a detailed message?: Yes at Other phone number:  [phone]

## 2024-09-13 NOTE — TELEPHONE ENCOUNTER
Patient saw Vu Blanca PA-C on 8/29/24 for consultation on a T8 and T9 compression fracture. Next follow-up is scheduled for 10/3.    Patient was provided rx oxycodone upon discharge from the hospital, not from neurosurgery provider.    Patient reportedly having nausea and vomiting with oxycodone. Wondering about a different medication. Patient saw PCP today who prescribed zofran.    Called patient's sister Jodi (C2C on file) to discuss. Jodi states patient is at [facility]. She is not able to tolerate the oxycodone (2.5 mg). She states every time patient gets medication, even after eating, it causes her to throw up. Care team at [facility] has stopped oxycodone but patient is rating pain 6-8/10 and requesting medication.     Patient is getting Tylenol and methocarbamol but they are not providing enough relief.     Advised an update will be sent to provider team. Jodi requesting RN at the [facility] be called with updates, Berna Avilez [phone].

## 2024-09-16 ENCOUNTER — DOCUMENTATION ONLY (OUTPATIENT)
Dept: OTHER | Facility: CLINIC | Age: 89
End: 2024-09-16
Payer: MEDICARE

## 2024-09-23 ENCOUNTER — TRANSFERRED RECORDS (OUTPATIENT)
Dept: FAMILY MEDICINE | Facility: CLINIC | Age: 89
End: 2024-09-23

## 2024-09-25 ENCOUNTER — TELEPHONE (OUTPATIENT)
Dept: FAMILY MEDICINE | Facility: CLINIC | Age: 89
End: 2024-09-25

## 2024-10-03 ENCOUNTER — OFFICE VISIT (OUTPATIENT)
Dept: NEUROSURGERY | Facility: CLINIC | Age: 89
End: 2024-10-03
Attending: PHYSICIAN ASSISTANT
Payer: MEDICARE

## 2024-10-03 ENCOUNTER — ANCILLARY PROCEDURE (OUTPATIENT)
Dept: GENERAL RADIOLOGY | Facility: CLINIC | Age: 89
End: 2024-10-03
Attending: PHYSICIAN ASSISTANT
Payer: MEDICARE

## 2024-10-03 VITALS — HEART RATE: 51 BPM | DIASTOLIC BLOOD PRESSURE: 74 MMHG | SYSTOLIC BLOOD PRESSURE: 112 MMHG | OXYGEN SATURATION: 98 %

## 2024-10-03 DIAGNOSIS — S22.060A CLOSED WEDGE COMPRESSION FRACTURE OF T8 VERTEBRA, INITIAL ENCOUNTER (H): ICD-10-CM

## 2024-10-03 DIAGNOSIS — S22.080D COMPRESSION FRACTURE OF T12 VERTEBRA WITH ROUTINE HEALING, SUBSEQUENT ENCOUNTER: Primary | ICD-10-CM

## 2024-10-03 DIAGNOSIS — S22.070A CLOSED WEDGE COMPRESSION FRACTURE OF T9 VERTEBRA, INITIAL ENCOUNTER (H): ICD-10-CM

## 2024-10-03 PROCEDURE — 72070 X-RAY EXAM THORAC SPINE 2VWS: CPT | Mod: TC | Performed by: RADIOLOGY

## 2024-10-03 PROCEDURE — G0463 HOSPITAL OUTPT CLINIC VISIT: HCPCS | Performed by: PHYSICIAN ASSISTANT

## 2024-10-03 PROCEDURE — 99213 OFFICE O/P EST LOW 20 MIN: CPT | Performed by: PHYSICIAN ASSISTANT

## 2024-10-03 ASSESSMENT — PAIN SCALES - GENERAL: PAINLEVEL: NO PAIN (0)

## 2024-10-03 NOTE — LETTER
10/3/2024      Ilda Nassar  [address]  [facility] [address]      Dear Colleague,    Thank you for referring your patient, Ilda Nassar, to the [facility] [place]. Please see a copy of my visit note below.    NEUROSURGERY CLINIC PROGRESS NOTE    DATE OF VISIT: 10/3/2024    HPI:     Ilda Nassar is a pleasant 89 year old female who presents to the clinic today for a 12-week follow-up visit on T8 burst fracture and a compression deformity of the T9 vertebral level.   She has been wearing an OTS TLSO.     Today, the patient reports that she is quite pleased with her outcome. She rates her pain at 0/10.     Current Outpatient Medications   Medication Sig Dispense Refill     acetaminophen (TYLENOL) 500 MG tablet Take 1-2 tablets (500-1,000 mg) by mouth every 6 hours as needed for mild pain.       ASPIRIN LOW DOSE 81 MG EC tablet Take 1 tablet by mouth daily       carvedilol (COREG) 3.125 MG tablet Take 1 tablet (3.125 mg) by mouth 2 times daily (with meals).       citalopram (CELEXA) 10 MG tablet Take 1 tablet (10 mg) by mouth daily. 90 tablet 1     diclofenac (VOLTAREN) 1 % topical gel Apply 2 g topically 4 times daily. 50 g 0     donepezil (ARICEPT) 10 MG tablet Take 1 tablet (10 mg) by mouth At Bedtime       furosemide (LASIX) 20 MG tablet Take 20 mg by mouth daily If weight greater than 2 pounds from baseline, then take an extra tablet       hydrOXYzine HCl (ATARAX) 10 MG tablet Take 1 tablet (10 mg) by mouth 3 times daily as needed for itching.       Lidocaine (LIDOCARE) 4 % Patch Place 1 patch over 12 hours onto the skin every 24 hours. To prevent lidocaine toxicity, patient should be patch free for 12 hrs daily. 10 patch 0     methocarbamol (ROBAXIN) 500 MG tablet Take 0.5 tablets (250 mg) by mouth 3 times daily as needed for muscle spasms 20 tablet 0     omeprazole (PRILOSEC) 20 MG DR capsule Take 1 capsule (20 mg) by mouth daily.       ondansetron  (ZOFRAN ODT) 4 MG ODT tab Take 1 tablet (4 mg) by mouth every 8 hours as needed for nausea.       oxyCODONE (ROXICODONE) 5 MG tablet Take 0.5 tablets (2.5 mg) by mouth every 4 hours as needed for severe pain 8 tablet 0     potassium chloride caesar ER (KLOR-CON M20) 20 MEQ CR tablet Take 20 mEq by mouth daily       senna-docusate (SENOKOT-S/PERICOLACE) 8.6-50 MG tablet Take 2 tablets by mouth 2 times daily as needed for constipation       sodium chloride 1 GM tablet Take 1 tablet (1 g) by mouth 2 times daily (with meals)       No current facility-administered medications for this visit.     No Known Allergies    Past Medical History:   Diagnosis Date     Atrial fibrillation (H)      Atrial flutter (H)      Hyperlipidemia      Hypertension        Review Of Systems    Skin: negative  Eyes: negative  Ears/Nose/Throat: negative  Respiratory: No shortness of breath, dyspnea on exertion, cough, or hemoptysis  Cardiovascular: negative  Gastrointestinal: negative  Musculoskeletal: negative  Neurologic: negative  Psychiatric: negative  Hematologic/Lymphatic/Immunologic: negative  Endocrine: negative    OBJECTIVE:    /74   Pulse 51   LMP  (LMP Unknown)   SpO2 98%     Imaging:    Obtained and reviewed today. No concerns.     Radiographic Findings: Full radiological report in chart. I personally reviewed the images with the patient today.    Exam:    Patient appears comfortable and in no apparent distress. Moving all extremities.  Gait is non-antalgic.  CN II-XII grossly intact, alert and appropriate with conversation and following  commands  Bilateral upper extremities with full strength including hand intrinsics and grasp.  Sensation intact throughout.  Bilateral lower extremities 5/5 strength including plantar and dorsiflexion.  Normal sensation throughout bilaterally.    PLAN:    Ilda Nassar is three months out from a T8 burst fracture and a compression deformity of the T9 vertebral level. These fractures are a  result of a fall. She has been wearing an OTS TLSO.     Today, she reports that she is quite pleased with her outcome. She rates her pain at 0/10.     The patient has remained compliant with the restrictions which included  not lifting anything greater than 5-10 lbs. Today we discussed increasing activity from  10 lbs by 2-5 lbs per week, but encouraged continuing to avoid excessive bending,  twisting, and turning at the waist and to avoid jostling and jarring activities.     She will start a two-week weaning program for her brace.     The patient gave verbal understanding and is in agreement with the above plan. She  will call or return to the clinic for any worsening or changes in symptoms.      Respectfully,     NIMO Bartlett PA-C      Again, thank you for allowing me to participate in the care of your patient.        Sincerely,        Vu Blanca PA-C

## 2024-10-03 NOTE — NURSING NOTE
"Ilda Nassar is a 89 year old female who presents for:  Chief Complaint   Patient presents with    RECHECK     12 week follow-up          Vitals:    Vitals:    10/03/24 1118   BP: 112/74   Pulse: 51   SpO2: 98%       BMI:  Estimated body mass index is 16.81 kg/m  as calculated from the following:    Height as of 7/22/24: 5' 5\" (1.651 m).    Weight as of 9/13/24: 101 lb (45.8 kg).    Pain Score:  No Pain (0)        Amendo Phorn      "

## 2024-10-03 NOTE — PROGRESS NOTES
NEUROSURGERY CLINIC PROGRESS NOTE    DATE OF VISIT: 10/3/2024    HPI:     Ilda Nassar is a pleasant 89 year old female who presents to the clinic today for a 12-week follow-up visit on T8 burst fracture and a compression deformity of the T9 vertebral level.   She has been wearing an OTS TLSO.     Today, the patient reports that she is quite pleased with her outcome. She rates her pain at 0/10.     Current Outpatient Medications   Medication Sig Dispense Refill    acetaminophen (TYLENOL) 500 MG tablet Take 1-2 tablets (500-1,000 mg) by mouth every 6 hours as needed for mild pain.      ASPIRIN LOW DOSE 81 MG EC tablet Take 1 tablet by mouth daily      carvedilol (COREG) 3.125 MG tablet Take 1 tablet (3.125 mg) by mouth 2 times daily (with meals).      citalopram (CELEXA) 10 MG tablet Take 1 tablet (10 mg) by mouth daily. 90 tablet 1    diclofenac (VOLTAREN) 1 % topical gel Apply 2 g topically 4 times daily. 50 g 0    donepezil (ARICEPT) 10 MG tablet Take 1 tablet (10 mg) by mouth At Bedtime      furosemide (LASIX) 20 MG tablet Take 20 mg by mouth daily If weight greater than 2 pounds from baseline, then take an extra tablet      hydrOXYzine HCl (ATARAX) 10 MG tablet Take 1 tablet (10 mg) by mouth 3 times daily as needed for itching.      Lidocaine (LIDOCARE) 4 % Patch Place 1 patch over 12 hours onto the skin every 24 hours. To prevent lidocaine toxicity, patient should be patch free for 12 hrs daily. 10 patch 0    methocarbamol (ROBAXIN) 500 MG tablet Take 0.5 tablets (250 mg) by mouth 3 times daily as needed for muscle spasms 20 tablet 0    omeprazole (PRILOSEC) 20 MG DR capsule Take 1 capsule (20 mg) by mouth daily.      ondansetron (ZOFRAN ODT) 4 MG ODT tab Take 1 tablet (4 mg) by mouth every 8 hours as needed for nausea.      oxyCODONE (ROXICODONE) 5 MG tablet Take 0.5 tablets (2.5 mg) by mouth every 4 hours as needed for severe pain 8 tablet 0    potassium chloride caesar ER (KLOR-CON M20) 20 MEQ CR tablet  Take 20 mEq by mouth daily      senna-docusate (SENOKOT-S/PERICOLACE) 8.6-50 MG tablet Take 2 tablets by mouth 2 times daily as needed for constipation      sodium chloride 1 GM tablet Take 1 tablet (1 g) by mouth 2 times daily (with meals)       No current facility-administered medications for this visit.     No Known Allergies    Past Medical History:   Diagnosis Date    Atrial fibrillation (H)     Atrial flutter (H)     Hyperlipidemia     Hypertension        Review Of Systems    Skin: negative  Eyes: negative  Ears/Nose/Throat: negative  Respiratory: No shortness of breath, dyspnea on exertion, cough, or hemoptysis  Cardiovascular: negative  Gastrointestinal: negative  Musculoskeletal: negative  Neurologic: negative  Psychiatric: negative  Hematologic/Lymphatic/Immunologic: negative  Endocrine: negative    OBJECTIVE:    /74   Pulse 51   LMP  (LMP Unknown)   SpO2 98%     Imaging:    Obtained and reviewed today. No concerns.     Radiographic Findings: Full radiological report in chart. I personally reviewed the images with the patient today.    Exam:    Patient appears comfortable and in no apparent distress. Moving all extremities.  Gait is non-antalgic.  CN II-XII grossly intact, alert and appropriate with conversation and following  commands  Bilateral upper extremities with full strength including hand intrinsics and grasp.  Sensation intact throughout.  Bilateral lower extremities 5/5 strength including plantar and dorsiflexion.  Normal sensation throughout bilaterally.    PLAN:    Ilda Nassar is three months out from a T8 burst fracture and a compression deformity of the T9 vertebral level. These fractures are a result of a fall. She has been wearing an OTS TLSO.     Today, she reports that she is quite pleased with her outcome. She rates her pain at 0/10.     The patient has remained compliant with the restrictions which included  not lifting anything greater than 5-10 lbs. Today we discussed  increasing activity from  10 lbs by 2-5 lbs per week, but encouraged continuing to avoid excessive bending,  twisting, and turning at the waist and to avoid jostling and jarring activities.     She will start a two-week weaning program for her brace.     The patient gave verbal understanding and is in agreement with the above plan. She  will call or return to the clinic for any worsening or changes in symptoms.      Respectfully,     NIMO Bartlett, PAAlexaC

## 2024-10-07 RX ORDER — ONDANSETRON 4 MG/1
TABLET, ORALLY DISINTEGRATING ORAL
COMMUNITY
Start: 2024-10-07

## 2024-10-07 NOTE — TELEPHONE ENCOUNTER
Ilda Nassar is requesting a refill of:    Refused Prescriptions:                       Disp   Refills    ondansetron (ZOFRAN ODT) 4 MG ODT tab [Pha*                Sig: DISSOLVE 1 TABLET BY MOUTH EVERY 6 HOURS AS NEEDED  Refused By: TALIA KRAFT  Reason for Refusal: Originating/Specialty Provider to approve    Medication was to be used PRN when taking oxycodone

## 2024-10-08 ENCOUNTER — TELEPHONE (OUTPATIENT)
Dept: NEUROSURGERY | Facility: CLINIC | Age: 89
End: 2024-10-08
Payer: MEDICARE

## 2024-10-08 NOTE — TELEPHONE ENCOUNTER
Medications prescribed by patients PCP, Dr. Swain, not neurosurgery.     Returned call to pharmacy to provide this update, they verbalized understanding and will contact patients PCP.

## 2024-10-08 NOTE — TELEPHONE ENCOUNTER
[facility] Call Center    Phone Message    May a detailed message be left on voicemail: yes     Reason for Call: Medication Refill Request    Has the patient contacted the pharmacy for the refill? Yes     Name of medication being requested:     hydrOXYzine HCl (ATARAX) 10 MG tablet     potassium chloride caesar ER (KLOR-CON M20) 20 MEQ CR tablet     omeprazole (PRILOSEC) 20 MG DR capsule     donepezil (ARICEPT) 10 MG tablet     citalopram (CELEXA) 10 MG tablet     ondansetron (ZOFRAN ODT) 4 MG ODT tab     Provider who prescribed the medication: Elisa Munroe    Pharmacy: [facility] in [place]    Date medication is needed: ASAP, Pharmacy said that they have been faxing this to us several times and now need this today.

## 2024-10-18 ENCOUNTER — MEDICAL CORRESPONDENCE (OUTPATIENT)
Dept: HEALTH INFORMATION MANAGEMENT | Facility: CLINIC | Age: 89
End: 2024-10-18
Payer: MEDICARE

## 2025-01-14 RX ORDER — POTASSIUM CHLORIDE 750 MG/1
CAPSULE, EXTENDED RELEASE ORAL
COMMUNITY
Start: 2025-01-14

## 2025-01-14 NOTE — TELEPHONE ENCOUNTER
Ilda Nassar is requesting a refill of:    Refused Prescriptions:                       Disp   Refills    potassium chloride ER (MICRO-K) 10 MEQ CR *                Sig: ADMINISTER 2 CAPSULES (20MEQ) BY MOUTH ONCE DAILY. OK           TO OPEN CAPSULE AND SPRINKLE ON FOOD OR DISSOLVE           PRIOR TO ADMNISTRATION.  Refused By: TALIA KRAFT  Reason for Refusal: Originating/Specialty Provider to approve

## 2025-01-16 RX ORDER — POTASSIUM CHLORIDE 750 MG/1
CAPSULE, EXTENDED RELEASE ORAL
COMMUNITY
Start: 2025-01-16

## 2025-01-16 NOTE — TELEPHONE ENCOUNTER
Ilda Nassar is requesting a refill of:    Refused Prescriptions:                       Disp   Refills    potassium chloride ER (MICRO-K) 10 MEQ CR *                Sig: ADMINISTER 2 CAPSULES (20MEQ) BY MOUTH ONCE DAILY. OK           TO OPEN CAPSULE AND SPRINKLE ON FOOD OR DISSOLVE           PRIOR TO ADMNISTRATION.  Refused By: TALIA KRAFT  Reason for Refusal: Originating/Specialty Provider to approve  Reason for Refusal Comment: not prescribed by us, send to correct provider    Pt in hospice, not being prescribed by us

## 2025-05-17 ENCOUNTER — HEALTH MAINTENANCE LETTER (OUTPATIENT)
Age: OVER 89
End: 2025-05-17